# Patient Record
Sex: FEMALE | Race: WHITE | NOT HISPANIC OR LATINO | Employment: UNEMPLOYED | URBAN - NONMETROPOLITAN AREA
[De-identification: names, ages, dates, MRNs, and addresses within clinical notes are randomized per-mention and may not be internally consistent; named-entity substitution may affect disease eponyms.]

---

## 2017-02-15 ENCOUNTER — HOSPITAL ENCOUNTER (OUTPATIENT)
Dept: ULTRASOUND IMAGING | Facility: HOSPITAL | Age: 42
Discharge: HOME | End: 2017-02-15
Admitting: NURSE PRACTITIONER

## 2017-02-15 ENCOUNTER — TRANSCRIBE ORDERS (OUTPATIENT)
Dept: GENERAL RADIOLOGY | Facility: HOSPITAL | Age: 42
End: 2017-02-15

## 2017-02-15 DIAGNOSIS — N63.0 BREAST LUMP: Primary | ICD-10-CM

## 2017-02-15 DIAGNOSIS — N63.0 BREAST LUMP: ICD-10-CM

## 2017-02-15 PROCEDURE — 76642 ULTRASOUND BREAST LIMITED: CPT

## 2017-02-20 ENCOUNTER — TRANSCRIBE ORDERS (OUTPATIENT)
Dept: ADMINISTRATIVE | Facility: HOSPITAL | Age: 42
End: 2017-02-20

## 2017-02-20 DIAGNOSIS — N63.0 BREAST MASS: Primary | ICD-10-CM

## 2017-02-21 ENCOUNTER — APPOINTMENT (OUTPATIENT)
Dept: MRI IMAGING | Facility: HOSPITAL | Age: 42
End: 2017-02-21
Attending: SPECIALIST

## 2017-02-23 ENCOUNTER — APPOINTMENT (OUTPATIENT)
Dept: MRI IMAGING | Facility: HOSPITAL | Age: 42
End: 2017-02-23
Attending: SPECIALIST

## 2017-02-24 ENCOUNTER — HOSPITAL ENCOUNTER (OUTPATIENT)
Dept: MRI IMAGING | Facility: HOSPITAL | Age: 42
Discharge: HOME | End: 2017-02-24
Attending: SPECIALIST | Admitting: SPECIALIST

## 2017-02-24 DIAGNOSIS — N63.0 BREAST MASS: ICD-10-CM

## 2017-02-24 LAB — CREAT BLDA-MCNC: 0.9 MG/DL (ref 0.6–1.3)

## 2017-02-24 PROCEDURE — A9577 INJ MULTIHANCE: HCPCS | Performed by: SPECIALIST

## 2017-02-24 PROCEDURE — 82565 ASSAY OF CREATININE: CPT

## 2017-02-24 PROCEDURE — C8908 MRI W/O FOL W/CONT, BREAST,: HCPCS

## 2017-02-24 PROCEDURE — 0 GADOBENATE DIMEGLUMINE 529 MG/ML SOLUTION: Performed by: SPECIALIST

## 2017-02-24 PROCEDURE — 0159T HC MRI BREAST COMPUTER ANALYSIS: CPT

## 2017-02-24 RX ADMIN — GADOBENATE DIMEGLUMINE 20 ML: 529 INJECTION, SOLUTION INTRAVENOUS at 16:00

## 2017-04-21 ENCOUNTER — HOSPITAL ENCOUNTER (OUTPATIENT)
Age: 42
Setting detail: OUTPATIENT SURGERY
Discharge: HOME | End: 2017-04-21
Attending: ORTHOPAEDIC SURGERY | Admitting: ORTHOPAEDIC SURGERY

## 2017-04-21 ENCOUNTER — HOSPITAL ENCOUNTER (OUTPATIENT)
Dept: GENERAL RADIOLOGY | Age: 42
Discharge: HOME | End: 2017-04-21
Payer: COMMERCIAL

## 2017-04-21 VITALS
RESPIRATION RATE: 18 BRPM | BODY MASS INDEX: 20.76 KG/M2 | OXYGEN SATURATION: 99 % | SYSTOLIC BLOOD PRESSURE: 115 MMHG | HEIGHT: 70 IN | HEART RATE: 69 BPM | DIASTOLIC BLOOD PRESSURE: 68 MMHG | WEIGHT: 145 LBS

## 2017-04-21 DIAGNOSIS — R52 PAIN: ICD-10-CM

## 2017-04-21 PROBLEM — M16.11 PRIMARY OSTEOARTHRITIS OF RIGHT HIP: Status: ACTIVE | Noted: 2017-04-21

## 2017-04-21 PROCEDURE — G8918 PT W/O PREOP ORDER IV AB PRO: HCPCS

## 2017-04-21 PROCEDURE — G8907 PT DOC NO EVENTS ON DISCHARG: HCPCS

## 2017-04-21 PROCEDURE — 20610 DRAIN/INJ JOINT/BURSA W/O US: CPT

## 2017-04-21 PROCEDURE — 3209999900 FLUORO FOR SURGICAL PROCEDURES

## 2017-04-21 RX ORDER — BUPIVACAINE HYDROCHLORIDE 5 MG/ML
INJECTION, SOLUTION EPIDURAL; INTRACAUDAL PRN
Status: DISCONTINUED | OUTPATIENT
Start: 2017-04-21 | End: 2017-04-21 | Stop reason: HOSPADM

## 2017-04-21 RX ORDER — LIDOCAINE HYDROCHLORIDE 10 MG/ML
INJECTION, SOLUTION EPIDURAL; INFILTRATION; INTRACAUDAL; PERINEURAL PRN
Status: DISCONTINUED | OUTPATIENT
Start: 2017-04-21 | End: 2017-04-21 | Stop reason: HOSPADM

## 2017-04-21 RX ORDER — BETAMETHASONE SODIUM PHOSPHATE AND BETAMETHASONE ACETATE 3; 3 MG/ML; MG/ML
INJECTION, SUSPENSION INTRA-ARTICULAR; INTRALESIONAL; INTRAMUSCULAR; SOFT TISSUE PRN
Status: DISCONTINUED | OUTPATIENT
Start: 2017-04-21 | End: 2017-04-21 | Stop reason: HOSPADM

## 2017-06-21 ENCOUNTER — OFFICE VISIT (OUTPATIENT)
Dept: INTERNAL MEDICINE | Age: 42
End: 2017-06-21
Payer: COMMERCIAL

## 2017-06-21 VITALS
HEIGHT: 70 IN | HEART RATE: 86 BPM | WEIGHT: 149 LBS | BODY MASS INDEX: 21.33 KG/M2 | TEMPERATURE: 98.1 F | OXYGEN SATURATION: 99 % | DIASTOLIC BLOOD PRESSURE: 64 MMHG | SYSTOLIC BLOOD PRESSURE: 112 MMHG

## 2017-06-21 DIAGNOSIS — Z00.00 ANNUAL PHYSICAL EXAM: Primary | ICD-10-CM

## 2017-06-21 DIAGNOSIS — Z72.0 TOBACCO ABUSE: ICD-10-CM

## 2017-06-21 DIAGNOSIS — I10 ESSENTIAL HYPERTENSION: ICD-10-CM

## 2017-06-21 DIAGNOSIS — F41.9 ANXIETY: ICD-10-CM

## 2017-06-21 DIAGNOSIS — M16.11 PRIMARY OSTEOARTHRITIS OF RIGHT HIP: ICD-10-CM

## 2017-06-21 PROCEDURE — 99204 OFFICE O/P NEW MOD 45 MIN: CPT | Performed by: NURSE PRACTITIONER

## 2017-06-21 RX ORDER — BUPROPION HYDROCHLORIDE 150 MG/1
150 TABLET, EXTENDED RELEASE ORAL DAILY
COMMUNITY
Start: 2017-05-02 | End: 2017-08-28 | Stop reason: ALTCHOICE

## 2017-06-21 RX ORDER — HYDROCHLOROTHIAZIDE 25 MG/1
12.5 TABLET ORAL DAILY
Qty: 30 TABLET | Refills: 0 | Status: SHIPPED | OUTPATIENT
Start: 2017-06-21 | End: 2017-07-25 | Stop reason: SDUPTHER

## 2017-06-21 RX ORDER — LORAZEPAM 1 MG/1
1 TABLET ORAL PRN
COMMUNITY
Start: 2017-05-02 | End: 2018-01-31 | Stop reason: ALTCHOICE

## 2017-06-21 ASSESSMENT — ENCOUNTER SYMPTOMS
VOMITING: 0
COLOR CHANGE: 0
BACK PAIN: 0
NAUSEA: 0
SHORTNESS OF BREATH: 0
COUGH: 0
PHOTOPHOBIA: 0
RHINORRHEA: 0
VOICE CHANGE: 0

## 2017-06-21 ASSESSMENT — PATIENT HEALTH QUESTIONNAIRE - PHQ9
SUM OF ALL RESPONSES TO PHQ QUESTIONS 1-9: 0
2. FEELING DOWN, DEPRESSED OR HOPELESS: 0
SUM OF ALL RESPONSES TO PHQ9 QUESTIONS 1 & 2: 0
1. LITTLE INTEREST OR PLEASURE IN DOING THINGS: 0

## 2017-07-06 RX ORDER — ATENOLOL 25 MG/1
25 TABLET ORAL DAILY
Qty: 30 TABLET | Refills: 3 | Status: SHIPPED | OUTPATIENT
Start: 2017-07-06 | End: 2017-08-02 | Stop reason: SDUPTHER

## 2017-07-21 DIAGNOSIS — Z00.00 ANNUAL PHYSICAL EXAM: ICD-10-CM

## 2017-07-21 LAB
ALBUMIN SERPL-MCNC: 4 G/DL (ref 3.5–5.2)
ALP BLD-CCNC: 51 U/L (ref 35–104)
ALT SERPL-CCNC: 7 U/L (ref 5–33)
ANION GAP SERPL CALCULATED.3IONS-SCNC: 15 MMOL/L (ref 7–19)
AST SERPL-CCNC: 12 U/L (ref 5–32)
BACTERIA: NEGATIVE /HPF
BASOPHILS ABSOLUTE: 0.1 K/UL (ref 0–0.2)
BASOPHILS RELATIVE PERCENT: 0.9 % (ref 0–1)
BILIRUB SERPL-MCNC: 0.3 MG/DL (ref 0.2–1.2)
BILIRUBIN URINE: NEGATIVE
BLOOD, URINE: ABNORMAL
BUN BLDV-MCNC: 10 MG/DL (ref 6–20)
CALCIUM SERPL-MCNC: 9.4 MG/DL (ref 8.6–10)
CHLORIDE BLD-SCNC: 102 MMOL/L (ref 98–111)
CHOLESTEROL, TOTAL: 182 MG/DL (ref 160–199)
CLARITY: ABNORMAL
CO2: 25 MMOL/L (ref 22–29)
COLOR: YELLOW
CREAT SERPL-MCNC: 0.8 MG/DL (ref 0.5–0.9)
EOSINOPHILS ABSOLUTE: 0.1 K/UL (ref 0–0.6)
EOSINOPHILS RELATIVE PERCENT: 1.9 % (ref 0–5)
EPITHELIAL CELLS, UA: 6 /HPF (ref 0–5)
GFR NON-AFRICAN AMERICAN: >60
GLUCOSE BLD-MCNC: 85 MG/DL (ref 74–109)
GLUCOSE URINE: NEGATIVE MG/DL
HBA1C MFR BLD: 5.4 %
HCT VFR BLD CALC: 39.5 % (ref 37–47)
HDLC SERPL-MCNC: 71 MG/DL (ref 65–121)
HEMOGLOBIN: 13.2 G/DL (ref 12–16)
HYALINE CASTS: 4 /HPF (ref 0–8)
KETONES, URINE: NEGATIVE MG/DL
LDL CHOLESTEROL CALCULATED: 96 MG/DL
LEUKOCYTE ESTERASE, URINE: NEGATIVE
LYMPHOCYTES ABSOLUTE: 2 K/UL (ref 1.1–4.5)
LYMPHOCYTES RELATIVE PERCENT: 35.1 % (ref 20–40)
MCH RBC QN AUTO: 34.4 PG (ref 27–31)
MCHC RBC AUTO-ENTMCNC: 33.4 G/DL (ref 33–37)
MCV RBC AUTO: 102.9 FL (ref 81–99)
MONOCYTES ABSOLUTE: 0.5 K/UL (ref 0–0.9)
MONOCYTES RELATIVE PERCENT: 8.3 % (ref 0–10)
NEUTROPHILS ABSOLUTE: 3.1 K/UL (ref 1.5–7.5)
NEUTROPHILS RELATIVE PERCENT: 53.6 % (ref 50–65)
NITRITE, URINE: NEGATIVE
PDW BLD-RTO: 11.5 % (ref 11.5–14.5)
PH UA: 5.5
PLATELET # BLD: 230 K/UL (ref 130–400)
PMV BLD AUTO: 10.3 FL (ref 9.4–12.3)
POTASSIUM SERPL-SCNC: 3.6 MMOL/L (ref 3.5–5)
PROTEIN UA: NEGATIVE MG/DL
RBC # BLD: 3.84 M/UL (ref 4.2–5.4)
RBC UA: 2 /HPF (ref 0–4)
SODIUM BLD-SCNC: 142 MMOL/L (ref 136–145)
SPECIFIC GRAVITY UA: 1.02
TOTAL PROTEIN: 7.3 G/DL (ref 6.6–8.7)
TRIGL SERPL-MCNC: 74 MG/DL (ref 150–199)
TSH SERPL DL<=0.05 MIU/L-ACNC: 2.56 UIU/ML (ref 0.27–4.2)
UROBILINOGEN, URINE: 0.2 E.U./DL
WBC # BLD: 5.8 K/UL (ref 4.8–10.8)
WBC UA: 1 /HPF (ref 0–5)

## 2017-07-23 LAB — VITAMIN D 1,25-DIHYDROXY: 28.5 PG/ML (ref 19.9–79.3)

## 2017-07-25 ENCOUNTER — TELEPHONE (OUTPATIENT)
Dept: CARDIOLOGY | Age: 42
End: 2017-07-25

## 2017-07-25 ENCOUNTER — OFFICE VISIT (OUTPATIENT)
Dept: INTERNAL MEDICINE | Age: 42
End: 2017-07-25
Payer: COMMERCIAL

## 2017-07-25 VITALS
TEMPERATURE: 98.4 F | BODY MASS INDEX: 21.47 KG/M2 | SYSTOLIC BLOOD PRESSURE: 132 MMHG | HEART RATE: 65 BPM | HEIGHT: 70 IN | DIASTOLIC BLOOD PRESSURE: 80 MMHG | OXYGEN SATURATION: 99 % | WEIGHT: 150 LBS

## 2017-07-25 DIAGNOSIS — F41.9 ANXIETY: Primary | ICD-10-CM

## 2017-07-25 DIAGNOSIS — R53.83 FATIGUE, UNSPECIFIED TYPE: ICD-10-CM

## 2017-07-25 DIAGNOSIS — Z71.6 TOBACCO ABUSE COUNSELING: ICD-10-CM

## 2017-07-25 DIAGNOSIS — I10 ESSENTIAL HYPERTENSION: ICD-10-CM

## 2017-07-25 DIAGNOSIS — Z72.0 TOBACCO ABUSE DISORDER: ICD-10-CM

## 2017-07-25 DIAGNOSIS — E55.9 VITAMIN D DEFICIENCY: ICD-10-CM

## 2017-07-25 DIAGNOSIS — I49.3 PVC (PREMATURE VENTRICULAR CONTRACTION): Chronic | ICD-10-CM

## 2017-07-25 PROCEDURE — 99214 OFFICE O/P EST MOD 30 MIN: CPT | Performed by: NURSE PRACTITIONER

## 2017-07-25 PROCEDURE — 93000 ELECTROCARDIOGRAM COMPLETE: CPT | Performed by: NURSE PRACTITIONER

## 2017-07-25 RX ORDER — ERGOCALCIFEROL (VITAMIN D2) 1250 MCG
50000 CAPSULE ORAL
Qty: 4 CAPSULE | Refills: 3 | Status: SHIPPED | OUTPATIENT
Start: 2017-07-27 | End: 2017-10-27 | Stop reason: ALTCHOICE

## 2017-07-25 RX ORDER — ESCITALOPRAM OXALATE 5 MG/1
5 TABLET ORAL DAILY
Qty: 30 TABLET | Refills: 0 | Status: SHIPPED | OUTPATIENT
Start: 2017-07-25 | End: 2017-08-30 | Stop reason: ALTCHOICE

## 2017-07-25 RX ORDER — HYDROCHLOROTHIAZIDE 25 MG/1
25 TABLET ORAL DAILY
Qty: 30 TABLET | Refills: 0 | Status: SHIPPED | OUTPATIENT
Start: 2017-07-25 | End: 2017-08-28 | Stop reason: SDUPTHER

## 2017-07-25 RX ORDER — ERGOCALCIFEROL (VITAMIN D2) 1250 MCG
50000 CAPSULE ORAL WEEKLY
Qty: 4 CAPSULE | Refills: 3 | Status: SHIPPED | OUTPATIENT
Start: 2017-07-25 | End: 2017-07-25 | Stop reason: SDUPTHER

## 2017-07-25 ASSESSMENT — ENCOUNTER SYMPTOMS
BACK PAIN: 0
COLOR CHANGE: 0
VOICE CHANGE: 0
NAUSEA: 0
RHINORRHEA: 0
VOMITING: 0
PHOTOPHOBIA: 0
SHORTNESS OF BREATH: 0
COUGH: 0

## 2017-08-02 RX ORDER — ATENOLOL 25 MG/1
25 TABLET ORAL DAILY
Qty: 30 TABLET | Refills: 3 | Status: SHIPPED | OUTPATIENT
Start: 2017-08-02 | End: 2017-09-27 | Stop reason: DRUGHIGH

## 2017-08-28 ENCOUNTER — OFFICE VISIT (OUTPATIENT)
Dept: INTERNAL MEDICINE | Age: 42
End: 2017-08-28
Payer: COMMERCIAL

## 2017-08-28 VITALS
HEART RATE: 64 BPM | WEIGHT: 151 LBS | BODY MASS INDEX: 21.62 KG/M2 | SYSTOLIC BLOOD PRESSURE: 108 MMHG | DIASTOLIC BLOOD PRESSURE: 60 MMHG | HEIGHT: 70 IN | TEMPERATURE: 98.6 F | OXYGEN SATURATION: 97 %

## 2017-08-28 DIAGNOSIS — K21.9 GASTROESOPHAGEAL REFLUX DISEASE WITHOUT ESOPHAGITIS: ICD-10-CM

## 2017-08-28 DIAGNOSIS — F41.9 ANXIETY: Primary | ICD-10-CM

## 2017-08-28 PROCEDURE — 99213 OFFICE O/P EST LOW 20 MIN: CPT | Performed by: NURSE PRACTITIONER

## 2017-08-28 RX ORDER — PANTOPRAZOLE SODIUM 20 MG/1
20 TABLET, DELAYED RELEASE ORAL DAILY
Qty: 30 TABLET | Refills: 3 | Status: SHIPPED | OUTPATIENT
Start: 2017-08-28 | End: 2018-01-08 | Stop reason: SDUPTHER

## 2017-08-28 RX ORDER — HYDROCHLOROTHIAZIDE 25 MG/1
25 TABLET ORAL DAILY
Qty: 30 TABLET | Refills: 5 | Status: SHIPPED | OUTPATIENT
Start: 2017-08-28

## 2017-08-28 RX ORDER — BUPROPION HYDROCHLORIDE 300 MG/1
300 TABLET ORAL EVERY MORNING
Qty: 30 TABLET | Refills: 1 | Status: SHIPPED | OUTPATIENT
Start: 2017-08-28 | End: 2017-11-06

## 2017-08-28 ASSESSMENT — ENCOUNTER SYMPTOMS
SHORTNESS OF BREATH: 0
COLOR CHANGE: 0
BACK PAIN: 0
COUGH: 0
VOICE CHANGE: 0
VOMITING: 0
NAUSEA: 0
PHOTOPHOBIA: 0
RHINORRHEA: 0

## 2017-08-30 ENCOUNTER — OFFICE VISIT (OUTPATIENT)
Dept: CARDIOLOGY | Age: 42
End: 2017-08-30
Payer: COMMERCIAL

## 2017-08-30 VITALS
BODY MASS INDEX: 23.34 KG/M2 | HEART RATE: 68 BPM | HEIGHT: 68 IN | DIASTOLIC BLOOD PRESSURE: 70 MMHG | SYSTOLIC BLOOD PRESSURE: 100 MMHG | WEIGHT: 154 LBS

## 2017-08-30 DIAGNOSIS — R07.89 OTHER CHEST PAIN: ICD-10-CM

## 2017-08-30 DIAGNOSIS — I10 ESSENTIAL HYPERTENSION: ICD-10-CM

## 2017-08-30 DIAGNOSIS — F41.9 ANXIETY: ICD-10-CM

## 2017-08-30 DIAGNOSIS — Z72.0 TOBACCO ABUSE: ICD-10-CM

## 2017-08-30 DIAGNOSIS — R00.2 PALPITATIONS: Primary | ICD-10-CM

## 2017-08-30 PROCEDURE — 99204 OFFICE O/P NEW MOD 45 MIN: CPT | Performed by: CLINICAL NURSE SPECIALIST

## 2017-08-30 RX ORDER — LANOLIN ALCOHOL/MO/W.PET/CERES
1000 CREAM (GRAM) TOPICAL DAILY
COMMUNITY
End: 2017-10-27

## 2017-08-30 ASSESSMENT — ENCOUNTER SYMPTOMS: SHORTNESS OF BREATH: 1

## 2017-08-31 ASSESSMENT — ENCOUNTER SYMPTOMS
HEARTBURN: 0
COUGH: 0
NAUSEA: 0
BLURRED VISION: 0
ORTHOPNEA: 0
VOMITING: 0

## 2017-09-19 ENCOUNTER — HOSPITAL ENCOUNTER (OUTPATIENT)
Dept: NON INVASIVE DIAGNOSTICS | Age: 42
Discharge: HOME | End: 2017-09-19
Payer: COMMERCIAL

## 2017-09-19 DIAGNOSIS — R07.89 OTHER CHEST PAIN: ICD-10-CM

## 2017-09-19 DIAGNOSIS — R00.2 PALPITATIONS: ICD-10-CM

## 2017-09-19 LAB
LV EF: 58 %
LVEF MODALITY: NORMAL

## 2017-09-19 PROCEDURE — 93017 CV STRESS TEST TRACING ONLY: CPT

## 2017-09-19 PROCEDURE — 93306 TTE W/DOPPLER COMPLETE: CPT

## 2017-09-27 ENCOUNTER — OFFICE VISIT (OUTPATIENT)
Dept: CARDIOLOGY | Age: 42
End: 2017-09-27
Payer: COMMERCIAL

## 2017-09-27 VITALS
DIASTOLIC BLOOD PRESSURE: 70 MMHG | HEIGHT: 69 IN | HEART RATE: 72 BPM | SYSTOLIC BLOOD PRESSURE: 110 MMHG | BODY MASS INDEX: 22.96 KG/M2 | WEIGHT: 155 LBS

## 2017-09-27 DIAGNOSIS — I49.9 VENTRICULAR ARRHYTHMIA: ICD-10-CM

## 2017-09-27 DIAGNOSIS — I49.8 ATRIAL ARRHYTHMIA: Primary | ICD-10-CM

## 2017-09-27 PROCEDURE — 99213 OFFICE O/P EST LOW 20 MIN: CPT | Performed by: INTERNAL MEDICINE

## 2017-09-27 RX ORDER — ATENOLOL 25 MG/1
25 TABLET ORAL 2 TIMES DAILY
Qty: 60 TABLET | Refills: 5 | Status: SHIPPED | OUTPATIENT
Start: 2017-09-27 | End: 2018-07-23 | Stop reason: SDUPTHER

## 2017-09-27 RX ORDER — ATENOLOL 25 MG/1
25 TABLET ORAL 2 TIMES DAILY
COMMUNITY
End: 2017-09-27 | Stop reason: SDUPTHER

## 2017-09-27 NOTE — PATIENT INSTRUCTIONS
Irregular heart rhythm. Increase atenolol to 25 mg by mouth twice daily. Take your hydrochlorothiazide as needed. ____________________    Orthostatic hypotension -- also called postural hypotension -- is a form of low blood pressure that happens when you stand up from sitting or lying down. Orthostatic hypotension can make you feel dizzy or lightheaded, and maybe even faint. You should be careful and stand up slowly before attempting to start walking. It's usually worse in the morning than in the afternoon. _____________________    Understanding Blood Pressure Readings    Blood pressure is typically recorded as two numbers, written as a ratio like this:      Systolic:  The top number, which is also the higher of the two numbers, measures the pressure in the arteries when the heart beats (when the heart muscle contracts). Diastolic:  The bottom number, which is also the lower of the two numbers, measures the pressure in the arteries between heartbeats (when the heart muscle is resting between beats and refilling with blood). This chart reflects blood pressure categories defined by the American Heart Association. Blood Pressure  Category Systolic  mm Hg (upper #)  Diastolic  mm Hg (lower #)   Normal   less than 120 and less than 80   Prehypertension 120 - 139 or 80 - 89   High Blood Pressure  (Hypertension) Stage 1 140 - 159 or 90 - 99   High Blood Pressure  (Hypertension) Stage 2 160 or higher or 100 or higher   Hypertensive Crisis Higher than 180 or Higher than 110     Take your blood pressure a couple times a day at different times of the day. If readings are anymore than 230-169 or more systolic, or 90 or above diastolic, with regularity, let us know. We may need to make a medication adjustment. If you are a diabetic, the goal is 130/80 or less.      Lifestyle changes you can do to help control your blood pressure at home, including:   Eat a heart-healthy diet, including potassium and fiber.   Drink plenty of water. Exercise regularly for at least 30 minutes of aerobic exercise a day. If you smoke, quit. Limit how much alcohol you drink to 1 drink a day for women, and 2 a day for men. Limit the amount of sodium (salt) you eat. Aim for less than 1,500 mg per day. Reduce stress. Try to avoid things that cause you stress. Stay at a healthy body weight.     -----------------------    What's a normal resting heart rate? A normal resting heart rate for adults ranges from 60 to 100 beats a minute. Generally, a lower heart rate at rest implies more efficient heart function and better cardiovascular fitness. To measure your heart rate, simply check your pulse. Place your index and third fingers on your neck to the side of your windpipe. To check your pulse at your wrist, place two fingers between the bone and the tendon over your radial artery which is located on the thumb side of your wrist.  When you feel your pulse, count the number of beats in 15 seconds. Multiply this number by 4 to calculate your beats per minute. Keep in mind that many factors can influence heart rate, including: Activity level, Fitness level, Air temperature, Body position (standing up or lying down, for example), Emotions, Body size, Medications. Although there's a wide range of normal, and unusually high or low heart rate may indicate an underlying problem. Consult your doctor if your resting heart rate is consistently above 100 beats a minute (tachycardia) or below 60 beats a minute (bradycardia) especially if you have other signs or symptoms, such as fainting, dizziness or shortness of breath.

## 2017-09-27 NOTE — MR AVS SNAPSHOT
After Visit Summary             Shu Mascorro   2017 3:30 PM   Office Visit    Description:  Female : 1975   Provider:  Shailesh Chapman MD   Department:  [facility] [address] [place] [address] and Future Appointments         Below is a list of your follow-up and future appointments. This may not be a complete list as you may have made appointments directly with providers that we are not aware of or your providers may have made some for you. Please call your providers to confirm appointments. It is important to keep your appointments. Please bring your current insurance card, photo ID, co-pay, and all medication bottles to your appointment. If self-pay, payment is expected at the time of service. Your To-Do List     Future Appointments Provider Department Dept Phone    43/3/3706 01:26 AM Chriscruz Mathew, [address] [facility] [phone]    Please arrive 15 minutes prior to appointment time, bring insurance card and photo ID. Follow-Up    Return for 1 month with José Antonio Kan M.D.. Information from Your Visit        Department     Name Address Phone Fax    [facility] [address]. [address] [address] [phone]      Vital Signs     Blood Pressure Pulse Height Weight Last Menstrual Period Body Mass Index    110/70 72 5' 9\" (1.753 m) 155 lb (70.3 kg) 2014 22.89 kg/m2    Smoking Status                   Current Every Day Smoker           Instructions    Irregular heart rhythm. Increase atenolol to 25 mg by mouth twice daily. Take your hydrochlorothiazide as needed. ____________________    Orthostatic hypotension  also called postural hypotension  is a form of low blood pressure that happens when you stand up from sitting or lying down. Orthostatic hypotension can make you feel dizzy or lightheaded, and maybe even faint.   You should be careful and stand up slowly before

## 2017-09-28 ENCOUNTER — TELEPHONE (OUTPATIENT)
Dept: INTERNAL MEDICINE | Age: 42
End: 2017-09-28

## 2017-09-29 NOTE — PROGRESS NOTES
[facility]  [facility], 100 [facility], [address] [place] [address], [address] [facility]  The following was transcribed by SIOBHAN Sneed : 1975, 38 y.o. Female        Office Visit:  2017    Chief Complaint   Patient presents with    Follow-up     4 week follow up, review ZIO patch results. pt states no cardiac symptoms       HISTORY OF PRESENT ILLNESS  Ms. Adonis Sparks is seen here for atrial and ventricular arrhythmias. She presents having had both premature ventricular contractions, premature atrial contractions, and nonsustained events. Fortunately, she is having no syncope, no angina, and no overt heart failure.       Patient Active Problem List   Diagnosis Code    Uterine mass N85.9    S/P laparoscopic cholecystectomy Z90.49    Primary osteoarthritis of right hip M16.11    Essential hypertension I10    Anxiety F41.9    Tobacco abuse Z72.0    PVC (premature ventricular contraction) I49.3    Gastroesophageal reflux disease without esophagitis K21.9     Past Medical History:   Diagnosis Date    PVC (premature ventricular contraction)     Uterine mass 14    9x9 cm on CT scan     Past Surgical History:   Procedure Laterality Date    BREAST SURGERY Bilateral 4034    silicone Memory Gel    CHOLECYSTECTOMY  14    HC INJECT OTHER PERPHRL NERV Right 8/3/2016    HIP FLUOROSCOPIC GUIDED CORTICOSTEROID INJECTION  performed by Radha Yoo MD at [facility] NERV Right 2017    FLURO GUIDED HIP INJECTION performed by Stanley Zuleta MD at [address] [place] [address]      Family History   Problem Relation Age of Onset    Cancer Brother      synovial cell sarcoma    Cancer Maternal Grandfather      lung    Other Daughter      gallbladder disease    Hypertension Mother      Social History   Substance Use Topics    Smoking status: Current Every Day Smoker     Packs/day: HEMATOLOGICAL:   Negative for bruising and bleeding easily. PSYCHIATRIC/BEHAVIORAL:   No excessive anxiety or confusion. Except as noted in the HPI, all other systems are negative. PHYSICAL EXAMINATION  GENERAL:  Alert and oriented x3 in no apparent distress. Short-term and long-term memory intact. Judgment intact. Oriented to time, place and person. No depression, anxiety or agitation. Vital Signs:  /70  Pulse 72  Ht 5' 9\" (1.753 m)  Wt 155 lb (70.3 kg)  LMP 06/08/2014  BMI 22.89 kg/m2   HEAD:  Normocephalic without evidence of old or recent trauma. EYES:  Sclerae clear. Conjunctivae pink. Pupils equal and round. NOSE:  Negative nasal discharge or epistaxis. THROAT:  No lesions on lips or buccal mucosa. NECK:  Supple without mass or JVD. Carotid pulses 2+ to palpation bilaterally without bruit. No thyromegaly noted. CHEST:  Equal bilateral expansion. RESPIRATORY:  The lungs are clear to auscultation. Normal respiratory effort. CARDIOVASCULAR: The heart demonstrates regular rhythm with a normal rate. No audible murmurs or gallops. ABDOMEN:  Soft, nontender. Exhibits no distension. Bowel sounds are normal.   UPPER EXTREMITY EVALUATION:  Radial pulses palpable bilaterally. No cyanosis, clubbing or edema. LOWER EXTREMITY EVALUATION:  Femoral, popliteal, dorsalis pedis, and posterior tibialis pulses 2+ to palpation bilaterally. No cyanosis, clubbing, or peripheral edema. SKIN:  Warm and dry. MUSCULOSKELETAL:  Normal muscle strength and tone. SKIN:  Warm, dry. NEUROLOGIC:  Cranial nerves II through XII are grossly intact. IMPRESSION / PLAN  1. Symptomatic arrhythmia - we are going to increase her Tenormin to 25 mg orally twice daily using her hydrochlorothiazide as needed. 2.  We will see her back in a month and see how she is doing.       Orders Placed This Encounter   Medications    atenolol (TENORMIN) 25 MG tablet     Sig: Take 1 tablet by mouth 2 times daily     Dispense:  60 tablet     Refill:  5     __________________________________  Silvia Ruiz. Daya Hadley M.D., Ph.D., F.A.C.C.   [facility]    cc (pcp): WHITNEY Gaona

## 2017-10-06 ENCOUNTER — TELEPHONE (OUTPATIENT)
Dept: CARDIOLOGY | Age: 42
End: 2017-10-06

## 2017-10-27 ENCOUNTER — OFFICE VISIT (OUTPATIENT)
Dept: CARDIOLOGY | Age: 42
End: 2017-10-27
Payer: COMMERCIAL

## 2017-10-27 VITALS
DIASTOLIC BLOOD PRESSURE: 68 MMHG | HEIGHT: 69 IN | BODY MASS INDEX: 23.11 KG/M2 | WEIGHT: 156 LBS | SYSTOLIC BLOOD PRESSURE: 110 MMHG | HEART RATE: 58 BPM

## 2017-10-27 DIAGNOSIS — I49.8 ATRIAL ARRHYTHMIA: Primary | ICD-10-CM

## 2017-10-27 PROCEDURE — 99213 OFFICE O/P EST LOW 20 MIN: CPT | Performed by: INTERNAL MEDICINE

## 2017-11-03 NOTE — PROGRESS NOTES
[facility]  [facility], [address] [place] [address] Drive, [address] [place] [address], [address] [place]  The following was transcribed by SIOBHAN Molina: 1975, 38 y.o. Female        Office Visit:  10/27/2017    Chief Complaint   Patient presents with    1 Month Follow-Up     1-month follow up - symptomatic arrhythmia, we increased Tenormin to 25 mg BID and HCTZ as needed. pt states no cardiac symptoms       HISTORY OF PRESENT ILLNESS  Ms. Taina Granados is here for a one month follow up. Faith Francisco presents doing better, however she still has some worrisome palpitations. There is no angina, no overt heart failure, and no syncope.       Patient Active Problem List   Diagnosis Code    Uterine mass N85.9    S/P laparoscopic cholecystectomy Z90.49    Primary osteoarthritis of right hip M16.11    Essential hypertension I10    Anxiety F41.9    Tobacco abuse Z72.0    PVC (premature ventricular contraction) I49.3    Gastroesophageal reflux disease without esophagitis K21.9     Past Medical History:   Diagnosis Date    PVC (premature ventricular contraction)     Uterine mass 14    9x9 cm on CT scan     Past Surgical History:   Procedure Laterality Date    BREAST SURGERY Bilateral     silicone Memory Gel    CHOLECYSTECTOMY  14    HC INJECT OTHER PERPHRL NERV Right 8/3/2016    HIP FLUOROSCOPIC GUIDED CORTICOSTEROID INJECTION  performed by Terra Corbin MD at [facility] Right 2017    FLURO GUIDED HIP INJECTION performed by Sarah Sandoval MD at [address] [place] [address]      Family History   Problem Relation Age of Onset    Cancer Brother      synovial cell sarcoma    Cancer Maternal Grandfather      lung    Other Daughter      gallbladder disease    Hypertension Mother      Social History   Substance Use Topics    Smoking status: Current Every Day Smoker     Packs/day: 0.50

## 2017-11-06 ENCOUNTER — OFFICE VISIT (OUTPATIENT)
Dept: INTERNAL MEDICINE | Age: 42
End: 2017-11-06
Payer: COMMERCIAL

## 2017-11-06 VITALS
TEMPERATURE: 99.1 F | SYSTOLIC BLOOD PRESSURE: 100 MMHG | OXYGEN SATURATION: 99 % | BODY MASS INDEX: 23.34 KG/M2 | WEIGHT: 154 LBS | HEIGHT: 68 IN | DIASTOLIC BLOOD PRESSURE: 60 MMHG | HEART RATE: 70 BPM

## 2017-11-06 DIAGNOSIS — I49.9 CARDIAC ARRHYTHMIA, UNSPECIFIED CARDIAC ARRHYTHMIA TYPE: ICD-10-CM

## 2017-11-06 DIAGNOSIS — Z71.3 DIETARY COUNSELING: ICD-10-CM

## 2017-11-06 DIAGNOSIS — I10 ESSENTIAL HYPERTENSION: ICD-10-CM

## 2017-11-06 DIAGNOSIS — F41.9 ANXIETY: Primary | ICD-10-CM

## 2017-11-06 PROCEDURE — 99214 OFFICE O/P EST MOD 30 MIN: CPT | Performed by: NURSE PRACTITIONER

## 2017-11-06 PROCEDURE — 99401 PREV MED CNSL INDIV APPRX 15: CPT | Performed by: NURSE PRACTITIONER

## 2017-11-06 RX ORDER — BUSPIRONE HYDROCHLORIDE 10 MG/1
10 TABLET ORAL 3 TIMES DAILY
Qty: 90 TABLET | Refills: 1 | Status: SHIPPED | OUTPATIENT
Start: 2017-11-06 | End: 2018-07-16

## 2017-11-06 ASSESSMENT — ENCOUNTER SYMPTOMS
COLOR CHANGE: 0
PHOTOPHOBIA: 0
BACK PAIN: 0
SHORTNESS OF BREATH: 0
NAUSEA: 0
VOMITING: 0
COUGH: 0
VOICE CHANGE: 0
RHINORRHEA: 0

## 2017-11-06 NOTE — PROGRESS NOTES
lung    Other Daughter      gallbladder disease    Hypertension Mother        Social History   Substance Use Topics    Smoking status: Current Every Day Smoker     Packs/day: 0.50     Years: 20.00     Types: Cigarettes    Smokeless tobacco: Never Used    Alcohol use Yes      Comment: occ      Current Outpatient Prescriptions   Medication Sig Dispense Refill    busPIRone (BUSPAR) 10 MG tablet Take 1 tablet by mouth 3 times daily 90 tablet 1    atenolol (TENORMIN) 25 MG tablet Take 1 tablet by mouth 2 times daily 60 tablet 5    pantoprazole (PROTONIX) 20 MG tablet Take 1 tablet by mouth daily 30 tablet 3    hydrochlorothiazide (HYDRODIURIL) 25 MG tablet Take 1 tablet by mouth daily (Patient taking differently: Take 25 mg by mouth as needed ) 30 tablet 5    LORazepam (ATIVAN) 1 MG tablet Take 1 mg by mouth as needed       No current facility-administered medications for this visit. Allergies   Allergen Reactions    Percocet [Oxycodone-Acetaminophen] Itching    Codeine     Toradol [Ketorolac Tromethamine] Itching       Health Maintenance   Topic Date Due    HIV screen  08/01/1988    Pneumococcal med risk (1 of 1 - PPSV23) 08/01/1992    Cervical cancer screen  08/01/1994    DTaP/Tdap/Td vaccine (1 - Tdap) 06/02/2009    Flu vaccine (1) 09/01/2017    Lipid screen  07/21/2022       Subjective:      Review of Systems   Constitutional: Negative for chills and fever. HENT: Negative for ear pain, hearing loss, rhinorrhea and voice change. Eyes: Negative for photophobia and visual disturbance. Respiratory: Negative for cough and shortness of breath. Cardiovascular: Negative for chest pain and palpitations. Gastrointestinal: Negative for nausea and vomiting. Endocrine: Negative. Negative for cold intolerance and heat intolerance. Genitourinary: Negative for difficulty urinating and flank pain. Musculoskeletal: Negative for back pain and neck pain.    Skin: Negative for color change

## 2017-11-06 NOTE — PATIENT INSTRUCTIONS
1. Buspar 10 mg three times daily. 2. Follow-up in 6 months for routine visit. 3. Call for worsening symptoms or concerns.

## 2017-11-10 LAB
C-REACTIVE PROTEIN: <0.03 MG/DL (ref 0–0.5)
SEDIMENTATION RATE, ERYTHROCYTE: 10 MM/HR (ref 0–20)
URIC ACID, SERUM: 5.4 MG/DL (ref 2.4–5.7)

## 2017-12-15 ENCOUNTER — TELEPHONE (OUTPATIENT)
Dept: INTERNAL MEDICINE | Age: 42
End: 2017-12-15

## 2017-12-15 NOTE — TELEPHONE ENCOUNTER
Pt is wanting to know if she can get something called in for a bladder infection?   She didn't say what pharmacy due to her leaving it on the voicemail

## 2017-12-19 RX ORDER — PHENAZOPYRIDINE HYDROCHLORIDE 100 MG/1
100 TABLET, FILM COATED ORAL 3 TIMES DAILY PRN
Qty: 30 TABLET | Refills: 0 | Status: SHIPPED | OUTPATIENT
Start: 2017-12-19 | End: 2018-01-10

## 2017-12-19 RX ORDER — NITROFURANTOIN 25; 75 MG/1; MG/1
100 CAPSULE ORAL 2 TIMES DAILY
Qty: 20 CAPSULE | Refills: 0 | Status: SHIPPED | OUTPATIENT
Start: 2017-12-19 | End: 2017-12-29

## 2018-01-08 RX ORDER — PANTOPRAZOLE SODIUM 20 MG/1
20 TABLET, DELAYED RELEASE ORAL DAILY
Qty: 30 TABLET | Refills: 11 | Status: SHIPPED | OUTPATIENT
Start: 2018-01-08 | End: 2018-07-16 | Stop reason: SDUPTHER

## 2018-01-08 NOTE — TELEPHONE ENCOUNTER
Vielka Mejias called requesting a refill of the below medication which has been pended for you:     Requested Prescriptions     Pending Prescriptions Disp Refills    pantoprazole (PROTONIX) 20 MG tablet 30 tablet 11     Sig: Take 1 tablet by mouth daily       Last Appointment Date: 11/6/2017  Next Appointment Date: 1/10/2018    Allergies   Allergen Reactions    Percocet [Oxycodone-Acetaminophen] Itching    Codeine     Toradol [Ketorolac Tromethamine] Itching

## 2018-01-10 ENCOUNTER — OFFICE VISIT (OUTPATIENT)
Dept: INTERNAL MEDICINE | Age: 43
End: 2018-01-10
Payer: COMMERCIAL

## 2018-01-10 VITALS
BODY MASS INDEX: 22.81 KG/M2 | TEMPERATURE: 97.9 F | OXYGEN SATURATION: 98 % | HEART RATE: 59 BPM | DIASTOLIC BLOOD PRESSURE: 64 MMHG | SYSTOLIC BLOOD PRESSURE: 102 MMHG | HEIGHT: 69 IN | WEIGHT: 154 LBS

## 2018-01-10 DIAGNOSIS — R59.0 LYMPHADENOPATHY, AXILLARY: ICD-10-CM

## 2018-01-10 DIAGNOSIS — R59.0 LYMPHADENOPATHY, AXILLARY: Primary | ICD-10-CM

## 2018-01-10 DIAGNOSIS — Z87.898 HISTORY OF ABNORMAL MAMMOGRAM: ICD-10-CM

## 2018-01-10 DIAGNOSIS — B37.0 THRUSH: ICD-10-CM

## 2018-01-10 LAB
ALBUMIN SERPL-MCNC: 4.4 G/DL (ref 3.5–5.2)
ALP BLD-CCNC: 50 U/L (ref 35–104)
ALT SERPL-CCNC: 7 U/L (ref 5–33)
ANION GAP SERPL CALCULATED.3IONS-SCNC: 14 MMOL/L (ref 7–19)
AST SERPL-CCNC: 14 U/L (ref 5–32)
BASOPHILS ABSOLUTE: 0.1 K/UL (ref 0–0.2)
BASOPHILS RELATIVE PERCENT: 1.2 % (ref 0–1)
BILIRUB SERPL-MCNC: 0.3 MG/DL (ref 0.2–1.2)
BUN BLDV-MCNC: 7 MG/DL (ref 6–20)
CALCIUM SERPL-MCNC: 9.6 MG/DL (ref 8.6–10)
CHLORIDE BLD-SCNC: 104 MMOL/L (ref 98–111)
CO2: 24 MMOL/L (ref 22–29)
CREAT SERPL-MCNC: 0.8 MG/DL (ref 0.5–0.9)
EOSINOPHILS ABSOLUTE: 0.1 K/UL (ref 0–0.6)
EOSINOPHILS RELATIVE PERCENT: 1.2 % (ref 0–5)
GFR NON-AFRICAN AMERICAN: >60
GLUCOSE BLD-MCNC: 85 MG/DL (ref 74–109)
HCT VFR BLD CALC: 38.5 % (ref 37–47)
HEMOGLOBIN: 12.7 G/DL (ref 12–16)
LACTATE DEHYDROGENASE: 157 U/L (ref 91–215)
LYMPHOCYTES ABSOLUTE: 2.3 K/UL (ref 1.1–4.5)
LYMPHOCYTES RELATIVE PERCENT: 43.2 % (ref 20–40)
MCH RBC QN AUTO: 32.7 PG (ref 27–31)
MCHC RBC AUTO-ENTMCNC: 33 G/DL (ref 33–37)
MCV RBC AUTO: 99.2 FL (ref 81–99)
MONO TEST: NEGATIVE
MONOCYTES ABSOLUTE: 0.3 K/UL (ref 0–0.9)
MONOCYTES RELATIVE PERCENT: 4.8 % (ref 0–10)
NEUTROPHILS ABSOLUTE: 2.6 K/UL (ref 1.5–7.5)
NEUTROPHILS RELATIVE PERCENT: 49.4 % (ref 50–65)
PDW BLD-RTO: 11.2 % (ref 11.5–14.5)
PLATELET # BLD: 239 K/UL (ref 130–400)
PMV BLD AUTO: 10.3 FL (ref 9.4–12.3)
POTASSIUM SERPL-SCNC: 4 MMOL/L (ref 3.5–5)
RBC # BLD: 3.88 M/UL (ref 4.2–5.4)
SODIUM BLD-SCNC: 142 MMOL/L (ref 136–145)
TOTAL PROTEIN: 7.3 G/DL (ref 6.6–8.7)
WBC # BLD: 5.2 K/UL (ref 4.8–10.8)

## 2018-01-10 PROCEDURE — 99214 OFFICE O/P EST MOD 30 MIN: CPT | Performed by: NURSE PRACTITIONER

## 2018-01-10 RX ORDER — FLUCONAZOLE 150 MG/1
150 TABLET ORAL DAILY
Qty: 1 TABLET | Refills: 0 | Status: SHIPPED | OUTPATIENT
Start: 2018-01-10 | End: 2018-01-11

## 2018-01-10 RX ORDER — ONDANSETRON 4 MG/1
4 TABLET, ORALLY DISINTEGRATING ORAL EVERY 8 HOURS PRN
Qty: 30 TABLET | Refills: 0 | Status: SHIPPED | OUTPATIENT
Start: 2018-01-10 | End: 2018-04-19 | Stop reason: SDUPTHER

## 2018-01-10 RX ORDER — AMOXICILLIN AND CLAVULANATE POTASSIUM 875; 125 MG/1; MG/1
1 TABLET, FILM COATED ORAL 2 TIMES DAILY
Qty: 20 TABLET | Refills: 0 | Status: SHIPPED | OUTPATIENT
Start: 2018-01-10 | End: 2018-01-15 | Stop reason: ALTCHOICE

## 2018-01-10 ASSESSMENT — ENCOUNTER SYMPTOMS
PHOTOPHOBIA: 0
RHINORRHEA: 0
VOMITING: 0
COLOR CHANGE: 0
NAUSEA: 0
BACK PAIN: 0
SHORTNESS OF BREATH: 0
VOICE CHANGE: 0
COUGH: 0

## 2018-01-10 NOTE — PROGRESS NOTES
Musculoskeletal: Normal range of motion. Lymphadenopathy:     She has axillary adenopathy. Left axillary: Lateral adenopathy present. approx 2 cm palpable, mildly tender   Neurological: She is alert and oriented to person, place, and time. Skin: Skin is warm and dry. Psychiatric: She has a normal mood and affect. Her behavior is normal.   Nursing note and vitals reviewed. /64 (Site: Left Arm, Position: Sitting)   Pulse 59   Temp 97.9 °F (36.6 °C)   Ht 5' 9\" (1.753 m)   Wt 154 lb (69.9 kg)   LMP 06/08/2014   SpO2 98%   BMI 22.74 kg/m²     Assessment:      1. Lymphadenopathy, axillary  [facility] [place], [place], [place]    Comprehensive Metabolic Panel    CBC Auto Differential    Bartonella Henselae (Cat [place]ratch) Antibodies IgG & IgM by IFA    Lactate Dehydrogenase    Mononucleosis Screen   2. History of abnormal mammogram  [facility] [address]   3. Thrush         Plan: We will treat with additional antibiotic therapy and obtain labs. I would like patient to have further evaluation per general surgery especially due to her history. Patient is to follow-up for worsening condition or concerns. 1. Labs today. 2. Begin Augmentin 875 mg twice daily x 10 days as prescribed. 3. Referral to general surgery for further evaluation. 4. Call for worsening symptoms or concerns. 5. Nystatin and diflucan sent to pharmacy. Take as directed. Patient given educational materials - see patient instructions. Discussed use, benefit, and side effects of prescribed medications. All patient questions answered. Pt voiced understanding. Reviewed health maintenance. Instructed to continue current medications, diet and exercise. Patient agreed with treatment plan. Follow up as directed.      MEDICATIONS:  Orders Placed This Encounter   Medications    amoxicillin-clavulanate (AUGMENTIN) 875-125 MG per tablet     Sig: Take 1 tablet by mouth 2 times daily for 10

## 2018-01-14 LAB
BARTONELLA HENSELAE AB, IGG: NORMAL
BARTONELLA HENSELAE AB, IGM: NORMAL

## 2018-01-15 ENCOUNTER — OFFICE VISIT (OUTPATIENT)
Dept: SURGERY | Age: 43
End: 2018-01-15
Payer: COMMERCIAL

## 2018-01-15 ENCOUNTER — TELEPHONE (OUTPATIENT)
Dept: INTERNAL MEDICINE | Age: 43
End: 2018-01-15

## 2018-01-15 VITALS
DIASTOLIC BLOOD PRESSURE: 68 MMHG | WEIGHT: 151.2 LBS | RESPIRATION RATE: 16 BRPM | BODY MASS INDEX: 21.64 KG/M2 | TEMPERATURE: 98.1 F | SYSTOLIC BLOOD PRESSURE: 102 MMHG | HEIGHT: 70 IN

## 2018-01-15 DIAGNOSIS — N64.4 BREAST PAIN, LEFT: Primary | ICD-10-CM

## 2018-01-15 DIAGNOSIS — R59.0 AXILLARY ADENOPATHY: ICD-10-CM

## 2018-01-15 PROCEDURE — 99212 OFFICE O/P EST SF 10 MIN: CPT | Performed by: PHYSICIAN ASSISTANT

## 2018-01-31 ENCOUNTER — TELEPHONE (OUTPATIENT)
Dept: INTERNAL MEDICINE | Age: 43
End: 2018-01-31

## 2018-01-31 ENCOUNTER — OFFICE VISIT (OUTPATIENT)
Dept: CARDIOLOGY | Age: 43
End: 2018-01-31
Payer: COMMERCIAL

## 2018-01-31 VITALS
HEIGHT: 69 IN | SYSTOLIC BLOOD PRESSURE: 99 MMHG | HEART RATE: 58 BPM | DIASTOLIC BLOOD PRESSURE: 62 MMHG | BODY MASS INDEX: 23.11 KG/M2 | WEIGHT: 156 LBS

## 2018-01-31 DIAGNOSIS — I10 ESSENTIAL HYPERTENSION: ICD-10-CM

## 2018-01-31 DIAGNOSIS — R00.2 PALPITATIONS: Primary | ICD-10-CM

## 2018-01-31 DIAGNOSIS — I49.9 IRREGULAR HEART RHYTHM: ICD-10-CM

## 2018-01-31 PROCEDURE — 99213 OFFICE O/P EST LOW 20 MIN: CPT | Performed by: INTERNAL MEDICINE

## 2018-01-31 RX ORDER — VALACYCLOVIR HYDROCHLORIDE 1 G/1
2000 TABLET, FILM COATED ORAL 2 TIMES DAILY
Qty: 14 TABLET | Refills: 1 | Status: SHIPPED | OUTPATIENT
Start: 2018-01-31 | End: 2018-04-19 | Stop reason: ALTCHOICE

## 2018-01-31 NOTE — TELEPHONE ENCOUNTER
Called and spoke with patient, she has a fever blister and requested valacyclovir, per verbal order from Araceli delgado, order sent to [facility].

## 2018-02-19 NOTE — PROGRESS NOTES
HISTORY OF PRESENT ILLNESS:  Nesha Needs comes today to discuss left axillary nodule and left breasts nodularity. She states that over a year ago she noticed some left axillary nodularity and left breasts tenderness. She reports that she had a MRI done about a year ago at [facility] the reports of which we do not have. She did see Dr. Oumar Lamb at that time and the events of that encounter are not available as well. The patient denies any skin retractions or nipple inversion. As it relates to the axilla, she denies known environmental changes that would potentially cause her adenopathy. There is no reported family history of breast cancer. She has a history of silicone gel breast implants. Patient Active Problem List    Diagnosis Date Noted    Gastroesophageal reflux disease without esophagitis 08/28/2017    PVC (premature ventricular contraction) 07/25/2017    Essential hypertension 06/21/2017    Anxiety 06/21/2017    Tobacco abuse 06/21/2017    Primary osteoarthritis of right hip 04/21/2017    S/P laparoscopic cholecystectomy 07/10/2014    Uterine mass 06/18/2014     Current Outpatient Prescriptions   Medication Sig Dispense Refill    ondansetron (ZOFRAN ODT) 4 MG disintegrating tablet Take 1 tablet by mouth every 8 hours as needed for Nausea or Vomiting 30 tablet 0    pantoprazole (PROTONIX) 20 MG tablet Take 1 tablet by mouth daily 30 tablet 11    busPIRone (BUSPAR) 10 MG tablet Take 1 tablet by mouth 3 times daily 90 tablet 1    atenolol (TENORMIN) 25 MG tablet Take 1 tablet by mouth 2 times daily 60 tablet 5    hydrochlorothiazide (HYDRODIURIL) 25 MG tablet Take 1 tablet by mouth daily (Patient taking differently: Take 25 mg by mouth as needed ) 30 tablet 5    valACYclovir (VALTREX) 1 g tablet Take 2 tablets by mouth 2 times daily 14 tablet 1     No current facility-administered medications for this visit. Allergies: Percocet [oxycodone-acetaminophen];  Codeine; and Toradol [ketorolac tromethamine]     Past Medical History:   Diagnosis Date    PVC (premature ventricular contraction)     Uterine mass 6/17/14    9x9 cm on CT scan     Past Surgical History:   Procedure Laterality Date    BREAST SURGERY Bilateral 1690    silicone Memory Gel    CHOLECYSTECTOMY  6/24/14    HC INJECT OTHER PERPHRL NERV Right 8/3/2016    HIP FLUOROSCOPIC GUIDED CORTICOSTEROID INJECTION  performed by Lidia Kaiser MD at [facility] NERV Right 4/21/2017    FLURO GUIDED HIP INJECTION performed by Rober Medrano MD at [address] [place] [address]     Family History   Problem Relation Age of Onset    Cancer Brother      synovial cell sarcoma    Cancer Maternal Grandfather      lung    Other Daughter      gallbladder disease    Hypertension Mother      Social History   Substance Use Topics    Smoking status: Current Every Day Smoker     Packs/day: 0.50     Years: 20.00     Types: Cigarettes    Smokeless tobacco: Never Used    Alcohol use Yes      Comment: occ     RADIOLOGY:  We were able to review the report from her breast MRI dated 2/24/2017. The report revealed some scattered background enhancement in both breasts. She had a small cyst and possible fibroadenoma of the left breast. There were no malignant features identified. REVIEW OF SYSTEMS:  14 point ROS reviewed and positive for the above    PHYSICAL EXAMINATION:  Blood pressure 102/68, temperature 98.1 °F (36.7 °C), temperature source Temporal, resp. rate 16, height 5' 10\" (1.778 m), weight 151 lb 3.2 oz (68.6 kg), last menstrual period 06/08/2014, unknown if currently breastfeeding. GENERAL:  Reveals a 38 y.o. female that  appears to be in no acute distress. BREAST:  She has some fibrocystic changes throughout both breasts. I feel no dominant masses at this time. I feel no suspicious adenopathy at this time as well.      PSYCHIATRIC:  Patient is oriented to

## 2018-02-27 ENCOUNTER — TELEPHONE (OUTPATIENT)
Dept: CARDIOLOGY | Age: 43
End: 2018-02-27

## 2018-03-07 ENCOUNTER — TELEPHONE (OUTPATIENT)
Dept: INTERNAL MEDICINE | Age: 43
End: 2018-03-07

## 2018-03-07 RX ORDER — VARENICLINE TARTRATE 1 MG/1
1 TABLET, FILM COATED ORAL 2 TIMES DAILY
Qty: 60 TABLET | Refills: 3 | Status: SHIPPED | OUTPATIENT
Start: 2018-03-07 | End: 2018-04-19

## 2018-03-12 ENCOUNTER — OFFICE VISIT (OUTPATIENT)
Dept: SURGERY | Age: 43
End: 2018-03-12
Payer: COMMERCIAL

## 2018-03-12 VITALS
SYSTOLIC BLOOD PRESSURE: 127 MMHG | WEIGHT: 150.4 LBS | HEIGHT: 69 IN | TEMPERATURE: 97.9 F | DIASTOLIC BLOOD PRESSURE: 70 MMHG | BODY MASS INDEX: 22.28 KG/M2

## 2018-03-12 DIAGNOSIS — Z98.82 HISTORY OF BREAST AUGMENTATION: ICD-10-CM

## 2018-03-12 DIAGNOSIS — N60.19 FIBROCYSTIC BREAST DISEASE (FCBD), UNSPECIFIED LATERALITY: ICD-10-CM

## 2018-03-12 DIAGNOSIS — N64.4 BREAST PAIN: ICD-10-CM

## 2018-03-12 PROCEDURE — 99215 OFFICE O/P EST HI 40 MIN: CPT | Performed by: SURGERY

## 2018-03-13 NOTE — PROGRESS NOTES
Ms. Juanita Pappas comes in today for evaluation. She was seen by the PA 2 months ago and now comes in for additional discussion. She initially noted left axillary nodularity in left breast nodularity about a year ago. She had an MRI at that time which she says was unremarkable but we do not have that report. She did see Dr. Myriam Jackson at that time and did not have any surgery. There is no significant family history of breast cancer. She has had a hysterectomy but still has ovaries. She had placement of bilateral silicone gel breast implants in 2010. Mammogram-2/19/2018  There are moderate parenchymal densities. The breast parenchyma is heterogeneous which may decrease mammographic sensitivity. Augmentation implants are present bilaterally. No masses, suspicious calcifications, or architectural distortion. Impression: Category 1   Negative                       No mammographic evidence of malignancy    Ultrasound-2/26/2018  Impression: Scattered small left breast nodules, essentially stable from 2016. Recommend routine annual mammographic screening. BREAST EXAM:    Rayne  has unremarkable fibrocystic changes throughout both breasts. There are no dominant masses, no skin or nipple changes and no axillary adenopathy. I see nothing suspicious on physical examination. She does have some tenderness laterally on the left. No worrisome palpable masses. She has bilateral implants with no associated abnormalities. IMPRESSION:  Benign fibrocystic changes                           Low probability of malignancy    PLAN: I would recommend 6 month follow-up with repeat left ultrasound.        DISCUSSION:  We discussed  the fibrocystic disease and its relationship to breast cancer  , the pathophysiology of breast cancer, the pathophysiology of fibrocystic disease, the importance of routine mammograms, the technique of good breast self-examination and encouraged her, the effect of caffeine on her breasts and the risks

## 2018-04-11 ENCOUNTER — TELEPHONE (OUTPATIENT)
Dept: INTERNAL MEDICINE | Age: 43
End: 2018-04-11

## 2018-04-19 ENCOUNTER — OFFICE VISIT (OUTPATIENT)
Dept: INTERNAL MEDICINE | Age: 43
End: 2018-04-19
Payer: COMMERCIAL

## 2018-04-19 VITALS
OXYGEN SATURATION: 99 % | SYSTOLIC BLOOD PRESSURE: 106 MMHG | DIASTOLIC BLOOD PRESSURE: 60 MMHG | BODY MASS INDEX: 21.33 KG/M2 | WEIGHT: 149 LBS | HEIGHT: 70 IN | HEART RATE: 52 BPM | TEMPERATURE: 99.5 F

## 2018-04-19 DIAGNOSIS — Z01.818 PRE-OP EVALUATION: Primary | ICD-10-CM

## 2018-04-19 DIAGNOSIS — R00.1 BRADYCARDIA: ICD-10-CM

## 2018-04-19 LAB
ANION GAP SERPL CALCULATED.3IONS-SCNC: 16 MMOL/L (ref 7–19)
APTT: 30.4 SEC (ref 26–36.2)
BASOPHILS ABSOLUTE: 0.1 K/UL (ref 0–0.2)
BASOPHILS RELATIVE PERCENT: 0.7 % (ref 0–1)
BILIRUBIN URINE: NEGATIVE
BLOOD, URINE: NEGATIVE
BUN BLDV-MCNC: 11 MG/DL (ref 6–20)
CALCIUM SERPL-MCNC: 9.5 MG/DL (ref 8.6–10)
CHLORIDE BLD-SCNC: 104 MMOL/L (ref 98–111)
CLARITY: CLEAR
CO2: 21 MMOL/L (ref 22–29)
COLOR: YELLOW
CREAT SERPL-MCNC: 0.8 MG/DL (ref 0.5–0.9)
EOSINOPHILS ABSOLUTE: 0 K/UL (ref 0–0.6)
EOSINOPHILS RELATIVE PERCENT: 0.4 % (ref 0–5)
GFR NON-AFRICAN AMERICAN: >60
GLUCOSE BLD-MCNC: 88 MG/DL (ref 74–109)
GLUCOSE URINE: NEGATIVE MG/DL
HBA1C MFR BLD: 5.4 %
HCT VFR BLD CALC: 36.4 % (ref 37–47)
HEMOGLOBIN: 12.1 G/DL (ref 12–16)
INR BLD: 1.02 (ref 0.88–1.18)
KETONES, URINE: NEGATIVE MG/DL
LEUKOCYTE ESTERASE, URINE: NEGATIVE
LYMPHOCYTES ABSOLUTE: 2.3 K/UL (ref 1.1–4.5)
LYMPHOCYTES RELATIVE PERCENT: 33.5 % (ref 20–40)
MCH RBC QN AUTO: 32.4 PG (ref 27–31)
MCHC RBC AUTO-ENTMCNC: 33.2 G/DL (ref 33–37)
MCV RBC AUTO: 97.6 FL (ref 81–99)
MONOCYTES ABSOLUTE: 0.3 K/UL (ref 0–0.9)
MONOCYTES RELATIVE PERCENT: 4.6 % (ref 0–10)
NEUTROPHILS ABSOLUTE: 4.1 K/UL (ref 1.5–7.5)
NEUTROPHILS RELATIVE PERCENT: 60.4 % (ref 50–65)
NITRITE, URINE: NEGATIVE
PDW BLD-RTO: 11.3 % (ref 11.5–14.5)
PH UA: 6
PLATELET # BLD: 193 K/UL (ref 130–400)
PMV BLD AUTO: 10.4 FL (ref 9.4–12.3)
POTASSIUM SERPL-SCNC: 3.7 MMOL/L (ref 3.5–5)
PROTEIN UA: NEGATIVE MG/DL
PROTHROMBIN TIME: 13.3 SEC (ref 12–14.6)
RBC # BLD: 3.73 M/UL (ref 4.2–5.4)
SODIUM BLD-SCNC: 141 MMOL/L (ref 136–145)
SPECIFIC GRAVITY UA: 1
URINE REFLEX TO CULTURE: NORMAL
UROBILINOGEN, URINE: 0.2 E.U./DL
WBC # BLD: 6.8 K/UL (ref 4.8–10.8)

## 2018-04-19 PROCEDURE — 99213 OFFICE O/P EST LOW 20 MIN: CPT | Performed by: NURSE PRACTITIONER

## 2018-04-19 PROCEDURE — 93000 ELECTROCARDIOGRAM COMPLETE: CPT | Performed by: NURSE PRACTITIONER

## 2018-04-19 RX ORDER — HYDROCODONE BITARTRATE AND ACETAMINOPHEN 7.5; 325 MG/1; MG/1
1 TABLET ORAL EVERY 8 HOURS PRN
COMMUNITY
End: 2018-07-16 | Stop reason: ALTCHOICE

## 2018-04-19 RX ORDER — ONDANSETRON 4 MG/1
4 TABLET, ORALLY DISINTEGRATING ORAL EVERY 8 HOURS PRN
Qty: 30 TABLET | Refills: 0 | Status: SHIPPED | OUTPATIENT
Start: 2018-04-19 | End: 2018-08-06 | Stop reason: SDUPTHER

## 2018-04-19 ASSESSMENT — ENCOUNTER SYMPTOMS
RHINORRHEA: 0
VOMITING: 0
VOICE CHANGE: 0
NAUSEA: 0
COLOR CHANGE: 0
COUGH: 0
BACK PAIN: 0
PHOTOPHOBIA: 0
SHORTNESS OF BREATH: 0

## 2018-04-20 LAB — MRSA CULTURE ONLY: NORMAL

## 2018-06-11 RX ORDER — LANSOPRAZOLE 30 MG/1
30 CAPSULE, DELAYED RELEASE ORAL DAILY
Qty: 30 CAPSULE | Refills: 5 | Status: SHIPPED | OUTPATIENT
Start: 2018-06-11 | End: 2018-07-16

## 2018-07-16 ENCOUNTER — OFFICE VISIT (OUTPATIENT)
Dept: INTERNAL MEDICINE | Age: 43
End: 2018-07-16
Payer: COMMERCIAL

## 2018-07-16 VITALS
HEIGHT: 69 IN | OXYGEN SATURATION: 97 % | DIASTOLIC BLOOD PRESSURE: 84 MMHG | HEART RATE: 69 BPM | SYSTOLIC BLOOD PRESSURE: 120 MMHG | WEIGHT: 144 LBS | BODY MASS INDEX: 21.33 KG/M2

## 2018-07-16 DIAGNOSIS — G25.81 RESTLESS LEG SYNDROME: ICD-10-CM

## 2018-07-16 DIAGNOSIS — N32.81 OVERACTIVE BLADDER: ICD-10-CM

## 2018-07-16 DIAGNOSIS — F41.9 ANXIETY: ICD-10-CM

## 2018-07-16 DIAGNOSIS — K21.9 GASTROESOPHAGEAL REFLUX DISEASE WITHOUT ESOPHAGITIS: ICD-10-CM

## 2018-07-16 DIAGNOSIS — I10 ESSENTIAL HYPERTENSION: Primary | ICD-10-CM

## 2018-07-16 DIAGNOSIS — G47.00 INSOMNIA, UNSPECIFIED TYPE: ICD-10-CM

## 2018-07-16 PROCEDURE — 99396 PREV VISIT EST AGE 40-64: CPT | Performed by: NURSE PRACTITIONER

## 2018-07-16 RX ORDER — DOXEPIN HYDROCHLORIDE 10 MG/1
10 CAPSULE ORAL NIGHTLY
Qty: 30 CAPSULE | Refills: 0 | Status: SHIPPED | OUTPATIENT
Start: 2018-07-16

## 2018-07-16 RX ORDER — PANTOPRAZOLE SODIUM 20 MG/1
20 TABLET, DELAYED RELEASE ORAL DAILY
Qty: 30 TABLET | Refills: 11 | Status: SHIPPED | OUTPATIENT
Start: 2018-07-16 | End: 2019-01-14 | Stop reason: SDUPTHER

## 2018-07-16 RX ORDER — ROPINIROLE 0.5 MG/1
0.5 TABLET, FILM COATED ORAL NIGHTLY
Qty: 30 TABLET | Refills: 0 | Status: SHIPPED | OUTPATIENT
Start: 2018-07-16 | End: 2018-08-27 | Stop reason: SDUPTHER

## 2018-07-16 ASSESSMENT — ENCOUNTER SYMPTOMS
COUGH: 0
VOMITING: 0
NAUSEA: 0
BACK PAIN: 0
COLOR CHANGE: 0
SHORTNESS OF BREATH: 0
RHINORRHEA: 0
PHOTOPHOBIA: 0
VOICE CHANGE: 0

## 2018-07-16 NOTE — PROGRESS NOTES
OTHER PERPHRL NERV Right 4/21/2017    FLURO GUIDED HIP INJECTION performed by Josie Osborn MD at [address]       Vitals 7/16/2018 4/19/2018 3/12/2018 1/31/2018 1/15/2018 5/26/3178   SYSTOLIC 042 287 584 99 544 416   DIASTOLIC 84 60 70 62 68 64   Site - Left Arm Left Arm - Left Arm Left Arm   Position - Sitting Sitting - Sitting Sitting   Cuff Size - - Medium Adult - Large Adult -   Pulse 69 52 - 58 - 59   Temp - 99.5 97.9 - 98.1 97.9   Resp - - - - 16 -   Weight 144 lb 149 lb 150 lb 6.4 oz 156 lb 151 lb 3.2 oz 154 lb   Height 5' 9\" 5' 10\" 5' 9\" 5' 9\" 5' 10\" 5' 9\"   BMI (wt*703/ht~2) 21.26 kg/m2 21.38 kg/m2 22.21 kg/m2 23.03 kg/m2 21.69 kg/m2 22.74 kg/m2   Some recent data might be hidden       Family History   Problem Relation Age of Onset    Cancer Brother         synovial cell sarcoma    Cancer Maternal Grandfather         lung    Other Daughter         gallbladder disease    Hypertension Mother        Social History   Substance Use Topics    Smoking status: Current Every Day Smoker     Packs/day: 0.50     Years: 20.00     Types: Cigarettes    Smokeless tobacco: Never Used    Alcohol use Yes      Comment: occ      Current Outpatient Prescriptions   Medication Sig Dispense Refill    rOPINIRole (REQUIP) 0.5 MG tablet Take 1 tablet by mouth nightly 30 tablet 0    doxepin (SINEQUAN) 10 MG capsule Take 1 capsule by mouth nightly 30 capsule 0    pantoprazole (PROTONIX) 20 MG tablet Take 1 tablet by mouth daily 30 tablet 11    Mirabegron ER (MYRBETRIQ) 25 MG TB24 Take 1 tablet by mouth daily 30 tablet 0    ondansetron (ZOFRAN ODT) 4 MG disintegrating tablet Take 1 tablet by mouth every 8 hours as needed for Nausea or Vomiting 30 tablet 0    atenolol (TENORMIN) 25 MG tablet Take 1 tablet by mouth 2 times daily 60 tablet 5    hydrochlorothiazide (HYDRODIURIL) 25 MG tablet Take 1 tablet by mouth daily (Patient taking differently: Take 25 11    Mirabegron ER (MYRBETRIQ) 25 MG TB24     Sig: Take 1 tablet by mouth daily     Dispense:  30 tablet     Refill:  0         ORDERS:  Orders Placed This Encounter   Procedures    CBC Auto Differential    Comprehensive Metabolic Panel    Hemoglobin A1C    Lipid Panel    TSH without Reflex       Follow-up:  Return in about 6 months (around 1/16/2019) for follow-up with labs. PATIENT INSTRUCTIONS:  Patient Instructions   1. Myrbetriq 25 mg daily for overactive bladder. 2. Trial of sinequan nightly for sleep aid. 3. Trial of requip for RLS. 4. Resent protonix to pharmacy. 5. Follow-up in 6 months with labs 1 week prior to visit. 6. Call for worsening symptoms or concerns. Electronically signed by WHITNEY Vines on 7/16/2018 at 9:00 AM    EMR Dragon/transcription disclaimer:  Much of this encounter note is electronic transcription/translation of spoken language to printed texts. The electronic translation of spoken language may be erroneous, or at times, nonsensical words or phrases may be inadvertently transcribed.   Although I have reviewed the note for such errors, some may still exist.

## 2018-07-23 RX ORDER — ATENOLOL 25 MG/1
25 TABLET ORAL 2 TIMES DAILY
Qty: 60 TABLET | Refills: 5 | Status: SHIPPED | OUTPATIENT
Start: 2018-07-23 | End: 2019-04-12 | Stop reason: SDUPTHER

## 2018-08-06 ENCOUNTER — TELEPHONE (OUTPATIENT)
Dept: INTERNAL MEDICINE | Age: 43
End: 2018-08-06

## 2018-08-06 RX ORDER — ONDANSETRON 4 MG/1
4 TABLET, ORALLY DISINTEGRATING ORAL EVERY 8 HOURS PRN
Qty: 30 TABLET | Refills: 0 | Status: SHIPPED | OUTPATIENT
Start: 2018-08-06 | End: 2019-02-27 | Stop reason: SDUPTHER

## 2018-08-27 RX ORDER — ROPINIROLE 0.5 MG/1
0.5 TABLET, FILM COATED ORAL NIGHTLY
Qty: 30 TABLET | Refills: 2 | Status: SHIPPED | OUTPATIENT
Start: 2018-08-27 | End: 2018-08-29 | Stop reason: ALTCHOICE

## 2018-08-27 NOTE — TELEPHONE ENCOUNTER
Clara Chavez called requesting a refill of the below medication which has been pended for you:     Requested Prescriptions     Pending Prescriptions Disp Refills    rOPINIRole (REQUIP) 0.5 MG tablet 30 tablet 0     Sig: Take 1 tablet by mouth nightly       Last Appointment Date: 7/16/2018  Next Appointment Date: 1/16/2019    Allergies   Allergen Reactions    Percocet [Oxycodone-Acetaminophen] Itching    Codeine     Toradol [Ketorolac Tromethamine] Itching

## 2018-08-29 ENCOUNTER — OFFICE VISIT (OUTPATIENT)
Dept: CARDIOLOGY | Age: 43
End: 2018-08-29
Payer: COMMERCIAL

## 2018-08-29 VITALS
HEART RATE: 60 BPM | WEIGHT: 146 LBS | DIASTOLIC BLOOD PRESSURE: 64 MMHG | BODY MASS INDEX: 21.62 KG/M2 | HEIGHT: 69 IN | SYSTOLIC BLOOD PRESSURE: 112 MMHG

## 2018-08-29 DIAGNOSIS — I49.3 PVC (PREMATURE VENTRICULAR CONTRACTION): Chronic | ICD-10-CM

## 2018-08-29 DIAGNOSIS — I10 ESSENTIAL HYPERTENSION: Primary | ICD-10-CM

## 2018-08-29 PROCEDURE — 99214 OFFICE O/P EST MOD 30 MIN: CPT | Performed by: NURSE PRACTITIONER

## 2018-08-29 NOTE — PROGRESS NOTES
[facility], [place]. [address], [facility] [address], [address] [facility]  [phone] office  [phone] fax      OFFICE VISIT:  2018    Nicole Evangelista - : 1975    Reason For Visit:  Silviano Singh is a 37 y.o. female who is here for 6 Month Follow-Up (Patient presents for cardiology follow up doing well.) and Palpitations    The patient presents today for cardiology follow up. Overall, the patient is doing well from a cardiac standpoint without symptoms to suggest myocardial ischemia. BP and palpitations well controlled on current regimen. The patient's PCP monitors cholesterol. 2D echo last year showed normal cardiac structure. Stress test was negative for myocardial ischemia. Subjective  Rayne denies exertional chest pain, shortness of breath, orthopnea, paroxysmal nocturnal dyspnea, syncope, presyncope, sustained arrythmia, edema and fatigue. The patient denies numbness or weakness to suggest cerebrovascular accident or transient ischemic attack.       Nicole Evangelista has the following history as recorded in [facility]:    Patient Active Problem List   Diagnosis Code    Uterine mass N85.9    S/P laparoscopic cholecystectomy Z90.49    Primary osteoarthritis of right hip M16.11    Essential hypertension I10    Anxiety F41.9    Tobacco abuse Z72.0    PVC (premature ventricular contraction) I49.3    Gastroesophageal reflux disease without esophagitis K21.9    Breast pain N64.4    Diffuse cystic mastopathy N60.19    History of breast augmentation Z98.82    Overactive bladder N32.81    Restless leg syndrome G25.81    Insomnia G47.00     Past Medical History:   Diagnosis Date    PVC (premature ventricular contraction)     Restless leg syndrome 2018    Uterine mass 14    9x9 cm on CT scan     Past Surgical History:   Procedure Laterality Date    BREAST SURGERY Bilateral 0558    silicone Memory Gel    CHOLECYSTECTOMY hemorrhage or discharge. Respiratory - no significant wheezing, stridor, apnea or cough. No dyspnea on exertion or shortness of air. Cardiovascular - no exertional chest pain to suggest myocardial ischemia. No orthopnea or PND. No sensation of sustained arrythmia. No occurrence of slow heart rate. No palpitations. No claudication. No leg edema. Gastrointestinal - no abdominal swelling or pain. No blood in stool. No severe constipation, diarrhea, nausea, or vomiting. Genitourinary - no dysuria, frequency, or urgency. No flank pain or hematuria. Musculoskeletal - no back pain or myalgia. No problems with gait. Extremities - no clubbing, cyanosis or edema. Skin - no color change or rash. No pallor. No new surgical incision. Neurologic - no speech difficulty, facial asymmetry or lateralizing weakness. No seizures, presyncope or syncope. No significant dizziness. Hematologic - no easy bruising or excessive bleeding. Psychiatric - no severe anxiety or insomnia. No confusion. All other review of systems are negative. Objective  Vital Signs - /64   Pulse 60   Ht 5' 9\" (1.753 m)   Wt 146 lb (66.2 kg)   LMP 06/08/2014   BMI 21.56 kg/m²   General - Rayne is alert, cooperative, and pleasant. Well groomed. No acute distress. Body habitus - Body mass index is 21.56 kg/m². HEENT - Head is normocephalic. No circumoral cyanosis. Dentition is normal.  EYES -   Lids normal without ptosis. No discharge, edema or subconjunctival hemorrhage. Neck - Symmetrical without apparent mass or lymphadenopathy. Respiratory - Normal respiratory effort without use of accessory muscles. Ausculatation reveals vesicular breath sounds without crackles, wheezes, rub or rhonchi. Cardiovascular - No jugular venous distention. Auscultation reveals regular rate and rhythm. No audible clicks, gallop or rub. No murmur. No lower extremity varicosities. No carotid bruits.     Abdominal -  No visible

## 2018-08-29 NOTE — PATIENT INSTRUCTIONS
results and keep a list of the medicines you take. How can you care for yourself at home? · Limit caffeine and other stimulants if they trigger premature heartbeats. · Reduce stress. Avoid people and places that make you feel anxious, if you can. Learn ways to reduce stress, such as biofeedback, guided imagery, and meditation. · Do not smoke or allow others to smoke around you. If you need help quitting, talk to your doctor about stop-smoking programs and medicines. These can increase your chances of quitting for good. · Get at least 30 minutes of exercise on most days of the week. Walking is a good choice. You also may want to do other activities, such as running, swimming, cycling, or playing tennis or team sports. · Get enough sleep. Keep your room dark and quiet, and try to go to bed at the same time every night. · Limit alcohol to 2 drinks a day for men and 1 drink a day for women. Too much alcohol can cause health problems. If drinking alcohol causes more premature heartbeats, do not drink it. · If your doctor prescribes medicine, take it exactly as prescribed. Call your doctor if you think you are having a problem with your medicine. When should you call for help? Call 911 anytime you think you may need emergency care. For example, call if:    · You passed out (lost consciousness).    Call your doctor now or seek immediate medical care if:    · You are dizzy or lightheaded, or you feel like you may faint.     · You are short of breath.    Watch closely for changes in your health, and be sure to contact your doctor if you have any problems. Where can you learn more? Go to https://mikal.[employer]Storm [email or web address] org and sign in to your [email or web address] account. Enter Q572 in the [employer] box to learn more about \"Premature Heartbeat: Care Instructions. \"     If you do not have an account, please click on the \"Sign Up Now\" link.   Current as of: December 6, 2017  Content Version: 11.7  © 0935-2699

## 2018-08-31 DIAGNOSIS — N64.4 BREAST PAIN: Primary | ICD-10-CM

## 2018-08-31 DIAGNOSIS — R59.0 AXILLARY ADENOPATHY: ICD-10-CM

## 2018-10-18 ENCOUNTER — OFFICE VISIT (OUTPATIENT)
Dept: INTERNAL MEDICINE | Age: 43
End: 2018-10-18
Payer: COMMERCIAL

## 2018-10-18 VITALS — OXYGEN SATURATION: 98 % | SYSTOLIC BLOOD PRESSURE: 110 MMHG | DIASTOLIC BLOOD PRESSURE: 62 MMHG | HEART RATE: 53 BPM

## 2018-10-18 DIAGNOSIS — R68.82 DECREASED LIBIDO: ICD-10-CM

## 2018-10-18 DIAGNOSIS — F41.9 ANXIETY: ICD-10-CM

## 2018-10-18 DIAGNOSIS — R68.82 DECREASED LIBIDO: Primary | ICD-10-CM

## 2018-10-18 LAB
ESTRADIOL LEVEL: 320.5 PG/ML
TESTOSTERONE TOTAL: 12 NG/DL (ref 8.4–48.1)

## 2018-10-18 PROCEDURE — 99214 OFFICE O/P EST MOD 30 MIN: CPT | Performed by: NURSE PRACTITIONER

## 2018-10-18 RX ORDER — ROPINIROLE 0.5 MG/1
0.5 TABLET, FILM COATED ORAL NIGHTLY
COMMUNITY
Start: 2018-10-07 | End: 2019-01-16 | Stop reason: SDUPTHER

## 2018-10-18 RX ORDER — BUPROPION HYDROCHLORIDE 150 MG/1
150 TABLET ORAL 2 TIMES DAILY
Qty: 60 TABLET | Refills: 0 | Status: SHIPPED | OUTPATIENT
Start: 2018-10-18 | End: 2018-11-26 | Stop reason: SDUPTHER

## 2018-10-18 RX ORDER — ALPRAZOLAM 0.5 MG/1
0.5 TABLET ORAL PRN
Qty: 10 TABLET | Refills: 0 | Status: SHIPPED | OUTPATIENT
Start: 2018-10-18 | End: 2018-11-17

## 2018-10-18 RX ORDER — VALACYCLOVIR HYDROCHLORIDE 1 G/1
1000 TABLET, FILM COATED ORAL DAILY PRN
Qty: 30 TABLET | Refills: 0 | Status: SHIPPED | OUTPATIENT
Start: 2018-10-18 | End: 2019-02-22 | Stop reason: SDUPTHER

## 2018-10-18 ASSESSMENT — ENCOUNTER SYMPTOMS
VOICE CHANGE: 0
SHORTNESS OF BREATH: 0
COLOR CHANGE: 0
COUGH: 0
NAUSEA: 0
PHOTOPHOBIA: 0
VOMITING: 0
RHINORRHEA: 0
BACK PAIN: 0

## 2018-10-18 ASSESSMENT — PATIENT HEALTH QUESTIONNAIRE - PHQ9
2. FEELING DOWN, DEPRESSED OR HOPELESS: 0
SUM OF ALL RESPONSES TO PHQ QUESTIONS 1-9: 0
SUM OF ALL RESPONSES TO PHQ9 QUESTIONS 1 & 2: 0
1. LITTLE INTEREST OR PLEASURE IN DOING THINGS: 0
SUM OF ALL RESPONSES TO PHQ QUESTIONS 1-9: 0

## 2018-10-23 ENCOUNTER — HOSPITAL ENCOUNTER (OUTPATIENT)
Dept: GENERAL RADIOLOGY | Age: 43
Discharge: HOME | End: 2018-10-23
Payer: COMMERCIAL

## 2018-10-23 ENCOUNTER — OFFICE VISIT (OUTPATIENT)
Dept: URGENT CARE | Age: 43
End: 2018-10-23
Payer: COMMERCIAL

## 2018-10-23 VITALS
OXYGEN SATURATION: 98 % | DIASTOLIC BLOOD PRESSURE: 73 MMHG | WEIGHT: 145 LBS | TEMPERATURE: 98.5 F | SYSTOLIC BLOOD PRESSURE: 123 MMHG | HEART RATE: 75 BPM | RESPIRATION RATE: 16 BRPM | BODY MASS INDEX: 20.76 KG/M2 | HEIGHT: 70 IN

## 2018-10-23 DIAGNOSIS — R07.9 CHEST PAIN, UNSPECIFIED TYPE: ICD-10-CM

## 2018-10-23 DIAGNOSIS — J40 BRONCHITIS: Primary | ICD-10-CM

## 2018-10-23 DIAGNOSIS — R05.8 PRODUCTIVE COUGH: ICD-10-CM

## 2018-10-23 DIAGNOSIS — J02.9 SORE THROAT: ICD-10-CM

## 2018-10-23 DIAGNOSIS — F17.200 SMOKER: ICD-10-CM

## 2018-10-23 DIAGNOSIS — J02.9 PHARYNGITIS, UNSPECIFIED ETIOLOGY: ICD-10-CM

## 2018-10-23 LAB — S PYO AG THROAT QL: NORMAL

## 2018-10-23 PROCEDURE — G0405 EKG INTERPRET & REPORT PREVE: HCPCS | Performed by: SPECIALIST

## 2018-10-23 PROCEDURE — 93000 ELECTROCARDIOGRAM COMPLETE: CPT | Performed by: SPECIALIST

## 2018-10-23 PROCEDURE — 99213 OFFICE O/P EST LOW 20 MIN: CPT | Performed by: SPECIALIST

## 2018-10-23 PROCEDURE — 87880 STREP A ASSAY W/OPTIC: CPT | Performed by: SPECIALIST

## 2018-10-23 PROCEDURE — 71046 X-RAY EXAM CHEST 2 VIEWS: CPT

## 2018-10-23 RX ORDER — BENZONATATE 100 MG/1
100 CAPSULE ORAL 3 TIMES DAILY PRN
Qty: 21 CAPSULE | Refills: 0 | Status: SHIPPED | OUTPATIENT
Start: 2018-10-23 | End: 2018-10-30

## 2018-10-23 RX ORDER — AMOXICILLIN 875 MG/1
875 TABLET, COATED ORAL 2 TIMES DAILY
Qty: 14 TABLET | Refills: 0 | Status: SHIPPED | OUTPATIENT
Start: 2018-10-23 | End: 2018-10-30

## 2018-10-23 ASSESSMENT — ENCOUNTER SYMPTOMS
SORE THROAT: 1
COUGH: 1
CHEST TIGHTNESS: 1

## 2018-10-23 NOTE — PROGRESS NOTES
as needed for Cough 21 capsule 0    rOPINIRole (REQUIP) 0.5 MG tablet Take 0.5 mg by mouth nightly      valACYclovir (VALTREX) 1 g tablet Take 1 tablet by mouth daily as needed (fever blister) 30 tablet 0    buPROPion (WELLBUTRIN XL) 150 MG extended release tablet Take 1 tablet by mouth 2 times daily 60 tablet 0    ALPRAZolam (XANAX) 0.5 MG tablet Take 1 tablet by mouth as needed for Anxiety for up to 30 days. . 10 tablet 0    ondansetron (ZOFRAN ODT) 4 MG disintegrating tablet Take 1 tablet by mouth every 8 hours as needed for Nausea or Vomiting 30 tablet 0    atenolol (TENORMIN) 25 MG tablet Take 1 tablet by mouth 2 times daily 60 tablet 5    doxepin (SINEQUAN) 10 MG capsule Take 1 capsule by mouth nightly 30 capsule 0    pantoprazole (PROTONIX) 20 MG tablet Take 1 tablet by mouth daily 30 tablet 11    hydrochlorothiazide (HYDRODIURIL) 25 MG tablet Take 1 tablet by mouth daily (Patient taking differently: Take 25 mg by mouth as needed ) 30 tablet 5     No current facility-administered medications for this visit. Allergies   Allergen Reactions    Percocet [Oxycodone-Acetaminophen] Itching    Codeine     Toradol [Ketorolac Tromethamine] Itching       Health Maintenance   Topic Date Due    HIV screen  08/01/1988    Pneumococcal med risk (1 of 1 - PPSV23) 08/01/1992    Cervical cancer screen  08/01/1994    DTaP/Tdap/Td vaccine (1 - Tdap) 06/02/2009    Flu vaccine (1) 09/01/2018    Potassium monitoring  04/19/2019    Creatinine monitoring  04/19/2019    Lipid screen  07/21/2022       Subjective:     Review of Systems   Constitutional: Positive for fatigue and fever. HENT: Positive for congestion, postnasal drip and sore throat. Respiratory: Positive for cough and chest tightness. Cardiovascular: Positive for chest pain. Objective:     Physical Exam   Constitutional: Vital signs are normal. She appears well-developed and well-nourished. She is cooperative.    HENT:   Head: label.  · Be careful when taking over-the-counter cold or flu medicines and Tylenol at the same time. Many of these medicines have acetaminophen, which is Tylenol. Read the labels to make sure that you are not taking more than the recommended dose. Too much acetaminophen (Tylenol) can be harmful. · Drink plenty of fluids. Fluids may help soothe an irritated throat. Hot fluids, such as tea or soup, may help decrease throat pain. · Use over-the-counter throat lozenges to soothe pain. Regular cough drops or hard candy may also help. These should not be given to young children because of the risk of choking. · Do not smoke or allow others to smoke around you. If you need help quitting, talk to your doctor about stop-smoking programs and medicines. These can increase your chances of quitting for good. · Use a vaporizer or humidifier to add moisture to your bedroom. Follow the directions for cleaning the machine. When should you call for help? Call your doctor now or seek immediate medical care if:    · You have new or worse trouble swallowing.     · Your sore throat gets much worse on one side.    Watch closely for changes in your health, and be sure to contact your doctor if you do not get better as expected. Where can you learn more? Go to https://IKOTECH.ElasticDot. org and sign in to your Etopus account. Enter Y557 in the [employer] box to learn more about \"Sore Throat: Care Instructions. \"     If you do not have an account, please click on the \"Sign Up Now\" link. Current as of: May 12, 2017  Content Version: 11.7  © 3160-8930 [employer]. Care instructions adapted under license by [facility] [place] ([facility]). If you have questions about a medical condition or this instruction, always ask your healthcare professional. [place] [address] any warranty or liability for your use of this information.        Patient Education        Learning About Benefits From Quitting

## 2018-11-26 DIAGNOSIS — R68.82 DECREASED LIBIDO: ICD-10-CM

## 2018-11-26 RX ORDER — BUPROPION HYDROCHLORIDE 150 MG/1
TABLET ORAL
Qty: 60 TABLET | Refills: 5 | Status: SHIPPED | OUTPATIENT
Start: 2018-11-26 | End: 2019-08-23

## 2019-01-14 RX ORDER — PANTOPRAZOLE SODIUM 20 MG/1
20 TABLET, DELAYED RELEASE ORAL DAILY
Qty: 90 TABLET | Refills: 3 | Status: SHIPPED | OUTPATIENT
Start: 2019-01-14 | End: 2019-01-15 | Stop reason: CLARIF

## 2019-01-14 RX ORDER — ROPINIROLE 0.5 MG/1
TABLET, FILM COATED ORAL
Qty: 30 TABLET | Refills: 3 | Status: SHIPPED | OUTPATIENT
Start: 2019-01-14 | End: 2019-01-16 | Stop reason: SDUPTHER

## 2019-01-15 RX ORDER — LANSOPRAZOLE 15 MG/1
15 CAPSULE, DELAYED RELEASE ORAL DAILY
Qty: 90 CAPSULE | Refills: 3 | Status: SHIPPED | OUTPATIENT
Start: 2019-01-15 | End: 2019-01-15 | Stop reason: CLARIF

## 2019-01-15 RX ORDER — PANTOPRAZOLE SODIUM 20 MG/1
20 TABLET, DELAYED RELEASE ORAL DAILY
Qty: 90 TABLET | Refills: 3 | Status: SHIPPED | OUTPATIENT
Start: 2019-01-15 | End: 2019-01-16 | Stop reason: SDUPTHER

## 2019-01-16 ENCOUNTER — OFFICE VISIT (OUTPATIENT)
Dept: INTERNAL MEDICINE | Age: 44
End: 2019-01-16
Payer: COMMERCIAL

## 2019-01-16 VITALS
HEART RATE: 60 BPM | SYSTOLIC BLOOD PRESSURE: 110 MMHG | BODY MASS INDEX: 21.03 KG/M2 | HEIGHT: 69 IN | WEIGHT: 142 LBS | DIASTOLIC BLOOD PRESSURE: 70 MMHG | OXYGEN SATURATION: 99 %

## 2019-01-16 DIAGNOSIS — G25.81 RESTLESS LEG SYNDROME: ICD-10-CM

## 2019-01-16 DIAGNOSIS — I10 ESSENTIAL HYPERTENSION: Primary | ICD-10-CM

## 2019-01-16 DIAGNOSIS — F41.9 ANXIETY: ICD-10-CM

## 2019-01-16 DIAGNOSIS — R68.82 DECREASED LIBIDO: ICD-10-CM

## 2019-01-16 DIAGNOSIS — K21.9 GASTROESOPHAGEAL REFLUX DISEASE WITHOUT ESOPHAGITIS: ICD-10-CM

## 2019-01-16 PROCEDURE — 99396 PREV VISIT EST AGE 40-64: CPT | Performed by: NURSE PRACTITIONER

## 2019-01-16 RX ORDER — PANTOPRAZOLE SODIUM 40 MG/1
40 TABLET, DELAYED RELEASE ORAL DAILY
Qty: 90 TABLET | Refills: 2 | Status: SHIPPED | OUTPATIENT
Start: 2019-01-16 | End: 2019-07-16 | Stop reason: SDUPTHER

## 2019-01-16 RX ORDER — ROPINIROLE 1 MG/1
1 TABLET, FILM COATED ORAL DAILY
Qty: 90 TABLET | Refills: 2 | Status: SHIPPED | OUTPATIENT
Start: 2019-01-16 | End: 2019-09-13 | Stop reason: DRUGHIGH

## 2019-01-16 ASSESSMENT — ENCOUNTER SYMPTOMS
RHINORRHEA: 0
COUGH: 0
VOMITING: 0
COLOR CHANGE: 0
SHORTNESS OF BREATH: 0
BACK PAIN: 0
NAUSEA: 0
VOICE CHANGE: 0
PHOTOPHOBIA: 0

## 2019-02-05 RX ORDER — LANSOPRAZOLE 15 MG/1
15 CAPSULE, DELAYED RELEASE ORAL DAILY
Qty: 90 CAPSULE | Refills: 3 | Status: SHIPPED | OUTPATIENT
Start: 2019-02-05 | End: 2019-07-16 | Stop reason: ALTCHOICE

## 2019-02-25 RX ORDER — VALACYCLOVIR HYDROCHLORIDE 1 G/1
TABLET, FILM COATED ORAL
Qty: 30 TABLET | Refills: 0 | Status: SHIPPED | OUTPATIENT
Start: 2019-02-25 | End: 2019-10-28 | Stop reason: SDUPTHER

## 2019-02-27 RX ORDER — ONDANSETRON 4 MG/1
TABLET, ORALLY DISINTEGRATING ORAL
Qty: 30 TABLET | Refills: 0 | Status: SHIPPED | OUTPATIENT
Start: 2019-02-27 | End: 2021-01-14

## 2019-04-12 RX ORDER — ATENOLOL 25 MG/1
25 TABLET ORAL 2 TIMES DAILY
Qty: 60 TABLET | Refills: 5 | Status: SHIPPED | OUTPATIENT
Start: 2019-04-12 | End: 2019-09-13 | Stop reason: SDUPTHER

## 2019-05-15 RX ORDER — ROPINIROLE 0.5 MG/1
TABLET, FILM COATED ORAL
Qty: 30 TABLET | Refills: 0 | Status: SHIPPED | OUTPATIENT
Start: 2019-05-15 | End: 2019-06-16 | Stop reason: SDUPTHER

## 2019-05-15 NOTE — TELEPHONE ENCOUNTER
Pharmacy sent an efax requesting a refill of the below medication which has been pended for you:     Requested Prescriptions     Pending Prescriptions Disp Refills    rOPINIRole (REQUIP) 0.5 MG tablet [Pharmacy Med Name: ROPINIROLE 0.5MG TABLETS] 30 tablet 0     Sig: TAKE 1 TABLET BY MOUTH EVERY NIGHT       Last Appointment Date: 1/16/2019  Next Appointment Date: 7/16/2019      Allergies   Allergen Reactions    Percocet [Oxycodone-Acetaminophen] Itching    Codeine     Toradol [Ketorolac Tromethamine] Itching

## 2019-05-30 ENCOUNTER — TELEPHONE (OUTPATIENT)
Dept: CARDIOLOGY | Age: 44
End: 2019-05-30

## 2019-06-17 RX ORDER — ROPINIROLE 0.5 MG/1
TABLET, FILM COATED ORAL
Qty: 30 TABLET | Refills: 5 | Status: SHIPPED | OUTPATIENT
Start: 2019-06-17 | End: 2020-03-16

## 2019-06-17 NOTE — TELEPHONE ENCOUNTER
Pharmacy sent an efax requesting a refill of the below medication which has been pended for you:     Requested Prescriptions     Pending Prescriptions Disp Refills    rOPINIRole (REQUIP) 0.5 MG tablet [Pharmacy Med Name: ROPINIROLE 0.5MG TABLETS] 30 tablet 5     Sig: TAKE 1 TABLET BY MOUTH EVERY NIGHT       Last Appointment Date: 1/16/2019  Next Appointment Date: 7/16/2019      Allergies   Allergen Reactions    Percocet [Oxycodone-Acetaminophen] Itching    Codeine     Toradol [Ketorolac Tromethamine] Itching

## 2019-07-16 ENCOUNTER — OFFICE VISIT (OUTPATIENT)
Dept: INTERNAL MEDICINE | Age: 44
End: 2019-07-16
Payer: COMMERCIAL

## 2019-07-16 VITALS
WEIGHT: 138 LBS | HEIGHT: 70 IN | DIASTOLIC BLOOD PRESSURE: 64 MMHG | HEART RATE: 56 BPM | SYSTOLIC BLOOD PRESSURE: 122 MMHG | BODY MASS INDEX: 19.76 KG/M2 | OXYGEN SATURATION: 94 %

## 2019-07-16 DIAGNOSIS — K21.9 GASTROESOPHAGEAL REFLUX DISEASE WITHOUT ESOPHAGITIS: ICD-10-CM

## 2019-07-16 DIAGNOSIS — I10 ESSENTIAL HYPERTENSION: ICD-10-CM

## 2019-07-16 DIAGNOSIS — G51.4 FACIAL TWITCHING: Primary | ICD-10-CM

## 2019-07-16 DIAGNOSIS — D64.9 ANEMIA, UNSPECIFIED TYPE: ICD-10-CM

## 2019-07-16 DIAGNOSIS — Z12.31 ENCOUNTER FOR SCREENING MAMMOGRAM FOR BREAST CANCER: ICD-10-CM

## 2019-07-16 DIAGNOSIS — F41.9 ANXIETY: ICD-10-CM

## 2019-07-16 DIAGNOSIS — H53.9 VISUAL DISTURBANCE: ICD-10-CM

## 2019-07-16 DIAGNOSIS — R20.0 RIGHT FACIAL NUMBNESS: ICD-10-CM

## 2019-07-16 LAB
ALBUMIN SERPL-MCNC: 3.7 G/DL (ref 3.5–5.2)
ALP BLD-CCNC: 54 U/L (ref 35–104)
ALT SERPL-CCNC: 7 U/L (ref 5–33)
ANION GAP SERPL CALCULATED.3IONS-SCNC: 14 MMOL/L (ref 7–19)
AST SERPL-CCNC: 13 U/L (ref 5–32)
BASOPHILS ABSOLUTE: 0.1 K/UL (ref 0–0.2)
BASOPHILS RELATIVE PERCENT: 1 % (ref 0–1)
BILIRUB SERPL-MCNC: <0.2 MG/DL (ref 0.2–1.2)
BUN BLDV-MCNC: 8 MG/DL (ref 6–20)
CALCIUM SERPL-MCNC: 9.2 MG/DL (ref 8.6–10)
CHLORIDE BLD-SCNC: 104 MMOL/L (ref 98–111)
CHOLESTEROL, TOTAL: 144 MG/DL (ref 160–199)
CO2: 24 MMOL/L (ref 22–29)
CREAT SERPL-MCNC: 0.8 MG/DL (ref 0.5–0.9)
EOSINOPHILS ABSOLUTE: 0.1 K/UL (ref 0–0.6)
EOSINOPHILS RELATIVE PERCENT: 2.2 % (ref 0–5)
GFR NON-AFRICAN AMERICAN: >60
GLUCOSE BLD-MCNC: 89 MG/DL (ref 74–109)
HBA1C MFR BLD: 5.1 % (ref 4–6)
HCT VFR BLD CALC: 36.9 % (ref 37–47)
HDLC SERPL-MCNC: 47 MG/DL (ref 65–121)
HEMOGLOBIN: 11.9 G/DL (ref 12–16)
LDL CHOLESTEROL CALCULATED: 85 MG/DL
LYMPHOCYTES ABSOLUTE: 2.7 K/UL (ref 1.1–4.5)
LYMPHOCYTES RELATIVE PERCENT: 55 % (ref 20–40)
MCH RBC QN AUTO: 32.5 PG (ref 27–31)
MCHC RBC AUTO-ENTMCNC: 32.2 G/DL (ref 33–37)
MCV RBC AUTO: 100.8 FL (ref 81–99)
MONOCYTES ABSOLUTE: 0.4 K/UL (ref 0–0.9)
MONOCYTES RELATIVE PERCENT: 7.7 % (ref 0–10)
NEUTROPHILS ABSOLUTE: 1.7 K/UL (ref 1.5–7.5)
NEUTROPHILS RELATIVE PERCENT: 34.1 % (ref 50–65)
PDW BLD-RTO: 11.9 % (ref 11.5–14.5)
PLATELET # BLD: 173 K/UL (ref 130–400)
PMV BLD AUTO: 11.1 FL (ref 9.4–12.3)
POTASSIUM SERPL-SCNC: 3.7 MMOL/L (ref 3.5–5)
RBC # BLD: 3.66 M/UL (ref 4.2–5.4)
SODIUM BLD-SCNC: 142 MMOL/L (ref 136–145)
TOTAL PROTEIN: 6.7 G/DL (ref 6.6–8.7)
TRIGL SERPL-MCNC: 61 MG/DL (ref 0–149)
TSH SERPL DL<=0.05 MIU/L-ACNC: 3.16 UIU/ML (ref 0.27–4.2)
WBC # BLD: 4.9 K/UL (ref 4.8–10.8)

## 2019-07-16 PROCEDURE — 99214 OFFICE O/P EST MOD 30 MIN: CPT | Performed by: NURSE PRACTITIONER

## 2019-07-16 RX ORDER — PANTOPRAZOLE SODIUM 40 MG/1
40 TABLET, DELAYED RELEASE ORAL 2 TIMES DAILY
Qty: 60 TABLET | Refills: 3 | Status: SHIPPED | OUTPATIENT
Start: 2019-07-16 | End: 2021-01-14

## 2019-07-16 ASSESSMENT — ENCOUNTER SYMPTOMS
RHINORRHEA: 0
PHOTOPHOBIA: 0
VOMITING: 0
COUGH: 0
BACK PAIN: 0
SHORTNESS OF BREATH: 0
NAUSEA: 0
VOICE CHANGE: 0
COLOR CHANGE: 0

## 2019-07-16 ASSESSMENT — PATIENT HEALTH QUESTIONNAIRE - PHQ9
SUM OF ALL RESPONSES TO PHQ9 QUESTIONS 1 & 2: 0
1. LITTLE INTEREST OR PLEASURE IN DOING THINGS: 0
SUM OF ALL RESPONSES TO PHQ QUESTIONS 1-9: 0
SUM OF ALL RESPONSES TO PHQ QUESTIONS 1-9: 0
2. FEELING DOWN, DEPRESSED OR HOPELESS: 0

## 2019-07-16 NOTE — PROGRESS NOTES
[facility] INTERNAL MEDICINE  [address] [facility] [address]  [address] [address]  Dept: [phone]  Dept Fax: [phone]: [phone]    Nneka Herring is a 37 y.o. female who presents today for her medical conditions/complaints as noted below. Nneka Herring is c/o of Hypertension (6 month follow up, has some shakiness at times and memory issues) and Anxiety (Continues to have increased anxiety)      HPI:     HPI   Chief Complaint   Patient presents with    Hypertension     6 month follow up, has some shakiness at times and memory issues    Anxiety     Continues to have increased anxiety     Patient presents today for follow-up hypertension, anxiety, RLS and GERD. She is currently taking 40 mg protonix; she states GERD is well-controlled. Requip is working well for RLS. She had labs done this morning; results are still pending for tsh and a1c. CBC reveals mild anemia with low neutrophils and elevated lymphocytes; she has recently not been ill. She complains today of a head twitching or movement when she is focusing or trying to apply make-up. She reports 2-3 incidents of her right side of her face going numb and her throat feeling like it was closing. She states 2 nights ago she took benadryl when it happened in case she was having an allergic reaction. She also notices her fingertips often feel numb. She also has been having increased anxiety when in enclosed areas such as a car for long periods of time. She denies any crying, sadness, no issues with anger or headaches. She feels like she is \"laid back\". She does have a teenager that has behavioral problems, but he is currently doing well. Mammogram- DUE.     Lab Results   Component Value Date    CHOL 144 (L) 07/16/2019    CHOL 182 07/21/2017     Lab Results   Component Value Date    TRIG 61 07/16/2019    TRIG 74 (L) 07/21/2017     Lab Results   Component Value Date    HDL 47 (L) 07/16/2019    HDL 71 07/21/2017 Lab Results   Component Value Date    LDLCALC 85 07/16/2019    LDLCALC 96 07/21/2017     No results found for: LABVLDL, VLDL  No results found for: [facility]  Lab Results   Component Value Date     07/16/2019    K 3.7 07/16/2019     07/16/2019    CO2 24 07/16/2019    BUN 8 07/16/2019    CREATININE 0.8 07/16/2019    GLUCOSE 89 07/16/2019    CALCIUM 9.2 07/16/2019    PROT 6.7 07/16/2019    LABALBU 3.7 07/16/2019    BILITOT <0.2 07/16/2019    ALKPHOS 54 07/16/2019    AST 13 07/16/2019    ALT 7 07/16/2019    LABGLOM >60 07/16/2019       Lab Results   Component Value Date    WBC 4.9 07/16/2019    HGB 11.9 (L) 07/16/2019    HCT 36.9 (L) 07/16/2019    .8 (H) 07/16/2019     07/16/2019     Lab Results   Component Value Date    TSH 2.56 07/21/2017       Past Medical History:   Diagnosis Date    PVC (premature ventricular contraction)     Restless leg syndrome 7/16/2018    Uterine mass 6/17/14    9x9 cm on CT scan      Past Surgical History:   Procedure Laterality Date    BREAST SURGERY Bilateral 5943    silicone Memory Gel    CHOLECYSTECTOMY  6/24/14    HC INJECT OTHER PERPHRL NERV Right 8/3/2016    HIP FLUOROSCOPIC GUIDED CORTICOSTEROID INJECTION  performed by Yamini Barba MD at [address] [facility] [address] NERV Right 4/21/2017    FLURO GUIDED HIP INJECTION performed by Larry Arreola MD at [address] [place] [address]     [address] 7/16/2019 1/16/2019 10/23/2018 10/18/2018 8/29/2018 4/93/4681   SYSTOLIC 878 635 [phone] 378   DIASTOLIC 64 70 73 62 64 84   Site Left Upper Arm Left Upper Arm - Left Upper Arm - -   Position Sitting Sitting - Sitting - -   Cuff Size - - - - - -   Pulse 56 60 75 53 60 69   Temp - - 98.5 - - -   Resp - - 16 - - -   SpO2 94 99 98 98 - 97   Weight 138 lb 142 lb 145 lb - 146 lb 144 lb   Height 5' 10\" 5' 9\" 5' 10\" - 5' 9\" 5' 9\"   BMI (wt*703/ht~2) 19.8 kg/m2 20.97 kg/m2 20.8 08/01/1992    Breast cancer screen  02/19/2019    Potassium monitoring  04/19/2019    Creatinine monitoring  04/19/2019    Pneumococcal 0-64 years Vaccine (1 of 1 - PPSV23) 07/15/2024 (Originally 8/1/1979)    Flu vaccine (1) 09/01/2019    Lipid screen  07/21/2022    HIV screen  Discontinued       Subjective:      Review of Systems   Constitutional: Negative for chills and fever. HENT: Negative for ear pain, hearing loss, rhinorrhea and voice change. Eyes: Negative for photophobia and visual disturbance. Respiratory: Negative for cough and shortness of breath. Cardiovascular: Negative for chest pain and palpitations. Gastrointestinal: Negative for nausea and vomiting. Endocrine: Negative. Negative for cold intolerance and heat intolerance. Genitourinary: Negative for difficulty urinating and flank pain. Musculoskeletal: Negative for back pain and neck pain. Skin: Negative for color change and rash. Allergic/Immunologic: Negative for environmental allergies and food allergies. Neurological: Negative for dizziness, speech difficulty and headaches. Hematological: Does not bruise/bleed easily. Psychiatric/Behavioral: Negative for sleep disturbance and suicidal ideas. Objective:     Physical Exam   Constitutional: She is oriented to person, place, and time. She appears well-developed and well-nourished. HENT:   Head: Atraumatic. Right Ear: External ear normal.   Left Ear: External ear normal.   Nose: Nose normal.   Mouth/Throat: Oropharynx is clear and moist.   Eyes: Pupils are equal, round, and reactive to light. Conjunctivae are normal.   Neck: Normal range of motion. Neck supple. Cardiovascular: Normal rate, regular rhythm, S1 normal, S2 normal and normal heart sounds. Pulmonary/Chest: Effort normal and breath sounds normal.   Abdominal: Soft. Bowel sounds are normal.   Musculoskeletal: Normal range of motion.    Neurological: She is alert and oriented to person, place,

## 2019-07-22 DIAGNOSIS — G51.4 FACIAL TWITCHING: ICD-10-CM

## 2019-07-22 DIAGNOSIS — R20.0 RIGHT FACIAL NUMBNESS: ICD-10-CM

## 2019-07-22 DIAGNOSIS — H53.9 VISUAL DISTURBANCE: ICD-10-CM

## 2019-07-23 ENCOUNTER — OFFICE VISIT (OUTPATIENT)
Dept: INTERNAL MEDICINE | Age: 44
End: 2019-07-23
Payer: COMMERCIAL

## 2019-07-23 VITALS
WEIGHT: 141 LBS | HEART RATE: 44 BPM | SYSTOLIC BLOOD PRESSURE: 118 MMHG | HEIGHT: 69 IN | RESPIRATION RATE: 20 BRPM | BODY MASS INDEX: 20.88 KG/M2 | DIASTOLIC BLOOD PRESSURE: 76 MMHG | OXYGEN SATURATION: 98 %

## 2019-07-23 DIAGNOSIS — G51.4 FACIAL TWITCHING: ICD-10-CM

## 2019-07-23 DIAGNOSIS — D51.9 ANEMIA DUE TO VITAMIN B12 DEFICIENCY, UNSPECIFIED B12 DEFICIENCY TYPE: Primary | ICD-10-CM

## 2019-07-23 DIAGNOSIS — D64.9 ANEMIA, UNSPECIFIED TYPE: ICD-10-CM

## 2019-07-23 LAB
BASOPHILS ABSOLUTE: 0 K/UL (ref 0–0.2)
BASOPHILS RELATIVE PERCENT: 0.6 % (ref 0–1)
EOSINOPHILS ABSOLUTE: 0.1 K/UL (ref 0–0.6)
EOSINOPHILS RELATIVE PERCENT: 2.2 % (ref 0–5)
FERRITIN: 15.2 NG/ML (ref 13–150)
FOLATE: 9.6 NG/ML (ref 4.8–37.3)
HCT VFR BLD CALC: 35.3 % (ref 37–47)
HEMOGLOBIN: 11.4 G/DL (ref 12–16)
IRON SATURATION: 20 % (ref 14–50)
IRON: 58 UG/DL (ref 37–145)
LYMPHOCYTES ABSOLUTE: 2.4 K/UL (ref 1.1–4.5)
LYMPHOCYTES RELATIVE PERCENT: 47.5 % (ref 20–40)
MCH RBC QN AUTO: 32.3 PG (ref 27–31)
MCHC RBC AUTO-ENTMCNC: 32.3 G/DL (ref 33–37)
MCV RBC AUTO: 100 FL (ref 81–99)
MONOCYTES ABSOLUTE: 0.3 K/UL (ref 0–0.9)
MONOCYTES RELATIVE PERCENT: 6.7 % (ref 0–10)
NEUTROPHILS ABSOLUTE: 2.2 K/UL (ref 1.5–7.5)
NEUTROPHILS RELATIVE PERCENT: 42.8 % (ref 50–65)
PDW BLD-RTO: 12.3 % (ref 11.5–14.5)
PLATELET # BLD: 164 K/UL (ref 130–400)
PMV BLD AUTO: 10.2 FL (ref 9.4–12.3)
RBC # BLD: 3.53 M/UL (ref 4.2–5.4)
TOTAL IRON BINDING CAPACITY: 292 UG/DL (ref 250–400)
VITAMIN B-12: 266 PG/ML (ref 211–946)
WBC # BLD: 5.1 K/UL (ref 4.8–10.8)

## 2019-07-23 PROCEDURE — 96372 THER/PROPH/DIAG INJ SC/IM: CPT | Performed by: NURSE PRACTITIONER

## 2019-07-23 PROCEDURE — 99214 OFFICE O/P EST MOD 30 MIN: CPT | Performed by: NURSE PRACTITIONER

## 2019-07-23 RX ORDER — CYANOCOBALAMIN 1000 UG/ML
1000 INJECTION INTRAMUSCULAR; SUBCUTANEOUS ONCE
Status: COMPLETED | OUTPATIENT
Start: 2019-07-23 | End: 2019-07-23

## 2019-07-23 RX ADMIN — CYANOCOBALAMIN 1000 MCG: 1000 INJECTION INTRAMUSCULAR; SUBCUTANEOUS at 11:26

## 2019-07-23 ASSESSMENT — ENCOUNTER SYMPTOMS
VOMITING: 0
COLOR CHANGE: 0
SHORTNESS OF BREATH: 0
PHOTOPHOBIA: 0
BACK PAIN: 0
COUGH: 0
NAUSEA: 0
VOICE CHANGE: 0
RHINORRHEA: 0

## 2019-07-23 NOTE — PROGRESS NOTES
5' 10\" - 5' 9\"   BMI (wt*703/ht~2) 20.82 kg/m2 19.8 kg/m2 20.97 kg/m2 20.8 kg/m2 - 21.56 kg/m2   Some recent data might be hidden       Family History   Problem Relation Age of Onset    Cancer Brother         synovial cell sarcoma    Cancer Maternal Grandfather         lung    Other Daughter         gallbladder disease    Hypertension Mother        Social History     Tobacco Use    Smoking status: Current Every Day Smoker     Packs/day: 0.50     Years: 20.00     Pack years: 10.00     Types: Cigarettes    Smokeless tobacco: Never Used   Substance Use Topics    Alcohol use: Yes     Comment: occ      Current Outpatient Medications   Medication Sig Dispense Refill    pantoprazole (PROTONIX) 40 MG tablet Take 1 tablet by mouth 2 times daily 60 tablet 3    rOPINIRole (REQUIP) 0.5 MG tablet TAKE 1 TABLET BY MOUTH EVERY NIGHT 30 tablet 5    atenolol (TENORMIN) 25 MG tablet Take 1 tablet by mouth 2 times daily 60 tablet 5    ondansetron (ZOFRAN-ODT) 4 MG disintegrating tablet DISSOLVE 1 TABLET ON THE TONGUE EVERY 8 HOURS AS NEEDED FOR NAUSEA OR VOMITING 30 tablet 0    valACYclovir (VALTREX) 1 g tablet TAKE 1 TABLET BY MOUTH EVERY DAY AS NEEDED FOR FEVER BLISTER 30 tablet 0    buPROPion (WELLBUTRIN XL) 150 MG extended release tablet TAKE 1 TABLET BY MOUTH TWICE DAILY 60 tablet 5    doxepin (SINEQUAN) 10 MG capsule Take 1 capsule by mouth nightly (Patient taking differently: Take 10 mg by mouth nightly as needed ) 30 capsule 0    hydrochlorothiazide (HYDRODIURIL) 25 MG tablet Take 1 tablet by mouth daily (Patient taking differently: Take 25 mg by mouth as needed ) 30 tablet 5    rOPINIRole (REQUIP) 1 MG tablet Take 1 tablet by mouth daily (Patient not taking: Reported on 7/23/2019) 90 tablet 2     No current facility-administered medications for this visit.       Allergies   Allergen Reactions    Percocet [Oxycodone-Acetaminophen] Itching    Codeine     Toradol [Ketorolac Tromethamine] Itching       Health

## 2019-08-05 ENCOUNTER — TELEPHONE (OUTPATIENT)
Dept: CARDIOLOGY | Age: 44
End: 2019-08-05

## 2019-08-23 ENCOUNTER — OFFICE VISIT (OUTPATIENT)
Dept: PRIMARY CARE CLINIC | Age: 44
End: 2019-08-23
Payer: COMMERCIAL

## 2019-08-23 VITALS
SYSTOLIC BLOOD PRESSURE: 136 MMHG | WEIGHT: 133 LBS | HEIGHT: 69 IN | BODY MASS INDEX: 19.7 KG/M2 | OXYGEN SATURATION: 96 % | RESPIRATION RATE: 20 BRPM | HEART RATE: 68 BPM | DIASTOLIC BLOOD PRESSURE: 72 MMHG

## 2019-08-23 DIAGNOSIS — D51.9 ANEMIA DUE TO VITAMIN B12 DEFICIENCY, UNSPECIFIED B12 DEFICIENCY TYPE: ICD-10-CM

## 2019-08-23 DIAGNOSIS — R41.3 MEMORY LOSS: Primary | ICD-10-CM

## 2019-08-23 DIAGNOSIS — F41.9 ANXIETY: ICD-10-CM

## 2019-08-23 LAB
BASOPHILS ABSOLUTE: 0.1 K/UL (ref 0–0.2)
BASOPHILS RELATIVE PERCENT: 0.8 % (ref 0–1)
EOSINOPHILS ABSOLUTE: 0.1 K/UL (ref 0–0.6)
EOSINOPHILS RELATIVE PERCENT: 0.8 % (ref 0–5)
HCT VFR BLD CALC: 38.9 % (ref 37–47)
HEMOGLOBIN: 12.8 G/DL (ref 12–16)
IMMATURE GRANULOCYTES #: 0 K/UL
LYMPHOCYTES ABSOLUTE: 3.1 K/UL (ref 1.1–4.5)
LYMPHOCYTES RELATIVE PERCENT: 50.4 % (ref 20–40)
MCH RBC QN AUTO: 32.8 PG (ref 27–31)
MCHC RBC AUTO-ENTMCNC: 32.9 G/DL (ref 33–37)
MCV RBC AUTO: 99.7 FL (ref 81–99)
MONOCYTES ABSOLUTE: 0.4 K/UL (ref 0–0.9)
MONOCYTES RELATIVE PERCENT: 5.6 % (ref 0–10)
NEUTROPHILS ABSOLUTE: 2.6 K/UL (ref 1.5–7.5)
NEUTROPHILS RELATIVE PERCENT: 42.2 % (ref 50–65)
PDW BLD-RTO: 12.5 % (ref 11.5–14.5)
PLATELET # BLD: 177 K/UL (ref 130–400)
PMV BLD AUTO: 11 FL (ref 9.4–12.3)
RBC # BLD: 3.9 M/UL (ref 4.2–5.4)
VITAMIN B-12: 326 PG/ML (ref 211–946)
WBC # BLD: 6.2 K/UL (ref 4.8–10.8)

## 2019-08-23 PROCEDURE — 99214 OFFICE O/P EST MOD 30 MIN: CPT | Performed by: NURSE PRACTITIONER

## 2019-08-23 ASSESSMENT — ENCOUNTER SYMPTOMS
VOMITING: 0
RHINORRHEA: 0
NAUSEA: 0
COUGH: 0
PHOTOPHOBIA: 0
BACK PAIN: 0
SHORTNESS OF BREATH: 0
COLOR CHANGE: 0
VOICE CHANGE: 0

## 2019-08-23 NOTE — PROGRESS NOTES
(SINEQUAN) 10 MG capsule Take 1 capsule by mouth nightly (Patient taking differently: Take 10 mg by mouth nightly as needed ) 30 capsule 0    hydrochlorothiazide (HYDRODIURIL) 25 MG tablet Take 1 tablet by mouth daily (Patient taking differently: Take 25 mg by mouth as needed ) 30 tablet 5    rOPINIRole (REQUIP) 0.5 MG tablet TAKE 1 TABLET BY MOUTH EVERY NIGHT 30 tablet 5     No current facility-administered medications for this visit. Allergies   Allergen Reactions    Percocet [Oxycodone-Acetaminophen] Itching    Codeine     Toradol [Ketorolac Tromethamine] Itching       Health Maintenance   Topic Date Due    Breast cancer screen  07/15/2020 (Originally 2/19/2019)    DTaP/Tdap/Td vaccine (1 - Tdap) 07/18/2020 (Originally 8/1/1992)    Pneumococcal 0-64 years Vaccine (1 of 1 - PPSV23) 07/15/2024 (Originally 8/1/1979)    Flu vaccine (1) 09/01/2019    Potassium monitoring  07/16/2020    Creatinine monitoring  07/16/2020    Lipid screen  07/16/2024    HIV screen  Discontinued       Subjective:      Review of Systems   Constitutional: Negative for chills and fever. HENT: Negative for ear pain, hearing loss, rhinorrhea and voice change. Eyes: Negative for photophobia and visual disturbance. Respiratory: Negative for cough and shortness of breath. Cardiovascular: Negative for chest pain and palpitations. Gastrointestinal: Negative for nausea and vomiting. Endocrine: Negative. Negative for cold intolerance and heat intolerance. Genitourinary: Negative for difficulty urinating and flank pain. Musculoskeletal: Negative for back pain and neck pain. Skin: Negative for color change and rash. Allergic/Immunologic: Negative for environmental allergies and food allergies. Neurological: Negative for dizziness, speech difficulty and headaches. Hematological: Does not bruise/bleed easily. Psychiatric/Behavioral: Negative for sleep disturbance and suicidal ideas.        Objective: of the defined types were placed in this encounter. ORDERS:  Orders Placed This Encounter   Procedures   Luayah Hermes 85, WHITNEY Padilla, Neurology, [place]       Follow-up:  Return in about 1 year (around 8/23/2020) for follow-up. PATIENT INSTRUCTIONS:  There are no Patient Instructions on file for this visit. Electronically signed by WHITNEY Farias on 8/23/2019 at 1:07 PM    EMR Dragon/transcription disclaimer:  Much of thisencounter note is electronic transcription/translation of spoken language to printed texts. The electronic translation of spoken language may be erroneous, or at times, nonsensical words or phrases may be inadvertentlytranscribed.   Although I have reviewed the note for such errors, some may still exist.

## 2019-08-29 ENCOUNTER — OFFICE VISIT (OUTPATIENT)
Dept: PRIMARY CARE CLINIC | Age: 44
End: 2019-08-29
Payer: COMMERCIAL

## 2019-08-29 DIAGNOSIS — E53.8 VITAMIN B 12 DEFICIENCY: Primary | ICD-10-CM

## 2019-08-29 PROCEDURE — 96372 THER/PROPH/DIAG INJ SC/IM: CPT | Performed by: NURSE PRACTITIONER

## 2019-08-29 RX ORDER — CYANOCOBALAMIN 1000 UG/ML
1000 INJECTION INTRAMUSCULAR; SUBCUTANEOUS ONCE
Status: COMPLETED | OUTPATIENT
Start: 2019-08-29 | End: 2019-08-29

## 2019-08-29 RX ADMIN — CYANOCOBALAMIN 1000 MCG: 1000 INJECTION INTRAMUSCULAR; SUBCUTANEOUS at 13:11

## 2019-09-04 ENCOUNTER — TELEPHONE (OUTPATIENT)
Dept: PRIMARY CARE CLINIC | Age: 44
End: 2019-09-04

## 2019-09-04 NOTE — TELEPHONE ENCOUNTER
Patient called and requested something for nasal congestion,fever of 100-102 with chills and cough. Patient reports \"I just don't feel good enough to come in to office. \"

## 2019-09-05 ENCOUNTER — TELEPHONE (OUTPATIENT)
Dept: PRIMARY CARE CLINIC | Age: 44
End: 2019-09-05

## 2019-09-05 RX ORDER — METHYLPREDNISOLONE 4 MG/1
TABLET ORAL
Qty: 1 KIT | Refills: 0 | Status: SHIPPED | OUTPATIENT
Start: 2019-09-05 | End: 2019-09-11

## 2019-09-13 ENCOUNTER — OFFICE VISIT (OUTPATIENT)
Dept: CARDIOLOGY | Age: 44
End: 2019-09-13
Payer: COMMERCIAL

## 2019-09-13 ENCOUNTER — OFFICE VISIT (OUTPATIENT)
Dept: PRIMARY CARE CLINIC | Age: 44
End: 2019-09-13
Payer: COMMERCIAL

## 2019-09-13 VITALS
HEART RATE: 51 BPM | DIASTOLIC BLOOD PRESSURE: 78 MMHG | BODY MASS INDEX: 20.29 KG/M2 | WEIGHT: 137 LBS | SYSTOLIC BLOOD PRESSURE: 112 MMHG | HEIGHT: 69 IN

## 2019-09-13 DIAGNOSIS — I49.3 PVC (PREMATURE VENTRICULAR CONTRACTION): Primary | ICD-10-CM

## 2019-09-13 DIAGNOSIS — E53.8 B12 DEFICIENCY: Primary | ICD-10-CM

## 2019-09-13 DIAGNOSIS — R00.2 PALPITATIONS: ICD-10-CM

## 2019-09-13 PROCEDURE — 96372 THER/PROPH/DIAG INJ SC/IM: CPT | Performed by: NURSE PRACTITIONER

## 2019-09-13 PROCEDURE — 99212 OFFICE O/P EST SF 10 MIN: CPT | Performed by: INTERNAL MEDICINE

## 2019-09-13 PROCEDURE — 93000 ELECTROCARDIOGRAM COMPLETE: CPT | Performed by: INTERNAL MEDICINE

## 2019-09-13 PROCEDURE — 99212 OFFICE O/P EST SF 10 MIN: CPT | Performed by: NURSE PRACTITIONER

## 2019-09-13 RX ORDER — CYANOCOBALAMIN 1000 UG/ML
1000 INJECTION INTRAMUSCULAR; SUBCUTANEOUS ONCE
Status: COMPLETED | OUTPATIENT
Start: 2019-09-13 | End: 2019-09-13

## 2019-09-13 RX ORDER — ATENOLOL 25 MG/1
25 TABLET ORAL 2 TIMES DAILY
Qty: 180 TABLET | Refills: 3 | Status: SHIPPED | OUTPATIENT
Start: 2019-09-13 | End: 2020-10-05

## 2019-09-13 RX ADMIN — CYANOCOBALAMIN 1000 MCG: 1000 INJECTION INTRAMUSCULAR; SUBCUTANEOUS at 10:57

## 2019-09-16 ENCOUNTER — OFFICE VISIT (OUTPATIENT)
Dept: PRIMARY CARE CLINIC | Age: 44
End: 2019-09-16
Payer: COMMERCIAL

## 2019-09-16 ENCOUNTER — TELEPHONE (OUTPATIENT)
Dept: PRIMARY CARE CLINIC | Age: 44
End: 2019-09-16

## 2019-09-16 VITALS — DIASTOLIC BLOOD PRESSURE: 72 MMHG | OXYGEN SATURATION: 98 % | SYSTOLIC BLOOD PRESSURE: 112 MMHG | HEART RATE: 62 BPM

## 2019-09-16 DIAGNOSIS — E53.8 B12 DEFICIENCY: ICD-10-CM

## 2019-09-16 DIAGNOSIS — E53.8 B12 DEFICIENCY: Primary | ICD-10-CM

## 2019-09-16 LAB — VITAMIN B-12: 975 PG/ML (ref 211–946)

## 2019-09-16 PROCEDURE — 99213 OFFICE O/P EST LOW 20 MIN: CPT | Performed by: NURSE PRACTITIONER

## 2019-09-16 ASSESSMENT — ENCOUNTER SYMPTOMS
RHINORRHEA: 0
COLOR CHANGE: 0
VOMITING: 0
SHORTNESS OF BREATH: 0
BACK PAIN: 0
NAUSEA: 0
VOICE CHANGE: 0
COUGH: 0
PHOTOPHOBIA: 0

## 2019-09-16 NOTE — PROGRESS NOTES
[facility] PRIMARY [facility]ARE  [address] [address]  [address] [address]  Dept: [phone]  Dept Fax: [phone]  Loc: [phone]    Wojciech Courtney is a 53 y.o. female who presents today for her medical conditions/complaints as noted below. Wojciech Courtney is c/o of Follow-up (Follow for B12 levels)      HPI:     HPI   Chief Complaint   Patient presents with    Follow-up     Follow for B12 levels     Patient presents today for follow-up on b12 deficiency. She is going to have labs drawn today. She states she feels like she is improving and having less fatigue; she is no longer having facial tingling or numbness. No complaints today.      Past Medical History:   Diagnosis Date    PVC (premature ventricular contraction)     Restless leg syndrome 7/16/2018    Uterine mass 6/17/14    9x9 cm on CT scan      Past Surgical History:   Procedure Laterality Date    BREAST RECONSTRUCTION  04/2019    BREAST SURGERY Bilateral 4609    silicone Memory Gel    CHOLECYSTECTOMY  6/24/14    HC INJECT OTHER PERPHRL NERV Right 8/3/2016    HIP FLUOROSCOPIC GUIDED CORTICOSTEROID INJECTION  performed by Marion Hopkins MD at [address] [place] [address] NERV Right 4/21/2017    FLURO GUIDED HIP INJECTION performed by Anders Chappell MD at [address] [place] [address] Street 9/16/2019 9/13/2019 8/23/2019 7/23/2019 7/16/2019 9/57/0015   SYSTOLIC 201 [phone]   DIASTOLIC 72 78 72 76 64 70   Site Left Upper Arm - - - Left Upper Arm Left Upper Arm   Position Sitting - - - Sitting Sitting   Cuff Size - - - - - -   Pulse 62 51 68 44 56 60   Temp - - - - - -   Resp - - 20 20 - -   SpO2 98 - 96 98 94 99   Weight - 137 lb 133 lb 141 lb 138 lb 142 lb   Height - 5' 9\" 5' 9\" 5' 9\" 5' 10\" 5' 9\"   BMI (wt*703/ht~2) - 20.23 kg/m2 19.64 kg/m2 20.82 kg/m2 19.8 kg/m2 20.97 kg/m2   Some recent data might be hidden Family History   Problem Relation Age of Onset    Cancer Brother         synovial cell sarcoma    Cancer Maternal Grandfather         lung    Other Daughter         gallbladder disease    Hypertension Mother        Social History     Tobacco Use    Smoking status: Current Every Day Smoker     Packs/day: 0.50     Years: 20.00     Pack years: 10.00     Types: Cigarettes    Smokeless tobacco: Never Used   Substance Use Topics    Alcohol use: Yes     Comment: occ      Current Outpatient Medications   Medication Sig Dispense Refill    Multiple Vitamin (MULTI-VITAMIN DAILY PO) Take by mouth      atenolol (TENORMIN) 25 MG tablet Take 1 tablet by mouth 2 times daily 180 tablet 3    pantoprazole (PROTONIX) 40 MG tablet Take 1 tablet by mouth 2 times daily 60 tablet 3    rOPINIRole (REQUIP) 0.5 MG tablet TAKE 1 TABLET BY MOUTH EVERY NIGHT 30 tablet 5    doxepin (SINEQUAN) 10 MG capsule Take 1 capsule by mouth nightly (Patient taking differently: Take 10 mg by mouth nightly as needed ) 30 capsule 0    hydrochlorothiazide (HYDRODIURIL) 25 MG tablet Take 1 tablet by mouth daily (Patient taking differently: Take 25 mg by mouth as needed ) 30 tablet 5    ondansetron (ZOFRAN-ODT) 4 MG disintegrating tablet DISSOLVE 1 TABLET ON THE TONGUE EVERY 8 HOURS AS NEEDED FOR NAUSEA OR VOMITING (Patient not taking: Reported on 9/16/2019) 30 tablet 0    valACYclovir (VALTREX) 1 g tablet TAKE 1 TABLET BY MOUTH EVERY DAY AS NEEDED FOR FEVER BLISTER (Patient not taking: Reported on 9/16/2019) 30 tablet 0     No current facility-administered medications for this visit.       Allergies   Allergen Reactions    Percocet [Oxycodone-Acetaminophen] Itching    Codeine     Toradol [Ketorolac Tromethamine] Itching       Health Maintenance   Topic Date Due    Flu vaccine (1) 09/01/2019    Breast cancer screen  07/15/2020 (Originally 2/19/2019)    DTaP/Tdap/Td vaccine (1 - Tdap) 07/18/2020 (Originally 8/1/1992)    Pneumococcal 0-64 years Vaccine (1 of 1 - PPSV23) 07/15/2024 (Originally 8/1/1979)    Potassium monitoring  07/16/2020    Creatinine monitoring  07/16/2020    Lipid screen  07/16/2024    HIV screen  Discontinued       Subjective:      Review of Systems   Constitutional: Negative for chills and fever. HENT: Negative for ear pain, hearing loss, rhinorrhea and voice change. Eyes: Negative for photophobia and visual disturbance. Respiratory: Negative for cough and shortness of breath. Cardiovascular: Negative for chest pain and palpitations. Gastrointestinal: Negative for nausea and vomiting. Endocrine: Negative. Negative for cold intolerance and heat intolerance. Genitourinary: Negative for difficulty urinating and flank pain. Musculoskeletal: Negative for back pain and neck pain. Skin: Negative for color change and rash. Allergic/Immunologic: Negative for environmental allergies and food allergies. Neurological: Negative for dizziness, speech difficulty and headaches. Hematological: Does not bruise/bleed easily. Psychiatric/Behavioral: Negative for sleep disturbance and suicidal ideas. Objective:     Physical Exam   Constitutional: She is oriented to person, place, and time. She appears well-developed and well-nourished. HENT:   Head: Atraumatic. Right Ear: External ear normal.   Left Ear: External ear normal.   Nose: Nose normal.   Mouth/Throat: Oropharynx is clear and moist.   Eyes: Pupils are equal, round, and reactive to light. Conjunctivae are normal.   Neck: Normal range of motion. Neck supple. Cardiovascular: Normal rate, regular rhythm, S1 normal, S2 normal and normal heart sounds. Pulmonary/Chest: Effort normal and breath sounds normal.   Abdominal: Soft. Bowel sounds are normal.   Musculoskeletal: Normal range of motion. Neurological: She is alert and oriented to person, place, and time. Skin: Skin is warm and dry. Psychiatric: She has a normal mood and affect.  Her behavior

## 2019-10-28 RX ORDER — VALACYCLOVIR HYDROCHLORIDE 1 G/1
TABLET, FILM COATED ORAL
Qty: 30 TABLET | Refills: 0 | Status: SHIPPED | OUTPATIENT
Start: 2019-10-28 | End: 2021-06-01 | Stop reason: SDUPTHER

## 2019-11-22 ENCOUNTER — OFFICE VISIT (OUTPATIENT)
Dept: PRIMARY CARE CLINIC | Age: 44
End: 2019-11-22
Payer: COMMERCIAL

## 2019-11-22 VITALS
WEIGHT: 138 LBS | TEMPERATURE: 98 F | RESPIRATION RATE: 20 BRPM | BODY MASS INDEX: 20.44 KG/M2 | OXYGEN SATURATION: 98 % | DIASTOLIC BLOOD PRESSURE: 68 MMHG | HEIGHT: 69 IN | HEART RATE: 73 BPM | SYSTOLIC BLOOD PRESSURE: 112 MMHG

## 2019-11-22 DIAGNOSIS — R68.82 DECREASED LIBIDO: ICD-10-CM

## 2019-11-22 DIAGNOSIS — L65.9 ALOPECIA: Primary | ICD-10-CM

## 2019-11-22 DIAGNOSIS — R61 NIGHT SWEATS: ICD-10-CM

## 2019-11-22 DIAGNOSIS — N89.8 VAGINAL DRYNESS: ICD-10-CM

## 2019-11-22 PROCEDURE — 99214 OFFICE O/P EST MOD 30 MIN: CPT | Performed by: NURSE PRACTITIONER

## 2019-11-22 ASSESSMENT — ENCOUNTER SYMPTOMS
VOICE CHANGE: 0
BACK PAIN: 0
COUGH: 0
NAUSEA: 0
SHORTNESS OF BREATH: 0
VOMITING: 0
COLOR CHANGE: 0
RHINORRHEA: 0
PHOTOPHOBIA: 0

## 2019-11-25 DIAGNOSIS — N89.8 VAGINAL DRYNESS: ICD-10-CM

## 2019-11-25 DIAGNOSIS — L65.9 ALOPECIA: ICD-10-CM

## 2019-11-25 DIAGNOSIS — R68.82 DECREASED LIBIDO: ICD-10-CM

## 2019-11-25 DIAGNOSIS — R61 NIGHT SWEATS: ICD-10-CM

## 2019-11-25 LAB
ALBUMIN SERPL-MCNC: 3.9 G/DL (ref 3.5–5.2)
ALP BLD-CCNC: 55 U/L (ref 35–104)
ALT SERPL-CCNC: 11 U/L (ref 5–33)
ANION GAP SERPL CALCULATED.3IONS-SCNC: 13 MMOL/L (ref 7–19)
AST SERPL-CCNC: 18 U/L (ref 5–32)
BILIRUB SERPL-MCNC: <0.2 MG/DL (ref 0.2–1.2)
BUN BLDV-MCNC: 11 MG/DL (ref 6–20)
CALCIUM SERPL-MCNC: 9.8 MG/DL (ref 8.6–10)
CHLORIDE BLD-SCNC: 108 MMOL/L (ref 98–111)
CO2: 24 MMOL/L (ref 22–29)
CORTISOL - AM: 12.4 UG/DL (ref 6.2–19.4)
CREAT SERPL-MCNC: 0.9 MG/DL (ref 0.5–0.9)
ESTRADIOL LEVEL: 243.8 PG/ML
GFR NON-AFRICAN AMERICAN: >60
GLUCOSE BLD-MCNC: 94 MG/DL (ref 74–109)
HBA1C MFR BLD: 5.4 % (ref 4–6)
INSULIN: 4 MU/L (ref 2.6–24.9)
POTASSIUM SERPL-SCNC: 4.3 MMOL/L (ref 3.5–5)
PROGESTERONE LEVEL: 0.22 NG/ML
SODIUM BLD-SCNC: 145 MMOL/L (ref 136–145)
T3 FREE: 3.2 PG/ML (ref 2–4.4)
T4 FREE: 1.2 NG/DL (ref 0.93–1.7)
TESTOSTERONE TOTAL: 8.1 NG/DL (ref 8.4–48.1)
TOTAL PROTEIN: 6.6 G/DL (ref 6.6–8.7)
TSH SERPL DL<=0.05 MIU/L-ACNC: 2.35 UIU/ML (ref 0.27–4.2)
VITAMIN B-12: 473 PG/ML (ref 211–946)
VITAMIN D 25-HYDROXY: 42 NG/ML

## 2019-11-27 LAB
DHEAS (DHEA SULFATE): 25 UG/DL (ref 32–240)
THYROGLOBULIN AB: <0.9 IU/ML (ref 0–4)
THYROID PEROXIDASE (TPO) ABS: 0.7 IU/ML (ref 0–9)

## 2019-11-29 LAB — T3 REVERSE: 16.1 NG/DL (ref 9–27)

## 2019-11-30 LAB — ESTRONE: 67.4 PG/ML

## 2019-12-10 ENCOUNTER — TELEPHONE (OUTPATIENT)
Dept: PRIMARY CARE CLINIC | Age: 44
End: 2019-12-10

## 2020-03-16 RX ORDER — ROPINIROLE 0.5 MG/1
TABLET, FILM COATED ORAL
Qty: 30 TABLET | Refills: 5 | Status: SHIPPED | OUTPATIENT
Start: 2020-03-16 | End: 2020-09-25

## 2020-03-16 NOTE — TELEPHONE ENCOUNTER
Rayne called requesting a refill of the below medication which has been pended for you:     Requested Prescriptions     Pending Prescriptions Disp Refills    rOPINIRole (REQUIP) 0.5 MG tablet [Pharmacy Med Name: ROPINIROLE 0.5MG TABLETS] 30 tablet 5     Sig: TAKE 1 TABLET BY MOUTH EVERY NIGHT       Last Appointment Date: 11/22/2019  Next Appointment Date: 3/16/2020    Allergies   Allergen Reactions    Percocet [Oxycodone-Acetaminophen] Itching    Codeine     Toradol [Ketorolac Tromethamine] Itching

## 2020-03-24 ENCOUNTER — TELEPHONE (OUTPATIENT)
Dept: PRIMARY CARE CLINIC | Age: 45
End: 2020-03-24

## 2020-03-24 RX ORDER — NITROFURANTOIN 25; 75 MG/1; MG/1
100 CAPSULE ORAL 2 TIMES DAILY
Qty: 20 CAPSULE | Refills: 0 | Status: SHIPPED | OUTPATIENT
Start: 2020-03-24 | End: 2020-04-03

## 2020-06-10 ENCOUNTER — OFFICE VISIT (OUTPATIENT)
Dept: CARDIOLOGY | Age: 45
End: 2020-06-10
Payer: COMMERCIAL

## 2020-06-10 VITALS
HEIGHT: 69 IN | DIASTOLIC BLOOD PRESSURE: 68 MMHG | BODY MASS INDEX: 20.73 KG/M2 | HEART RATE: 51 BPM | WEIGHT: 140 LBS | SYSTOLIC BLOOD PRESSURE: 110 MMHG

## 2020-06-10 PROCEDURE — 93000 ELECTROCARDIOGRAM COMPLETE: CPT | Performed by: NURSE PRACTITIONER

## 2020-06-10 NOTE — PROGRESS NOTES
EKG shows Sinus Bradycardia with a heart rate if 81 bpm.   EKG is unchanged from EKG done in 9/2019.

## 2020-09-08 ENCOUNTER — VIRTUAL VISIT (OUTPATIENT)
Dept: PRIMARY CARE CLINIC | Age: 45
End: 2020-09-08
Payer: COMMERCIAL

## 2020-09-08 PROCEDURE — 99443 PR PHYS/QHP TELEPHONE EVALUATION 21-30 MIN: CPT | Performed by: NURSE PRACTITIONER

## 2020-09-08 RX ORDER — HYDROXYZINE HYDROCHLORIDE 25 MG/1
25 TABLET, FILM COATED ORAL EVERY 8 HOURS PRN
Qty: 30 TABLET | Refills: 0 | Status: SHIPPED | OUTPATIENT
Start: 2020-09-08 | End: 2020-09-18

## 2020-09-08 RX ORDER — SERTRALINE HYDROCHLORIDE 25 MG/1
25 TABLET, FILM COATED ORAL DAILY
Qty: 30 TABLET | Refills: 0 | Status: SHIPPED | OUTPATIENT
Start: 2020-09-08 | End: 2020-10-05

## 2020-09-08 ASSESSMENT — ENCOUNTER SYMPTOMS
VOICE CHANGE: 0
PHOTOPHOBIA: 0
COLOR CHANGE: 0
VOMITING: 0
SHORTNESS OF BREATH: 0
BACK PAIN: 0
NAUSEA: 0
RHINORRHEA: 0
COUGH: 0
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
## 2020-10-05 RX ORDER — SERTRALINE HYDROCHLORIDE 25 MG/1
25 TABLET, FILM COATED ORAL DAILY
Qty: 30 TABLET | Refills: 0 | Status: SHIPPED | OUTPATIENT
Start: 2020-10-05 | End: 2020-10-12 | Stop reason: SDUPTHER

## 2020-10-05 RX ORDER — ATENOLOL 25 MG/1
TABLET ORAL
Qty: 180 TABLET | Refills: 3 | Status: SHIPPED | OUTPATIENT
Start: 2020-10-05 | End: 2021-01-14 | Stop reason: SDUPTHER

## 2020-10-12 ENCOUNTER — VIRTUAL VISIT (OUTPATIENT)
Dept: PRIMARY CARE CLINIC | Age: 45
End: 2020-10-12
Payer: COMMERCIAL

## 2020-10-12 PROCEDURE — 99213 OFFICE O/P EST LOW 20 MIN: CPT | Performed by: NURSE PRACTITIONER

## 2020-10-12 RX ORDER — SERTRALINE HYDROCHLORIDE 25 MG/1
25 TABLET, FILM COATED ORAL DAILY
Qty: 30 TABLET | Refills: 3 | Status: SHIPPED | OUTPATIENT
Start: 2020-10-12 | End: 2021-01-14 | Stop reason: SDUPTHER

## 2020-10-12 ASSESSMENT — ENCOUNTER SYMPTOMS
BACK PAIN: 0
PHOTOPHOBIA: 0
VOICE CHANGE: 0
COUGH: 0
NAUSEA: 0
SHORTNESS OF BREATH: 0
COLOR CHANGE: 0
RHINORRHEA: 0
VOMITING: 0

## 2020-10-12 ASSESSMENT — PATIENT HEALTH QUESTIONNAIRE - PHQ9
SUM OF ALL RESPONSES TO PHQ QUESTIONS 1-9: 0
1. LITTLE INTEREST OR PLEASURE IN DOING THINGS: 0
SUM OF ALL RESPONSES TO PHQ9 QUESTIONS 1 & 2: 0
2. FEELING DOWN, DEPRESSED OR HOPELESS: 0
SUM OF ALL RESPONSES TO PHQ QUESTIONS 1-9: 0

## 2020-10-12 NOTE — PROGRESS NOTES
ideas.       Prior to Visit Medications    Medication Sig Taking?  Authorizing Provider   sertraline (ZOLOFT) 25 MG tablet Take 1 tablet by mouth daily Yes WHITNEY Pope   atenolol (TENORMIN) 25 MG tablet TAKE 1 TABLET BY MOUTH TWICE DAILY  WHITNEY Portillo   rOPINIRole (REQUIP) 0.5 MG tablet TAKE 1 TABLET BY MOUTH EVERY NIGHT  WHITNEY Pope   valACYclovir (VALTREX) 1 g tablet TAKE 1 TABLET BY MOUTH EVERY DAY AS NEEDED FOR FEVER BLISTER  WHITNEY Pope   Multiple Vitamin (MULTI-VITAMIN DAILY PO) Take by mouth  Historical Provider, MD   pantoprazole (PROTONIX) 40 MG tablet Take 1 tablet by mouth 2 times daily  Patient not taking: Reported on 9/58/1318  WHITNEY Pope   ondansetron (ZOFRAN-ODT) 4 MG disintegrating tablet DISSOLVE 1 TABLET ON THE TONGUE EVERY 8 HOURS AS NEEDED FOR NAUSEA OR VOMITING  WHITNEY Pope   doxepin (SINEQUAN) 10 MG capsule Take 1 capsule by mouth nightly  Patient not taking: Reported on 6/88/9297  WHITNEY Pope   hydrochlorothiazide (HYDRODIURIL) 25 MG tablet Take 1 tablet by mouth daily  Patient taking differently: Take 25 mg by mouth as needed   WHITNEY Pope       Social History     Tobacco Use    Smoking status: Current Every Day Smoker     Packs/day: 0.50     Years: 20.00     Pack years: 10.00     Types: Cigarettes    Smokeless tobacco: Never Used   Substance Use Topics    Alcohol use: Yes     Comment: occ    Drug use: No        Allergies   Allergen Reactions    Percocet [Oxycodone-Acetaminophen] Itching    Codeine     Toradol [Ketorolac Tromethamine] Itching   ,   Past Medical History:   Diagnosis Date    PVC (premature ventricular contraction)     Restless leg syndrome 7/16/2018    Uterine mass 6/17/14    9x9 cm on CT scan   ,   Past Surgical History:   Procedure Laterality Date    BREAST RECONSTRUCTION  04/2019    BREAST SURGERY Bilateral 2274    silicone Memory Gel    CHOLECYSTECTOMY  6/24/14    HC INJECT OTHER PERPHRL NERV Right 8/3/2016    HIP FLUOROSCOPIC GUIDED CORTICOSTEROID INJECTION  performed by Lolly Kent MD at [facility] NERV Right 4/21/2017    FLURO GUIDED HIP INJECTION performed by Kim Mendenhall MD at [address] [place] [address], VAGINAL      TONSILLECTOMY  1979    TUBAL LIGATION  1994   ,   Social History     Tobacco Use    Smoking status: Current Every Day Smoker     Packs/day: 0.50     Years: 20.00     Pack years: 10.00     Types: Cigarettes    Smokeless tobacco: Never Used   Substance Use Topics    Alcohol use: Yes     Comment: occ    Drug use: No   ,   Family History   Problem Relation Age of Onset    Cancer Brother         synovial cell sarcoma    Cancer Maternal Grandfather         lung    Other Daughter         gallbladder disease    Hypertension Mother    ,   Immunization History   Administered Date(s) Administered    Tetanus 06/01/2009   ,   Health Maintenance   Topic Date Due    DTaP/Tdap/Td vaccine (1 - Tdap) 08/01/1992    Breast cancer screen  02/19/2020    Flu vaccine (1) 09/01/2020    Pneumococcal 0-64 years Vaccine (1 of 1 - PPSV23) 07/15/2024 (Originally 8/1/1979)    Potassium monitoring  11/25/2020    Creatinine monitoring  11/25/2020    Lipid screen  07/16/2024    Hepatitis A vaccine  Aged Out    Hepatitis B vaccine  Aged Out    Hib vaccine  Aged Out    Meningococcal (ACWY) vaccine  Aged Out    HIV screen  Discontinued       PHYSICAL EXAMINATION:  [ INSTRUCTIONS:  \"[x]\" Indicates a positive item  \"[]\" Indicates a negative item  -- DELETE ALL ITEMS NOT EXAMINED]  [x] Alert  [x] Oriented to person/place/time    [x] No apparent distress  [] Toxic appearing    [] Face flushed appearing [x] Sclera clear  [] Lips are cyanotic      [x] Breathing appears normal  [] Appears tachypneic      [] Rash on visible skin    [x] Cranial Nerves II-XII grossly intact    [x] Motor grossly intact in visible upper extremities    [x] Motor grossly intact in visible lower extremities    [x] Normal Mood  [] Anxious appearing    [] Depressed appearing  [] Confused appearing      [] Poor short term memory  [] Poor long term memory    [] OTHER:      Due to this being a TeleHealth encounter, evaluation of the following organ systems is limited: Vitals/Constitutional/EENT/Resp/CV/GI//MS/Neuro/Skin/Heme-Lymph-Imm. ASSESSMENT/PLAN:  1. Anxiety  - Continue sertraline 25 mg; follow-up in 3 months or sooner if needed. 2. Tobacco Abuse Disorder  Approximately 5 minutes of education was provided about quit smoking and the harms of tobacco.  Patient does show understanding. Patient does not have  the desire to quit smoking in the future. Return in about 3 months (around 1/12/2021) for video visit, anxiety/depression check. An  electronic signature was used to authenticate this note. --WHITNEY Wheatley on 10/12/2020 at 11:18 AM        Pursuant to the emergency declaration under the [facility] waiver authority and the [employer] Act, this Virtual  Visit was conducted, with patient's consent, to reduce the patient's risk of exposure to COVID-19 and provide continuity of care for an established patient. Services were provided through a video synchronous discussion virtually to substitute for in-person clinic visit.

## 2020-10-19 ENCOUNTER — VIRTUAL VISIT (OUTPATIENT)
Dept: PRIMARY CARE CLINIC | Age: 45
End: 2020-10-19
Payer: COMMERCIAL

## 2020-10-19 PROCEDURE — 99214 OFFICE O/P EST MOD 30 MIN: CPT | Performed by: NURSE PRACTITIONER

## 2020-10-19 ASSESSMENT — ENCOUNTER SYMPTOMS
SHORTNESS OF BREATH: 0
PHOTOPHOBIA: 0
BACK PAIN: 0
VOICE CHANGE: 0
COLOR CHANGE: 0
VOMITING: 0
RHINORRHEA: 0
COUGH: 0
NAUSEA: 0

## 2020-10-19 NOTE — PROGRESS NOTES
[address] [address]             Phone:  [phone]  Fax:  [phone]       10/19/2020    TELEHEALTH EVALUATION -- Audio/Visual (During QTBYU-95 public health emergency)    HPI:  Chief Complaint   Patient presents with    Abdominal Pain         Rayne Welch (:  1975) has requested an audio/video evaluation for the following concern(s):    Patient presents today for evaluation of abdominal pain that began yesterday. She reports it was off and on yesterday. Today she has had constant pain in her right lower abdomen. She cannot tolerate coughing or moving without increased pain. She states that when she pushes in on the area it provides relief but hurts worse after letting go. She has had 3-4 days of diarrhea as well. She denies fever, nausea or vomiting. No dysuria, flank pain or urinary frequency. She does still have her appendix. Review of Systems   Constitutional: Negative for chills and fever. HENT: Negative for ear pain, hearing loss, rhinorrhea and voice change. Eyes: Negative for photophobia and visual disturbance. Respiratory: Negative for cough and shortness of breath. Cardiovascular: Negative for chest pain and palpitations. Gastrointestinal: Negative for nausea and vomiting. Endocrine: Negative. Negative for cold intolerance and heat intolerance. Genitourinary: Negative for difficulty urinating and flank pain. Musculoskeletal: Negative for back pain and neck pain. Skin: Negative for color change and rash. Allergic/Immunologic: Negative for environmental allergies and food allergies. Neurological: Negative for dizziness, speech difficulty and headaches. Hematological: Does not bruise/bleed easily. Psychiatric/Behavioral: Negative for sleep disturbance and suicidal ideas. Prior to Visit Medications    Medication Sig Taking?  Authorizing Provider   sertraline (ZOLOFT) 25 MG tablet Take 1 tablet by mouth daily  Arnel Reina WHITNEY Sam   atenolol (TENORMIN) 25 MG tablet TAKE 1 TABLET BY MOUTH TWICE DAILY  WHITNEY Bryson   rOPINIRole (REQUIP) 0.5 MG tablet TAKE 1 TABLET BY MOUTH EVERY NIGHT  WHITNEY Galvan   valACYclovir (VALTREX) 1 g tablet TAKE 1 TABLET BY MOUTH EVERY DAY AS NEEDED FOR FEVER BLISTER  WHITNEY Galvan   Multiple Vitamin (MULTI-VITAMIN DAILY PO) Take by mouth  Historical Provider, MD   pantoprazole (PROTONIX) 40 MG tablet Take 1 tablet by mouth 2 times daily  Patient not taking: Reported on 7/29/8670  WHITNEY Galvan   ondansetron (ZOFRAN-ODT) 4 MG disintegrating tablet DISSOLVE 1 TABLET ON THE TONGUE EVERY 8 HOURS AS NEEDED FOR NAUSEA OR VOMITING  WHITNEY Galvan   doxepin (SINEQUAN) 10 MG capsule Take 1 capsule by mouth nightly  Patient not taking: Reported on 1/99/5102  WHITNEY Galvan   hydrochlorothiazide (HYDRODIURIL) 25 MG tablet Take 1 tablet by mouth daily  Patient taking differently: Take 25 mg by mouth as needed   WHITNEY Galvan       Social History     Tobacco Use    Smoking status: Current Every Day Smoker     Packs/day: 0.50     Years: 20.00     Pack years: 10.00     Types: Cigarettes    Smokeless tobacco: Never Used   Substance Use Topics    Alcohol use: Yes     Comment: occ    Drug use: No        Allergies   Allergen Reactions    Percocet [Oxycodone-Acetaminophen] Itching    Codeine     Toradol [Ketorolac Tromethamine] Itching   ,   Past Medical History:   Diagnosis Date    PVC (premature ventricular contraction)     Restless leg syndrome 7/16/2018    Uterine mass 6/17/14    9x9 cm on CT scan   ,   Past Surgical History:   Procedure Laterality Date    BREAST RECONSTRUCTION  04/2019    BREAST SURGERY Bilateral 4029    silicone Memory Gel    CHOLECYSTECTOMY  6/24/14    HC INJECT OTHER PERPHRL NERV Right 8/3/2016    HIP FLUOROSCOPIC GUIDED CORTICOSTEROID INJECTION  performed by Vinita Melara MD at [facility]    HC INJECT OTHER PERPHRL NERV Right 4/21/2017    FLURO GUIDED HIP INJECTION performed by Tricia Velázquez MD at [facility], VAGINAL      TONSILLECTOMY  1979    TUBAL LIGATION  1994   ,   Social History     Tobacco Use    Smoking status: Current Every Day Smoker     Packs/day: 0.50     Years: 20.00     Pack years: 10.00     Types: Cigarettes    Smokeless tobacco: Never Used   Substance Use Topics    Alcohol use: Yes     Comment: occ    Drug use: No   ,   Family History   Problem Relation Age of Onset    Cancer Brother         synovial cell sarcoma    Cancer Maternal Grandfather         lung    Other Daughter         gallbladder disease    Hypertension Mother    ,   Immunization History   Administered Date(s) Administered    Tetanus 06/01/2009   ,   Health Maintenance   Topic Date Due    DTaP/Tdap/Td vaccine (1 - Tdap) 08/01/1992    Breast cancer screen  02/19/2020    Flu vaccine (1) 09/01/2020    Pneumococcal 0-64 years Vaccine (1 of 1 - PPSV23) 07/15/2024 (Originally 8/1/1979)    Potassium monitoring  11/25/2020    Creatinine monitoring  11/25/2020    Lipid screen  07/16/2024    Hepatitis A vaccine  Aged Out    Hepatitis B vaccine  Aged Out    Hib vaccine  Aged Out    Meningococcal (ACWY) vaccine  Aged Out    HIV screen  Discontinued       PHYSICAL EXAMINATION:  [ INSTRUCTIONS:  \"[x]\" Indicates a positive item  \"[]\" Indicates a negative item  -- DELETE ALL ITEMS NOT EXAMINED]  [x] Alert  [x] Oriented to person/place/time    [x] No apparent distress  [] Toxic appearing    [] Face flushed appearing [x] Sclera clear  [] Lips are cyanotic      [x] Breathing appears normal  [] Appears tachypneic      [] Rash on visible skin    [x] Cranial Nerves II-XII grossly intact    [x] Motor grossly intact in visible upper extremities    [x] Motor grossly intact in visible lower extremities    [x] Normal Mood  [] Anxious appearing    [] Depressed appearing  [] Confused appearing      [] Poor short term memory [] Poor long term memory    [] OTHER:      Due to this being a TeleHealth encounter, evaluation of the following organ systems is limited: Vitals/Constitutional/EENT/Resp/CV/GI//MS/Neuro/Skin/Heme-Lymph-Imm. ASSESSMENT/PLAN:  1. Right lower quadrant abdominal pain  - There is obvious concern for an acute appendicitis at this time; she would like to avoid going to ED due to cost especially if it is not emergent. She wants to have CT done at [facility] today; she is agreeable to go to the ED if we are unable to get her scheduled this afternoon, however. Return if symptoms worsen or fail to improve. An  electronic signature was used to authenticate this note. --WHITNEY Mendoza on 10/19/2020 at 2:40 PM        Pursuant to the emergency declaration under the [facility], 1135 waiver authority and the [employer] and [employer] Act, this Virtual  Visit was conducted, with patient's consent, to reduce the patient's risk of exposure to COVID-19 and provide continuity of care for an established patient. Services were provided through a video synchronous discussion virtually to substitute for in-person clinic visit.

## 2020-10-20 ENCOUNTER — TELEPHONE (OUTPATIENT)
Dept: PRIMARY CARE CLINIC | Age: 45
End: 2020-10-20

## 2020-10-20 NOTE — TELEPHONE ENCOUNTER
Please inform patient that CT is negative; could possibly be a pulled muscle that is causing her severe pain. I would recommend muscle relaxer- flexeril 10 mg TID as needed #30.

## 2020-10-22 ENCOUNTER — TELEPHONE (OUTPATIENT)
Dept: PRIMARY CARE CLINIC | Age: 45
End: 2020-10-22

## 2020-10-22 RX ORDER — CYCLOBENZAPRINE HCL 10 MG
10 TABLET ORAL 3 TIMES DAILY PRN
Qty: 90 TABLET | Refills: 0 | Status: SHIPPED | OUTPATIENT
Start: 2020-10-22 | End: 2020-11-21

## 2020-10-22 NOTE — TELEPHONE ENCOUNTER
Pt called to get results on CT scan. Per [address] Er[address] Lerma advised of the following:    Please inform patient that CT is negative; could possibly be a pulled muscle that is causing her severe pain. I would recommend muscle relaxer- flexeril 10 mg TID as needed #30. Medication sent in to [facility].

## 2020-10-26 ENCOUNTER — VIRTUAL VISIT (OUTPATIENT)
Dept: PRIMARY CARE CLINIC | Age: 45
End: 2020-10-26
Payer: COMMERCIAL

## 2020-10-26 PROCEDURE — 99213 OFFICE O/P EST LOW 20 MIN: CPT | Performed by: NURSE PRACTITIONER

## 2020-10-26 NOTE — PROGRESS NOTES
[address] [address]             Phone:  [phone]  Fax:  [phone]       10/26/2020    TELEHEALTH EVALUATION -- Audio/Visual (During EGTWD-57 public health emergency)    HPI:  Chief Complaint   Patient presents with    Abdominal Pain         Rayne Welch (:  1975) has requested an audio/video evaluation for the following concern(s):    Patient presents today for follow-up abdominal pain. She is still having RLQ abdominal pain; CT abdomen was negative for any acute process. She states she is having normal bowel movements. We discussed possibility of muscular strain. Review of Systems   Constitutional: Negative for chills and fever. HENT: Negative for ear pain, hearing loss, rhinorrhea and voice change. Eyes: Negative for photophobia and visual disturbance. Respiratory: Negative for cough and shortness of breath. Cardiovascular: Negative for chest pain and palpitations. Gastrointestinal: Negative for nausea and vomiting. Endocrine: Negative. Negative for cold intolerance and heat intolerance. Genitourinary: Negative for difficulty urinating and flank pain. Musculoskeletal: Negative for back pain and neck pain. Skin: Negative for color change and rash. Allergic/Immunologic: Negative for environmental allergies and food allergies. Neurological: Negative for dizziness, speech difficulty and headaches. Hematological: Does not bruise/bleed easily. Psychiatric/Behavioral: Negative for sleep disturbance and suicidal ideas. Prior to Visit Medications    Medication Sig Taking? Authorizing Provider   methylPREDNISolone (MEDROL DOSEPACK) 4 MG tablet Take by mouth.  Yes WHITNEY Moreno   cyclobenzaprine (FLEXERIL) 10 MG tablet Take 1 tablet by mouth 3 times daily as needed for Muscle spasms  WHITNEY Moreno   sertraline (ZOLOFT) 25 MG tablet Take 1 tablet by mouth daily  WHITNEY Moreno   atenolol (TENORMIN) 25 MG VAGINAL      TONSILLECTOMY  1979    TUBAL LIGATION  1994   ,   Social History     Tobacco Use    Smoking status: Current Every Day Smoker     Packs/day: 0.50     Years: 20.00     Pack years: 10.00     Types: Cigarettes    Smokeless tobacco: Never Used   Substance Use Topics    Alcohol use: Yes     Comment: occ    Drug use: No   ,   Family History   Problem Relation Age of Onset    Cancer Brother         synovial cell sarcoma    Cancer Maternal Grandfather         lung    Other Daughter         gallbladder disease    Hypertension Mother    ,   Immunization History   Administered Date(s) Administered    Tetanus 06/01/2009   ,   Health Maintenance   Topic Date Due    DTaP/Tdap/Td vaccine (1 - Tdap) 08/01/1992    Breast cancer screen  02/19/2020    Flu vaccine (1) 09/01/2020    Potassium monitoring  11/25/2020    Creatinine monitoring  11/25/2020    Pneumococcal 0-64 years Vaccine (1 of 1 - PPSV23) 07/15/2024 (Originally 8/1/1979)    Lipid screen  07/16/2024    Hepatitis A vaccine  Aged Out    Hepatitis B vaccine  Aged Out    Hib vaccine  Aged Out    Meningococcal (ACWY) vaccine  Aged Out    HIV screen  Discontinued       PHYSICAL EXAMINATION:  [ INSTRUCTIONS:  \"[x]\" Indicates a positive item  \"[]\" Indicates a negative item  -- DELETE ALL ITEMS NOT EXAMINED]  [x] Alert  [x] Oriented to person/place/time    [x] No apparent distress  [] Toxic appearing    [] Face flushed appearing [x] Sclera clear  [] Lips are cyanotic      [x] Breathing appears normal  [] Appears tachypneic      [] Rash on visible skin    [x] Cranial Nerves II-XII grossly intact    [x] Motor grossly intact in visible upper extremities    [x] Motor grossly intact in visible lower extremities    [x] Normal Mood  [] Anxious appearing    [] Depressed appearing  [] Confused appearing      [] Poor short term memory  [] Poor long term memory    [] OTHER:      Due to this being a TeleHealth encounter, evaluation of the following organ systems is limited: Vitals/Constitutional/EENT/Resp/CV/GI//MS/Neuro/Skin/Heme-Lymph-Imm. ASSESSMENT/PLAN:  1. Right lower quadrant abdominal pain    - [address], APRN, Gastroenterology, [place]  - Will try medrol dose pack for symptoms; refer to GI for further work-up. No follow-ups on file. An  electronic signature was used to authenticate this note. --Lorrin Mortimer, APRN on 11/11/2020 at 3:31 PM        Pursuant to the emergency declaration under the [facility], 1135 waiver authority and the [employer] and [employer] Act, this Virtual  Visit was conducted, with patient's consent, to reduce the patient's risk of exposure to COVID-19 and provide continuity of care for an established patient. Services were provided through a video synchronous discussion virtually to substitute for in-person clinic visit.

## 2020-10-27 ENCOUNTER — OFFICE VISIT (OUTPATIENT)
Dept: CARDIOLOGY | Age: 45
End: 2020-10-27
Payer: COMMERCIAL

## 2020-10-27 VITALS
HEART RATE: 56 BPM | WEIGHT: 129 LBS | DIASTOLIC BLOOD PRESSURE: 74 MMHG | SYSTOLIC BLOOD PRESSURE: 122 MMHG | HEIGHT: 69 IN | BODY MASS INDEX: 19.11 KG/M2

## 2020-10-27 PROCEDURE — 93000 ELECTROCARDIOGRAM COMPLETE: CPT | Performed by: NURSE PRACTITIONER

## 2020-10-27 PROCEDURE — 99213 OFFICE O/P EST LOW 20 MIN: CPT | Performed by: NURSE PRACTITIONER

## 2020-10-27 RX ORDER — METHYLPREDNISOLONE 4 MG/1
TABLET ORAL
Qty: 1 KIT | Refills: 0 | Status: SHIPPED | OUTPATIENT
Start: 2020-10-27 | End: 2021-01-14

## 2020-10-27 ASSESSMENT — ENCOUNTER SYMPTOMS
RHINORRHEA: 0
VOMITING: 0
BACK PAIN: 0
SHORTNESS OF BREATH: 0
PHOTOPHOBIA: 0
NAUSEA: 0
VOICE CHANGE: 0
COUGH: 0
COLOR CHANGE: 0

## 2020-10-27 NOTE — PATIENT INSTRUCTIONS
Patient Education        Premature Heartbeat: Care Instructions  Your Care Instructions     A premature heartbeat happens when the heart beats earlier than it should. This briefly interrupts the heart's rhythm. You do not usually feel the early heartbeat, and the next beat is stronger. To many people, this feels like a skipped heartbeat or a flutter. This heartbeat is also called a premature ventricular contraction (PVC). If you have no heart problems, premature heartbeats that happen once in a while are not a cause for concern. Most people have them at some time. They may happen more often if you drink a lot of caffeine or alcohol or are under stress. Usually, no cause for a premature heartbeat is found, and no treatment is needed. Some people may take medicine to prevent these heartbeats and to relieve symptoms. Follow-up care is a key part of your treatment and safety. Be sure to make and go to all appointments, and call your doctor if you are having problems. It's also a good idea to know your test results and keep a list of the medicines you take. How can you care for yourself at home? · Limit caffeine and other stimulants if they trigger premature heartbeats. · Reduce stress. Avoid people and places that make you feel anxious, if you can. Learn ways to reduce stress, such as biofeedback, guided imagery, and meditation. · Do not smoke or allow others to smoke around you. If you need help quitting, talk to your doctor about stop-smoking programs and medicines. These can increase your chances of quitting for good. · Get at least 30 minutes of exercise on most days of the week. Walking is a good choice. You also may want to do other activities, such as running, swimming, cycling, or playing tennis or team sports. · Eat heart-healthy foods. · Stay at a healthy weight. Lose weight if you need to. · Get enough sleep.  Keep your room dark and quiet, and try to go to bed at the same time every
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
## 2020-11-24 ENCOUNTER — PATIENT MESSAGE (OUTPATIENT)
Dept: PRIMARY CARE CLINIC | Age: 45
End: 2020-11-24

## 2020-11-24 RX ORDER — NITROFURANTOIN 25; 75 MG/1; MG/1
100 CAPSULE ORAL 2 TIMES DAILY
Qty: 20 CAPSULE | Refills: 0 | Status: SHIPPED | OUTPATIENT
Start: 2020-11-24 | End: 2020-12-04

## 2020-11-24 NOTE — TELEPHONE ENCOUNTER
From: Bonita Getting  To: WHITNEY Zamudio  Sent: 11/24/2020 8:32 AM CST  Subject: Non-Urgent Medical Question    Good morning. I would like for you to call in some medicine for me to [facility] on [address]. Woke up this morning and pretty sure I have a bladder infection. .. Urgency with little results, pain when stopping, frequency with little results, and a tinge of pink on tissue after. I've been through this before. Very uncomfortable. Thank you and have a good day!

## 2020-11-24 NOTE — TELEPHONE ENCOUNTER
Please advise to typically do evisit or add on for VV, but this time it is okay to send in Macrobid 100 mg BID 10 days. Thank you!

## 2020-12-01 ENCOUNTER — TRANSCRIBE ORDERS (OUTPATIENT)
Dept: ADMINISTRATIVE | Facility: HOSPITAL | Age: 45
End: 2020-12-01

## 2020-12-01 DIAGNOSIS — M75.42 IMPINGEMENT SYNDROME OF LEFT SHOULDER: Primary | ICD-10-CM

## 2020-12-09 ENCOUNTER — HOSPITAL ENCOUNTER (OUTPATIENT)
Dept: GENERAL RADIOLOGY | Facility: HOSPITAL | Age: 45
Discharge: HOME | End: 2020-12-09

## 2020-12-09 ENCOUNTER — HOSPITAL ENCOUNTER (OUTPATIENT)
Dept: MRI IMAGING | Facility: HOSPITAL | Age: 45
Discharge: HOME | End: 2020-12-09

## 2020-12-09 VITALS — DIASTOLIC BLOOD PRESSURE: 48 MMHG | SYSTOLIC BLOOD PRESSURE: 99 MMHG | HEART RATE: 62 BPM | OXYGEN SATURATION: 100 %

## 2020-12-09 PROCEDURE — 77002 NEEDLE LOCALIZATION BY XRAY: CPT

## 2020-12-09 PROCEDURE — 73222 MRI JOINT UPR EXTREM W/DYE: CPT

## 2020-12-09 PROCEDURE — 0 GADOBENATE DIMEGLUMINE 529 MG/ML SOLUTION: Performed by: ORTHOPAEDIC SURGERY

## 2020-12-09 PROCEDURE — 25010000003 LIDOCAINE 1 % SOLUTION: Performed by: ORTHOPAEDIC SURGERY

## 2020-12-09 PROCEDURE — 0 IOPAMIDOL 41 % SOLUTION: Performed by: ORTHOPAEDIC SURGERY

## 2020-12-09 PROCEDURE — A9577 INJ MULTIHANCE: HCPCS | Performed by: ORTHOPAEDIC SURGERY

## 2020-12-09 RX ORDER — LIDOCAINE HYDROCHLORIDE 10 MG/ML
5 INJECTION, SOLUTION INFILTRATION; PERINEURAL ONCE
Status: COMPLETED | OUTPATIENT
Start: 2020-12-09 | End: 2020-12-09

## 2020-12-09 RX ADMIN — IOPAMIDOL 2 ML: 408 INJECTION, SOLUTION INTRATHECAL at 13:45

## 2020-12-09 RX ADMIN — LIDOCAINE HYDROCHLORIDE 5 ML: 10 INJECTION, SOLUTION INFILTRATION; PERINEURAL at 13:45

## 2020-12-09 RX ADMIN — GADOBENATE DIMEGLUMINE 5 ML: 529 INJECTION, SOLUTION INTRAVENOUS at 13:45

## 2021-01-14 ENCOUNTER — VIRTUAL VISIT (OUTPATIENT)
Dept: PRIMARY CARE CLINIC | Age: 46
End: 2021-01-14
Payer: COMMERCIAL

## 2021-01-14 DIAGNOSIS — F41.9 ANXIETY: ICD-10-CM

## 2021-01-14 DIAGNOSIS — Z72.0 TOBACCO ABUSE: ICD-10-CM

## 2021-01-14 DIAGNOSIS — G25.81 RESTLESS LEG SYNDROME: ICD-10-CM

## 2021-01-14 DIAGNOSIS — I10 ESSENTIAL HYPERTENSION: Primary | ICD-10-CM

## 2021-01-14 DIAGNOSIS — K21.9 GASTROESOPHAGEAL REFLUX DISEASE WITHOUT ESOPHAGITIS: ICD-10-CM

## 2021-01-14 PROCEDURE — 99214 OFFICE O/P EST MOD 30 MIN: CPT | Performed by: NURSE PRACTITIONER

## 2021-01-14 RX ORDER — ROPINIROLE 0.5 MG/1
TABLET, FILM COATED ORAL
Qty: 30 TABLET | Refills: 5 | Status: SHIPPED | OUTPATIENT
Start: 2021-01-14 | End: 2021-02-23

## 2021-01-14 RX ORDER — ATENOLOL 25 MG/1
TABLET ORAL
Qty: 180 TABLET | Refills: 3 | Status: SHIPPED | OUTPATIENT
Start: 2021-01-14 | End: 2021-12-26

## 2021-01-14 RX ORDER — OMEPRAZOLE 20 MG/1
20 CAPSULE, DELAYED RELEASE ORAL
Qty: 60 CAPSULE | Refills: 5 | Status: SHIPPED | OUTPATIENT
Start: 2021-01-14

## 2021-01-14 RX ORDER — SERTRALINE HYDROCHLORIDE 25 MG/1
25 TABLET, FILM COATED ORAL DAILY
Qty: 30 TABLET | Refills: 5 | Status: SHIPPED | OUTPATIENT
Start: 2021-01-14 | End: 2021-02-23

## 2021-01-14 ASSESSMENT — ENCOUNTER SYMPTOMS
SHORTNESS OF BREATH: 0
VOMITING: 0
COLOR CHANGE: 0
BACK PAIN: 0
PHOTOPHOBIA: 0
NAUSEA: 0
VOICE CHANGE: 0
COUGH: 0
RHINORRHEA: 0

## 2021-01-14 NOTE — PROGRESS NOTES
(before meals) Yes WHITNEY Larson   rOPINIRole (REQUIP) 0.5 MG tablet TAKE 1 TABLET BY MOUTH EVERY NIGHT Yes WHITNEY Larson   atenolol (TENORMIN) 25 MG tablet TAKE 1 TABLET BY MOUTH TWICE DAILY Yes WHITNEY Larson   valACYclovir (VALTREX) 1 g tablet TAKE 1 TABLET BY MOUTH EVERY DAY AS NEEDED FOR FEVER BLISTER  WHITNEY Larson   Multiple Vitamin (MULTI-VITAMIN DAILY PO) Take by mouth  Historical Provider, MD   doxepin (SINEQUAN) 10 MG capsule Take 1 capsule by mouth nightly  WHITNEY Larson   hydrochlorothiazide (HYDRODIURIL) 25 MG tablet Take 1 tablet by mouth daily  Patient taking differently: Take 25 mg by mouth as needed   WHITNEY Larson       Social History     Tobacco Use    Smoking status: Current Every Day Smoker     Packs/day: 0.50     Years: 20.00     Pack years: 10.00     Types: Cigarettes    Smokeless tobacco: Never Used   Substance Use Topics    Alcohol use: Yes     Comment: occ    Drug use: No        Allergies   Allergen Reactions    Percocet [Oxycodone-Acetaminophen] Itching    Codeine     Toradol [Ketorolac Tromethamine] Itching   ,   Past Medical History:   Diagnosis Date    PVC (premature ventricular contraction)     Restless leg syndrome 7/16/2018    Uterine mass 6/17/14    9x9 cm on CT scan   ,   Past Surgical History:   Procedure Laterality Date    BREAST RECONSTRUCTION  04/2019    BREAST SURGERY Bilateral 4650    silicone Memory Gel    CHOLECYSTECTOMY  6/24/14    HC INJECT OTHER PERPHRL NERV Right 8/3/2016    HIP FLUOROSCOPIC GUIDED CORTICOSTEROID INJECTION  performed by Melody Armenta MD at [facility] Right 4/21/2017    FLURO GUIDED HIP INJECTION performed by Kanika Khan MD at [address] [place] [address], VAGINAL      TONSILLECTOMY  1979   [address]   ,   Social History     Tobacco Use    Smoking status: Current Every Day Smoker     Packs/day: 0.50     Years: 20.00 MG tablet; Take 1 tablet by mouth daily  Dispense: 30 tablet; Refill: 5  - CBC Auto Differential; Future  - Comprehensive Metabolic Panel; Future  - Lipid Panel; Future  - Hemoglobin A1C; Future  - Vitamin D 25 Hydroxy; Future  - TSH without Reflex; Future    2. Essential hypertension    - CBC Auto Differential; Future  - Comprehensive Metabolic Panel; Future  - Lipid Panel; Future  - Hemoglobin A1C; Future  - Vitamin D 25 Hydroxy; Future  - TSH without Reflex; Future    3. Restless leg syndrome    - CBC Auto Differential; Future  - Comprehensive Metabolic Panel; Future  - Lipid Panel; Future  - Hemoglobin A1C; Future  - Vitamin D 25 Hydroxy; Future  - TSH without Reflex; Future    4. Gastroesophageal reflux disease without esophagitis    - CBC Auto Differential; Future  - Comprehensive Metabolic Panel; Future  - Lipid Panel; Future  - Hemoglobin A1C; Future  - Vitamin D 25 Hydroxy; Future  - TSH without Reflex; Future    5. Tobacco abuse  Approximately 5 minutes of education was provided about quit smoking and the harms of tobacco.  Patient does show understanding. Patient has  the desire to quit smoking in the future. Return in about 6 months (around 7/14/2021) for in office, physical.      An  electronic signature was used to authenticate this note. --WHITNEY Grimes on 1/14/2021 at 11:21 AM        Pursuant to the emergency declaration under the [facility]5 waiver authority and the [email or web address] and [employer] Act, this Virtual  Visit was conducted, with patient's consent, to reduce the patient's risk of exposure to COVID-19 and provide continuity of care for an established patient. Services were provided through a video synchronous discussion virtually to substitute for in-person clinic visit.

## 2021-02-23 RX ORDER — ROPINIROLE 0.5 MG/1
TABLET, FILM COATED ORAL
Qty: 30 TABLET | Refills: 5 | Status: SHIPPED | OUTPATIENT
Start: 2021-02-23 | End: 2021-05-16 | Stop reason: SDUPTHER

## 2021-05-17 RX ORDER — ROPINIROLE 0.5 MG/1
TABLET, FILM COATED ORAL
Qty: 30 TABLET | Refills: 5 | Status: SHIPPED | OUTPATIENT
Start: 2021-05-17 | End: 2021-06-01 | Stop reason: SDUPTHER

## 2021-07-20 ENCOUNTER — OFFICE VISIT (OUTPATIENT)
Dept: PRIMARY CARE CLINIC | Age: 46
End: 2021-07-20

## 2021-07-20 VITALS
HEIGHT: 69 IN | OXYGEN SATURATION: 98 % | SYSTOLIC BLOOD PRESSURE: 102 MMHG | BODY MASS INDEX: 19.93 KG/M2 | HEART RATE: 62 BPM | DIASTOLIC BLOOD PRESSURE: 68 MMHG | TEMPERATURE: 98.8 F | WEIGHT: 134.6 LBS

## 2021-07-20 DIAGNOSIS — F41.9 ANXIETY: ICD-10-CM

## 2021-07-20 DIAGNOSIS — R41.3 MEMORY LOSS: ICD-10-CM

## 2021-07-20 DIAGNOSIS — I49.3 PVC (PREMATURE VENTRICULAR CONTRACTION): Chronic | ICD-10-CM

## 2021-07-20 DIAGNOSIS — I10 ESSENTIAL HYPERTENSION: Primary | ICD-10-CM

## 2021-07-20 DIAGNOSIS — Z72.0 TOBACCO ABUSE: ICD-10-CM

## 2021-07-20 DIAGNOSIS — Z12.31 ENCOUNTER FOR SCREENING MAMMOGRAM FOR MALIGNANT NEOPLASM OF BREAST: ICD-10-CM

## 2021-07-20 DIAGNOSIS — Z12.11 COLON CANCER SCREENING: ICD-10-CM

## 2021-07-20 DIAGNOSIS — K21.9 GASTROESOPHAGEAL REFLUX DISEASE WITHOUT ESOPHAGITIS: ICD-10-CM

## 2021-07-20 PROCEDURE — 99214 OFFICE O/P EST MOD 30 MIN: CPT | Performed by: NURSE PRACTITIONER

## 2021-07-20 PROCEDURE — 99406 BEHAV CHNG SMOKING 3-10 MIN: CPT | Performed by: NURSE PRACTITIONER

## 2021-07-20 ASSESSMENT — ENCOUNTER SYMPTOMS
RHINORRHEA: 0
VOICE CHANGE: 0
COLOR CHANGE: 0
PHOTOPHOBIA: 0
COUGH: 0
SHORTNESS OF BREATH: 0
VOMITING: 0
NAUSEA: 0
BACK PAIN: 0

## 2021-07-20 ASSESSMENT — PATIENT HEALTH QUESTIONNAIRE - PHQ9
SUM OF ALL RESPONSES TO PHQ QUESTIONS 1-9: 0
2. FEELING DOWN, DEPRESSED OR HOPELESS: 0
SUM OF ALL RESPONSES TO PHQ QUESTIONS 1-9: 0
SUM OF ALL RESPONSES TO PHQ QUESTIONS 1-9: 0
SUM OF ALL RESPONSES TO PHQ9 QUESTIONS 1 & 2: 0
1. LITTLE INTEREST OR PLEASURE IN DOING THINGS: 0

## 2021-07-20 NOTE — PROGRESS NOTES
PERPHRL NERV Right 4/21/2017    FLURO GUIDED HIP INJECTION performed by Soheila Helms MD at [address] [facility] [address], VAGINAL      TONSILLECTOMY  1979    TUBAL LIGATION  1994       Vitals 7/20/2021 10/27/2020 6/10/2020 11/22/2019 9/16/2019 9/47/1448   SYSTOLIC 446 253 [phone] 298   DIASTOLIC 68 74 68 68 72 78   Site - - - - Left Upper Arm -   Position - - - - Sitting -   Cuff Size - - - - - -   Pulse 62 56 51 73 62 51   Temp 98.8 - - 98 - -   Resp - - - 20 - -   SpO2 98 - - 98 98 -   Weight 134 lb 9.6 oz 129 lb 140 lb 138 lb - 137 lb   Height 5' 9\" 5' 9\" 5' 9\" 5' 9\" - 5' 9\"   Body mass index 19.87 kg/m2 19.05 kg/m2 20.67 kg/m2 20.38 kg/m2 - 20.23 kg/m2   Some recent data might be hidden       Family History   Problem Relation Age of Onset    Cancer Brother         synovial cell sarcoma    Cancer Maternal Grandfather         lung    Other Daughter         gallbladder disease    Hypertension Mother        Social History     Tobacco Use    Smoking status: Current Every Day Smoker     Packs/day: 0.50     Years: 20.00     Pack years: 10.00     Types: Cigarettes    Smokeless tobacco: Never Used   Substance Use Topics    Alcohol use: Yes     Comment: occ      Current Outpatient Medications on File Prior to Visit   Medication Sig Dispense Refill    valACYclovir (VALTREX) 1 g tablet TAKE 1 TABLET BY MOUTH EVERY DAY AS NEEDED FOR FEVER BLISTER 30 tablet 0    rOPINIRole (REQUIP) 0.5 MG tablet TAKE 1 TABLET BY MOUTH EVERY NIGHT 30 tablet 5    omeprazole (PRILOSEC) 20 MG delayed release capsule Take 1 capsule by mouth 2 times daily (before meals) 60 capsule 5    atenolol (TENORMIN) 25 MG tablet TAKE 1 TABLET BY MOUTH TWICE DAILY (Patient taking differently: daily TAKE 1 TABLET BY MOUTH TWICE DAILY) 180 tablet 3    Multiple Vitamin (MULTI-VITAMIN DAILY PO) Take by mouth      doxepin (SINEQUAN) 10 MG capsule Take 1 capsule by mouth nightly (Patient taking differently: Take 10 mg by mouth nightly PRN) 30 capsule 0    hydrochlorothiazide (HYDRODIURIL) 25 MG tablet Take 1 tablet by mouth daily (Patient taking differently: Take 25 mg by mouth as needed ) 30 tablet 5     No current facility-administered medications on file prior to visit. Allergies   Allergen Reactions    Percocet [Oxycodone-Acetaminophen] Itching    Codeine     Toradol [Ketorolac Tromethamine] Itching       Health Maintenance   Topic Date Due    Hepatitis C screen  Never done    COVID-19 Vaccine (1) Never done    DTaP/Tdap/Td vaccine (1 - Tdap) Never done    Colon cancer screen colonoscopy  Never done    Breast cancer screen  02/19/2020    Potassium monitoring  11/25/2020    Creatinine monitoring  11/25/2020    Pneumococcal 0-64 years Vaccine (1 of 2 - PPSV23) 07/15/2024 (Originally 8/1/1981)    Flu vaccine (1) 09/01/2021    Lipid screen  07/16/2024    Hepatitis A vaccine  Aged Out    Hepatitis B vaccine  Aged Out    Hib vaccine  Aged Out    Meningococcal (ACWY) vaccine  Aged Out    HIV screen  Discontinued       Subjective:      Review of Systems   Constitutional: Negative for chills and fever. HENT: Negative for ear pain, hearing loss, rhinorrhea and voice change. Eyes: Negative for photophobia and visual disturbance. Respiratory: Negative for cough and shortness of breath. Cardiovascular: Negative for chest pain and palpitations. Gastrointestinal: Negative for nausea and vomiting. Endocrine: Negative. Negative for cold intolerance and heat intolerance. Genitourinary: Negative for difficulty urinating and flank pain. Musculoskeletal: Negative for back pain and neck pain. Skin: Negative for color change and rash. Allergic/Immunologic: Negative for environmental allergies and food allergies. Neurological: Negative for dizziness, speech difficulty and headaches. Hematological: Does not bruise/bleed easily. Psychiatric/Behavioral: Negative for sleep disturbance and suicidal ideas. Objective:     Physical Exam  Vitals and nursing note reviewed. Constitutional:       Appearance: She is well-developed. HENT:      Head: Atraumatic. Right Ear: External ear normal.      Left Ear: External ear normal.      Nose: Nose normal.   Eyes:      Conjunctiva/sclera: Conjunctivae normal.      Pupils: Pupils are equal, round, and reactive to light. Cardiovascular:      Rate and Rhythm: Normal rate and regular rhythm. Heart sounds: Normal heart sounds, S1 normal and S2 normal.   Pulmonary:      Effort: Pulmonary effort is normal.      Breath sounds: Normal breath sounds. Abdominal:      General: Bowel sounds are normal.      Palpations: Abdomen is soft. Musculoskeletal:         General: Normal range of motion. Cervical back: Normal range of motion and neck supple. Skin:     General: Skin is warm and dry. Neurological:      Mental Status: She is alert and oriented to person, place, and time. Psychiatric:         Behavior: Behavior normal.       /68   Pulse 62   Temp 98.8 °F (37.1 °C) (Temporal)   Ht 5' 9\" (1.753 m)   Wt 134 lb 9.6 oz (61.1 kg)   LMP 06/08/2014   SpO2 98%   BMI 19.88 kg/m²     Assessment:       Diagnosis Orders   1. Essential hypertension     2. PVC (premature ventricular contraction)     3. Gastroesophageal reflux disease without esophagitis     4. Anxiety  sertraline (ZOLOFT) 50 MG tablet    Vitamin B12   5. Tobacco abuse     6. Encounter for screening mammogram for malignant neoplasm of breast  JOHNATHON DIGITAL SCREEN W OR WO CAD BILATERAL   7. Colon cancer screening  Cologuard (For External Results Only)   8. Memory loss  [place], WHITNEY Silva, Neurology, [facility]         Plan:     More than 50% of the time was spent counseling and coordinating care for a total time of 30 min face to face. Approximately 5 minutes of education was provided about quit smoking and the harms of tobacco.  Patient does show understanding.   Patient has  the desire to quit smoking in the future. Patient given educational materials -see patient instructions. Discussed use, benefit, and side effects of prescribed medications. All patient questions answered. Pt voiced understanding. Reviewed health maintenance. Instructed to continue currentmedications, diet and exercise. Patient agreed with treatment plan. Follow up as directed. MEDICATIONS:  Orders Placed This Encounter   Medications    sertraline (ZOLOFT) 50 MG tablet     Sig: Take 1 tablet by mouth daily     Dispense:  30 tablet     Refill:  5         ORDERS:  Orders Placed This Encounter   Procedures    [place] (For External Results Only)    JOHNATHON  [address] CAD BILATERAL    Vitamin B12    [place] [place] [address], [facility]       Follow-up:  Return in about 4 weeks (around 8/17/2021) for anxiety/depression check. PATIENT INSTRUCTIONS:  There are no Patient Instructions on file for this visit. Electronically signed by WHITNEY Cross on 7/20/2021 at 11:55 AM    EMR Dragon/transcription disclaimer:  Much of thisencounter note is electronic transcription/translation of spoken language to printed texts. The electronic translation of spoken language may be erroneous, or at times, nonsensical words or phrases may be inadvertentlytranscribed.   Although I have reviewed the note for such errors, some may still exist.

## 2021-08-03 ENCOUNTER — OFFICE VISIT (OUTPATIENT)
Dept: NEUROSURGERY | Age: 46
End: 2021-08-03
Payer: COMMERCIAL

## 2021-08-03 VITALS
DIASTOLIC BLOOD PRESSURE: 78 MMHG | WEIGHT: 134 LBS | TEMPERATURE: 97.4 F | OXYGEN SATURATION: 100 % | BODY MASS INDEX: 19.85 KG/M2 | HEIGHT: 69 IN | HEART RATE: 56 BPM | SYSTOLIC BLOOD PRESSURE: 123 MMHG

## 2021-08-03 DIAGNOSIS — R41.3 MEMORY CHANGE: Primary | ICD-10-CM

## 2021-08-03 DIAGNOSIS — R41.3 MEMORY CHANGE: ICD-10-CM

## 2021-08-03 LAB
ALBUMIN SERPL-MCNC: 4 G/DL (ref 3.5–5.2)
ALP BLD-CCNC: 75 U/L (ref 35–104)
ALT SERPL-CCNC: 8 U/L (ref 5–33)
ANION GAP SERPL CALCULATED.3IONS-SCNC: 8 MMOL/L (ref 7–19)
AST SERPL-CCNC: 18 U/L (ref 5–32)
BASOPHILS ABSOLUTE: 0 K/UL (ref 0–0.2)
BASOPHILS RELATIVE PERCENT: 0.8 % (ref 0–1)
BILIRUB SERPL-MCNC: <0.2 MG/DL (ref 0.2–1.2)
BUN BLDV-MCNC: 6 MG/DL (ref 6–20)
C-REACTIVE PROTEIN: <0.3 MG/DL (ref 0–0.5)
CALCIUM SERPL-MCNC: 9.4 MG/DL (ref 8.6–10)
CHLORIDE BLD-SCNC: 103 MMOL/L (ref 98–111)
CO2: 28 MMOL/L (ref 22–29)
CREAT SERPL-MCNC: 0.8 MG/DL (ref 0.5–0.9)
EOSINOPHILS ABSOLUTE: 0.1 K/UL (ref 0–0.6)
EOSINOPHILS RELATIVE PERCENT: 1.1 % (ref 0–5)
GFR AFRICAN AMERICAN: >59
GFR NON-AFRICAN AMERICAN: >60
GLUCOSE BLD-MCNC: 86 MG/DL (ref 74–109)
HCT VFR BLD CALC: 40.1 % (ref 37–47)
HEMOGLOBIN: 13.1 G/DL (ref 12–16)
HIV-1 P24 AG: NORMAL
IMMATURE GRANULOCYTES #: 0 K/UL
LYMPHOCYTES ABSOLUTE: 2.4 K/UL (ref 1.1–4.5)
LYMPHOCYTES RELATIVE PERCENT: 45 % (ref 20–40)
MCH RBC QN AUTO: 33.6 PG (ref 27–31)
MCHC RBC AUTO-ENTMCNC: 32.7 G/DL (ref 33–37)
MCV RBC AUTO: 102.8 FL (ref 81–99)
MONOCYTES ABSOLUTE: 0.3 K/UL (ref 0–0.9)
MONOCYTES RELATIVE PERCENT: 5.9 % (ref 0–10)
NEUTROPHILS ABSOLUTE: 2.5 K/UL (ref 1.5–7.5)
NEUTROPHILS RELATIVE PERCENT: 47 % (ref 50–65)
PDW BLD-RTO: 12.1 % (ref 11.5–14.5)
PLATELET # BLD: 193 K/UL (ref 130–400)
PMV BLD AUTO: 10.6 FL (ref 9.4–12.3)
POTASSIUM SERPL-SCNC: 4.3 MMOL/L (ref 3.5–5)
RAPID HIV 1&2: NORMAL
RBC # BLD: 3.9 M/UL (ref 4.2–5.4)
SEDIMENTATION RATE, ERYTHROCYTE: 9 MM/HR (ref 0–20)
SODIUM BLD-SCNC: 139 MMOL/L (ref 136–145)
TOTAL PROTEIN: 7 G/DL (ref 6.6–8.7)
TSH REFLEX FT4: 1.62 UIU/ML (ref 0.35–5.5)
WBC # BLD: 5.3 K/UL (ref 4.8–10.8)

## 2021-08-03 PROCEDURE — 99213 OFFICE O/P EST LOW 20 MIN: CPT | Performed by: NURSE PRACTITIONER

## 2021-08-03 NOTE — PROGRESS NOTES
RECONSTRUCTION  04/2019    BREAST SURGERY Bilateral 7941    silicone Memory Gel    CHOLECYSTECTOMY  6/24/14    HC INJECT OTHER PERPHRL NERV Right 8/3/2016    HIP FLUOROSCOPIC GUIDED CORTICOSTEROID INJECTION  performed by Vasquez Kilpatrick MD at [facility] NERV Right 4/21/2017    FLURO GUIDED HIP INJECTION performed by Jackson Capone MD at [address] [place] [address], VAGINAL      TONSILLECTOMY  1979    TUBAL LIGATION  1994       Family History   Problem Relation Age of Onset    Cancer Brother         synovial cell sarcoma    Cancer Maternal Grandfather         lung    Other Daughter         gallbladder disease    Hypertension Mother        Social History     Socioeconomic History    Marital status:      Spouse name: Not on file    Number of children: Not on file    Years of education: Not on file    Highest education level: Not on file   Occupational History    Not on file   Tobacco Use    Smoking status: Current Every Day Smoker     Packs/day: 0.50     Years: 20.00     Pack years: 10.00     Types: Cigarettes    Smokeless tobacco: Never Used   Substance and Sexual Activity    Alcohol use: Yes     Comment: occ    Drug use: No    Sexual activity: Not on file   Other Topics Concern    Not on file   Social History Narrative    Not on file     Social Determinants of Health     Financial Resource Strain:     Difficulty of Paying Living Expenses:    Food Insecurity:     Worried About Running Out of Food in the Last Year:     Ran Out of Food in the Last Year:    Transportation Needs:     Lack of Transportation (Medical):      Lack of Transportation (Non-Medical):    Physical Activity:     Days of Exercise per Week:     Minutes of Exercise per Session:    Stress:     Feeling of Stress :    Social Connections:     Frequency of Communication with Friends and Family:     Frequency of Social Gatherings with Friends and Family:     Attends Yazidism Services:     Active Member of Clubs or Organizations:     Attends Club or Organization Meetings:     Marital Status:    Intimate Partner Violence:     Fear of Current or Ex-Partner:     Emotionally Abused:     Physically Abused:     Sexually Abused:        Current Outpatient Medications   Medication Sig Dispense Refill    sertraline (ZOLOFT) 50 MG tablet Take 1 tablet by mouth daily 30 tablet 5    valACYclovir (VALTREX) 1 g tablet TAKE 1 TABLET BY MOUTH EVERY DAY AS NEEDED FOR FEVER BLISTER (Patient taking differently: as needed TAKE 1 TABLET BY MOUTH EVERY DAY AS NEEDED FOR FEVER BLISTER) 30 tablet 0    rOPINIRole (REQUIP) 0.5 MG tablet TAKE 1 TABLET BY MOUTH EVERY NIGHT 30 tablet 5    omeprazole (PRILOSEC) 20 MG delayed release capsule Take 1 capsule by mouth 2 times daily (before meals) 60 capsule 5    atenolol (TENORMIN) 25 MG tablet TAKE 1 TABLET BY MOUTH TWICE DAILY (Patient taking differently: daily TAKE 1 TABLET BY MOUTH TWICE DAILY) 180 tablet 3    Multiple Vitamin (MULTI-VITAMIN DAILY PO) Take by mouth      doxepin (SINEQUAN) 10 MG capsule Take 1 capsule by mouth nightly (Patient taking differently: Take 10 mg by mouth nightly PRN) 30 capsule 0    hydrochlorothiazide (HYDRODIURIL) 25 MG tablet Take 1 tablet by mouth daily (Patient taking differently: Take 25 mg by mouth as needed ) 30 tablet 5     No current facility-administered medications for this visit. Allergies   Allergen Reactions    Percocet [Oxycodone-Acetaminophen] Itching    Codeine     Toradol [Ketorolac Tromethamine] Itching     REVIEW OF SYSTEMS  Constitutional: []? Fever []? Sweats []? Chills []? Recent Injury [x]? Denies all unless marked  HEENT:[]? Headache  []? Head Injury []? Hearing Loss  []? Sore Throat  []? Ear Ache [x]? Denies all unless marked  Spine:  []? Neck pain  []? Back pain  []? Sciaticia  [x]? Denies all unless marked  Cardiovascular:[]? Heart Disease []? Palpitations []? Chest Pain   [x]?  Denies all unless marked  Pulmonary: []? Shortness of Breath []? Cough   [x]? Denies all unless marked  Psychiatric/Behavioral:[]? Depression []? Anxiety [x]? Denies all unless marked  Gastrointestinal: []? Nausea  []? Vomiting  []? Abdominal Pain  []? Constipation  []? Diarrhea  [x]? Denies all unless marked  Genitourinary:   []? Frequency  []? Urgency  []? Dysuria []? Incontinence  [x]? Denies all unless marked  Extremities: []? Pain  []? Swelling  [x]? Denies all unless marked  Musculoskeletal: []? Myalgias  []? Joint Pain  []? Arthritis []? Muscle Cramps []? Muscle Twitches  [x]? Denies all unless marked  Sleep: []? Insomnia[]? Snoring []? Restless Legs  []? Sleep Apnea  []? Daytime Sleepiness  [x]? Denies all unless marked  Skin:[]? Rash []? Color Change [x]? Denies all unless marked   Neurological:[x]? Visual Disturbance [x]? Memory Loss []? Loss of Balance []? Slurred Speech []? Weakness []? Seizures  []? Dizziness [x]? Denies all unless marked    The MA has completed the ROS with the patient. I have reviewed it in its' entirety with the patient and agree with the documentation. PHYSICAL EXAM  /78   Pulse 56   Temp 97.4 °F (36.3 °C)   Ht 5' 9\" (1.753 m)   Wt 134 lb (60.8 kg)   LMP 06/08/2014   SpO2 100%   Breastfeeding No   BMI 19.79 kg/m²       Constitutional - No acute distress    HEENT- Conjunctiva normal.  No scars, masses, or lesions over external nose or ears, no neck masses noted, no jugular vein distension, no bruit  Cardiac- Regular rate and rhythm  Pulmonary- Good expansion, normal effort without use of accessory muscles  Musculoskeletal - No significant wasting of muscles noted, no bony deformities  Extremities - No clubbing, cyanosis or edema  Skin - Warm, dry, and intact.   No rash, erythema, or pallor  Psychiatric - Mood, affect, and behavior appear normal      NEUROLOGICAL EXAM     Mental status   [x] Awake, alert, oriented   [x]Affect attention and concentration appear appropriate  [x]Recent and remote memory appears unremarkable  [x]Speech normal without dysarthria or aphasia, comprehension and repetition intact. COMMENTS: MoCA 27/30    Cranial Nerves [x]No VF deficit to confrontation,  no papilledema on fundoscopic exam.  [x]PERRLA, EOMI, no nystagmus, conjugate eye movements, no ptosis  [x]Face symmetric  [x]Facial sensation intact  [x]Tongue midline no atrophy or fasciculations present  [x]Palate midline, hearing to finger rub normal bilaterally  [x]Shoulder shrug and SCM testing normal bilaterally  COMMENTS:   Motor   [x]5/5 strength x 4 extremities  [x]Normal bulk and tone  [x]No tremor present  [x]No rigidity or bradykinesia noted  COMMENTS:   Sensory  [x]Sensation intact to light touch, pin prick, vibration, and proprioception BLE  [x]Sensation intact to light touch, pin prick, vibration, and proprioception BUE  COMMENTS:   Coordination [x]FTN normal bilaterally   [x]HTS normal bilaterally  [x]ALVARO normal bilaterally.    COMMENTS:   Reflexes  [x]Symmetric and non-pathological  [x]Toes down going bilaterally  [x]No clonus present  COMMENTS:   Gait                  [x]Normal steady gait    []Ataxic    []Spastic     []Magnetic     []Shuffling  COMMENTS:       LABS RECORD AND IMAGING REVIEW (As below and per HPI)    Lab Results   Component Value Date    ZQXZHOHR26 473 11/25/2019     Lab Results   Component Value Date    WBC 6.2 08/23/2019    HGB 12.8 08/23/2019    HCT 38.9 08/23/2019    MCV 99.7 (H) 08/23/2019     08/23/2019     Lab Results   Component Value Date     11/25/2019    K 4.3 11/25/2019     11/25/2019    CO2 24 11/25/2019    BUN 11 11/25/2019    CREATININE 0.9 11/25/2019    GLUCOSE 94 11/25/2019    CALCIUM 9.8 11/25/2019    PROT 6.6 11/25/2019    LABALBU 3.9 11/25/2019    BILITOT <0.2 11/25/2019    ALKPHOS 55 11/25/2019    AST 18 11/25/2019    ALT 11 11/25/2019    LABGLOM >60 11/25/2019     Lab Results   Component Value Date    CHOL 144 (L) 07/16/2019    TRIG 61 07/16/2019    HDL 47 (L) 07/16/2019    LDLCALC 85 07/16/2019     Lab Results   Component Value Date    TSH 2.350 11/25/2019    T4FREE 1.20 11/25/2019     Lab Results   Component Value Date    CRP <0.03 11/10/2017    SEDRATE 10 11/10/2017      XR CHEST STANDARD (2 VW)    Result Date: 10/23/2018  XR CHEST (2 VW) 10/23/2018 12:40 PM HISTORY: R05 COMPARISON: None. FINDINGS: The lungs are clear. The cardiomediastinal silhouette and pulmonary vascularity are within normal limits. The osseous structures and surrounding soft tissues demonstrate no acute abnormality. Augmentation prostheses are noted    1. No radiographic evidence of acute cardiopulmonary process. Signed by Dr Nayana Hyman on 10/23/2018 1:50 PM    Reviewed referral records     ASSESSMENT:    Bonita Barnard is a 48y.o. year old female here for evaluation of memory changes. MoCA 27/30. Exam is normal today. She has noted more stress/anxiety recently. Suspect that this is playing a large role in symptoms. Dementia felt less likely. Will plan for additional work up to exclude secondary source with MRI brain and labs today. ICD-10-CM    1. Memory change  R41.3 CBC Auto Differential     Comprehensive Metabolic Panel     C-Reactive Protein     HIV Screen     Vitamin B12     Vitamin B1, Whole Blood     TSH WITH REFLEX TO FT4     Sedimentation Rate     RPR Reflex to Titer and TPPA     MRI BRAIN W WO CONTRAST       PLAN:  1. MRI brain   2. Additional labs as listed above  3. Maximize treatment of anxiety/depression through PCP, suspect this is playing a large role in symptoms. 4. Return in about 3 months (around 11/3/2021) for follow up, sooner if worsening.     Thomas Avalos DNP, APRN

## 2021-08-03 NOTE — PROGRESS NOTES

## 2021-08-04 DIAGNOSIS — I10 ESSENTIAL HYPERTENSION: ICD-10-CM

## 2021-08-04 DIAGNOSIS — K21.9 GASTROESOPHAGEAL REFLUX DISEASE WITHOUT ESOPHAGITIS: ICD-10-CM

## 2021-08-04 DIAGNOSIS — G25.81 RESTLESS LEG SYNDROME: ICD-10-CM

## 2021-08-04 DIAGNOSIS — F41.9 ANXIETY: ICD-10-CM

## 2021-08-04 LAB
CHOLESTEROL, TOTAL: 127 MG/DL (ref 160–199)
HBA1C MFR BLD: 5.3 % (ref 4–6)
HDLC SERPL-MCNC: 46 MG/DL (ref 65–121)
LDL CHOLESTEROL CALCULATED: 68 MG/DL
TRIGL SERPL-MCNC: 67 MG/DL (ref 0–149)
VITAMIN B-12: 279 PG/ML (ref 211–946)
VITAMIN D 25-HYDROXY: 18.8 NG/ML

## 2021-08-05 LAB — RPR: NORMAL

## 2021-08-06 ENCOUNTER — TELEPHONE (OUTPATIENT)
Dept: PRIMARY CARE CLINIC | Age: 46
End: 2021-08-06

## 2021-08-06 DIAGNOSIS — E55.9 VITAMIN D DEFICIENCY: Primary | ICD-10-CM

## 2021-08-06 RX ORDER — ERGOCALCIFEROL 1.25 MG/1
50000 CAPSULE ORAL WEEKLY
Qty: 12 CAPSULE | Refills: 1 | Status: SHIPPED | OUTPATIENT
Start: 2021-08-06 | End: 2022-01-12

## 2021-08-08 LAB — VITAMIN B1 WHOLE BLOOD: 118 NMOL/L (ref 70–180)

## 2021-08-09 ENCOUNTER — TELEPHONE (OUTPATIENT)
Dept: NEUROSURGERY | Age: 46
End: 2021-08-09

## 2021-08-09 ENCOUNTER — VIRTUAL VISIT (OUTPATIENT)
Dept: PRIMARY CARE CLINIC | Age: 46
End: 2021-08-09
Payer: COMMERCIAL

## 2021-08-09 DIAGNOSIS — J01.90 ACUTE NON-RECURRENT SINUSITIS, UNSPECIFIED LOCATION: Primary | ICD-10-CM

## 2021-08-09 PROCEDURE — 99213 OFFICE O/P EST LOW 20 MIN: CPT | Performed by: NURSE PRACTITIONER

## 2021-08-09 RX ORDER — AMOXICILLIN AND CLAVULANATE POTASSIUM 875; 125 MG/1; MG/1
1 TABLET, FILM COATED ORAL 2 TIMES DAILY
Qty: 20 TABLET | Refills: 0 | Status: SHIPPED | OUTPATIENT
Start: 2021-08-09 | End: 2021-10-26 | Stop reason: ALTCHOICE

## 2021-08-09 ASSESSMENT — ENCOUNTER SYMPTOMS
SHORTNESS OF BREATH: 0
COUGH: 1
SINUS PAIN: 1
PHOTOPHOBIA: 0
VOICE CHANGE: 0
SINUS PRESSURE: 1
BACK PAIN: 0
COLOR CHANGE: 0
NAUSEA: 0
VOMITING: 0
RHINORRHEA: 0

## 2021-08-09 NOTE — TELEPHONE ENCOUNTER
Called patient about b12 labs. Pt voiced her pcp called already and sent b12 script to pharmacy.  I voiced understanding at this time,

## 2021-08-09 NOTE — PROGRESS NOTES
capsule Take 1 capsule by mouth once a week Yes WHITNEY Benjamin   sertraline (ZOLOFT) 50 MG tablet Take 1 tablet by mouth daily Yes WHITNEY Benjamin   valACYclovir (VALTREX) 1 g tablet TAKE 1 TABLET BY MOUTH EVERY DAY AS NEEDED FOR FEVER BLISTER  Patient taking differently: as needed TAKE 1 TABLET BY MOUTH EVERY DAY AS NEEDED FOR FEVER BLISTER Yes WHITNEY Benjamin   rOPINIRole (REQUIP) 0.5 MG tablet TAKE 1 TABLET BY MOUTH EVERY NIGHT Yes WHITNEY Benjamin   omeprazole (PRILOSEC) 20 MG delayed release capsule Take 1 capsule by mouth 2 times daily (before meals) Yes WHITNEY Benjamin   atenolol (TENORMIN) 25 MG tablet TAKE 1 TABLET BY MOUTH TWICE DAILY  Patient taking differently: daily TAKE 1 TABLET BY MOUTH TWICE DAILY Yes WHITNEY Benjamin   Multiple Vitamin (MULTI-VITAMIN DAILY PO) Take by mouth Yes Historical Provider, MD   doxepin (SINEQUAN) 10 MG capsule Take 1 capsule by mouth nightly  Patient taking differently: Take 10 mg by mouth nightly PRN Yes WHITNEY Benjamin   hydrochlorothiazide (HYDRODIURIL) 25 MG tablet Take 1 tablet by mouth daily  Patient taking differently: Take 25 mg by mouth as needed  Yes WHITNEY Benjamin       Social History     Tobacco Use    Smoking status: Current Every Day Smoker     Packs/day: 0.50     Years: 20.00     Pack years: 10.00     Types: Cigarettes    Smokeless tobacco: Never Used   Substance Use Topics    Alcohol use: Yes     Comment: occ    Drug use: No        Allergies   Allergen Reactions    Percocet [Oxycodone-Acetaminophen] Itching    Codeine     Toradol [Ketorolac Tromethamine] Itching   ,   Past Medical History:   Diagnosis Date    PVC (premature ventricular contraction)     Restless leg syndrome 7/16/2018    Uterine mass 6/17/14    9x9 cm on CT scan   ,   Past Surgical History:   Procedure Laterality Date    BREAST RECONSTRUCTION  04/2019    BREAST SURGERY Bilateral 4755    silicone Memory Gel    CHOLECYSTECTOMY 6/24/14    HC INJECT OTHER PERPHRL NERV Right 8/3/2016    HIP FLUOROSCOPIC GUIDED CORTICOSTEROID INJECTION  performed by Tiara Cabral MD at [facility] Right 4/21/2017    FLURO GUIDED HIP INJECTION performed by Gunjan Rios MD at [address] [place] [address], VAGINAL      TONSILLECTOMY  1979    TUBAL LIGATION  1994   ,   Social History     Tobacco Use    Smoking status: Current Every Day Smoker     Packs/day: 0.50     Years: 20.00     Pack years: 10.00     Types: Cigarettes    Smokeless tobacco: Never Used   Substance Use Topics    Alcohol use: Yes     Comment: occ    Drug use: No   ,   Family History   Problem Relation Age of Onset    Cancer Brother         synovial cell sarcoma    Cancer Maternal Grandfather         lung    Other Daughter         gallbladder disease    Hypertension Mother    ,   Immunization History   Administered Date(s) Administered    Tetanus 06/01/2009   ,   Health Maintenance   Topic Date Due    Hepatitis C screen  Never done    COVID-19 Vaccine (1) Never done    DTaP/Tdap/Td vaccine (1 - Tdap) Never done    Colon cancer screen colonoscopy  Never done    Breast cancer screen  02/19/2020    Pneumococcal 0-64 years Vaccine (1 of 2 - PPSV23) 07/15/2024 (Originally 8[birth year]1/1979)    Flu vaccine (1) 09/01/2021    Potassium monitoring  08/03/2022    Creatinine monitoring  08/03/2022    Lipid screen  08/04/2026    Hepatitis A vaccine  Aged Out    Hepatitis B vaccine  Aged Out    Hib vaccine  Aged Out    Meningococcal (ACWY) vaccine  Aged Out    HIV screen  Discontinued       PHYSICAL EXAMINATION:  [ INSTRUCTIONS:  \"[x]\" Indicates a positive item  \"[]\" Indicates a negative item  -- DELETE ALL ITEMS NOT EXAMINED]  [x] Alert  [x] Oriented to person/place/time    [x] No apparent distress  [] Toxic appearing    [] Face flushed appearing [x] Sclera clear  [] Lips are cyanotic      [x] Breathing appears normal  [] Appears tachypneic [] Rash on visible skin    [x] Cranial Nerves II-XII grossly intact    [x] Motor grossly intact in visible upper extremities    [x] Motor grossly intact in visible lower extremities    [x] Normal Mood  [] Anxious appearing    [] Depressed appearing  [] Confused appearing      [] Poor short term memory  [] Poor long term memory    [] OTHER:      Due to this being a TeleHealth encounter, evaluation of the following organ systems is limited: Vitals/Constitutional/EENT/Resp/CV/GI//MS/Neuro/Skin/Heme-Lymph-Imm. LMP 06/08/2014      No flowsheet data found. ASSESSMENT/PLAN:  1. Acute non-recurrent sinusitis, unspecified location    - amoxicillin-clavulanate (AUGMENTIN) 875-125 MG per tablet; Take 1 tablet by mouth 2 times daily  Dispense: 20 tablet; Refill: 0      No follow-ups on file. An  electronic signature was used to authenticate this note. --Lorrin Mortimer, APRN on 8/9/2021 at 4:03 PM        Pursuant to the emergency declaration under the [facility], [facility] waiver authority and the [employer] Act, this Virtual  Visit was conducted, with patient's consent, to reduce the patient's risk of exposure to COVID-19 and provide continuity of care for an established patient. Services were provided through a video synchronous discussion virtually to substitute for in-person clinic visit.

## 2021-08-09 NOTE — TELEPHONE ENCOUNTER
----- Message from WHITNEY Murcia sent at 8/9/2021  7:55 AM CDT -----  Vitamin B12 is on the low side of normal. Have her start once daily vitamin B12 replacement.

## 2021-08-17 ENCOUNTER — OFFICE VISIT (OUTPATIENT)
Dept: PRIMARY CARE CLINIC | Age: 46
End: 2021-08-17
Payer: COMMERCIAL

## 2021-08-17 VITALS
HEART RATE: 53 BPM | WEIGHT: 144.8 LBS | SYSTOLIC BLOOD PRESSURE: 118 MMHG | OXYGEN SATURATION: 98 % | HEIGHT: 69 IN | DIASTOLIC BLOOD PRESSURE: 64 MMHG | BODY MASS INDEX: 21.45 KG/M2 | TEMPERATURE: 97.2 F

## 2021-08-17 DIAGNOSIS — F41.9 ANXIETY: Primary | ICD-10-CM

## 2021-08-17 DIAGNOSIS — G25.81 RESTLESS LEG SYNDROME: ICD-10-CM

## 2021-08-17 PROCEDURE — 99214 OFFICE O/P EST MOD 30 MIN: CPT | Performed by: NURSE PRACTITIONER

## 2021-08-17 RX ORDER — ROPINIROLE 0.5 MG/1
TABLET, FILM COATED ORAL
Qty: 90 TABLET | Refills: 5 | Status: SHIPPED | OUTPATIENT
Start: 2021-08-17 | End: 2022-02-22 | Stop reason: SDUPTHER

## 2021-08-17 ASSESSMENT — ENCOUNTER SYMPTOMS
COLOR CHANGE: 0
SHORTNESS OF BREATH: 0
BACK PAIN: 0
PHOTOPHOBIA: 0
RHINORRHEA: 0
NAUSEA: 0
VOMITING: 0
VOICE CHANGE: 0
COUGH: 0

## 2021-08-17 NOTE — PROGRESS NOTES
[facility] PRIMARY [facility]ARE  [address] [id number] [address] [address]  Dept: [phone]  Dept Fax: [phone]  Loc: [phone]    Mariposa Peck is a 48 y.o. female who presents today for her medical conditions/complaints as noted below. Mariposa Peck is c/o of Anxiety and Depression        HPI:     HPI   Chief Complaint   Patient presents with    Anxiety    Depression     Patient presents today for medication follow-up. She reports she is doing well on sertraline 50 mg. She has not noticed significant improvement in mood but she is becoming more aware of her reactions to situations. She states occasionally she is taking more than one tablet of requip; this often helps reduce RLS symptoms.        Past Medical History:   Diagnosis Date    PVC (premature ventricular contraction)     Restless leg syndrome 7/16/2018    Uterine mass 6/17/14    9x9 cm on CT scan      Past Surgical History:   Procedure Laterality Date    BREAST RECONSTRUCTION  04/2019    BREAST SURGERY Bilateral 4427    silicone Memory Gel    CHOLECYSTECTOMY  6/24/14    HC INJECT OTHER PERPHRL NERV Right 8/3/2016    HIP FLUOROSCOPIC GUIDED CORTICOSTEROID INJECTION  performed by Elyse Jones MD at [facility] NERV Right 4/21/2017    FLURO GUIDED HIP INJECTION performed by Vonnie Becerril MD at [address] [place] [address] 8/17/2021 8/3/2021 7/20/2021 10/27/2020 6/10/2020 51/37/2744   SYSTOLIC [phone] 065   DIASTOLIC 64 78 68 74 68 68   Site - - - - - -   Position - - - - - -   Pulse 53 56 62 56 51 73   Temp 97.2 97.4 98.8 - - 98   Resp - - - - - 20   SpO2 98 100 98 - - 98   Weight 144 lb 12.8 oz 134 lb 134 lb 9.6 oz 129 lb 140 lb 138 lb   Height 5' 9\" 5' 9\" 5' 9\" 5' 9\" 5' 9\" 5' 9\"   Body mass index 21.38 kg/m2 19.79 kg/m2 19.87 kg/m2 19.05 kg/m2 20.67 kg/m2 20.38 kg/m2   Some Never done    COVID-19 Vaccine (1) Never done    DTaP/Tdap/Td vaccine (1 - Tdap) Never done    Colon cancer screen colonoscopy  Never done    Breast cancer screen  02/19/2020    Pneumococcal 0-64 years Vaccine (1 of 2 - PPSV23) 07/15/2024 (Originally [birth year]1/1979)    Flu vaccine (1) 09/01/2021    Potassium monitoring  08/03/2022    Creatinine monitoring  08/03/2022    Lipid screen  08/04/2026    Hepatitis A vaccine  Aged Out    Hepatitis B vaccine  Aged Out    Hib vaccine  Aged Out    Meningococcal (ACWY) vaccine  Aged Out    HIV screen  Discontinued       Subjective:      Review of Systems   Constitutional: Negative for chills and fever. HENT: Negative for ear pain, hearing loss, rhinorrhea and voice change. Eyes: Negative for photophobia and visual disturbance. Respiratory: Negative for cough and shortness of breath. Cardiovascular: Negative for chest pain and palpitations. Gastrointestinal: Negative for nausea and vomiting. Endocrine: Negative. Negative for cold intolerance and heat intolerance. Genitourinary: Negative for difficulty urinating and flank pain. Musculoskeletal: Negative for back pain and neck pain. Skin: Negative for color change and rash. Allergic/Immunologic: Negative for environmental allergies and food allergies. Neurological: Negative for dizziness, speech difficulty and headaches. Hematological: Does not bruise/bleed easily. Psychiatric/Behavioral: Negative for sleep disturbance and suicidal ideas. Objective:     Physical Exam  Vitals and nursing note reviewed. Constitutional:       Appearance: She is well-developed. HENT:      Head: Atraumatic. Right Ear: External ear normal.      Left Ear: External ear normal.      Nose: Nose normal.   Eyes:      Conjunctiva/sclera: Conjunctivae normal.      Pupils: Pupils are equal, round, and reactive to light. Cardiovascular:      Rate and Rhythm: Normal rate and regular rhythm.       Heart sounds: Normal heart sounds, S1 normal and S2 normal.   Pulmonary:      Effort: Pulmonary effort is normal.      Breath sounds: Normal breath sounds. Abdominal:      General: Bowel sounds are normal.      Palpations: Abdomen is soft. Musculoskeletal:         General: Normal range of motion. Cervical back: Normal range of motion and neck supple. Skin:     General: Skin is warm and dry. Neurological:      Mental Status: She is alert and oriented to person, place, and time. Psychiatric:         Behavior: Behavior normal.       /64   Pulse 53   Temp 97.2 °F (36.2 °C)   Ht 5' 9\" (1.753 m)   Wt 144 lb 12.8 oz (65.7 kg)   LMP 06/08/2014   SpO2 98%   BMI 21.38 kg/m²     Assessment:       Diagnosis Orders   1. Anxiety  sertraline (ZOLOFT) 50 MG tablet   2. Restless leg syndrome           Plan:     More than 50% of the time was spent counseling and coordinating care for a total time of 30 min face to face. Increase sertraline to 75 mg daily. Okay to increase requip to 1-3 tablets as need. Patient given educational materials -see patient instructions. Discussed use, benefit, and side effects of prescribed medications. All patient questions answered. Pt voiced understanding. Reviewed health maintenance. Instructed to continue currentmedications, diet and exercise. Patient agreed with treatment plan. Follow up as directed. MEDICATIONS:  Orders Placed This Encounter   Medications    rOPINIRole (REQUIP) 0.5 MG tablet     Sig: TAKE 1-3 TABLETS BY MOUTH EVERY NIGHT     Dispense:  90 tablet     Refill:  5    sertraline (ZOLOFT) 50 MG tablet     Sig: Take 1 tablet by mouth daily Take 1.5 tablet by mouth daily     Dispense:  45 tablet     Refill:  5         ORDERS:  No orders of the defined types were placed in this encounter. Follow-up:  Return in about 6 months (around 2/17/2022). PATIENT INSTRUCTIONS:  There are no Patient Instructions on file for this visit.   Electronically signed by WHITNEY Bain on 8/17/2021 at 12:13 PM    EMR Dragon/transcription disclaimer:  Much of thisencounter note is electronic transcription/translation of spoken language to printed texts. The electronic translation of spoken language may be erroneous, or at times, nonsensical words or phrases may be inadvertentlytranscribed.   Although I have reviewed the note for such errors, some may still exist.

## 2021-10-26 ENCOUNTER — OFFICE VISIT (OUTPATIENT)
Dept: CARDIOLOGY CLINIC | Age: 46
End: 2021-10-26
Payer: COMMERCIAL

## 2021-10-26 ENCOUNTER — TELEPHONE (OUTPATIENT)
Dept: PRIMARY CARE CLINIC | Age: 46
End: 2021-10-26

## 2021-10-26 VITALS
WEIGHT: 132 LBS | DIASTOLIC BLOOD PRESSURE: 80 MMHG | HEART RATE: 50 BPM | BODY MASS INDEX: 19.55 KG/M2 | SYSTOLIC BLOOD PRESSURE: 118 MMHG | HEIGHT: 69 IN | OXYGEN SATURATION: 98 %

## 2021-10-26 DIAGNOSIS — F17.200 SMOKING: ICD-10-CM

## 2021-10-26 DIAGNOSIS — I49.3 PVC (PREMATURE VENTRICULAR CONTRACTION): Primary | ICD-10-CM

## 2021-10-26 PROCEDURE — 99213 OFFICE O/P EST LOW 20 MIN: CPT | Performed by: NURSE PRACTITIONER

## 2021-10-26 PROCEDURE — 93000 ELECTROCARDIOGRAM COMPLETE: CPT | Performed by: NURSE PRACTITIONER

## 2021-10-26 NOTE — PATIENT INSTRUCTIONS
Patient Education        Premature Heartbeat: Care Instructions  Your Care Instructions     A premature heartbeat happens when the heart beats earlier than it should. This briefly interrupts the heart's rhythm. You do not usually feel the early heartbeat, and the next beat is stronger. To many people, this feels like a skipped heartbeat or a flutter. This heartbeat is also called a premature ventricular contraction (PVC). If you have no heart problems, premature heartbeats that happen once in a while are not a cause for concern. Most people have them at some time. They may happen more often if you drink a lot of caffeine or alcohol or are under stress. Usually, no cause for a premature heartbeat is found, and no treatment is needed. Some people may take medicine to prevent these heartbeats and to relieve symptoms. Follow-up care is a key part of your treatment and safety. Be sure to make and go to all appointments, and call your doctor if you are having problems. It's also a good idea to know your test results and keep a list of the medicines you take. How can you care for yourself at home? · Limit caffeine and other stimulants if they trigger premature heartbeats. · Reduce stress. Avoid people and places that make you feel anxious, if you can. Learn ways to reduce stress, such as biofeedback, guided imagery, and meditation. · Do not smoke or allow others to smoke around you. If you need help quitting, talk to your doctor about stop-smoking programs and medicines. These can increase your chances of quitting for good. · Get at least 30 minutes of exercise on most days of the week. Walking is a good choice. You also may want to do other activities, such as running, swimming, cycling, or playing tennis or team sports. · Eat heart-healthy foods. · Stay at a healthy weight. Lose weight if you need to. · Get enough sleep.  Keep your room dark and quiet, and try to go to bed at the same time every night.  · Limit alcohol to 2 drinks a day for men and 1 drink a day for women. Too much alcohol can cause health problems. If drinking alcohol causes more premature heartbeats, do not drink it. · If your doctor prescribes medicine, take it exactly as prescribed. Call your doctor if you think you are having a problem with your medicine. When should you call for help? Call 911 anytime you think you may need emergency care. For example, call if:    · You passed out (lost consciousness). Call your doctor now or seek immediate medical care if:    · You are dizzy or lightheaded, or you feel like you may faint.     · You are short of breath. Watch closely for changes in your health, and be sure to contact your doctor if you have any problems. Where can you learn more? Go to [email or web address] and sign in to your [employer] account. Enter D518 in the [employer] box to learn more about \"Premature Heartbeat: Care Instructions. \"     If you do not have an account, please click on the \"Sign Up Now\" link. Current as of: April 29, 2021               Content Version: 13.0  © 2006-2021 Healthwise, Incorporated. Care instructions adapted under license by [facility] [place] ([facility]). If you have questions about a medical condition or this instruction, always ask your healthcare professional. [address] any warranty or liability for your use of this information.

## 2021-10-26 NOTE — PROGRESS NOTES
Date    PVC (premature ventricular contraction)     Restless leg syndrome 7/16/2018    Uterine mass 6/17/14    9x9 cm on CT scan       Past Surgical History:   Procedure Laterality Date    BREAST RECONSTRUCTION  04/2019    BREAST SURGERY Bilateral 2165    silicone Memory Gel    CHOLECYSTECTOMY  6/24/14    HC INJECT OTHER PERPHRL NERV Right 8/3/2016    HIP FLUOROSCOPIC GUIDED CORTICOSTEROID INJECTION  performed by Christine Torres MD at [facility] Right 4/21/2017    FLURO GUIDED HIP INJECTION performed by Henrietta Cotto MD at [address] [place] [address] VAGINAL      TONSILLECTOMY  1979    TUBAL LIGATION  1994       Family History   Problem Relation Age of Onset    Cancer Brother         synovial cell sarcoma    Cancer Maternal Grandfather         lung    Other Daughter         gallbladder disease    Hypertension Mother        Social History     Socioeconomic History    Marital status:      Spouse name: Not on file    Number of children: Not on file    Years of education: Not on file    Highest education level: Not on file   Occupational History    Not on file   Tobacco Use    Smoking status: Current Every Day Smoker     Packs/day: 0.50     Years: 20.00     Pack years: 10.00     Types: Cigarettes    Smokeless tobacco: Never Used   Substance and Sexual Activity    Alcohol use: Yes     Comment: occ    Drug use: No    Sexual activity: Not on file   Other Topics Concern    Not on file   Social History Narrative    Not on file     Social Determinants of Health     Financial Resource Strain:     Difficulty of Paying Living Expenses:    Food Insecurity:     Worried About Running Out of Food in the Last Year:     Ran Out of Food in the Last Year:    Transportation Needs:     Lack of Transportation (Medical):      Lack of Transportation (Non-Medical):    Physical Activity:     Days of Exercise per Week:     Minutes of Exercise per Session: Stress:     Feeling of Stress :    Social Connections:     Frequency of Communication with Friends and Family:     Frequency of Social Gatherings with Friends and Family:     Attends Confucianist Services:     Active Member of Clubs or Organizations:     Attends Club or Organization Meetings:     Marital Status:    Intimate Partner Violence:     Fear of Current or Ex-Partner:     Emotionally Abused:     Physically Abused:     Sexually Abused:         Allergies   Allergen Reactions    Percocet [Oxycodone-Acetaminophen] Itching    Codeine     Toradol [Ketorolac Tromethamine] Itching         Current Outpatient Medications:     rOPINIRole (REQUIP) 0.5 MG tablet, TAKE 1-3 TABLETS BY MOUTH EVERY NIGHT, Disp: 90 tablet, Rfl: 5    sertraline (ZOLOFT) 50 MG tablet, Take 1 tablet by mouth daily Take 1.5 tablet by mouth daily, Disp: 45 tablet, Rfl: 5    vitamin D (ERGOCALCIFEROL) 1.25 MG (69383 UT) CAPS capsule, Take 1 capsule by mouth once a week, Disp: 12 capsule, Rfl: 1    valACYclovir (VALTREX) 1 g tablet, TAKE 1 TABLET BY MOUTH EVERY DAY AS NEEDED FOR FEVER BLISTER (Patient taking differently: as needed TAKE 1 TABLET BY MOUTH EVERY DAY AS NEEDED FOR FEVER BLISTER), Disp: 30 tablet, Rfl: 0    omeprazole (PRILOSEC) 20 MG delayed release capsule, Take 1 capsule by mouth 2 times daily (before meals), Disp: 60 capsule, Rfl: 5    atenolol (TENORMIN) 25 MG tablet, TAKE 1 TABLET BY MOUTH TWICE DAILY (Patient taking differently: daily TAKE 1 TABLET BY MOUTH TWICE DAILY), Disp: 180 tablet, Rfl: 3    Multiple Vitamin (MULTI-VITAMIN DAILY PO), Take by mouth, Disp: , Rfl:     doxepin (SINEQUAN) 10 MG capsule, Take 1 capsule by mouth nightly (Patient taking differently: Take 10 mg by mouth nightly PRN), Disp: 30 capsule, Rfl: 0    hydrochlorothiazide (HYDRODIURIL) 25 MG tablet, Take 1 tablet by mouth daily (Patient taking differently: Take 25 mg by mouth as needed ), Disp: 30 tablet, Rfl: 5   amoxicillin-clavulanate (AUGMENTIN) 875-125 MG per tablet, Take 1 tablet by mouth 2 times daily (Patient not taking: Reported on 10/26/2021), Disp: 20 tablet, Rfl: 0    PE:  Vitals:    10/26/21 1400   BP: 118/80   Pulse: 50   SpO2: 98%       Estimated body mass index is 19.49 kg/m² as calculated from the following:    Height as of this encounter: 5' 9\" (1.753 m). Weight as of this encounter: 132 lb (59.9 kg). Constitutional: She is oriented to person, place, and time. She appears well-developed and well-nourished in no acute distress. Neck:  Neck supple without JVD present. No trachea deviation present. No thyromegaly present. Eyes:Conjunctivae and EOM are normal. Pupils equal and reactive to light. ENT:Hearing appears normal.conjunctiva and lids are normal, ears and nose appear normal.  Cardiovascular: Normal rate, normal rhythm, normal heart sounds. No murmur ascultated. No gallop and no friction rub. No carotid bruits. No peripheral edema. Pulmonary/Chest:  Lungs clear to auscultation bilaterally without evidence of respiratory distress. She without wheezes. She without rales or ronchi. Musculoskeletal: Normal range of motion. Gait is normal.  Head is normocephalic and atraumatic. Skin: Skin is warm and dry without rash or pallor. Psychiatric:She is alert and oriented to person, place, and time. She has a normal mood and affect. Her behavior is normal. Thought content normal.       Lab Results   Component Value Date    CREATININE 0.8 08/03/2021    CREATININE 0.9 11/25/2019    CREATININE 0.8 07/16/2019    HGB 13.1 08/03/2021    HGB 12.8 08/23/2019    HGB 11.4 07/23/2019         ECG 10/26/21  Sinus bradycardia, HR 50 bpm         Assessment, Recommendations, & Plan:  48 y.o. female with symptomatic PVCs & smoking    Symptomatic PVCs-controlled with atenolol.  Continue current regimen    Smoking-actively tying to quit      Disposition - RTC in numeral 1 year with Dr. Lucas Torres or sooner if needed      Please do not hesitate to contact me for any questions or concerns.     Sincerely yours,    WHITNEY Thomas

## 2021-10-27 ENCOUNTER — OFFICE VISIT (OUTPATIENT)
Dept: PRIMARY CARE CLINIC | Age: 46
End: 2021-10-27
Payer: COMMERCIAL

## 2021-10-27 VITALS
DIASTOLIC BLOOD PRESSURE: 68 MMHG | OXYGEN SATURATION: 98 % | HEIGHT: 69 IN | WEIGHT: 131 LBS | TEMPERATURE: 96.6 F | HEART RATE: 54 BPM | BODY MASS INDEX: 19.4 KG/M2 | SYSTOLIC BLOOD PRESSURE: 112 MMHG

## 2021-10-27 DIAGNOSIS — L02.412 ABSCESS OF LEFT AXILLA: Primary | ICD-10-CM

## 2021-10-27 PROCEDURE — 99213 OFFICE O/P EST LOW 20 MIN: CPT | Performed by: NURSE PRACTITIONER

## 2021-10-27 RX ORDER — CEPHALEXIN 500 MG/1
500 CAPSULE ORAL 4 TIMES DAILY
Qty: 40 CAPSULE | Refills: 0 | Status: SHIPPED | OUTPATIENT
Start: 2021-10-27 | End: 2021-12-28 | Stop reason: ALTCHOICE

## 2021-10-27 ASSESSMENT — PATIENT HEALTH QUESTIONNAIRE - PHQ9
1. LITTLE INTEREST OR PLEASURE IN DOING THINGS: 0
SUM OF ALL RESPONSES TO PHQ QUESTIONS 1-9: 0
SUM OF ALL RESPONSES TO PHQ9 QUESTIONS 1 & 2: 0
2. FEELING DOWN, DEPRESSED OR HOPELESS: 0
SUM OF ALL RESPONSES TO PHQ QUESTIONS 1-9: 0
SUM OF ALL RESPONSES TO PHQ QUESTIONS 1-9: 0

## 2021-10-27 NOTE — TELEPHONE ENCOUNTER
Received a call about a pimple under the arm.  Told her I would call back with more information on where to go from here

## 2021-11-01 ENCOUNTER — OFFICE VISIT (OUTPATIENT)
Dept: SURGERY | Age: 46
End: 2021-11-01
Payer: COMMERCIAL

## 2021-11-01 VITALS
BODY MASS INDEX: 19.55 KG/M2 | WEIGHT: 132 LBS | HEIGHT: 69 IN | SYSTOLIC BLOOD PRESSURE: 106 MMHG | TEMPERATURE: 97.8 F | HEART RATE: 100 BPM | DIASTOLIC BLOOD PRESSURE: 72 MMHG

## 2021-11-01 DIAGNOSIS — R22.32 AXILLARY MASS, LEFT: Primary | ICD-10-CM

## 2021-11-01 PROCEDURE — 99024 POSTOP FOLLOW-UP VISIT: CPT | Performed by: PHYSICIAN ASSISTANT

## 2021-11-01 PROCEDURE — 24075 EXC ARM/ELBOW LES SC < 3 CM: CPT | Performed by: PHYSICIAN ASSISTANT

## 2021-11-03 NOTE — RESULT ENCOUNTER NOTE
Pathology report given. She reports all is well. This has been electronically signed by Jannine Fabry.  Janet El PA-C

## 2021-11-15 ENCOUNTER — OFFICE VISIT (OUTPATIENT)
Dept: SURGERY | Age: 46
End: 2021-11-15
Payer: COMMERCIAL

## 2021-11-15 VITALS — HEIGHT: 69 IN | TEMPERATURE: 98.3 F | BODY MASS INDEX: 20.14 KG/M2 | WEIGHT: 136 LBS

## 2021-11-15 DIAGNOSIS — R22.32 AXILLARY MASS, LEFT: Primary | ICD-10-CM

## 2021-11-15 PROCEDURE — 99211 OFF/OP EST MAY X REQ PHY/QHP: CPT | Performed by: PHYSICIAN ASSISTANT

## 2021-11-17 NOTE — PROGRESS NOTES
by mouth daily (Patient taking differently: Take 25 mg by mouth as needed ) 30 tablet 5     No current facility-administered medications for this visit. Allergies: Percocet [oxycodone-acetaminophen], Codeine, and Toradol [ketorolac tromethamine]  Past Medical History:   Diagnosis Date    PVC (premature ventricular contraction)     Restless leg syndrome 7/16/2018    Uterine mass 6/17/14    9x9 cm on CT scan     Past Surgical History:   Procedure Laterality Date    BREAST RECONSTRUCTION  04/2019    BREAST SURGERY Bilateral 7926    silicone Memory Gel    CHOLECYSTECTOMY  6/24/14    HC INJECT OTHER PERPHRL NERV Right 8/3/2016    HIP FLUOROSCOPIC GUIDED CORTICOSTEROID INJECTION  performed by Yoel Taylor MD at [facility] NERV Right 4/21/2017    FLURO GUIDED HIP INJECTION performed by Jean Bowman MD at [address] [place] [address] VAGINAL      TONSILLECTOMY  1979    TUBAL LIGATION  1994     Family History   Problem Relation Age of Onset    Cancer Brother         synovial cell sarcoma    Cancer Maternal Grandfather         lung    Other Daughter         gallbladder disease    Hypertension Mother      Social History     Tobacco Use    Smoking status: Current Every Day Smoker     Packs/day: 0.50     Years: 20.00     Pack years: 10.00     Types: Cigarettes    Smokeless tobacco: Never Used   Substance Use Topics    Alcohol use: Yes     Comment: occ       Review of systems  All systems are reviewed and positive for the above. All other systems are noted to be negative. Exam  Blood pressure 106/72, pulse 100, temperature 97.8 °F (36.6 °C), temperature source Temporal, height 5' 9\" (1.753 m), weight 132 lb (59.9 kg), last menstrual period 06/08/2014, not currently breastfeeding. On examination to the left axilla, there is a mass that is approximately 1.5 to 2 cm in size. There is some mild tenderness. There is no fluctuance.   There is no erythema on today's exam.      Impression  Left axillary mass    Plan/procedure  I believe that this mass needs to be excised and sent for pathologic analysis. Risk benefits and alternatives of the procedure were discussed with the patient and she is willing to proceed. Following informed consent and Xylocaine anesthesia an elliptical incision was made around the area of interest.  The area of interest measured approximately 1.5 to 2 cm in size. It was excised in its entirety. The skin was reapproximated with 3-0 nylon stitches. She tolerated the procedure well. A sterile dressing was applied. The specimen was sent for pathologic analysis. We will plan to see her back in 2 weeks for suture removal.  .

## 2021-12-01 NOTE — PROGRESS NOTES
Subjective  Rayne Welch today in follow-up from mass excision to the left axilla. She states she is doing well. She has no new complaints. She is reported no problems with infection or drainage.     Objective  Patient Active Problem List    Diagnosis Date Noted    Overactive bladder 07/16/2018    Restless leg syndrome 07/16/2018    Insomnia 07/16/2018    Breast pain 03/12/2018    Diffuse cystic mastopathy 03/12/2018    History of breast augmentation 03/12/2018    Gastroesophageal reflux disease without esophagitis 08/28/2017    PVC (premature ventricular contraction) 07/25/2017    Essential hypertension 06/21/2017    Anxiety 06/21/2017    Tobacco abuse 06/21/2017    Primary osteoarthritis of right hip 04/21/2017    S/P laparoscopic cholecystectomy 07/10/2014    Uterine mass 06/18/2014       Current Outpatient Medications   Medication Sig Dispense Refill    cephALEXin (KEFLEX) 500 MG capsule Take 1 capsule by mouth 4 times daily 40 capsule 0    rOPINIRole (REQUIP) 0.5 MG tablet TAKE 1-3 TABLETS BY MOUTH EVERY NIGHT 90 tablet 5    sertraline (ZOLOFT) 50 MG tablet Take 1 tablet by mouth daily Take 1.5 tablet by mouth daily 45 tablet 5    vitamin D (ERGOCALCIFEROL) 1.25 MG (99702 UT) CAPS capsule Take 1 capsule by mouth once a week 12 capsule 1    valACYclovir (VALTREX) 1 g tablet TAKE 1 TABLET BY MOUTH EVERY DAY AS NEEDED FOR FEVER BLISTER 30 tablet 0    omeprazole (PRILOSEC) 20 MG delayed release capsule Take 1 capsule by mouth 2 times daily (before meals) 60 capsule 5    atenolol (TENORMIN) 25 MG tablet TAKE 1 TABLET BY MOUTH TWICE DAILY (Patient taking differently: daily TAKE 1 TABLET BY MOUTH TWICE DAILY) 180 tablet 3    Multiple Vitamin (MULTI-VITAMIN DAILY PO) Take by mouth      doxepin (SINEQUAN) 10 MG capsule Take 1 capsule by mouth nightly 30 capsule 0    hydrochlorothiazide (HYDRODIURIL) 25 MG tablet Take 1 tablet by mouth daily (Patient taking differently: Take 25 mg by mouth as needed ) 30 tablet 5     No current facility-administered medications for this visit. Allergies: Percocet [oxycodone-acetaminophen], Codeine, and Toradol [ketorolac tromethamine]    Past Medical History:   Diagnosis Date    PVC (premature ventricular contraction)     Restless leg syndrome 7/16/2018    Uterine mass 6/17/14    9x9 cm on CT scan       Past Surgical History:   Procedure Laterality Date    BREAST RECONSTRUCTION  04/2019    BREAST SURGERY Bilateral 5241    silicone Memory Gel    CHOLECYSTECTOMY  6/24/14    HC INJECT OTHER PERPHRL NERV Right 8/3/2016    HIP FLUOROSCOPIC GUIDED CORTICOSTEROID INJECTION  performed by Rochelle Schwarz MD at [facility] NERV Right 4/21/2017    FLURO GUIDED HIP INJECTION performed by Sujatha Ribera MD at [address] [place] [address] VAGINAL      TONSILLECTOMY  1979    TUBAL LIGATION  1994       Family History   Problem Relation Age of Onset    Cancer Brother         synovial cell sarcoma    Cancer Maternal Grandfather         lung    Other Daughter         gallbladder disease    Hypertension Mother        Social History     Tobacco Use    Smoking status: Current Every Day Smoker     Packs/day: 0.50     Years: 20.00     Pack years: 10.00     Types: Cigarettes    Smokeless tobacco: Never Used   Substance Use Topics    Alcohol use: Yes     Comment: occ      Pathology   No malignancy identified. Subcutaneous abscess with associated ingrown hair    Review of systems  Reviewed and positive for the above although system noted to be negative    Exam  Temperature 98.3 °F (36.8 °C), temperature source Temporal, height 5' 9\" (1.753 m), weight 136 lb (61.7 kg), last menstrual period 06/08/2014, not currently breastfeeding. Axillary exam demonstrates the incision to be healing well. There is no evidence of infection. There is no drainage.     Assessment  Axillary mass status post excision    Plan  Sutures were removed without difficulty. Patient was given care instructions. We will plan to see the patient back in office as needed.

## 2021-12-28 ENCOUNTER — OFFICE VISIT (OUTPATIENT)
Dept: PRIMARY CARE CLINIC | Age: 46
End: 2021-12-28
Payer: COMMERCIAL

## 2021-12-28 VITALS
DIASTOLIC BLOOD PRESSURE: 74 MMHG | OXYGEN SATURATION: 98 % | BODY MASS INDEX: 19.08 KG/M2 | HEART RATE: 63 BPM | SYSTOLIC BLOOD PRESSURE: 102 MMHG | WEIGHT: 128.8 LBS | HEIGHT: 69 IN | TEMPERATURE: 97.6 F

## 2021-12-28 DIAGNOSIS — R52 BODY ACHES: ICD-10-CM

## 2021-12-28 DIAGNOSIS — R52 BODY ACHES: Primary | ICD-10-CM

## 2021-12-28 DIAGNOSIS — U07.1 COVID-19: ICD-10-CM

## 2021-12-28 LAB — SARS-COV-2, PCR: DETECTED

## 2021-12-28 PROCEDURE — 99213 OFFICE O/P EST LOW 20 MIN: CPT | Performed by: FAMILY MEDICINE

## 2021-12-28 ASSESSMENT — ENCOUNTER SYMPTOMS
GASTROINTESTINAL NEGATIVE: 1
SORE THROAT: 1
SHORTNESS OF BREATH: 0
COUGH: 0
RHINORRHEA: 1

## 2021-12-28 NOTE — PROGRESS NOTES
[address] [facility]  [address] [address]  [address] [address]  Dept: [phone]  Dept Fax: [phone]  Loc: [phone]      Subjective:     Chief Complaint   Patient presents with    Fever     fever and body aches that started on Sunday. She took and OTC Covid-19 test which was positive.  Generalized Body Aches       HPI:  Louise Méndez is a 48 y.o. female presents today with above complaint since 3 days ago. She had a positive   covid test from a home kit. She is here to get a confirmatory test    ROS:   Review of Systems   Constitutional: Positive for chills and fever. Negative for appetite change. HENT: Positive for congestion, rhinorrhea and sore throat. Respiratory: Negative for cough and shortness of breath. Cardiovascular: Negative. Gastrointestinal: Negative. Genitourinary: Negative. Musculoskeletal: Positive for myalgias. Neurological: Positive for weakness.        PMHx:  Past Medical History:   Diagnosis Date    PVC (premature ventricular contraction)     Restless leg syndrome 7/16/2018    Uterine mass 6/17/14    9x9 cm on CT scan     Patient Active Problem List   Diagnosis    Uterine mass    S/P laparoscopic cholecystectomy    Primary osteoarthritis of right hip    Essential hypertension    Anxiety    Tobacco abuse    PVC (premature ventricular contraction)    Gastroesophageal reflux disease without esophagitis    Breast pain    Diffuse cystic mastopathy    History of breast augmentation    Overactive bladder    Restless leg syndrome    Insomnia       PSHx:  Past Surgical History:   Procedure Laterality Date    BREAST RECONSTRUCTION  04/2019    BREAST SURGERY Bilateral 2306    silicone Memory Gel    CHOLECYSTECTOMY  6/24/14    HC INJECT OTHER PERPHRL NERV Right 8/3/2016    HIP FLUOROSCOPIC GUIDED CORTICOSTEROID INJECTION  performed by J Luis Valenzuela MD at [facility]    HC INJECT OTHER PERPHRL NERV Right 4/21/2017    FLURO GUIDED HIP INJECTION performed by Yari Montero MD at [facility], VAGINAL      TONSILLECTOMY  1979    TUBAL LIGATION  1994       PFHx:  Family History   Problem Relation Age of Onset    Cancer Brother         synovial cell sarcoma    Cancer Maternal Grandfather         lung    Other Daughter         gallbladder disease    Hypertension Mother        SocialHx:  Social History     Tobacco Use    Smoking status: Current Every Day Smoker     Packs/day: 0.50     Years: 20.00     Pack years: 10.00     Types: Cigarettes    Smokeless tobacco: Never Used   Substance Use Topics    Alcohol use: Yes     Comment: occ       Allergies:   Allergies   Allergen Reactions    Percocet [Oxycodone-Acetaminophen] Itching    Codeine     Toradol [Ketorolac Tromethamine] Itching       Medications:  Current Outpatient Medications   Medication Sig Dispense Refill    atenolol (TENORMIN) 25 MG tablet TAKE 1 TABLET BY MOUTH TWICE DAILY 180 tablet 0    rOPINIRole (REQUIP) 0.5 MG tablet TAKE 1-3 TABLETS BY MOUTH EVERY NIGHT 90 tablet 5    sertraline (ZOLOFT) 50 MG tablet Take 1 tablet by mouth daily Take 1.5 tablet by mouth daily 45 tablet 5    vitamin D (ERGOCALCIFEROL) 1.25 MG (68611 UT) CAPS capsule Take 1 capsule by mouth once a week 12 capsule 1    valACYclovir (VALTREX) 1 g tablet TAKE 1 TABLET BY MOUTH EVERY DAY AS NEEDED FOR FEVER BLISTER 30 tablet 0    omeprazole (PRILOSEC) 20 MG delayed release capsule Take 1 capsule by mouth 2 times daily (before meals) 60 capsule 5    Multiple Vitamin (MULTI-VITAMIN DAILY PO) Take by mouth      doxepin (SINEQUAN) 10 MG capsule Take 1 capsule by mouth nightly 30 capsule 0    hydrochlorothiazide (HYDRODIURIL) 25 MG tablet Take 1 tablet by mouth daily (Patient taking differently: Take 25 mg by mouth as needed ) 30 tablet 5    cephALEXin (KEFLEX) 500 MG capsule Take 1 capsule by mouth 4 times daily 40 capsule 0     No current including when to contact or return to office (ie; if symptoms worsen or fail to improve), were discussed and acknowledged.     Electronically signed by Fatmata Moses MD on 12/28/21 at 11:51 AM CST

## 2022-01-12 DIAGNOSIS — E55.9 VITAMIN D DEFICIENCY: ICD-10-CM

## 2022-01-12 RX ORDER — ERGOCALCIFEROL 1.25 MG/1
CAPSULE ORAL
Qty: 12 CAPSULE | Refills: 1 | Status: SHIPPED | OUTPATIENT
Start: 2022-01-12 | End: 2022-07-03

## 2022-01-13 DIAGNOSIS — F41.9 ANXIETY: ICD-10-CM

## 2022-02-22 ENCOUNTER — TELEMEDICINE (OUTPATIENT)
Dept: PRIMARY CARE CLINIC | Age: 47
End: 2022-02-22
Payer: COMMERCIAL

## 2022-02-22 DIAGNOSIS — I49.3 PVC (PREMATURE VENTRICULAR CONTRACTION): Chronic | ICD-10-CM

## 2022-02-22 DIAGNOSIS — Z12.11 COLON CANCER SCREENING: ICD-10-CM

## 2022-02-22 DIAGNOSIS — I10 ESSENTIAL HYPERTENSION: Primary | ICD-10-CM

## 2022-02-22 DIAGNOSIS — F41.9 ANXIETY: ICD-10-CM

## 2022-02-22 PROCEDURE — 99213 OFFICE O/P EST LOW 20 MIN: CPT | Performed by: NURSE PRACTITIONER

## 2022-02-22 RX ORDER — ROPINIROLE 0.5 MG/1
TABLET, FILM COATED ORAL
Qty: 90 TABLET | Refills: 5 | Status: SHIPPED | OUTPATIENT
Start: 2022-02-22

## 2022-02-22 RX ORDER — ATENOLOL 25 MG/1
25 TABLET ORAL 2 TIMES DAILY
Qty: 180 TABLET | Refills: 2 | Status: SHIPPED | OUTPATIENT
Start: 2022-02-22

## 2022-02-22 ASSESSMENT — ENCOUNTER SYMPTOMS
PHOTOPHOBIA: 0
VOMITING: 0
SHORTNESS OF BREATH: 0
COLOR CHANGE: 0
NAUSEA: 0
RHINORRHEA: 0
COUGH: 0
BACK PAIN: 0
VOICE CHANGE: 0

## 2022-02-22 NOTE — PROGRESS NOTES
[address] [address]             Phone:  [phone]  Fax:  [phone]       2022    TELEHEALTH EVALUATION -- Audio/Visual (During [facility]- public health emergency)    HPI:  Chief Complaint   Patient presents with    Follow-up     No current issues to discuss          Jasper Shaw (:  1975) has requested an audio/video evaluation for the following concern(s):    Patient presents today for follow-up anxiety, hypertension. She reports blood pressure is stable on medication. She denies any new concerns today. She states she never received cologuard when it was was ordered at last visit. Review of Systems   Constitutional: Negative for chills and fever. HENT: Negative for ear pain, hearing loss, rhinorrhea and voice change. Eyes: Negative for photophobia and visual disturbance. Respiratory: Negative for cough and shortness of breath. Cardiovascular: Negative for chest pain and palpitations. Gastrointestinal: Negative for nausea and vomiting. Endocrine: Negative. Negative for cold intolerance and heat intolerance. Genitourinary: Negative for difficulty urinating and flank pain. Musculoskeletal: Negative for back pain and neck pain. Skin: Negative for color change and rash. Allergic/Immunologic: Negative for environmental allergies and food allergies. Neurological: Negative for dizziness, speech difficulty and headaches. Hematological: Does not bruise/bleed easily. Psychiatric/Behavioral: Negative for sleep disturbance and suicidal ideas. Prior to Visit Medications    Medication Sig Taking?  Authorizing Provider   atenolol (TENORMIN) 25 MG tablet Take 1 tablet by mouth in the morning and at bedtime Yes WHITNEY Escobedo   rOPINIRole (REQUIP) 0.5 MG tablet TAKE 1-3 TABLETS BY MOUTH EVERY NIGHT Yes WHITNEY Escobedo   sertraline (ZOLOFT) 50 MG tablet TAKE 1 TABLET BY MOUTH DAILY Yes WHITNEY Escobedo   vitamin D (ERGOCALCIFEROL) 1.25 MG (95939 UT) CAPS capsule TAKE 1 CAPSULE BY MOUTH 1 TIME A WEEK Yes WHITNEY Abraham   valACYclovir (VALTREX) 1 g tablet TAKE 1 TABLET BY MOUTH EVERY DAY AS NEEDED FOR FEVER BLISTER Yes WHITNEY Abraham   omeprazole (PRILOSEC) 20 MG delayed release capsule Take 1 capsule by mouth 2 times daily (before meals) Yes WHITNEY Abraham   Multiple Vitamin (MULTI-VITAMIN DAILY PO) Take by mouth Yes Historical Provider, MD   doxepin (SINEQUAN) 10 MG capsule Take 1 capsule by mouth nightly Yes WHITNEY Abraham   hydrochlorothiazide (HYDRODIURIL) 25 MG tablet Take 1 tablet by mouth daily  Patient taking differently: Take 25 mg by mouth as needed  Yes WHITNEY Abraham       Social History     Tobacco Use    Smoking status: Current Every Day Smoker     Packs/day: 0.50     Years: 20.00     Pack years: 10.00     Types: Cigarettes    Smokeless tobacco: Never Used   Substance Use Topics    Alcohol use: Yes     Comment: occ    Drug use: No        Allergies   Allergen Reactions    Percocet [Oxycodone-Acetaminophen] Itching    Codeine     Toradol [Ketorolac Tromethamine] Itching   ,   Past Medical History:   Diagnosis Date    PVC (premature ventricular contraction)     Restless leg syndrome 7/16/2018    Uterine mass 6/17/14    9x9 cm on CT scan   ,   Past Surgical History:   Procedure Laterality Date    BREAST RECONSTRUCTION  04/2019    BREAST SURGERY Bilateral 5238    silicone Memory Gel    CHOLECYSTECTOMY  6/24/14    HC INJECT OTHER PERPHRL NERV Right 8/3/2016    HIP FLUOROSCOPIC GUIDED CORTICOSTEROID INJECTION  performed by Franklin San MD at [facility] HC INJECT OTHER PERPHRL NERV Right 4/21/2017    FLURO GUIDED HIP INJECTION performed by Letitia Meraz MD at [address] [place] [address], VAGINAL      TONSILLECTOMY  1979   [address]   ,   Social History     Tobacco Use    Smoking status: Current Every Day Smoker     Packs/day: 0.50 Years: 20.00     Pack years: 10.00     Types: Cigarettes    Smokeless tobacco: Never Used   Substance Use Topics    Alcohol use: Yes     Comment: occ    Drug use: No   ,   Family History   Problem Relation Age of Onset    Cancer Brother         synovial cell sarcoma    Cancer Maternal Grandfather         lung    Other Daughter         gallbladder disease    Hypertension Mother    ,   Immunization History   Administered Date(s) Administered    Tetanus 06/01/2009   ,   Health Maintenance   Topic Date Due    Hepatitis C screen  Never done    COVID-19 Vaccine (1) Never done    DTaP/Tdap/Td vaccine (1 - Tdap) Never done    Colorectal Cancer Screen  Never done    Breast cancer screen  02/19/2020    Flu vaccine (1) Never done    Pneumococcal 0-64 years Vaccine (1 of 2 - PPSV23) 07/15/2024 (Originally 8/1/1979)    Potassium monitoring  08/03/2022    Creatinine monitoring  08/03/2022    Depression Screen  10/27/2022    Lipid screen  08/04/2026    Hepatitis A vaccine  Aged Out    Hepatitis B vaccine  Aged Out    Hib vaccine  Aged Out    Meningococcal (ACWY) vaccine  Aged Out    HIV screen  Discontinued       PHYSICAL EXAMINATION:  [ INSTRUCTIONS:  \"[x]\" Indicates a positive item  \"[]\" Indicates a negative item  -- DELETE ALL ITEMS NOT EXAMINED]  [x] Alert  [x] Oriented to person/place/time    [x] No apparent distress  [] Toxic appearing    [] Face flushed appearing [x] Sclera clear  [] Lips are cyanotic      [x] Breathing appears normal  [] Appears tachypneic      [] Rash on visible skin    [x] Cranial Nerves II-XII grossly intact    [x] Motor grossly intact in visible upper extremities    [x] Motor grossly intact in visible lower extremities    [x] Normal Mood  [] Anxious appearing    [] Depressed appearing  [] Confused appearing      [] Poor short term memory  [] Poor long term memory    [] OTHER:      Due to this being a TeleHealth encounter, evaluation of the following organ systems is limited: Vitals/Constitutional/EENT/Resp/CV/GI//MS/Neuro/Skin/Heme-Lymph-Imm. LMP 06/08/2014      Patient-Reported Vitals 2/22/2022   Patient-Reported Weight 128   Patient-Reported Height 5' 9\"   Patient-Reported Systolic 374   Patient-Reported Diastolic 73   Patient-Reported Pulse 54   Patient-Reported SpO2 99          ASSESSMENT/PLAN:  1. Essential hypertension    - CBC with Auto Differential; Future  - Hemoglobin A1C; Future  - Lipid Panel; Future  - TSH; Future  - Comprehensive Metabolic Panel; Future  - Vitamin D 25 Hydroxy; Future    2. Anxiety    - CBC with Auto Differential; Future  - Hemoglobin A1C; Future  - Lipid Panel; Future  - TSH; Future  - Comprehensive Metabolic Panel; Future  - Vitamin D 25 Hydroxy; Future    3. PVC (premature ventricular contraction)    - atenolol (TENORMIN) 25 MG tablet; Take 1 tablet by mouth in the morning and at bedtime  Dispense: 180 tablet; Refill: 2    4. Colon cancer screening    - Fecal DNA Colorectal cancer screening (Cologuard)      Return in about 6 months (around 8/22/2022) for in office, follow-up with labs. An  electronic signature was used to authenticate this note. --WHITNEY Fox on 2/22/2022 at 3:00 PM        Pursuant to the emergency declaration under the [facility], [facility] waiver authority and the [employer] and [employer] Act, this Virtual  Visit was conducted, with patient's consent, to reduce the patient's risk of exposure to COVID-19 and provide continuity of care for an established patient. Services were provided through a video synchronous discussion virtually to substitute for in-person clinic visit.

## 2022-03-13 LAB — NONINV COLON CA DNA+OCC BLD SCRN STL QL: NORMAL

## 2022-07-01 DIAGNOSIS — E55.9 VITAMIN D DEFICIENCY: ICD-10-CM

## 2022-07-03 RX ORDER — ERGOCALCIFEROL 1.25 MG/1
CAPSULE ORAL
Qty: 12 CAPSULE | Refills: 1 | Status: SHIPPED | OUTPATIENT
Start: 2022-07-03

## 2022-08-22 ENCOUNTER — OFFICE VISIT (OUTPATIENT)
Dept: PRIMARY CARE CLINIC | Age: 47
End: 2022-08-22
Payer: COMMERCIAL

## 2022-08-22 VITALS
WEIGHT: 122.8 LBS | BODY MASS INDEX: 18.19 KG/M2 | DIASTOLIC BLOOD PRESSURE: 72 MMHG | HEART RATE: 69 BPM | HEIGHT: 69 IN | TEMPERATURE: 97.9 F | SYSTOLIC BLOOD PRESSURE: 98 MMHG | OXYGEN SATURATION: 98 %

## 2022-08-22 DIAGNOSIS — K21.9 GASTROESOPHAGEAL REFLUX DISEASE WITHOUT ESOPHAGITIS: ICD-10-CM

## 2022-08-22 DIAGNOSIS — Z12.31 BREAST CANCER SCREENING BY MAMMOGRAM: ICD-10-CM

## 2022-08-22 DIAGNOSIS — G25.81 RESTLESS LEG SYNDROME: ICD-10-CM

## 2022-08-22 DIAGNOSIS — Z72.0 TOBACCO ABUSE: ICD-10-CM

## 2022-08-22 DIAGNOSIS — Z00.00 ENCOUNTER FOR ANNUAL PHYSICAL EXAM: Primary | ICD-10-CM

## 2022-08-22 DIAGNOSIS — R25.2 LEG CRAMPS: ICD-10-CM

## 2022-08-22 PROCEDURE — 99406 BEHAV CHNG SMOKING 3-10 MIN: CPT | Performed by: NURSE PRACTITIONER

## 2022-08-22 PROCEDURE — 99213 OFFICE O/P EST LOW 20 MIN: CPT | Performed by: NURSE PRACTITIONER

## 2022-08-22 PROCEDURE — 99396 PREV VISIT EST AGE 40-64: CPT | Performed by: NURSE PRACTITIONER

## 2022-08-22 SDOH — ECONOMIC STABILITY: FOOD INSECURITY: WITHIN THE PAST 12 MONTHS, THE FOOD YOU BOUGHT JUST DIDN'T LAST AND YOU DIDN'T HAVE MONEY TO GET MORE.: NEVER TRUE

## 2022-08-22 SDOH — ECONOMIC STABILITY: FOOD INSECURITY: WITHIN THE PAST 12 MONTHS, YOU WORRIED THAT YOUR FOOD WOULD RUN OUT BEFORE YOU GOT MONEY TO BUY MORE.: NEVER TRUE

## 2022-08-22 ASSESSMENT — ENCOUNTER SYMPTOMS
VOMITING: 0
PHOTOPHOBIA: 0
VOICE CHANGE: 0
BACK PAIN: 0
RHINORRHEA: 0
COUGH: 0
NAUSEA: 0
COLOR CHANGE: 0
SHORTNESS OF BREATH: 0

## 2022-08-22 ASSESSMENT — PATIENT HEALTH QUESTIONNAIRE - PHQ9
SUM OF ALL RESPONSES TO PHQ QUESTIONS 1-9: 0
2. FEELING DOWN, DEPRESSED OR HOPELESS: 0
SUM OF ALL RESPONSES TO PHQ9 QUESTIONS 1 & 2: 0
SUM OF ALL RESPONSES TO PHQ QUESTIONS 1-9: 0
1. LITTLE INTEREST OR PLEASURE IN DOING THINGS: 0
SUM OF ALL RESPONSES TO PHQ QUESTIONS 1-9: 0
SUM OF ALL RESPONSES TO PHQ QUESTIONS 1-9: 0

## 2022-08-22 ASSESSMENT — SOCIAL DETERMINANTS OF HEALTH (SDOH): HOW HARD IS IT FOR YOU TO PAY FOR THE VERY BASICS LIKE FOOD, HOUSING, MEDICAL CARE, AND HEATING?: NOT HARD AT ALL

## 2022-08-22 NOTE — PROGRESS NOTES
[address] [facility]  [address] [address] [place] [address]  Dept: [phone]  Dept Fax: [phone]  Loc: [phone]    Louise Méndez is a 50 y.o. female who presents today for her medical conditions/complaints as noted below. Louise Méndez is c/o of 6 Month Follow-Up (/) and Leg Pain (Leg and foot cramps )        HPI:     HPI   Chief Complaint   Patient presents with    6 Month Follow-Up           Leg Pain     Leg and foot cramps      Patient presents today for annual physical. She is due for fasting labs and mammogram.      She complains of worsening RLS. She is currently taking 0.5 mg requip 1-3 tablets nightly. She is also taking 25 hctz daily. She states she feels like she has adequate water intake. She  reports that she has been smoking cigarettes. She has a 10.00 pack-year smoking history.  She has never used smokeless tobacco.      Past Medical History:   Diagnosis Date    PVC (premature ventricular contraction)     Restless leg syndrome 7/16/2018    Uterine mass 6/17/14    9x9 cm on CT scan      Past Surgical History:   Procedure Laterality Date    BREAST RECONSTRUCTION  04/2019    BREAST SURGERY Bilateral 3498    silicone Memory Gel    CHOLECYSTECTOMY  6/24/14    HC INJECT OTHER PERPHRL NERV Right 8/3/2016    HIP FLUOROSCOPIC GUIDED CORTICOSTEROID INJECTION  performed by J Luis Valenzuela MD at [address] NERV Right 4/21/2017    FLURO GUIDED HIP INJECTION performed by Iveth Werner MD at [address] [facility] 8/22/2022 12/28/2021 11/15/2021 11/1/2021 10/27/2021 48/24/7784   SYSTOLIC 98 120 - [phone]   DIASTOLIC 72 74 - 72 68 80   Site - - - Left Upper Arm - -   Position - - - Sitting - -   Cuff Size - - - Medium Adult - -   Pulse 69 63 - 100 54 50   Temp 97.9 97.6 98.3 97.8 96.6 -   Resp - - - - - -   SpO2 98 98 - - 98 98   Weight 122 lb 12.8 oz 128 lb 12.8 oz 136 lb 132 lb 131 lb 132 lb   Height 5' 9\" 5' 9\" 5' 9\" 5' 9\" 5' 9\" 5' 9\"   Body mass index 18.13 kg/m2 19.02 kg/m2 20.08 kg/m2 19.49 kg/m2 19.34 kg/m2 19.49 kg/m2   Some recent data might be hidden       Family History   Problem Relation Age of Onset    Cancer Brother         synovial cell sarcoma    Cancer Maternal Grandfather         lung    Other Daughter         gallbladder disease    Hypertension Mother        Social History     Tobacco Use    Smoking status: Every Day     Packs/day: 0.50     Years: 20.00     Pack years: 10.00     Types: Cigarettes    Smokeless tobacco: Never   Substance Use Topics    Alcohol use: Yes     Comment: occ      Current Outpatient Medications on File Prior to Visit   Medication Sig Dispense Refill    vitamin D (ERGOCALCIFEROL) 1.25 MG (77549 UT) CAPS capsule TAKE 1 CAPSULE BY MOUTH 1 TIME A WEEK 12 capsule 1    atenolol (TENORMIN) 25 MG tablet Take 1 tablet by mouth in the morning and at bedtime 180 tablet 2    rOPINIRole (REQUIP) 0.5 MG tablet TAKE 1-3 TABLETS BY MOUTH EVERY NIGHT 90 tablet 5    sertraline (ZOLOFT) 50 MG tablet TAKE 1 TABLET BY MOUTH DAILY 30 tablet 1    valACYclovir (VALTREX) 1 g tablet TAKE 1 TABLET BY MOUTH EVERY DAY AS NEEDED FOR FEVER BLISTER 30 tablet 0    omeprazole (PRILOSEC) 20 MG delayed release capsule Take 1 capsule by mouth 2 times daily (before meals) 60 capsule 5    Multiple Vitamin (MULTI-VITAMIN DAILY PO) Take by mouth      doxepin (SINEQUAN) 10 MG capsule Take 1 capsule by mouth nightly 30 capsule 0    hydrochlorothiazide (HYDRODIURIL) 25 MG tablet Take 1 tablet by mouth daily (Patient taking differently: Take 25 mg by mouth as needed) 30 tablet 5     No current facility-administered medications on file prior to visit.      Allergies   Allergen Reactions    Percocet [Oxycodone-Acetaminophen] Itching    Codeine     Toradol [Ketorolac Tromethamine] Itching       Health Maintenance   Topic Date Due    COVID-19 Vaccine (1) Never done    Hepatitis C screen  Never done    DTaP/Tdap/Td vaccine (1 - Tdap) Never done    Breast cancer screen  02/19/2020    Pneumococcal 0-64 years Vaccine (1 - PCV) 07/15/2024 (Originally 8/1/1981)    Flu vaccine (1) 09/01/2022    Depression Screen  10/27/2022    Colorectal Cancer Screen  03/03/2025    Lipids  08/04/2026    Hepatitis A vaccine  Aged Out    Hepatitis B vaccine  Aged Out    Hib vaccine  Aged Out    Meningococcal (ACWY) vaccine  Aged Out    HIV screen  Discontinued       Subjective:      Review of Systems   Constitutional:  Negative for chills and fever. HENT:  Negative for ear pain, hearing loss, rhinorrhea and voice change. Eyes:  Negative for photophobia and visual disturbance. Respiratory:  Negative for cough and shortness of breath. Cardiovascular:  Negative for chest pain and palpitations. Gastrointestinal:  Negative for nausea and vomiting. Endocrine: Negative. Negative for cold intolerance and heat intolerance. Genitourinary:  Negative for difficulty urinating and flank pain. Musculoskeletal:  Negative for back pain and neck pain. Skin:  Negative for color change and rash. Allergic/Immunologic: Negative for environmental allergies and food allergies. Neurological:  Negative for dizziness, speech difficulty and headaches. Hematological:  Does not bruise/bleed easily. Psychiatric/Behavioral:  Negative for sleep disturbance and suicidal ideas. Objective:     Physical Exam  Constitutional:       Appearance: She is well-developed. HENT:      Head: Atraumatic. Right Ear: External ear normal.      Left Ear: External ear normal.      Nose: Nose normal.   Eyes:      Conjunctiva/sclera: Conjunctivae normal.      Pupils: Pupils are equal, round, and reactive to light. Cardiovascular:      Rate and Rhythm: Normal rate and regular rhythm.       Heart sounds: Normal heart sounds, S1 normal and S2 normal.   Pulmonary:      Effort: Pulmonary effort is normal. Breath sounds: Normal breath sounds. Abdominal:      General: Bowel sounds are normal.      Palpations: Abdomen is soft. Musculoskeletal:         General: Normal range of motion. Cervical back: Normal range of motion and neck supple. Skin:     General: Skin is warm and dry. Neurological:      Mental Status: She is alert and oriented to person, place, and time. Psychiatric:         Behavior: Behavior normal.     BP 98/72   Pulse 69   Temp 97.9 °F (36.6 °C) (Temporal)   Ht 5' 9\" (1.753 m)   Wt 122 lb 12.8 oz (55.7 kg)   LMP 06/08/2014   SpO2 98%   BMI 18.13 kg/m²     Assessment:       Diagnosis Orders   1. Encounter for annual physical exam        2. Gastroesophageal reflux disease without esophagitis        3. Restless leg syndrome        4. Breast cancer screening by mammogram  J[facility]ATH[facility] DIGITAL SCREEN W OR WO CAD BILATERAL      5. Tobacco abuse        6. Leg cramps              Plan:   More than 50% of the time was spent counseling and coordinating care for a total time of 30 min face to face. Decrease HCTZ to 12.5 mg to help with leg cramps. Fasting labs in [address]. Mammogram- will call with appointment. Stop smoking. Follow-up in 6 months or sooner if needed. Approximately 5 minutes of education was provided about quit smoking and the harms of tobacco.  Patient does show understanding. Patient does not have  the desire to quit smoking in the future. PDMP Monitoring:    Last PDMP [place] as Reviewed:  Review User Review Instant Review Result            Urine Drug Screenings (1 yr)    No resulted procedures found. Medication Contract and Consent for Opioid Use Documents Filed       Patient Documents       Type of Document Status Date Received Received By Description    Medication Contract Received 6/21/2017 11:09 AM Binh Go                      Patient given educational materials -see patient instructions.   Discussed use, benefit, and side effects of prescribed medications. All patient questions answered. Pt voiced understanding. Reviewed health maintenance. Instructed to continue currentmedications, diet and exercise. Patient agreed with treatment plan. Follow up as directed. MEDICATIONS:  No orders of the defined types were placed in this encounter. ORDERS:  Orders Placed This Encounter   Procedures    JOHNATHON DIGITAL SCREEN W OR WO CAD BILATERAL       Follow-up:  Return in about 6 months (around 2/22/2023) for in office. PATIENT INSTRUCTIONS:  Patient Instructions   Decrease HCTZ to 12.5 mg to help with leg cramps. Fasting labs in [address]. Mammogram- will call with appointment. Stop smoking. Follow-up in 6 months or sooner if needed. Electronically signed by WHITNEY Pabon on 8/22/2022 at 11:39 AM    EMR Dragon/transcription disclaimer:  Much of thisencounter note is electronic transcription/translation of spoken language to printed texts. The electronic translation of spoken language may be erroneous, or at times, nonsensical words or phrases may be inadvertentlytranscribed.   Although I have reviewed the note for such errors, some may still exist.

## 2022-08-22 NOTE — PATIENT INSTRUCTIONS
Decrease HCTZ to 12.5 mg to help with leg cramps. Fasting labs in [address]. Mammogram- will call with appointment. Stop smoking. Follow-up in 6 months or sooner if needed.

## 2022-09-13 RX ORDER — VALACYCLOVIR HYDROCHLORIDE 1 G/1
TABLET, FILM COATED ORAL
Qty: 30 TABLET | Refills: 0 | OUTPATIENT
Start: 2022-09-13

## 2022-09-16 RX ORDER — VALACYCLOVIR HYDROCHLORIDE 1 G/1
TABLET, FILM COATED ORAL
Qty: 30 TABLET | Refills: 0 | Status: SHIPPED | OUTPATIENT
Start: 2022-09-16

## 2022-09-16 NOTE — TELEPHONE ENCOUNTER
Nik Braun called requesting a refill of the below medication which has been pended for you:     Requested Prescriptions     Pending Prescriptions Disp Refills    valACYclovir (VALTREX) 1 g tablet 30 tablet 0     Sig: TAKE 1 TABLET BY MOUTH EVERY DAY AS NEEDED FOR FEVER BLISTER       Last Appointment Date: Visit date not found  Next Appointment Date: Visit date not found    Allergies   Allergen Reactions    Percocet [Oxycodone-Acetaminophen] Itching    Codeine     Toradol [Ketorolac Tromethamine] Itching

## 2022-10-20 DIAGNOSIS — F41.9 ANXIETY: ICD-10-CM

## 2022-10-20 NOTE — TELEPHONE ENCOUNTER
Rayne Yuri called to request a refill on her medication.       Last office visit : 8/22/2022   Next office visit : 2/22/2023     Requested Prescriptions     Pending Prescriptions Disp Refills    sertraline (ZOLOFT) 50 MG tablet [Pharmacy Med Name: SERTRALINE 50MG TABLETS] 30 tablet 3     Sig: TAKE 1 TABLET BY MOUTH DAILY            CRISTIAN Salazar

## 2022-10-24 ENCOUNTER — TELEPHONE (OUTPATIENT)
Dept: CARDIOLOGY CLINIC | Age: 47
End: 2022-10-24

## 2022-10-25 ENCOUNTER — OFFICE VISIT (OUTPATIENT)
Dept: CARDIOLOGY CLINIC | Age: 47
End: 2022-10-25
Payer: COMMERCIAL

## 2022-10-25 VITALS
SYSTOLIC BLOOD PRESSURE: 90 MMHG | DIASTOLIC BLOOD PRESSURE: 62 MMHG | HEIGHT: 69 IN | WEIGHT: 127 LBS | HEART RATE: 54 BPM | BODY MASS INDEX: 18.81 KG/M2

## 2022-10-25 DIAGNOSIS — I49.3 PVC (PREMATURE VENTRICULAR CONTRACTION): Primary | ICD-10-CM

## 2022-10-25 PROCEDURE — 93000 ELECTROCARDIOGRAM COMPLETE: CPT | Performed by: INTERNAL MEDICINE

## 2022-10-25 PROCEDURE — 3078F DIAST BP <80 MM HG: CPT | Performed by: INTERNAL MEDICINE

## 2022-10-25 PROCEDURE — 3074F SYST BP LT 130 MM HG: CPT | Performed by: INTERNAL MEDICINE

## 2022-10-25 PROCEDURE — 99214 OFFICE O/P EST MOD 30 MIN: CPT | Performed by: INTERNAL MEDICINE

## 2022-10-25 NOTE — PROGRESS NOTES
HISTORY  50year-old with a history of symptomatic PVCs, tobacco abuse, and a depressed HDL returns for routine follow-up visit. Evaluation prompted by ectopy revealed a normal echocardiogram in 2017. Placed on atenolol 25 mg a day with apparent significant suppression of her symptoms. Last recorded lipids in our records from August 2021 with perceptible LDL of 68, depressed HDL of 46, and triglycerides of 67. On return today she relates an occasional palpitation without any symptoms that would suggest some sustained dysrhythmia. These are infrequent and apparently well suppressed by her beta-blocker therapy. She continues to smoke but relatively infrequently. Under some stress as her  has been troubled with marked dyspnea on exertion with a cardiac evaluation at this point negative and a pulmonary evaluation presently underway [still smoking]. She has not been vaccinated for COVID-19. PHYSICAL EXAM  On exam she carries 127 pounds in a 5 foot 9 inch frame. Pressure is 90/62 with pulse of 54. EOMs full, sclerae and conjunctiva normal. PERRLA. Mask in place. Trachea midline with no neck masses. Assessment of internal jugular veins reveals no elevation of central venous pressure at 45 degrees. Carotid pulses normal without delay or bruit. Thyroid normal to palpation. Chest exam reveals normal respiratory effort, no abnormal breath sounds and normal expiratory phase. No skin lesions seen. PMI normal. S1, S2 normal without murmur or eliane or click. Normal bowel sounds without palpable mass or bruit. No clubbing or acrocyanosis. No significant lower extremity edema or signs of venous insufficiency. General motor strength appears to be within normal limits. Normal range of motion with normal gait. Alert, oriented x 3, memory and cognition normal as reflected by history and conversation. EKG reveals sinus bradycardia at 54 with low voltage in the limb leads.     ASSESSMENT/PLAN:   Ventricular ectopy -appears to be simple and well suppressed with beta-blocker therapy.   Continue atenolol  Tobacco abuse -the hazards of such discussed at length  Dyslipidemia -advised that her depressed HDL was very likely a consequence of tobacco use  Pandemic response -unvaccinated

## 2022-11-02 ENCOUNTER — HOSPITAL ENCOUNTER (OUTPATIENT)
Dept: WOMENS IMAGING | Age: 47
Discharge: HOME | End: 2022-11-02
Payer: COMMERCIAL

## 2022-11-02 DIAGNOSIS — N63.0 MASS OF BREAST, UNSPECIFIED LATERALITY: ICD-10-CM

## 2022-11-02 PROCEDURE — 77066 DX MAMMO INCL CAD BI: CPT

## 2022-11-02 PROCEDURE — 76642 ULTRASOUND BREAST LIMITED: CPT

## 2022-11-11 ENCOUNTER — TELEPHONE (OUTPATIENT)
Dept: PRIMARY CARE CLINIC | Age: 47
End: 2022-11-11

## 2022-11-11 NOTE — TELEPHONE ENCOUNTER
Patient called in about results she received from mammogram. Wanting to know if she can have a referral to Dr Yessenia Ji for biopsy.  Please advise

## 2022-11-13 DIAGNOSIS — N63.21 MASS OF UPPER OUTER QUADRANT OF LEFT BREAST: Primary | ICD-10-CM

## 2022-11-13 NOTE — TELEPHONE ENCOUNTER
Bryn for referral to Dr Liliana Johnson; will need biopsy for suspicious mass of breast.     Impression:   1. Left breast, BI-RADS 4B, intermediate concern for malignancy   involving the palpable 1.7 cm complex cystic mass in the upper outer   quadrant. Recommendation is for ultrasound-guided percutaneous core   needle biopsy for diagnosis in order to exclude malignancy. 2.  1.7 cm axillary lymph node with borderline thickened cortical   elements. Recommendation is for percutaneous needle biopsy versus   fine-needle aspiration (depending on performing physician preference)   in order to exclude malignancy. As a courtesy to the patient I discussed all results and including   recommendations for 2 biopsies with her. She already has a surgical consultation scheduled for next week as   well. Overall assessment, BI-RADS 4, suspicious, recommendation is for   biopsies.

## 2022-11-16 ENCOUNTER — OFFICE VISIT (OUTPATIENT)
Dept: SURGERY | Age: 47
End: 2022-11-16
Payer: COMMERCIAL

## 2022-11-16 VITALS
HEART RATE: 64 BPM | TEMPERATURE: 98.2 F | WEIGHT: 134 LBS | OXYGEN SATURATION: 100 % | BODY MASS INDEX: 19.85 KG/M2 | HEIGHT: 69 IN

## 2022-11-16 DIAGNOSIS — R22.32 AXILLARY MASS, LEFT: Primary | ICD-10-CM

## 2022-11-16 PROCEDURE — 99202 OFFICE O/P NEW SF 15 MIN: CPT | Performed by: PHYSICIAN ASSISTANT

## 2022-11-16 RX ORDER — DIAZEPAM 5 MG/1
TABLET ORAL
Qty: 10 TABLET | Refills: 0 | Status: SHIPPED | OUTPATIENT
Start: 2022-11-16 | End: 2022-11-26

## 2022-11-16 NOTE — PROGRESS NOTES
November 16, 2022     Mari Velazquez presents with a left breast mass. No known family history of breast cancer. She has history of fibrocystic breast.  Mammography showed a 1.7 cm mass within the left breast and a potential 1.7 cm axillary node as well.         Objective  Patient Active Problem List    Diagnosis Date Noted    Triple negative breast cancer ([facility] [address] 12/09/2022    Malignant neoplasm of overlapping sites of left breast ([facility] [address] 11/22/2022    Overactive bladder 07/16/2018    Restless leg syndrome 07/16/2018    Insomnia 07/16/2018    Breast pain 03/12/2018    Diffuse cystic mastopathy 03/12/2018    History of breast augmentation 03/12/2018    Gastroesophageal reflux disease without esophagitis 08/28/2017    PVC (premature ventricular contraction) 07/25/2017    Essential hypertension 06/21/2017    Anxiety 06/21/2017    Tobacco abuse 06/21/2017    Primary osteoarthritis of right hip 04/21/2017    S/P laparoscopic cholecystectomy 07/10/2014    Uterine mass 06/18/2014       Current Outpatient Medications   Medication Sig Dispense Refill    sertraline (ZOLOFT) 50 MG tablet TAKE 1 TABLET BY MOUTH DAILY (Patient taking differently: Take 50 mg by mouth nightly) 30 tablet 3    valACYclovir (VALTREX) 1 g tablet TAKE 1 TABLET BY MOUTH EVERY DAY AS NEEDED FOR FEVER BLISTER (Patient taking differently: Take 1,000 mg by mouth as needed TAKE 1 TABLET BY MOUTH EVERY DAY AS NEEDED FOR FEVER BLISTER) 30 tablet 0    atenolol (TENORMIN) 25 MG tablet Take 1 tablet by mouth in the morning and at bedtime (Patient taking differently: Take 25 mg by mouth nightly) 180 tablet 2    omeprazole (PRILOSEC) 20 MG delayed release capsule Take 1 capsule by mouth 2 times daily (before meals) (Patient taking differently: Take 20 mg by mouth nightly) 60 capsule 5    Multiple Vitamin (MULTI-VITAMIN DAILY PO) Take 1 tablet by mouth daily      doxepin (SINEQUAN) 10 MG capsule Take 1 capsule by mouth nightly (Patient taking differently: Take 10 mg by mouth nightly as needed) 30 capsule 0    hydrochlorothiazide (HYDRODIURIL) 25 MG tablet Take 1 tablet by mouth daily (Patient taking differently: Take 25 mg by mouth as needed) 30 tablet 5    montelukast (SINGULAIR) 10 MG tablet Take 10 mg by mouth nightly      ondansetron (ZOFRAN) 4 MG tablet Take 1 tablet by mouth every 6 hours as needed for Nausea or Vomiting 30 tablet 5    promethazine (PHENERGAN) 25 MG tablet Take 0.5 tablets by mouth every 6 hours as needed for Nausea 30 tablet 5    OLANZapine (ZYPREXA) 10 MG tablet Take 1 tablet at night x4 nights ONLY with each cycle Adriamycin/Cytoxan every 2 weeks x4 cycles 16 tablet 0    lidocaine-prilocaine (EMLA) 2.5-2.5 % cream Apply topically as needed. 30 g 5    vitamin D (ERGOCALCIFEROL) 1.25 MG (45683 UT) CAPS capsule TAKE 1 CAPSULE BY MOUTH 1 TIME A WEEK 12 capsule 1    rOPINIRole (REQUIP) 0.5 MG tablet TAKE 1 TO 3 TABLETS BY MOUTH EVERY NIGHT 90 tablet 5     No current facility-administered medications for this visit.        Allergies Codeine, Percocet [oxycodone-acetaminophen], and Toradol [ketorolac tromethamine]    Past Medical History:   Diagnosis Date    Breast cancer ([facility] [address] 11/2022    Triple negative breast cancer    Malignant neoplasm of overlapping sites of left breast ([facility] [address] 11/22/2022    PVC (premature ventricular contraction)     Restless leg syndrome 07/16/2018    Uterine mass 06/17/2014    9x9 cm on CT scan       Past Surgical History:   Procedure Laterality Date    BREAST RECONSTRUCTION  04/2019    BREAST SURGERY Bilateral 1931    silicone Memory Gel    CHOLECYSTECTOMY  6/24/14    HC INJECT OTHER PERPHRL NERV Right 8/3/2016    HIP FLUOROSCOPIC GUIDED CORTICOSTEROID INJECTION  performed by Justin [facility] NERV Right 4/21/2017    FLURO GUIDED HIP INJECTION performed by Diamond Hernandez MD at [address] [facility], VAGINAL      PORT SURGERY Left 12/9/2022    PORT INSERTION WITH FLUOROSCOPY performed by Kulwinder Cifuentes MD at [facility] BREAST NEEDLE BIOPSY LEFT Left 11/21/2022     BREAST NEEDLE BIOPSY LEFT LPS GENERAL SURGERY       Family History   Problem Relation Age of Onset    Cancer Mother         Melanoma    Hypertension Mother     Cancer Brother         synovial cell sarcoma    Cancer Maternal Grandfather         lung    Other Daughter         gallbladder disease       Social History     Tobacco Use    Smoking status: Every Day     Packs/day: 0.50     Years: 34.00     Pack years: 17.00     Types: Cigarettes     Start date: 24    Smokeless tobacco: Never   Substance Use Topics    Alcohol use: Not Currently     Comment: occ        Review of systems  Reviewed and positive for the above all other systems noted to be negative    Exam  Vitals:    11/16/22 1048   Pulse: 64   Temp: 98.2 °F (36.8 °C)   SpO2: 100%        Patient has a palpable area within the left breast which corresponds to the mammographic and ultrasound findings. I am not able to appreciate the area in the left axilla. Right breast demonstrates no masses. Assessment  Suspicious left breast mass    Plan  Mammotome biopsy to be performed by Dr. Dino Daley. (Please note that portions of this note were completed with a voice recognition program. Efforts were made to edit the dictations but occasionally words are mis-transcribed. )eft breast mass

## 2022-11-21 ENCOUNTER — HOSPITAL ENCOUNTER (OUTPATIENT)
Dept: WOMENS IMAGING | Age: 47
Discharge: HOME | End: 2022-11-21
Payer: COMMERCIAL

## 2022-11-21 ENCOUNTER — PROCEDURE VISIT (OUTPATIENT)
Dept: SURGERY | Age: 47
End: 2022-11-21

## 2022-11-21 DIAGNOSIS — R92.8 ABNORMAL MAMMOGRAM: ICD-10-CM

## 2022-11-21 DIAGNOSIS — R22.32 AXILLARY MASS, LEFT: Primary | ICD-10-CM

## 2022-11-21 DIAGNOSIS — N63.20 MASS OF LEFT BREAST, UNSPECIFIED QUADRANT: ICD-10-CM

## 2022-11-21 PROCEDURE — 77065 DX MAMMO INCL CAD UNI: CPT

## 2022-11-22 ENCOUNTER — TELEPHONE (OUTPATIENT)
Dept: SURGERY | Age: 47
End: 2022-11-22

## 2022-11-22 DIAGNOSIS — C50.812 MALIGNANT NEOPLASM OF OVERLAPPING SITES OF LEFT BREAST (HCC): Primary | ICD-10-CM

## 2022-11-22 NOTE — TELEPHONE ENCOUNTER
Gave results. Please cancel follow up appt with me and schedule MRI and breast talk with Dr. David Murray. Orders placed.

## 2022-11-23 ENCOUNTER — TELEPHONE (OUTPATIENT)
Dept: OTHER | Age: 47
End: 2022-11-23

## 2022-11-23 ENCOUNTER — TELEPHONE (OUTPATIENT)
Dept: SURGERY | Age: 47
End: 2022-11-23

## 2022-11-23 DIAGNOSIS — C50.812 MALIGNANT NEOPLASM OF OVERLAPPING SITES OF LEFT BREAST (HCC): Primary | ICD-10-CM

## 2022-11-23 DIAGNOSIS — Z80.1 FAMILY HISTORY OF LUNG CANCER: ICD-10-CM

## 2022-11-23 NOTE — TELEPHONE ENCOUNTER
Patient called to speak with a nurse regarding getting an order for a MRI sooner. Pt was told she would have to wait until later January. Please return call to advise.

## 2022-11-23 NOTE — TELEPHONE ENCOUNTER
Called pt to let her know I would be scheduling all appointments and assured her she would get in sooner than told by scheduling

## 2022-11-23 NOTE — TELEPHONE ENCOUNTER
Patient was informed by Mata Richards     MRI scheduled on 12/2/2022 at 7 Am to arrive at 6:30 Am. All instructions for test were given.   Breast US Scheduled before breast talk here at General Surgery office on 12/7/2022 @ 11 AM.

## 2022-11-23 NOTE — TELEPHONE ENCOUNTER
Reached out to patient via telephone call to offer Nurse Navigator services. We spoke at length about navigator services and I offered to mail a Breast Cancer Treatment Handbook to her and she agreed that would be good. I included a business card with contact information, [employer] flyer, [employer] information and a welcome letter. I encouraged her to reach out at anytime with questions or concerns. Appointment times, dates and location reviewed and MRI, US and breast talk dates and times given. [facility] genetic testing offered and accepted by patient. She opts to have saliva kit mailed to her. Orders placed. Will f/u after breast talk.

## 2022-12-02 ENCOUNTER — HOSPITAL ENCOUNTER (OUTPATIENT)
Dept: MRI IMAGING | Age: 47
Discharge: HOME | End: 2022-12-02
Payer: COMMERCIAL

## 2022-12-02 DIAGNOSIS — I10 ESSENTIAL HYPERTENSION: ICD-10-CM

## 2022-12-02 DIAGNOSIS — Z80.1 FAMILY HISTORY OF LUNG CANCER: ICD-10-CM

## 2022-12-02 DIAGNOSIS — C50.812 MALIGNANT NEOPLASM OF OVERLAPPING SITES OF LEFT BREAST (HCC): Primary | ICD-10-CM

## 2022-12-02 DIAGNOSIS — C50.812 MALIGNANT NEOPLASM OF OVERLAPPING SITES OF LEFT BREAST (HCC): ICD-10-CM

## 2022-12-02 DIAGNOSIS — F41.9 ANXIETY: ICD-10-CM

## 2022-12-02 LAB
ALBUMIN SERPL-MCNC: 4 G/DL (ref 3.5–5.2)
ALP BLD-CCNC: 71 U/L (ref 35–104)
ALT SERPL-CCNC: 7 U/L (ref 5–33)
ANION GAP SERPL CALCULATED.3IONS-SCNC: 7 MMOL/L (ref 7–19)
AST SERPL-CCNC: 16 U/L (ref 5–32)
BASOPHILS ABSOLUTE: 0.1 K/UL (ref 0–0.2)
BASOPHILS RELATIVE PERCENT: 1.2 % (ref 0–1)
BILIRUB SERPL-MCNC: <0.2 MG/DL (ref 0.2–1.2)
BUN BLDV-MCNC: 11 MG/DL (ref 6–20)
CALCIUM SERPL-MCNC: 9.3 MG/DL (ref 8.6–10)
CHLORIDE BLD-SCNC: 105 MMOL/L (ref 98–111)
CHOLESTEROL, TOTAL: 169 MG/DL (ref 160–199)
CO2: 27 MMOL/L (ref 22–29)
CREAT SERPL-MCNC: 0.9 MG/DL (ref 0.5–0.9)
EOSINOPHILS ABSOLUTE: 0.1 K/UL (ref 0–0.6)
EOSINOPHILS RELATIVE PERCENT: 2.4 % (ref 0–5)
GFR SERPL CREATININE-BSD FRML MDRD: >60 ML/MIN/{1.73_M2}
GLUCOSE BLD-MCNC: 87 MG/DL (ref 74–109)
HBA1C MFR BLD: 5.2 % (ref 4–6)
HCT VFR BLD CALC: 39.9 % (ref 37–47)
HDLC SERPL-MCNC: 58 MG/DL (ref 65–121)
HEMOGLOBIN: 13 G/DL (ref 12–16)
IMMATURE GRANULOCYTES #: 0 K/UL
LDL CHOLESTEROL CALCULATED: 100 MG/DL
LYMPHOCYTES ABSOLUTE: 2.1 K/UL (ref 1.1–4.5)
LYMPHOCYTES RELATIVE PERCENT: 41.9 % (ref 20–40)
MCH RBC QN AUTO: 33.7 PG (ref 27–31)
MCHC RBC AUTO-ENTMCNC: 32.6 G/DL (ref 33–37)
MCV RBC AUTO: 103.4 FL (ref 81–99)
MONOCYTES ABSOLUTE: 0.4 K/UL (ref 0–0.9)
MONOCYTES RELATIVE PERCENT: 7.7 % (ref 0–10)
NEUTROPHILS ABSOLUTE: 2.4 K/UL (ref 1.5–7.5)
NEUTROPHILS RELATIVE PERCENT: 46.6 % (ref 50–65)
PDW BLD-RTO: 11.7 % (ref 11.5–14.5)
PLATELET # BLD: 183 K/UL (ref 130–400)
PMV BLD AUTO: 9.9 FL (ref 9.4–12.3)
POTASSIUM SERPL-SCNC: 4.2 MMOL/L (ref 3.5–5)
RBC # BLD: 3.86 M/UL (ref 4.2–5.4)
SODIUM BLD-SCNC: 139 MMOL/L (ref 136–145)
TOTAL PROTEIN: 6.2 G/DL (ref 6.6–8.7)
TRIGL SERPL-MCNC: 54 MG/DL (ref 0–149)
TSH SERPL DL<=0.05 MIU/L-ACNC: 2.74 UIU/ML (ref 0.27–4.2)
VITAMIN D 25-HYDROXY: 81.7 NG/ML
WBC # BLD: 5.1 K/UL (ref 4.8–10.8)

## 2022-12-02 PROCEDURE — 6360000004 HC RX CONTRAST MEDICATION: Performed by: PHYSICIAN ASSISTANT

## 2022-12-02 PROCEDURE — A9577 INJ MULTIHANCE: HCPCS | Performed by: PHYSICIAN ASSISTANT

## 2022-12-02 PROCEDURE — C8908 MRI W/O FOL W/CONT, BREAST,: HCPCS

## 2022-12-02 RX ADMIN — GADOBENATE DIMEGLUMINE 12 ML: 529 INJECTION, SOLUTION INTRAVENOUS at 08:20

## 2022-12-06 NOTE — PROGRESS NOTES
HISTORY OF PRESENT ILLNESS:    Ms. Sheyla Cisneros  is recently status post ultrasound guided breast biopsy  on the left which revealed a 1.1 cm high grade invasive ductal carcinoma. ER negative. NE negative. Her2 negative. Ki67 is 83%. MammaPrint is pending    MRI-12/2/2022     EXAM REASON: Patient is a 78-year-old female with recent diagnosis of   left breast malignancy. COMPARISON:    Prior breast imaging November 2, 2022 and breast biopsy November 21, 2022   TECHNICAL:    Multiplanar, multisequence imaging was performed through the breasts   before and after the administration of 12 mL MultiHance IV contrast.   FINDINGS:   Contrast is seen in the heart and great vessels on postcontrast   sequences. There is minimal background enhancement of the patient's   heterogeneously dense tissue. In the upper outer quadrant of the left   breast is demonstration of an oval mass, which on MRI demonstrates   very irregular margins and surrounded by some nonmass enhancement. MRI   measurement is estimated as 24 x 20 x 20 mm in length by with by   height. This is noted to be larger than that seen on previous   ultrasounds, and this may be due to some associated surrounding   nonmass enhancement which is immediately contiguous with mass margins. T2 bright biopsy clip is noted within the mass. The mass is seen just   under the skin but skin thickness or enhancement is not appreciated. Mass is also noted to be immediately contiguous with the   implant/implant capsule, without any intervening fat. No other   suspicious abnormality is seen throughout the left breast, no   suspicious finding is seen in the right breast. Axillary lymph nodes   appear within normal by MRI. No abnormal internal mammary chain lymph   nodes are seen. No obvious extramammary significant findings seen. Impression   1. Left breast, BI-RADS 6, biopsy-proven malignant mass in the left   breast upper outer quadrant.  Mass measures larger on MRI, possibly due   to some surrounding involved nonmass enhancement surrounding the mass. The mass is noted to be immediately contiguous, with no intervening   fat plane between the posterior margin of the mass and the   implant/implant capsule. Recommend appropriate clinical, medical   oncology and surgical management. Overall assessment BI-RADS 6, biopsy-proven malignancy. Signed by Dr Castro Bronson     I reviewed the radiographic images with the patient. I concur with the radiologist evaluation and recommendations. I discussed her treatment plan with Dr. Melissa Capps. DISCUSSION:  I had a lengthy discussion with Ms. Welch  and her family about the ramifications of the diagnosis of breast cancer. We discussed the pathophysiology of cancer in general and also the ways in which surgery, radiation therapy, and chemotherapy are utilized in the treatment of different types of cancers. We also explained how these modalities related to her situation in particular. We discussed the pathophysiology of breast cancer and some length, including what is known about the causes of breast cancer, its relationship to fibrocystic disease, its relationship to hormone replacement therapy, and some of the genetic aspects involved in familial breast cancers. We discussed the BrCa genetic analysis and why it is appropriate for her. We discussed breast MRI and how it assists in evaluation of breast cancers and the results of her MRI if done. We discussed the surgical options including simple mastectomy and lumpectomy with  sentinel lymph node biopsy as well as the possibility of axillary lymph node dissection. We explained in depth why breast conservation therapy requires radiation treatments for the majority of women. These treatments may be external beam for 6 weeks, partial breast for 5 days,  or intraoperative.    I explained that most women treated for invasive malignancy do receive systemic therapy, hormonal therapy or  chemotherapy postoperatively depending upon the final pathology, the lymph node status, and the hormone receptor status. I discussed Oncotype Dx, Mammoprint, and Adjuvant Online as tools which aid in the decision for chemotherapy or hormonal therapy. We also discussed the possibility of breast reconstruction if a mastectomy was required. .  I explained to her the different techniques including placement of a subpectoral implant with a Alloderm sling  versus TRAM flap reconstruction as welll as other methods of reconstruction. She does not wish to pursue reconstruction at this time. After a prolonged discussion lasting 150 minutes  we felt it was most appropriate that she undergo  neoadjuvant chemotherapy and port placement    We will schedule port placement, appoint with Dr. Gloria Davila, and her metastatic work-up. We discussed the risks and benefits of the surgery including but not limited to bleeding, infection, pain, lymphedema, numbness, and also the risks of general anesthesia including pneumonia, blood clots, heart attack, stroke, and death. She expresses good understanding and is agreeable to proceed with surgery.       We will schedule this to be done as soon as possible  at [facility].

## 2022-12-07 ENCOUNTER — HOSPITAL ENCOUNTER (OUTPATIENT)
Dept: PREADMISSION TESTING | Age: 47
End: 2022-12-07
Payer: COMMERCIAL

## 2022-12-07 ENCOUNTER — INITIAL CONSULT (OUTPATIENT)
Dept: SURGERY | Age: 47
End: 2022-12-07

## 2022-12-07 DIAGNOSIS — C50.812 MALIGNANT NEOPLASM OF OVERLAPPING SITES OF LEFT BREAST (HCC): Primary | ICD-10-CM

## 2022-12-07 DIAGNOSIS — C50.919 TRIPLE NEGATIVE BREAST CANCER (HCC): ICD-10-CM

## 2022-12-07 RX ORDER — ACETAMINOPHEN 325 MG/1
975 TABLET ORAL ONCE
Status: CANCELLED | OUTPATIENT
Start: 2022-12-09

## 2022-12-07 RX ORDER — GABAPENTIN 300 MG/1
300 CAPSULE ORAL ONCE
Status: CANCELLED | OUTPATIENT
Start: 2022-12-09

## 2022-12-07 NOTE — Clinical Note
Appointment Kale, metastatic work-up Appointment with me with left breast ultrasound in about 2 months

## 2022-12-09 ENCOUNTER — HOSPITAL ENCOUNTER (OUTPATIENT)
Age: 47
Setting detail: OUTPATIENT SURGERY
Discharge: HOME | End: 2022-12-09
Attending: SURGERY | Admitting: SURGERY
Payer: COMMERCIAL

## 2022-12-09 ENCOUNTER — ANESTHESIA (OUTPATIENT)
Dept: OPERATING ROOM | Age: 47
End: 2022-12-09
Payer: COMMERCIAL

## 2022-12-09 ENCOUNTER — TELEPHONE (OUTPATIENT)
Dept: SURGERY | Age: 47
End: 2022-12-09

## 2022-12-09 ENCOUNTER — ANESTHESIA EVENT (OUTPATIENT)
Dept: OPERATING ROOM | Age: 47
End: 2022-12-09
Payer: COMMERCIAL

## 2022-12-09 ENCOUNTER — APPOINTMENT (OUTPATIENT)
Dept: GENERAL RADIOLOGY | Age: 47
End: 2022-12-09
Attending: SURGERY
Payer: COMMERCIAL

## 2022-12-09 VITALS
RESPIRATION RATE: 16 BRPM | OXYGEN SATURATION: 98 % | TEMPERATURE: 98.4 F | HEART RATE: 85 BPM | DIASTOLIC BLOOD PRESSURE: 50 MMHG | HEIGHT: 69 IN | WEIGHT: 130 LBS | SYSTOLIC BLOOD PRESSURE: 105 MMHG | BODY MASS INDEX: 19.26 KG/M2

## 2022-12-09 DIAGNOSIS — C50.812 MALIGNANT NEOPLASM OF OVERLAPPING SITES OF LEFT BREAST (HCC): Primary | ICD-10-CM

## 2022-12-09 PROBLEM — C50.919 TRIPLE NEGATIVE BREAST CANCER (HCC): Status: ACTIVE | Noted: 2022-12-09

## 2022-12-09 PROBLEM — Z17.421 TRIPLE NEGATIVE BREAST CANCER (HCC): Status: ACTIVE | Noted: 2022-12-09

## 2022-12-09 PROCEDURE — 2580000003 HC RX 258: Performed by: ANESTHESIOLOGY

## 2022-12-09 PROCEDURE — 6370000000 HC RX 637 (ALT 250 FOR IP): Performed by: ANESTHESIOLOGY

## 2022-12-09 PROCEDURE — 2709999900 HC NON-CHARGEABLE SUPPLY: Performed by: SURGERY

## 2022-12-09 PROCEDURE — 7100000011 HC PHASE II RECOVERY - ADDTL 15 MIN: Performed by: SURGERY

## 2022-12-09 PROCEDURE — 71045 X-RAY EXAM CHEST 1 VIEW: CPT

## 2022-12-09 PROCEDURE — 7100000000 HC PACU RECOVERY - FIRST 15 MIN: Performed by: SURGERY

## 2022-12-09 PROCEDURE — 7100000010 HC PHASE II RECOVERY - FIRST 15 MIN: Performed by: SURGERY

## 2022-12-09 PROCEDURE — 3600000003 HC SURGERY LEVEL 3 BASE: Performed by: SURGERY

## 2022-12-09 PROCEDURE — 6360000002 HC RX W HCPCS: Performed by: SURGERY

## 2022-12-09 PROCEDURE — 3700000000 HC ANESTHESIA ATTENDED CARE: Performed by: SURGERY

## 2022-12-09 PROCEDURE — 6370000000 HC RX 637 (ALT 250 FOR IP): Performed by: SURGERY

## 2022-12-09 PROCEDURE — 6360000002 HC RX W HCPCS

## 2022-12-09 PROCEDURE — 7100000001 HC PACU RECOVERY - ADDTL 15 MIN: Performed by: SURGERY

## 2022-12-09 PROCEDURE — 2500000003 HC RX 250 WO HCPCS

## 2022-12-09 PROCEDURE — A4217 STERILE WATER/SALINE, 500 ML: HCPCS | Performed by: SURGERY

## 2022-12-09 PROCEDURE — 3700000001 HC ADD 15 MINUTES (ANESTHESIA): Performed by: SURGERY

## 2022-12-09 PROCEDURE — 36561 INSERT TUNNELED CV CATH: CPT | Performed by: SURGERY

## 2022-12-09 PROCEDURE — 71045 X-RAY EXAM CHEST 1 VIEW: CPT | Performed by: RADIOLOGY

## 2022-12-09 PROCEDURE — 36598 INJ W/FLUOR EVAL CV DEVICE: CPT | Performed by: SURGERY

## 2022-12-09 PROCEDURE — 2500000003 HC RX 250 WO HCPCS: Performed by: SURGERY

## 2022-12-09 PROCEDURE — 2580000003 HC RX 258: Performed by: SURGERY

## 2022-12-09 PROCEDURE — 3600000013 HC SURGERY LEVEL 3 ADDTL 15MIN: Performed by: SURGERY

## 2022-12-09 PROCEDURE — 6360000002 HC RX W HCPCS: Performed by: ANESTHESIOLOGY

## 2022-12-09 RX ORDER — FENTANYL CITRATE 50 UG/ML
50 INJECTION, SOLUTION INTRAMUSCULAR; INTRAVENOUS
Status: DISCONTINUED | OUTPATIENT
Start: 2022-12-09 | End: 2022-12-09 | Stop reason: HOSPADM

## 2022-12-09 RX ORDER — LIDOCAINE HYDROCHLORIDE 10 MG/ML
1 INJECTION, SOLUTION EPIDURAL; INFILTRATION; INTRACAUDAL; PERINEURAL
Status: DISCONTINUED | OUTPATIENT
Start: 2022-12-09 | End: 2022-12-09 | Stop reason: HOSPADM

## 2022-12-09 RX ORDER — SODIUM CHLORIDE 0.9 % (FLUSH) 0.9 %
5-40 SYRINGE (ML) INJECTION EVERY 12 HOURS SCHEDULED
Status: DISCONTINUED | OUTPATIENT
Start: 2022-12-09 | End: 2022-12-09 | Stop reason: HOSPADM

## 2022-12-09 RX ORDER — METOCLOPRAMIDE HYDROCHLORIDE 5 MG/ML
10 INJECTION INTRAMUSCULAR; INTRAVENOUS
Status: DISCONTINUED | OUTPATIENT
Start: 2022-12-09 | End: 2022-12-09 | Stop reason: HOSPADM

## 2022-12-09 RX ORDER — SODIUM CHLORIDE 9 MG/ML
INJECTION, SOLUTION INTRAVENOUS PRN
Status: DISCONTINUED | OUTPATIENT
Start: 2022-12-09 | End: 2022-12-09 | Stop reason: HOSPADM

## 2022-12-09 RX ORDER — ACETAMINOPHEN 325 MG/1
975 TABLET ORAL ONCE
Status: COMPLETED | OUTPATIENT
Start: 2022-12-09 | End: 2022-12-09

## 2022-12-09 RX ORDER — DEXAMETHASONE 4 MG/1
4 TABLET ORAL ONCE
Status: COMPLETED | OUTPATIENT
Start: 2022-12-09 | End: 2022-12-09

## 2022-12-09 RX ORDER — SODIUM CHLORIDE, SODIUM LACTATE, POTASSIUM CHLORIDE, CALCIUM CHLORIDE 600; 310; 30; 20 MG/100ML; MG/100ML; MG/100ML; MG/100ML
INJECTION, SOLUTION INTRAVENOUS CONTINUOUS
Status: DISCONTINUED | OUTPATIENT
Start: 2022-12-09 | End: 2022-12-09 | Stop reason: HOSPADM

## 2022-12-09 RX ORDER — LIDOCAINE HYDROCHLORIDE 10 MG/ML
INJECTION, SOLUTION EPIDURAL; INFILTRATION; INTRACAUDAL; PERINEURAL PRN
Status: DISCONTINUED | OUTPATIENT
Start: 2022-12-09 | End: 2022-12-09 | Stop reason: SDUPTHER

## 2022-12-09 RX ORDER — HYDROMORPHONE HYDROCHLORIDE 1 MG/ML
0.25 INJECTION, SOLUTION INTRAMUSCULAR; INTRAVENOUS; SUBCUTANEOUS EVERY 5 MIN PRN
Status: DISCONTINUED | OUTPATIENT
Start: 2022-12-09 | End: 2022-12-09 | Stop reason: HOSPADM

## 2022-12-09 RX ORDER — MEPERIDINE HYDROCHLORIDE 25 MG/ML
12.5 INJECTION INTRAMUSCULAR; INTRAVENOUS; SUBCUTANEOUS EVERY 5 MIN PRN
Status: DISCONTINUED | OUTPATIENT
Start: 2022-12-09 | End: 2022-12-09 | Stop reason: HOSPADM

## 2022-12-09 RX ORDER — DIPHENHYDRAMINE HYDROCHLORIDE 50 MG/ML
12.5 INJECTION INTRAMUSCULAR; INTRAVENOUS
Status: DISCONTINUED | OUTPATIENT
Start: 2022-12-09 | End: 2022-12-09 | Stop reason: HOSPADM

## 2022-12-09 RX ORDER — CEFAZOLIN SODIUM 1 G/3ML
INJECTION, POWDER, FOR SOLUTION INTRAMUSCULAR; INTRAVENOUS PRN
Status: DISCONTINUED | OUTPATIENT
Start: 2022-12-09 | End: 2022-12-09 | Stop reason: SDUPTHER

## 2022-12-09 RX ORDER — HYDROMORPHONE HYDROCHLORIDE 1 MG/ML
0.5 INJECTION, SOLUTION INTRAMUSCULAR; INTRAVENOUS; SUBCUTANEOUS EVERY 5 MIN PRN
Status: DISCONTINUED | OUTPATIENT
Start: 2022-12-09 | End: 2022-12-09 | Stop reason: HOSPADM

## 2022-12-09 RX ORDER — SODIUM CHLORIDE 0.9 % (FLUSH) 0.9 %
5-40 SYRINGE (ML) INJECTION PRN
Status: DISCONTINUED | OUTPATIENT
Start: 2022-12-09 | End: 2022-12-09 | Stop reason: HOSPADM

## 2022-12-09 RX ORDER — FENTANYL CITRATE 50 UG/ML
25 INJECTION, SOLUTION INTRAMUSCULAR; INTRAVENOUS
Status: DISCONTINUED | OUTPATIENT
Start: 2022-12-09 | End: 2022-12-09 | Stop reason: HOSPADM

## 2022-12-09 RX ORDER — MIDAZOLAM HYDROCHLORIDE 2 MG/2ML
2 INJECTION, SOLUTION INTRAMUSCULAR; INTRAVENOUS
Status: DISCONTINUED | OUTPATIENT
Start: 2022-12-09 | End: 2022-12-09 | Stop reason: HOSPADM

## 2022-12-09 RX ORDER — PROPOFOL 10 MG/ML
INJECTION, EMULSION INTRAVENOUS PRN
Status: DISCONTINUED | OUTPATIENT
Start: 2022-12-09 | End: 2022-12-09 | Stop reason: SDUPTHER

## 2022-12-09 RX ORDER — GABAPENTIN 300 MG/1
300 CAPSULE ORAL ONCE
Status: DISCONTINUED | OUTPATIENT
Start: 2022-12-09 | End: 2022-12-09 | Stop reason: HOSPADM

## 2022-12-09 RX ORDER — SODIUM CHLORIDE 9 MG/ML
INJECTION, SOLUTION INTRAVENOUS CONTINUOUS
Status: DISCONTINUED | OUTPATIENT
Start: 2022-12-09 | End: 2022-12-09 | Stop reason: HOSPADM

## 2022-12-09 RX ORDER — FAMOTIDINE 20 MG/1
20 TABLET, FILM COATED ORAL ONCE
Status: COMPLETED | OUTPATIENT
Start: 2022-12-09 | End: 2022-12-09

## 2022-12-09 RX ADMIN — PROPOFOL 150 MCG/KG/MIN: 10 INJECTION, EMULSION INTRAVENOUS at 08:44

## 2022-12-09 RX ADMIN — DEXAMETHASONE 4 MG: 4 TABLET ORAL at 07:33

## 2022-12-09 RX ADMIN — ACETAMINOPHEN 975 MG: 325 TABLET ORAL at 07:33

## 2022-12-09 RX ADMIN — SODIUM CHLORIDE, SODIUM LACTATE, POTASSIUM CHLORIDE, AND CALCIUM CHLORIDE: 600; 310; 30; 20 INJECTION, SOLUTION INTRAVENOUS at 09:30

## 2022-12-09 RX ADMIN — FAMOTIDINE 20 MG: 20 TABLET ORAL at 07:33

## 2022-12-09 RX ADMIN — SODIUM CHLORIDE, SODIUM LACTATE, POTASSIUM CHLORIDE, AND CALCIUM CHLORIDE: 600; 310; 30; 20 INJECTION, SOLUTION INTRAVENOUS at 07:07

## 2022-12-09 RX ADMIN — CEFAZOLIN SODIUM 1 G: 1 INJECTION, POWDER, FOR SOLUTION INTRAMUSCULAR; INTRAVENOUS at 08:50

## 2022-12-09 RX ADMIN — PROPOFOL 100 MG: 10 INJECTION, EMULSION INTRAVENOUS at 08:41

## 2022-12-09 RX ADMIN — LIDOCAINE HYDROCHLORIDE 50 MG: 10 INJECTION, SOLUTION EPIDURAL; INFILTRATION; INTRACAUDAL; PERINEURAL at 08:41

## 2022-12-09 ASSESSMENT — ENCOUNTER SYMPTOMS: SHORTNESS OF BREATH: 0

## 2022-12-09 ASSESSMENT — LIFESTYLE VARIABLES: SMOKING_STATUS: 1

## 2022-12-09 NOTE — H&P
HISTORY OF PRESENT ILLNESS:     Ms. Brady Pathak  is recently status post ultrasound guided breast biopsy  on the left which revealed a 1.1 cm high grade invasive ductal carcinoma. ER negative. IL negative. Her2 negative. Ki67 is 83%. MammaPrint is pending     MRI-12/2/2022     EXAM REASON: Patient is a 58-year-old female with recent diagnosis of   left breast malignancy. COMPARISON:    Prior breast imaging November 2, 2022 and breast biopsy November 21, 2022   TECHNICAL:    Multiplanar, multisequence imaging was performed through the breasts   before and after the administration of 12 mL MultiHance IV contrast.   FINDINGS:   Contrast is seen in the heart and great vessels on postcontrast   sequences. There is minimal background enhancement of the patient's   heterogeneously dense tissue. In the upper outer quadrant of the left   breast is demonstration of an oval mass, which on MRI demonstrates   very irregular margins and surrounded by some nonmass enhancement. MRI   measurement is estimated as 24 x 20 x 20 mm in length by with by   height. This is noted to be larger than that seen on previous   ultrasounds, and this may be due to some associated surrounding   nonmass enhancement which is immediately contiguous with mass margins. T2 bright biopsy clip is noted within the mass. The mass is seen just   under the skin but skin thickness or enhancement is not appreciated. Mass is also noted to be immediately contiguous with the   implant/implant capsule, without any intervening fat. No other   suspicious abnormality is seen throughout the left breast, no   suspicious finding is seen in the right breast. Axillary lymph nodes   appear within normal by MRI. No abnormal internal mammary chain lymph   nodes are seen. No obvious extramammary significant findings seen. Impression   1. Left breast, BI-RADS 6, biopsy-proven malignant mass in the left   breast upper outer quadrant.  Mass measures larger on MRI, possibly due   to some surrounding involved nonmass enhancement surrounding the mass. The mass is noted to be immediately contiguous, with no intervening   fat plane between the posterior margin of the mass and the   implant/implant capsule. Recommend appropriate clinical, medical   oncology and surgical management. Overall assessment BI-RADS 6, biopsy-proven malignancy.    Signed by Dr Joao Guerra is a 50 y.o. female with the following history as recorded in [facility]:  Patient Active Problem List    Diagnosis Date Noted    Triple negative breast cancer ([facility] [address] 12/09/2022    Malignant neoplasm of overlapping sites of left breast ([facility] [address] 11/22/2022    Overactive bladder 07/16/2018    Restless leg syndrome 07/16/2018    Insomnia 07/16/2018    Breast pain 03/12/2018    Diffuse cystic mastopathy 03/12/2018    History of breast augmentation 03/12/2018    Gastroesophageal reflux disease without esophagitis 08/28/2017    PVC (premature ventricular contraction) 07/25/2017    Essential hypertension 06/21/2017    Anxiety 06/21/2017    Tobacco abuse 06/21/2017    Primary osteoarthritis of right hip 04/21/2017    S/P laparoscopic cholecystectomy 07/10/2014    Uterine mass 06/18/2014     Current Facility-Administered Medications   Medication Dose Route Frequency Provider Last Rate Last Admin    gabapentin (NEURONTIN) capsule 300 mg  300 mg Oral Once Silvia Dhaliwal MD        lactated ringers infusion   IntraVENous Continuous Jadiel Monique,  mL/hr at 12/09/22 0707 New Bag at 12/09/22 0707    lidocaine PF 1 % injection 1 mL  1 mL IntraDERmal Once PRN Jadiel Monique, DO        0.9 % sodium chloride infusion   IntraVENous Continuous Jadiel Monique, DO        lactated ringers infusion   IntraVENous Continuous Jadiel Monique, DO        sodium chloride flush 0.9 % injection 5-40 mL  5-40 mL IntraVENous 2 times per day Jadielnae Monique, DO        sodium chloride flush 0.9 % injection 5-40 mL  5-40 mL IntraVENous PRN Eura Buys, DO        0.9 % sodium chloride infusion   IntraVENous PRN Eura Buys, DO        midazolam PF (VERSED) injection 2 mg  2 mg IntraVENous Once PRN Eura Buys, DO        fentaNYL (SUBLIMAZE) injection 25 mcg  25 mcg IntraVENous Once PRN Eura Buys, DO        Or    fentaNYL (SUBLIMAZE) injection 50 mcg  50 mcg IntraVENous Once PRN Eura Buys, DO         Allergies: Codeine, Percocet [oxycodone-acetaminophen], and Toradol [ketorolac tromethamine]  Past Medical History:   Diagnosis Date    Malignant neoplasm of overlapping sites of left breast ([facility] [address] 11/22/2022    PVC (premature ventricular contraction)     Restless leg syndrome 7/16/2018    Uterine mass 6/17/14    9x9 cm on CT scan     Past Surgical History:   Procedure Laterality Date    BREAST RECONSTRUCTION  04/2019    BREAST SURGERY Bilateral 2991    silicone Memory Gel    CHOLECYSTECTOMY  6/24/14    HC INJECT OTHER PERPHRL NERV Right 8/3/2016    HIP FLUOROSCOPIC GUIDED CORTICOSTEROID INJECTION  performed by Rashawn Valadez MD at [address] [facility] NERV Right 4/21/2017    FLURO GUIDED HIP INJECTION performed by José Manuel Jaime MD at [address], [address] [place] [address] Dr BREAST NEEDLE BIOPSY LEFT Left 11/21/2022    US BREAST NEEDLE BIOPSY LEFT LPS GENERAL SURGERY     Family History   Problem Relation Age of Onset    Cancer Brother         synovial cell sarcoma    Cancer Maternal Grandfather         lung    Other Daughter         gallbladder disease    Hypertension Mother      Social History     Tobacco Use    Smoking status: Every Day     Packs/day: 0.50     Years: 20.00     Pack years: 10.00     Types: Cigarettes     Start date: 24    Smokeless tobacco: Never   Substance Use Topics    Alcohol use: Yes     Comment: occ       ROS:  14 point review of systems is negative except for the above.         PHYSICAL EXAM:    The patient is a 50 y.o. female  in no acute distress. She is alert oriented and cooperative. Mood and affect are appropriate. Skin is warm and dry without rashes. BP (!) 115/53   Pulse 52   Temp 98.5 °F (36.9 °C) (Temporal)   Resp 18   Ht 5' 9\" (1.753 m)   Wt 130 lb (59 kg)   LMP 06/08/2014   SpO2 100%   BMI 19.20 kg/m²   Triple negative breast cancer    HEENT: Normocephalic and atraumatic. EOMs intact. Pupils equal and round and reactive to light and accommodation. External ears and nose are normal.  Sclera nonicteric. Conjunctiva normal  Oropharynx without masses or lesions. Neck: Neck is supple without masses or thyromegaly    Chest: Lungs are clear to auscultation. Respiratory effort normal    Cardiac: Regular rate and rhythm without rubs, murmurs, or gallops    Breasts: The breasts are symmetrical.  She has bilateral implants. She has a palpable mass in the left breast with biopsy-proven triple negative cancer. .     Abdomen: The abdomen is soft and nontender with no hepatosplenomegaly. There are no abdominal hernias noted. Extremities: The extremities are normal. There are no signs of clubbing, cyanosis, or edema. IMPRESSION: Triple negative breast cancer    DISCUSSION:  I had a lengthy discussion with Ms. Welch  and her family about the ramifications of the diagnosis of breast cancer. We discussed the pathophysiology of cancer in general and also the ways in which surgery, radiation therapy, and chemotherapy are utilized in the treatment of different types of cancers. We also explained how these modalities related to her situation in particular. We discussed the pathophysiology of breast cancer and some length, including what is known about the causes of breast cancer, its relationship to fibrocystic disease, its relationship to hormone replacement therapy, and some of the genetic aspects involved in familial breast cancers. We discussed the BrCa genetic analysis and why it is appropriate for her. We discussed breast MRI and how it assists in evaluation of breast cancers and the results of her MRI if done. We discussed the surgical options including simple mastectomy and lumpectomy with  sentinel lymph node biopsy as well as the possibility of axillary lymph node dissection. We explained in depth why breast conservation therapy requires radiation treatments for the majority of women. These treatments may be external beam for 6 weeks, partial breast for 5 days,  or intraoperative. I explained that most women treated for invasive malignancy do receive systemic therapy, hormonal therapy or  chemotherapy postoperatively depending upon the final pathology, the lymph node status, and the hormone receptor status. I discussed Oncotype Dx, Mammoprint, and Adjuvant Online as tools which aid in the decision for chemotherapy or hormonal therapy. We also discussed the possibility of breast reconstruction if a mastectomy was required. .  I explained to her the different techniques including placement of a subpectoral implant with a Alloderm sling  versus TRAM flap reconstruction as welll as other methods of reconstruction. After a prolonged discussion lasting 120 minutes  we felt it was most appropriate that she undergo  neoadjuvant chemotherapy and port placement       We discussed the risks and benefits of the surgery including but not limited to bleeding, infection, pain, lymphedema, numbness, and also the risks of general anesthesia including pneumonia, blood clots, heart attack, stroke, and death. She expresses good understanding and is agreeable to proceed with surgery.        We will schedule this to be done as soon as possible  at [facility].

## 2022-12-09 NOTE — DISCHARGE INSTRUCTIONS
POST-OP INSTRUCTIONS FOR MEDIPORT  1. Surgical glue will dissolve do not soak the site, you may shower in 48 hours, pat the incision dry do not rub. 2. If a small are of the wound separates or becomes red and inflamed, call the office to make an appointment. 3. Pain medication may cause constipation, you may take the laxative of your choice. 4. Call the office if you have any questions or concerns during nurse's  Hours Monday-Friday 9:00-4:00. [phone]. In case of emergency the answering service will take your call. 5.  Monitor incision site for redness, swelling, drainage, red streaks leading away from incision, or if incision separates call the office for appointment      *****PLEASE SEE THE COPY OF THE \"POWER PORT PATIENT GUIDE AND YOUR IDENTIFICATION CARD\" PROVIDED TO YOU AT TIME OF DISCHARGE.

## 2022-12-09 NOTE — BRIEF OP NOTE
Brief Postoperative Note      DATE OF PROCEDURE: 12/9/2022     SURGEON: Danny Paget, MD    PREOPERATIVE DIAGNOSIS:  Malignant neoplasm of overlapping sites of left female breast, unspecified estrogen receptor status ([facility] [C50.812]    POSTOPERATIVE DIAGNOSIS: Same     OPERATION: Procedure(s):  PORT INSERTION WITH FLUOROSCOPY    ANESTHESIA: Monitor Anesthesia Care    ESTIMATED BLOOD LOSS: Minimal    COMPLICATIONS: None. SPECIMENS: * No specimens in log *    DRAINS: None    The patient tolerated the procedure well.     Electronically signed by Danny Paget, MD  on 12/9/2022 at 10:02 AM

## 2022-12-09 NOTE — ANESTHESIA PRE PROCEDURE
Department of Anesthesiology  Preprocedure Note       Name:  Humza Durán   Age:  50 y.o.  :  1975                                          MRN:  [id number]         Date:  2022      Surgeon: My Lorenzana):  Lupe Anderson MD    Procedure: Procedure(s):  PORT INSERTION WITH FLUOROSCOPY    Medications prior to admission:   Prior to Admission medications    Medication Sig Start Date End Date Taking?  Authorizing Provider   sertraline (ZOLOFT) 50 MG tablet TAKE 1 TABLET BY MOUTH DAILY  Patient taking differently: Take 50 mg by mouth nightly    Porcupine Sides, APRN   valACYclovir (VALTREX) 1 g tablet TAKE 1 TABLET BY MOUTH EVERY DAY AS NEEDED FOR FEVER BLISTER  Patient taking differently: Take 1,000 mg by mouth as needed TAKE 1 TABLET BY MOUTH EVERY DAY AS NEEDED FOR FEVER BLISTER 22   WHITNEY Jackson - CNP   vitamin D (ERGOCALCIFEROL) 1.25 MG (76051 UT) CAPS capsule TAKE 1 CAPSULE BY MOUTH 1 TIME A WEEK  Patient taking differently: Take 50,000 Units by mouth once a week 36   Janice Sides, APRBRIGIDA   atenolol (TENORMIN) 25 MG tablet Take 1 tablet by mouth in the morning and at bedtime  Patient taking differently: Take 25 mg by mouth nightly    Porcupine Sides, APRBRIGIDA   rOPINIRole (REQUIP) 0.5 MG tablet TAKE 1-3 TABLETS BY MOUTH EVERY NIGHT  Patient taking differently: Take 0.5 mg by mouth nightly TAKE 1-3 TABLETS BY MOUTH EVERY NIGHT    Janice Sides APRBRIGIDA   omeprazole (PRILOSEC) 20 MG delayed release capsule Take 1 capsule by mouth 2 times daily (before meals)  Patient taking differently: Take 20 mg by mouth nightly 3/00/47   Porcupine Sides, APRBRIGIDA   Multiple Vitamin (MULTI-VITAMIN DAILY PO) Take 1 tablet by mouth daily    Historical Provider, MD   doxepin (SINEQUAN) 10 MG capsule Take 1 capsule by mouth nightly  Patient taking differently: Take 10 mg by mouth nightly as needed    Janice Sides, APRN   hydrochlorothiazide (HYDRODIURIL) 25 MG tablet Take 1 tablet by mouth daily  Patient taking differently: Take 25 mg by mouth as needed 2/72/52   WHITNEY Feliciano       Current medications:    Current Facility-Administered Medications   Medication Dose Route Frequency Provider Last Rate Last Admin    acetaminophen (TYLENOL) tablet 975 mg  975 mg Oral Once Sunspot MD Esau        gabapentin (NEURONTIN) capsule 300 mg  300 mg Oral Once Sunspot MD Esau        lactated ringers infusion   IntraVENous Continuous Anshul Cruz,  mL/hr at 12/09/22 0707 New Bag at 12/09/22 0707       Allergies:     Allergies   Allergen Reactions    Codeine Itching    Percocet [Oxycodone-Acetaminophen] Itching    Toradol [Ketorolac Tromethamine] Itching       Problem List:    Patient Active Problem List   Diagnosis Code    Uterine mass N85.8    S/P laparoscopic cholecystectomy Z90.49    Primary osteoarthritis of right hip M16.11    Essential hypertension I10    Anxiety F41.9    Tobacco abuse Z72.0    PVC (premature ventricular contraction) I49.3    Gastroesophageal reflux disease without esophagitis K21.9    Breast pain N64.4    Diffuse cystic mastopathy N60.19    History of breast augmentation Z98.82    Overactive bladder N32.81    Restless leg syndrome G25.81    Insomnia G47.00    Malignant neoplasm of overlapping sites of left breast (HCC) C50.812       Past Medical History:        Diagnosis Date    Malignant neoplasm of overlapping sites of left breast ([address] 11/22/2022    PVC (premature ventricular contraction)     Restless leg syndrome 7/16/2018    Uterine mass 6/17/14    9x9 cm on CT scan       Past Surgical History:        Procedure Laterality Date    BREAST RECONSTRUCTION  04/2019    BREAST SURGERY Bilateral 6777    silicone Memory Gel    CHOLECYSTECTOMY  6/24/14    HC INJECT OTHER PERPHRL NERV Right 8/3/2016    HIP FLUOROSCOPIC GUIDED CORTICOSTEROID INJECTION  performed by Josefina Carballo MD at [facility] OR    HC INJECT OTHER PERPHRL NERV Right 4/21/2017    FLURO GUIDED HIP INJECTION performed by Margie Monet MD at [address] [facility], VAGINAL      TONSILLECTOMY  1979    TUBAL LIGATION  1994     BREAST NEEDLE BIOPSY LEFT Left 11/21/2022     BREAST NEEDLE BIOPSY LEFT LPS GENERAL SURGERY       Social History:    Social History     Tobacco Use    Smoking status: Every Day     Packs/day: 0.50     Years: 20.00     Pack years: 10.00     Types: Cigarettes     Start date: 24    Smokeless tobacco: Never   Substance Use Topics    Alcohol use: Yes     Comment: occ                                Ready to quit: Not Answered  Counseling given: Not Answered      Vital Signs (Current):   Vitals:    12/07/22 1523 12/09/22 0655   BP:  (!) 115/53   Pulse:  52   Resp:  18   Temp:  98.5 °F (36.9 °C)   TempSrc:  Temporal   SpO2:  100%   Weight: 130 lb (59 kg) 130 lb (59 kg)   Height: 5' 9\" (1.753 m) 5' 9\" (1.753 m)                                              BP Readings from Last 3 Encounters:   12/09/22 (!) 115/53   10/25/22 90/62   08/22/22 98/72       NPO Status: Time of last liquid consumption: 0000                        Time of last solid consumption: 0000                        Date of last liquid consumption: 12/08/22                        Date of last solid food consumption: 12/08/22    BMI:   Wt Readings from Last 3 Encounters:   12/09/22 130 lb (59 kg)   11/16/22 134 lb (60.8 kg)   10/25/22 127 lb (57.6 kg)     Body mass index is 19.2 kg/m².     CBC:   Lab Results   Component Value Date/Time    WBC 5.1 12/02/2022 08:28 AM    RBC 3.86 12/02/2022 08:28 AM    HGB 13.0 12/02/2022 08:28 AM    HCT 39.9 12/02/2022 08:28 AM    .4 12/02/2022 08:28 AM    RDW 11.7 12/02/2022 08:28 AM     12/02/2022 08:28 AM       CMP:   Lab Results   Component Value Date/Time     12/02/2022 08:28 AM    K 4.2 12/02/2022 08:28 AM     12/02/2022 08:28 AM    CO2 27 12/02/2022 08:28 AM    BUN 11 12/02/2022 08:28 AM    CREATININE 0.9 12/02/2022 08:28 AM    GFRAA >59 08/03/2021 12:06 PM    LABGLOM >60 12/02/2022 08:28 AM    GLUCOSE 87 12/02/2022 08:28 AM    PROT 6.2 12/02/2022 08:28 AM    CALCIUM 9.3 12/02/2022 08:28 AM    BILITOT <0.2 12/02/2022 08:28 AM    ALKPHOS 71 12/02/2022 08:28 AM    AST 16 12/02/2022 08:28 AM    ALT 7 12/02/2022 08:28 AM       POC Tests: No results for input(s): POCGLU, POCNA, POCK, POCCL, POCBUN, POCHEMO, POCHCT in the last 72 hours. Coags:   Lab Results   Component Value Date/Time    PROTIME 13.3 04/19/2018 12:40 PM    INR 1.02 04/19/2018 12:40 PM    APTT 30.4 04/19/2018 12:40 PM       HCG (If Applicable):   Lab Results   Component Value Date    PREGTESTUR NEGATIVE (L) 06/19/2014        ABGs: No results found for: PHART, PO2ART, VUU7YFB, OTI3ZSB, BEART, J6AHQZML     Type & Screen (If Applicable):  No results found for: LABABO, LABRH    Drug/Infectious Status (If Applicable):  No results found for: HIV, HEPCAB    COVID-19 Screening (If Applicable):   Lab Results   Component Value Date/Time    COVID19 DETECTED 12/28/2021 11:41 AM           Anesthesia Evaluation  Patient summary reviewed and Nursing notes reviewed no history of anesthetic complications:   Airway: Mallampati: I  TM distance: >3 FB   Neck ROM: full  Mouth opening: > = 3 FB   Dental:    (+) upper dentures and lower dentures      Pulmonary:   (+) current smoker    (-) asthma, shortness of breath and sleep apnea          Patient smoked on day of surgery. Cardiovascular:  Exercise tolerance: good (>4 METS),   (+) hypertension:, dysrhythmias: PVC,     (-) pacemaker and no hyperlipidemia    ECG reviewed      Echocardiogram reviewed         Beta Blocker:  Dose within 24 Hrs      ROS comment: Echo 2017:  Summary   Normal left ventricular size with preserved LV function and an estimated   ejection fraction of approximately 55-60%. Normal left ventricular wall thickness.         Neuro/Psych:      (-) seizures and CVA           GI/Hepatic/Renal: (+) GERD: well controlled,      (-) liver disease and no renal disease       Endo/Other:    (+) malignancy/cancer (left breast cancer). (-) diabetes mellitus, blood dyscrasia               Abdominal:             Vascular:     - DVT and PE. Other Findings:           Anesthesia Plan      general and TIVA     ASA 2     (Pepcid and decadron in preop.)  Induction: intravenous. MIPS: Postoperative opioids intended and Prophylactic antiemetics administered. Anesthetic plan and risks discussed with patient.                         Beba Ramírez DO   12/9/2022

## 2022-12-09 NOTE — ANESTHESIA POSTPROCEDURE EVALUATION
Department of Anesthesiology  Postprocedure Note    Patient: Boaz Nunez  MRN: [id number]  YOB: 1975  Date of evaluation: 12/9/2022      Procedure Summary     Date: 12/09/22 Room / Location: [address]    Anesthesia Start: 5807 Anesthesia Stop: 5108    Procedure: PORT INSERTION WITH FLUOROSCOPY (Left) Diagnosis:       Malignant neoplasm of overlapping sites of left female breast, unspecified estrogen receptor status ([facility]      (Malignant neoplasm of overlapping sites of left female breast, unspecified estrogen receptor status ([facility] [C50.812])    Surgeons: Ryan Melvin MD Responsible Provider: WHITNEY Palacios CRNA    Anesthesia Type: general, TIVA ASA Status: 2          Anesthesia Type: No value filed.     Adolph Phase I:      Adolph Phase II:        Anesthesia Post Evaluation    Patient location during evaluation: PACU  Patient participation: complete - patient participated  Level of consciousness: awake and alert  Pain score: 0  Airway patency: patent  Nausea & Vomiting: no vomiting and no nausea  Complications: no  Cardiovascular status: hemodynamically stable  Respiratory status: acceptable and room air  Hydration status: stable

## 2022-12-09 NOTE — TELEPHONE ENCOUNTER
Pt. Is scheduled with Mica Elizabeth on 12/23, however, urology says that was made in error and suppose to be with Dr. Edwige Payton.

## 2022-12-09 NOTE — PROGRESS NOTES
MEDICAL ONCOLOGY CONSULTATION    Pt Name: Cresencio Grimes  MRN: [id number]  YOB: 1975  Date of evaluation: 12/14/2022    REASON FOR CONSULTATION:  Breast cancer  REQUESTING PHYSICIAN: Dr Kendra Helm    History Obtained From:  patient and old medical records    HISTORY OF PRESENT ILLNESS:    Diagnosis  Invasive ductal carcinoma, left breast, Nov 2022  Grade 3  Stage IIIB, clinical T2 N1 M0  ER 0.2%, WI 0%, HER-2 0/negative, Ki67 83%  MammaPrint: Basal type/high risk  Germline Willem 81 gene genetic panel: PENDING    Treatment Summary  Anticipate Pembrolizumab every 6 weeks x1 year with Adriamycin, Cyclophosphamide + GCSF every 2 weeks x4 cycles followed by weekly Taxol, Carbo x12 cycles  Anticipate surgery  Anticipate radiation therapy    Cancer History  Rayne Rendon was first seen by me on 12/14/2022. She was referred by Dr. Kendra Helm for a diagnosis of triple negative breast cancer. This was a palpable lesion on her left breast.  11/2/22 Bilateral diagnostic mammogram: Left breast, BI-RADS 4B, intermediate concern for malignancy involving the palpable 1.7 cm complex cystic mass in the upper outer quadrant. Recommendation is for ultrasound-guided percutaneous core needle biopsy for diagnosis in order to exclude malignancy. 1.7 cm axillary lymph node with borderline thickened cortical elements. Recommendation is for percutaneous needle biopsy versus fine-needle aspiration (depending on performing physician preference) in order to exclude malignancy. 11/2/22 US left breast: Palpable area of concern is labeled in the left breast upper outer quadrant at 3:00 4 cm from the nipple. Ultrasound at this location demonstrates an underlying partially circumscribed partially indistinct complex cystic mass measuring 1.7 x 1.6 x 1 cm. The surrounding tissue is unaffected in sonographic appearance. The underlying implant is noted and unremarkable.  Ultrasound also performed of the left axilla which demonstrates a solitary borderline abnormal axillary lymph node with appearance of thickened cortical elements. The lymph node measures up to 1.7 0.9 x 0.7 cm with mildly thickened cortex of 4 mm.   11/21/22 Breast, left breast needle core biopsies at 3 o'clock position: Invasive ductal carcinoma, no special type, grade 3. Invasive carcinoma measures 1.1 cm in greatest linear dimension and is present in multiple cores. Lymph node, left axillary lymph node fine-needle aspiration, smears and ThinPrep: Small round lymphocytes present, negative for cytologic evidence of malignancy. ER 0.2%, NM 0%, HER-2 0/negative, Ki67 83%. MammaPrint: Basal type/high risk. 12/2/22 MRI bilateral breast: Left breast, BI-RADS 6, biopsy-proven malignant mass in the left breast upper outer quadrant. MRI measurement is estimated as 24 x 20 x 20 mm in length by with by height. This is noted to be larger than that seen on previous ultrasounds, and this may be due to some associated surrounding nonmass enhancement which is immediately contiguous with mass margins. The mass is noted to be immediately contiguous, with no intervening fat plane between the posterior margin of the mass and the implant/implant capsule. Axillary lymph nodes appear within normal by MRI. No abnormal internal mammary chain lymph nodes are seen. No obvious extramammary significant findings seen. Recommend appropriate clinical, medical oncology and surgical management. Overall assessment BI-RADS 6, biopsy-proven malignancy. 12/14/22 Patient education given to patient for chemotherapy. Treatment consent signed. 12/14/2022-she was first seen by me. Essentially, clinical T2 N1 Mx triple negative breast cancer I reviewed imaging, pathology. Discussed treatment recommendations, side effects, logistics with the patient at length. I recommend neoadjuvant chemotherapy with dose dense Adriamycin and cyclophosphamide followed by carboplatin/Taxol. 1 year of Keytruda.   Awaiting CT C/A/P and bone scan. Order 2D echo today.       Past Medical History:    Past Medical History:   Diagnosis Date    Breast cancer ([facility] [address] 11/2022    Triple negative breast cancer    Malignant neoplasm of overlapping sites of left breast ([facility] [address] 11/22/2022    PVC (premature ventricular contraction)     Restless leg syndrome 07/16/2018    Uterine mass 06/17/2014    9x9 cm on CT scan       Past Surgical History:    Past Surgical History:   Procedure Laterality Date    BREAST RECONSTRUCTION  04/2019    BREAST SURGERY Bilateral 6715    silicone Memory Gel    CHOLECYSTECTOMY  6/24/14    HC INJECT OTHER PERPHRL NERV Right 8/3/2016    HIP FLUOROSCOPIC GUIDED CORTICOSTEROID INJECTION  performed by Adolfo Harrison MD at [address] [facility] NERV Right 4/21/2017    FLURO GUIDED HIP INJECTION performed by Valerio Rojas MD at [facility], VAGINAL      PORT SURGERY Left 12/9/2022    PORT INSERTION WITH FLUOROSCOPY performed by Susan Lu MD at [facility] LEFT Left 11/21/2022    US BREAST NEEDLE BIOPSY LEFT LPS GENERAL SURGERY       Social History:    Marital status:   Smoking status: Currently; 1/2 pack daily for 34 years  ETOH status: No  Resides: [place], [place]    Family History:   Family History   Problem Relation Age of Onset    Cancer Mother         Melanoma    Hypertension Mother     Cancer Brother         synovial cell sarcoma    Cancer Maternal Grandfather         lung    Other Daughter         gallbladder disease       Current Hospital Medications:    Current Outpatient Medications   Medication Sig Dispense Refill    montelukast (SINGULAIR) 10 MG tablet Take 10 mg by mouth nightly      ondansetron (ZOFRAN) 4 MG tablet Take 1 tablet by mouth every 6 hours as needed for Nausea or Vomiting 30 tablet 5    promethazine (PHENERGAN) 25 MG tablet Take 0.5 tablets by mouth every 6 hours as needed for Nausea 30 tablet 5 OLANZapine (ZYPREXA) 10 MG tablet Take 1 tablet at night x4 nights ONLY with each cycle Adriamycin/Cytoxan every 2 weeks x4 cycles 16 tablet 0    lidocaine-prilocaine (EMLA) 2.5-2.5 % cream Apply topically as needed. 30 g 5    vitamin D (ERGOCALCIFEROL) 1.25 MG (53921 UT) CAPS capsule TAKE 1 CAPSULE BY MOUTH 1 TIME A WEEK 12 capsule 1    rOPINIRole (REQUIP) 0.5 MG tablet TAKE 1 TO 3 TABLETS BY MOUTH EVERY NIGHT 90 tablet 5    sertraline (ZOLOFT) 50 MG tablet TAKE 1 TABLET BY MOUTH DAILY (Patient taking differently: Take 50 mg by mouth nightly) 30 tablet 3    valACYclovir (VALTREX) 1 g tablet TAKE 1 TABLET BY MOUTH EVERY DAY AS NEEDED FOR FEVER BLISTER (Patient taking differently: Take 1,000 mg by mouth as needed TAKE 1 TABLET BY MOUTH EVERY DAY AS NEEDED FOR FEVER BLISTER) 30 tablet 0    atenolol (TENORMIN) 25 MG tablet Take 1 tablet by mouth in the morning and at bedtime (Patient taking differently: Take 25 mg by mouth nightly) 180 tablet 2    omeprazole (PRILOSEC) 20 MG delayed release capsule Take 1 capsule by mouth 2 times daily (before meals) (Patient taking differently: Take 20 mg by mouth nightly) 60 capsule 5    Multiple Vitamin (MULTI-VITAMIN DAILY PO) Take 1 tablet by mouth daily      doxepin (SINEQUAN) 10 MG capsule Take 1 capsule by mouth nightly (Patient taking differently: Take 10 mg by mouth nightly as needed) 30 capsule 0    hydrochlorothiazide (HYDRODIURIL) 25 MG tablet Take 1 tablet by mouth daily (Patient taking differently: Take 25 mg by mouth as needed) 30 tablet 5     No current facility-administered medications for this visit. Allergies:    Allergies   Allergen Reactions    Codeine Itching    Percocet [Oxycodone-Acetaminophen] Itching    Toradol [Ketorolac Tromethamine] Itching         Subjective   REVIEW OF SYSTEMS:   CONSTITUTIONAL: no fever, no night sweats, no fatigue;  HEENT:impaired vision, no blurring of vision, no double vision, no hearing difficulty, no tinnitus, no ulceration, no dysplasia, no epistaxis;  LUNGS: no cough, no hemoptysis, no wheeze,  no shortness of breath;  CARDIOVASCULAR: palpitation, no chest pain, no shortness of breath;  GI: no abdominal pain, no nausea, no vomiting, no diarrhea, no constipation;  ERIC: no dysuria, no hematuria, no frequency or urgency, no nephrolithiasis;  MUSCULOSKELETAL: joint pain, no swelling, no stiffness;  ENDOCRINE: no polyuria, no polydipsia, , no hot intolerance,cold intolerance;  HEMATOLOGY: no easy bruising or bleeding, no history of clotting disorder;  DERMATOLOGY: no skin rash, no eczema, no pruritus;  PSYCHIATRY: no depression, no anxiety, no panic attacks, no suicidal ideation, no homicidal ideation;  NEUROLOGY:involuntary movement, no syncope, no seizures, no numbness or tingling of hands, no numbness or tingling of feet, no paresis;    Objective   /80 (Site: Left Upper Arm, Position: Sitting)   Pulse 57 Comment: pt states pulse is always low  Ht 5' 9\" (1.753 m)   Wt 131 lb 11.2 oz (59.7 kg)   LMP 06/08/2014   SpO2 100%   BMI 19.45 kg/m²     PHYSICAL EXAM:  CONSTITUTIONAL: Alert, appropriate, no acute distress  EYES: Non icteric, EOM intact, pupils equal round   ENT: Mucus membranes moist, no oral pharyngeal lesions, external inspection of ears and nose are normal  NECK: Supple, no masses. No palpable thyroid mass  CHEST/LUNGS: CTA bilaterally, normal respiratory effort   CARDIOVASCULAR: RRR, no murmurs. No lower extremity edema  ABDOMEN: soft non-tender, active bowel sounds, no HSM. No palpable masses  EXTREMITIES: warm, full ROM in all 4 extremities, no focal weakness. SKIN: warm, dry with no rashes or lesions  LYMPH: No cervical, clavicular, axillary, or inguinal lymphadenopathy  NEUROLOGIC: follows commands, non focal   PSYCH: mood and affect appropriate.   Alert and oriented to time, place, person      LABORATORY RESULTS REVIEWED/ANALYZED BY ME:  12/14/22 CBC  WBC 6.56  HGB 14.0    Neut 2. 95    RADIOLOGY STUDIES REVIEWED BY ME:  As above      ASSESSMENT:    Orders Placed This Encounter   Procedures    Echo 2d w doppler w color w contrast     Standing Status:   Future     Standing Expiration Date:   6/14/2024     Order Specific Question:   Reason for exam:     Answer:   assess cardiac function to due chemo        Rayne was seen today for new patient. Diagnoses and all orders for this visit:    Malignant neoplasm of overlapping sites of left breast (HCC)  -     ondansetron (ZOFRAN) 4 MG tablet; Take 1 tablet by mouth every 6 hours as needed for Nausea or Vomiting  -     promethazine (PHENERGAN) 25 MG tablet; Take 0.5 tablets by mouth every 6 hours as needed for Nausea  -     OLANZapine (ZYPREXA) 10 MG tablet; Take 1 tablet at night x4 nights ONLY with each cycle Adriamycin/Cytoxan every 2 weeks x4 cycles  -     lidocaine-prilocaine (EMLA) 2.5-2.5 % cream; Apply topically as needed. Triple negative breast cancer [facility])    Care plan discussed with patient    Encounter for monitoring cardiotoxic drug therapy  -     Echo 2d w doppler w color w contrast; Future    Examination prior to chemotherapy  -     Echo 2d w doppler w color w contrast; Future    At risk for cardiac complication  -     Echo 2d w doppler w color w contrast; Future    Encounter for preventive care  -     ondansetron (ZOFRAN) 4 MG tablet; Take 1 tablet by mouth every 6 hours as needed for Nausea or Vomiting  -     promethazine (PHENERGAN) 25 MG tablet; Take 0.5 tablets by mouth every 6 hours as needed for Nausea  -     OLANZapine (ZYPREXA) 10 MG tablet; Take 1 tablet at night x4 nights ONLY with each cycle Adriamycin/Cytoxan every 2 weeks x4 cycles  -     lidocaine-prilocaine (EMLA) 2.5-2.5 % cream; Apply topically as needed. Invasive ductal carcinoma, left breast, Nov 2022, Grade 3, ER 0.2%, WA 0%, HER-2 0/negative, Ki67 83%, Stage IIIB  12/14/2022-she was first seen by me.   Essentially, clinical T2 N1 Mx triple negative breast cancer I reviewed imaging, pathology. Discussed treatment recommendations, side effects, logistics with the patient at length. I recommend neoadjuvant chemotherapy with dose dense Adriamycin and cyclophosphamide followed by carboplatin/Taxol. 1 year of Keytruda. Awaiting CT C/A/P and bone scan. Order 2D echo today. Recommended regimen  Pembrolizumab every 6 weeks x1 year  Adriamycin, Cyclophosphamide + GCSF every 2 weeks x4 cycles  Followed by weekly Taxol, Carbo x12 cycles      PLAN:  RTC with MD in treatment room C#2  Recommend Pembrolizumab every 6 weeks x1 year with Adriamycin, Cyclophosphamide + GCSF every 2 weeks x4 cycles followed by weekly Taxol, Carbo x12 cycles-once ins approves  Treatment consent signed  Patient education given  Recommend 2D echo ASAP-prior to chemo  Recommend Zofran 4mg every 6 hrs as needed-script sent  Recommend Phenergan 12.5mg every 6 hrs as needed-script sent  Recommend Olanzapine 10mg nightly   Emla cream to port as needed-script sent  Recommend cold gloves/socks with weekly Taxol  Follow-up Willem results  Proceed with 12/15 CT scans  Continue follow-up with Dr Delano Álvarez, cody pre-charting as a registered nurse for Le Pierce MD. Electronically signed by Kacey Joe RN on 12/14/2022 at 2:16 PM CST. Silvio Shah am scribing for Le Pierce MD. Electronically signed by Kacey Joe RN on 12/14/2022 at 2:31 PM CST. I, Dr Gretchen Davies, personally performed the services described in this documentation as scribed by Kacey Joe RN in my presence and is both accurate and complete. I have seen, examined and reviewed this patient medication list, appropriate labs and imaging studies. I reviewed relevant medical records and others physicians notes. I discussed the plans of care with the patient. I answered all the questions to the patients satisfaction.  I have also reviewed the chief complaint (CC) and part of the history (History of Present Illness (HPI), Past Family Social History [facility]), or Review of Systems (ROS) and made changes when appropriated. (Please note that portions of this note were completed with a voice recognition program. Efforts were made to edit the dictations but occasionally words are mis-transcribed.)  Electronically signed by Asha Lambert MD on 12/14/2022 at 3:15 PM       The total time, 70min I spent to see the patient today includes at least one or more of the following: preparing to see the patient by reviewing prior tests, prior notes or other relevant information, performing appropriate independent examination and evaluation, counseling, ordering of medications, tests or procedures, referrals, communicating with other healthcare professionals when appropriated to coordinate care, documenting clinic information in the electronic medical record or other health records, independently interpreting results of tests, managing test results and communicating the results to the patient/family or caregiver.

## 2022-12-10 NOTE — OP NOTE
[facility] ROBERTA [address], [facility]                                OPERATIVE REPORT    PATIENT NAME: Jasmyn Evans                      :        1975  MED REC NO:   [id number]                              ROOM:  ACCOUNT NO:   [de-identified]                           ADMIT DATE: 2022  PROVIDER:     Tejal Peterson MD    DATE OF PROCEDURE:  2022    PREOPERATIVE DIAGNOSIS:  Triple-negative breast cancer, for  chemotherapy. POSTOPERATIVE DIAGNOSIS:  Triple-negative breast cancer, for  chemotherapy. PROCEDURE PERFORMED:  Left subclavian single-lumen LifePort catheter  placement. SURGEON:  Tejal Peterson MD    ASSISTANT:  Ashkan Ryder PA-C    ANESTHESIA:  Local with sedation. INDICATIONS:  The patient is a 78-year-old lady, recently diagnosed with  a triple-negative breast cancer. She is going to be receiving  neoadjuvant chemotherapy. We discussed the risks and benefits of port  placement. She understood and was agreeable. OPERATIVE PROCEDURE:  Today, she was brought to the operating room,  adequately sedated, and prepped and draped in a sterile fashion. The  left infraclavicular area was anesthetized with 1/16% Xylocaine,  subclavian vein was cannulated without incident. Wire was passed into  the superior vena cava under fluoroscopic guidance. We then  anesthetized the subcutaneous pocket. We made a lateral incision  laterally and elevated our pocket, made sure we had good hemostasis and  then passed the dilator through the initial stab incision of the skin. We threaded the catheter and positioned it appropriately, peeled away  the sheath, and did utilize intravenous contrast to facilitate  positioning of the catheter.   We then tunneled the catheter into our  pocket, checked position once more, attached the port in the usual  fashion, sutured the port in place with 2-0 silk stitches, once more  checked position, flushed and aspirated with heparin, irrigated the  pocket with antibiotic solution, closed with 2-0 and 3-0 Vicryl and 4-0  Monocryl for the skin. A Dermabond dressing was applied. Estimated  blood loss, minimal.  Complications, none. She tolerated the procedure  well.         Lobito Colón MD    D: 12/09/2022 11:34:17      T: 12/09/2022 23:27:47     [email or web address]  Job#: [id number]     Doc#: [id number]    CC:

## 2022-12-11 DIAGNOSIS — E55.9 VITAMIN D DEFICIENCY: ICD-10-CM

## 2022-12-12 RX ORDER — ERGOCALCIFEROL 1.25 MG/1
CAPSULE ORAL
Qty: 12 CAPSULE | Refills: 1 | Status: SHIPPED | OUTPATIENT
Start: 2022-12-12

## 2022-12-12 RX ORDER — ROPINIROLE 0.5 MG/1
TABLET, FILM COATED ORAL
Qty: 90 TABLET | Refills: 5 | Status: SHIPPED | OUTPATIENT
Start: 2022-12-12

## 2022-12-12 NOTE — TELEPHONE ENCOUNTER
This has been canceled per Tammy Blanton does not need follow up unless patient has issue with port.

## 2022-12-13 DIAGNOSIS — C50.812 MALIGNANT NEOPLASM OF OVERLAPPING SITES OF LEFT BREAST (HCC): Primary | ICD-10-CM

## 2022-12-14 ENCOUNTER — HOSPITAL ENCOUNTER (OUTPATIENT)
Dept: INFUSION THERAPY | Age: 47
Discharge: HOME | End: 2022-12-14
Payer: COMMERCIAL

## 2022-12-14 ENCOUNTER — OFFICE VISIT (OUTPATIENT)
Dept: HEMATOLOGY | Age: 47
End: 2022-12-14
Payer: COMMERCIAL

## 2022-12-14 VITALS
OXYGEN SATURATION: 100 % | BODY MASS INDEX: 19.51 KG/M2 | WEIGHT: 131.7 LBS | SYSTOLIC BLOOD PRESSURE: 112 MMHG | HEART RATE: 57 BPM | HEIGHT: 69 IN | DIASTOLIC BLOOD PRESSURE: 80 MMHG

## 2022-12-14 DIAGNOSIS — C50.812 MALIGNANT NEOPLASM OF OVERLAPPING SITES OF LEFT BREAST (HCC): Primary | ICD-10-CM

## 2022-12-14 DIAGNOSIS — Z91.89 AT RISK FOR CARDIAC COMPLICATION: ICD-10-CM

## 2022-12-14 DIAGNOSIS — Z00.00 ENCOUNTER FOR PREVENTIVE CARE: ICD-10-CM

## 2022-12-14 DIAGNOSIS — Z01.818 EXAMINATION PRIOR TO CHEMOTHERAPY: ICD-10-CM

## 2022-12-14 DIAGNOSIS — C50.919 TRIPLE NEGATIVE BREAST CANCER (HCC): ICD-10-CM

## 2022-12-14 DIAGNOSIS — Z79.899 ENCOUNTER FOR MONITORING CARDIOTOXIC DRUG THERAPY: ICD-10-CM

## 2022-12-14 DIAGNOSIS — Z71.89 CARE PLAN DISCUSSED WITH PATIENT: ICD-10-CM

## 2022-12-14 DIAGNOSIS — Z71.89 COORDINATION OF COMPLEX CARE: ICD-10-CM

## 2022-12-14 DIAGNOSIS — C50.812 MALIGNANT NEOPLASM OF OVERLAPPING SITES OF LEFT BREAST (HCC): ICD-10-CM

## 2022-12-14 DIAGNOSIS — Z51.81 ENCOUNTER FOR MONITORING CARDIOTOXIC DRUG THERAPY: ICD-10-CM

## 2022-12-14 LAB
BASOPHILS ABSOLUTE: 0.07 K/UL (ref 0.01–0.08)
BASOPHILS RELATIVE PERCENT: 1.1 % (ref 0.1–1.2)
EOSINOPHILS ABSOLUTE: 0.06 K/UL (ref 0.04–0.54)
EOSINOPHILS RELATIVE PERCENT: 0.9 % (ref 0.7–7)
HCT VFR BLD CALC: 41.4 % (ref 34.1–44.9)
HEMOGLOBIN: 14 G/DL (ref 11.2–15.7)
LYMPHOCYTES ABSOLUTE: 3.08 K/UL (ref 1.18–3.74)
LYMPHOCYTES RELATIVE PERCENT: 47 % (ref 19.3–53.1)
MCH RBC QN AUTO: 33.6 PG (ref 25.6–32.2)
MCHC RBC AUTO-ENTMCNC: 33.8 G/DL (ref 32.3–35.5)
MCV RBC AUTO: 99.3 FL (ref 79.4–94.8)
MONOCYTES ABSOLUTE: 0.39 K/UL (ref 0.24–0.82)
MONOCYTES RELATIVE PERCENT: 5.9 % (ref 4.7–12.5)
NEUTROPHILS ABSOLUTE: 2.95 K/UL (ref 1.56–6.13)
NEUTROPHILS RELATIVE PERCENT: 44.9 % (ref 34–71.1)
PDW BLD-RTO: 11.3 % (ref 11.7–14.4)
PLATELET # BLD: 150 K/UL (ref 182–369)
PMV BLD AUTO: 10.4 FL (ref 7.4–10.4)
RBC # BLD: 4.17 M/UL (ref 3.93–5.22)
WBC # BLD: 6.56 K/UL (ref 3.98–10.04)

## 2022-12-14 PROCEDURE — 99205 OFFICE O/P NEW HI 60 MIN: CPT | Performed by: INTERNAL MEDICINE

## 2022-12-14 PROCEDURE — 3078F DIAST BP <80 MM HG: CPT | Performed by: INTERNAL MEDICINE

## 2022-12-14 PROCEDURE — 99212 OFFICE O/P EST SF 10 MIN: CPT

## 2022-12-14 PROCEDURE — 36415 COLL VENOUS BLD VENIPUNCTURE: CPT

## 2022-12-14 PROCEDURE — 85025 COMPLETE CBC W/AUTO DIFF WBC: CPT

## 2022-12-14 PROCEDURE — 3074F SYST BP LT 130 MM HG: CPT | Performed by: INTERNAL MEDICINE

## 2022-12-14 RX ORDER — ONDANSETRON 4 MG/1
4 TABLET, FILM COATED ORAL EVERY 6 HOURS PRN
Qty: 30 TABLET | Refills: 5 | Status: SHIPPED | OUTPATIENT
Start: 2022-12-14

## 2022-12-14 RX ORDER — LIDOCAINE AND PRILOCAINE 25; 25 MG/G; MG/G
CREAM TOPICAL
Qty: 30 G | Refills: 5 | Status: SHIPPED | OUTPATIENT
Start: 2022-12-14

## 2022-12-14 RX ORDER — PROMETHAZINE HYDROCHLORIDE 25 MG/1
12.5 TABLET ORAL EVERY 6 HOURS PRN
Qty: 30 TABLET | Refills: 5 | Status: SHIPPED | OUTPATIENT
Start: 2022-12-14

## 2022-12-14 RX ORDER — OLANZAPINE 10 MG/1
TABLET ORAL
Qty: 16 TABLET | Refills: 0 | Status: SHIPPED | OUTPATIENT
Start: 2022-12-14

## 2022-12-14 RX ORDER — MONTELUKAST SODIUM 10 MG/1
10 TABLET ORAL NIGHTLY
COMMUNITY

## 2022-12-14 ASSESSMENT — PROMIS GLOBAL HEALTH SCALE
IN THE PAST 7 DAYS, HOW OFTEN HAVE YOU BEEN BOTHERED BY EMOTIONAL PROBLEMS, SUCH AS FEELING ANXIOUS, DEPRESSED, OR IRRITABLE [ON A SCALE FROM 1 (NEVER) TO 5 (ALWAYS)]?: 2
IN GENERAL, PLEASE RATE HOW WELL YOU CARRY OUT YOUR USUAL SOCIAL ACTIVITIES (INCLUDES ACTIVITIES AT HOME, AT WORK, AND IN YOUR COMMUNITY, AND RESPONSIBILITIES AS A PARENT, CHILD, SPOUSE, EMPLOYEE, FRIEND, ETC) [ON A SCALE OF 1 (POOR) TO 5 (EXCELLENT)]?: 4
TO WHAT EXTENT ARE YOU ABLE TO CARRY OUT YOUR EVERYDAY PHYSICAL ACTIVITIES SUCH AS WALKING, CLIMBING STAIRS, CARRYING GROCERIES, OR MOVING A CHAIR [ON A SCALE OF 1 (NOT AT ALL) TO 5 (COMPLETELY)]?: 5
SUM OF RESPONSES TO QUESTIONS 2, 4, 5, & 10: 12
SUM OF RESPONSES TO QUESTIONS 3, 6, 7, & 8: 11
IN GENERAL, HOW WOULD YOU RATE YOUR MENTAL HEALTH, INCLUDING YOUR MOOD AND YOUR ABILITY TO THINK [ON A SCALE OF 1 (POOR) TO 5 (EXCELLENT)]?: 3
IN GENERAL, WOULD YOU SAY YOUR QUALITY OF LIFE IS...[ON A SCALE OF 1 (POOR) TO 5 (EXCELLENT)]: 3
IN THE PAST 7 DAYS, HOW WOULD YOU RATE YOUR PAIN ON AVERAGE [ON A SCALE FROM 0 (NO PAIN) TO 10 (WORST IMAGINABLE PAIN)]?: 0
IN GENERAL, HOW WOULD YOU RATE YOUR PHYSICAL HEALTH [ON A SCALE OF 1 (POOR) TO 5 (EXCELLENT)]?: 3
IN GENERAL, WOULD YOU SAY YOUR HEALTH IS...[ON A SCALE OF 1 (POOR) TO 5 (EXCELLENT)]: 3
IN THE PAST 7 DAYS, HOW WOULD YOU RATE YOUR FATIGUE ON AVERAGE [ON A SCALE FROM 1 (NONE) TO 5 (VERY SEVERE)]?: 3
IN GENERAL, HOW WOULD YOU RATE YOUR SATISFACTION WITH YOUR SOCIAL ACTIVITIES AND RELATIONSHIPS [ON A SCALE OF 1 (POOR) TO 5 (EXCELLENT)]?: 4

## 2022-12-15 ENCOUNTER — HOSPITAL ENCOUNTER (OUTPATIENT)
Dept: CT IMAGING | Age: 47
Discharge: HOME | End: 2022-12-15
Payer: COMMERCIAL

## 2022-12-15 ENCOUNTER — PATIENT MESSAGE (OUTPATIENT)
Dept: OTHER | Age: 47
End: 2022-12-15

## 2022-12-15 DIAGNOSIS — C50.812 MALIGNANT NEOPLASM OF OVERLAPPING SITES OF LEFT BREAST (HCC): ICD-10-CM

## 2022-12-15 PROCEDURE — 6360000004 HC RX CONTRAST MEDICATION: Performed by: SURGERY

## 2022-12-15 PROCEDURE — 71260 CT THORAX DX C+: CPT

## 2022-12-15 PROCEDURE — 74177 CT ABD & PELVIS W/CONTRAST: CPT

## 2022-12-15 RX ADMIN — IOPAMIDOL 75 ML: 755 INJECTION, SOLUTION INTRAVENOUS at 11:23

## 2022-12-19 ENCOUNTER — HOSPITAL ENCOUNTER (OUTPATIENT)
Dept: NON INVASIVE DIAGNOSTICS | Age: 47
Discharge: HOME | End: 2022-12-19
Payer: COMMERCIAL

## 2022-12-19 DIAGNOSIS — Z79.899 ENCOUNTER FOR MONITORING CARDIOTOXIC DRUG THERAPY: ICD-10-CM

## 2022-12-19 DIAGNOSIS — Z91.89 AT RISK FOR CARDIAC COMPLICATION: ICD-10-CM

## 2022-12-19 DIAGNOSIS — Z01.818 EXAMINATION PRIOR TO CHEMOTHERAPY: ICD-10-CM

## 2022-12-19 DIAGNOSIS — Z51.81 ENCOUNTER FOR MONITORING CARDIOTOXIC DRUG THERAPY: ICD-10-CM

## 2022-12-19 PROCEDURE — 93306 TTE W/DOPPLER COMPLETE: CPT

## 2022-12-19 PROCEDURE — 93356 MYOCRD STRAIN IMG SPCKL TRCK: CPT

## 2022-12-27 ENCOUNTER — TELEPHONE (OUTPATIENT)
Dept: INFUSION THERAPY | Age: 47
End: 2022-12-27

## 2022-12-27 NOTE — TELEPHONE ENCOUNTER
Rayne Called back and gave me permission to apply for co-pay cards for her [facility]. I will mail out my contact information for any future questions.

## 2022-12-29 ENCOUNTER — HOSPITAL ENCOUNTER (OUTPATIENT)
Dept: INFUSION THERAPY | Age: 47
Discharge: HOME | End: 2022-12-29
Payer: COMMERCIAL

## 2022-12-29 VITALS
TEMPERATURE: 98.1 F | BODY MASS INDEX: 19.06 KG/M2 | SYSTOLIC BLOOD PRESSURE: 109 MMHG | RESPIRATION RATE: 18 BRPM | HEART RATE: 54 BPM | HEIGHT: 69 IN | DIASTOLIC BLOOD PRESSURE: 51 MMHG | WEIGHT: 128.7 LBS | OXYGEN SATURATION: 100 %

## 2022-12-29 DIAGNOSIS — C50.919 TRIPLE NEGATIVE BREAST CANCER (HCC): ICD-10-CM

## 2022-12-29 DIAGNOSIS — C50.812 MALIGNANT NEOPLASM OF OVERLAPPING SITES OF LEFT BREAST (HCC): Primary | ICD-10-CM

## 2022-12-29 DIAGNOSIS — R53.83 OTHER FATIGUE: ICD-10-CM

## 2022-12-29 DIAGNOSIS — C50.812 MALIGNANT NEOPLASM OF OVERLAPPING SITES OF LEFT BREAST (HCC): ICD-10-CM

## 2022-12-29 LAB
ALBUMIN SERPL-MCNC: 3.8 G/DL (ref 3.5–5.2)
ALP BLD-CCNC: 60 U/L (ref 35–104)
ALT SERPL-CCNC: 13 U/L (ref 9–52)
ANION GAP SERPL CALCULATED.3IONS-SCNC: 8 MMOL/L (ref 7–19)
AST SERPL-CCNC: 22 U/L (ref 14–36)
BILIRUB SERPL-MCNC: 0.5 MG/DL (ref 0.2–1.3)
BUN BLDV-MCNC: 8 MG/DL (ref 7–17)
CALCIUM SERPL-MCNC: 9.3 MG/DL (ref 8.4–10.2)
CHLORIDE BLD-SCNC: 106 MMOL/L (ref 98–111)
CO2: 25 MMOL/L (ref 22–29)
CREAT SERPL-MCNC: 0.7 MG/DL (ref 0.5–1)
GFR SERPL CREATININE-BSD FRML MDRD: >60 ML/MIN/{1.73_M2}
GLOBULIN: 2.9 G/DL
GLUCOSE BLD-MCNC: 94 MG/DL (ref 74–106)
HCT VFR BLD CALC: 34.7 % (ref 34.1–44.9)
HEMOGLOBIN: 11.9 G/DL (ref 11.2–15.7)
LYMPHOCYTES ABSOLUTE: 2.42 K/UL (ref 1.18–3.74)
LYMPHOCYTES RELATIVE PERCENT: 42.7 % (ref 19.3–53.1)
MCH RBC QN AUTO: 33.6 PG (ref 25.6–32.2)
MCHC RBC AUTO-ENTMCNC: 34.3 G/DL (ref 32.3–35.5)
MCV RBC AUTO: 98 FL (ref 79.4–94.8)
MONOCYTES ABSOLUTE: 0.34 K/UL (ref 0.24–0.82)
MONOCYTES RELATIVE PERCENT: 6 % (ref 4.7–12.5)
NEUTROPHILS ABSOLUTE: 2.83 K/UL (ref 1.56–6.13)
NEUTROPHILS RELATIVE PERCENT: 49.9 % (ref 34–71.1)
PDW BLD-RTO: 11.5 % (ref 11.7–14.4)
PLATELET # BLD: 174 K/UL (ref 182–369)
PMV BLD AUTO: 9.6 FL (ref 7.4–10.4)
POTASSIUM SERPL-SCNC: 3.7 MMOL/L (ref 3.5–5.1)
RBC # BLD: 3.54 M/UL (ref 3.93–5.22)
SODIUM BLD-SCNC: 139 MMOL/L (ref 137–145)
TOTAL PROTEIN: 6.7 G/DL (ref 6.3–8.2)
TSH SERPL DL<=0.05 MIU/L-ACNC: 1.79 UIU/ML (ref 0.27–4.2)
WBC # BLD: 5.67 K/UL (ref 3.98–10.04)

## 2022-12-29 PROCEDURE — 36415 COLL VENOUS BLD VENIPUNCTURE: CPT

## 2022-12-29 PROCEDURE — 85025 COMPLETE CBC W/AUTO DIFF WBC: CPT

## 2022-12-29 PROCEDURE — 6360000002 HC RX W HCPCS: Performed by: INTERNAL MEDICINE

## 2022-12-29 PROCEDURE — 6360000002 HC RX W HCPCS: Performed by: PHYSICIAN ASSISTANT

## 2022-12-29 PROCEDURE — 96375 TX/PRO/DX INJ NEW DRUG ADDON: CPT

## 2022-12-29 PROCEDURE — 96417 CHEMO IV INFUS EACH ADDL SEQ: CPT

## 2022-12-29 PROCEDURE — 2580000003 HC RX 258: Performed by: INTERNAL MEDICINE

## 2022-12-29 PROCEDURE — 96367 TX/PROPH/DG ADDL SEQ IV INF: CPT

## 2022-12-29 PROCEDURE — 2580000003 HC RX 258: Performed by: PHYSICIAN ASSISTANT

## 2022-12-29 PROCEDURE — 80053 COMPREHEN METABOLIC PANEL: CPT

## 2022-12-29 PROCEDURE — 96413 CHEMO IV INFUSION 1 HR: CPT

## 2022-12-29 RX ORDER — SODIUM CHLORIDE 9 MG/ML
5-40 INJECTION INTRAVENOUS PRN
Status: DISCONTINUED | OUTPATIENT
Start: 2022-12-29 | End: 2022-12-30 | Stop reason: HOSPADM

## 2022-12-29 RX ORDER — SODIUM CHLORIDE 9 MG/ML
5-40 INJECTION INTRAVENOUS PRN
Status: CANCELLED | OUTPATIENT
Start: 2022-12-29

## 2022-12-29 RX ORDER — FAMOTIDINE 10 MG/ML
20 INJECTION, SOLUTION INTRAVENOUS
Status: CANCELLED | OUTPATIENT
Start: 2022-12-29

## 2022-12-29 RX ORDER — PALONOSETRON 0.05 MG/ML
0.25 INJECTION, SOLUTION INTRAVENOUS ONCE
Status: COMPLETED | OUTPATIENT
Start: 2022-12-29 | End: 2022-12-29

## 2022-12-29 RX ORDER — ALBUTEROL SULFATE 90 UG/1
4 AEROSOL, METERED RESPIRATORY (INHALATION) PRN
Status: CANCELLED | OUTPATIENT
Start: 2022-12-29

## 2022-12-29 RX ORDER — SODIUM CHLORIDE 9 MG/ML
5-250 INJECTION, SOLUTION INTRAVENOUS PRN
Status: CANCELLED | OUTPATIENT
Start: 2022-12-29

## 2022-12-29 RX ORDER — ACETAMINOPHEN 325 MG/1
650 TABLET ORAL
Status: CANCELLED | OUTPATIENT
Start: 2022-12-29

## 2022-12-29 RX ORDER — HEPARIN SODIUM (PORCINE) LOCK FLUSH IV SOLN 100 UNIT/ML 100 UNIT/ML
500 SOLUTION INTRAVENOUS PRN
Status: DISCONTINUED | OUTPATIENT
Start: 2022-12-29 | End: 2022-12-30 | Stop reason: HOSPADM

## 2022-12-29 RX ORDER — DOXORUBICIN HYDROCHLORIDE 2 MG/ML
60 INJECTION, SOLUTION INTRAVENOUS ONCE
Status: DISCONTINUED | OUTPATIENT
Start: 2022-12-29 | End: 2022-12-29

## 2022-12-29 RX ORDER — EPINEPHRINE 1 MG/ML
0.3 INJECTION, SOLUTION, CONCENTRATE INTRAVENOUS PRN
Status: CANCELLED | OUTPATIENT
Start: 2022-12-29

## 2022-12-29 RX ORDER — DIPHENHYDRAMINE HYDROCHLORIDE 50 MG/ML
50 INJECTION INTRAMUSCULAR; INTRAVENOUS
Status: CANCELLED | OUTPATIENT
Start: 2022-12-29

## 2022-12-29 RX ORDER — ONDANSETRON 2 MG/ML
8 INJECTION INTRAMUSCULAR; INTRAVENOUS
Status: CANCELLED | OUTPATIENT
Start: 2022-12-29

## 2022-12-29 RX ORDER — SODIUM CHLORIDE 9 MG/ML
INJECTION, SOLUTION INTRAVENOUS CONTINUOUS
Status: CANCELLED | OUTPATIENT
Start: 2022-12-29

## 2022-12-29 RX ORDER — HEPARIN SODIUM (PORCINE) LOCK FLUSH IV SOLN 100 UNIT/ML 100 UNIT/ML
500 SOLUTION INTRAVENOUS PRN
Status: CANCELLED | OUTPATIENT
Start: 2022-12-29

## 2022-12-29 RX ORDER — PALONOSETRON 0.05 MG/ML
0.25 INJECTION, SOLUTION INTRAVENOUS ONCE
Status: CANCELLED | OUTPATIENT
Start: 2022-12-29 | End: 2022-12-29

## 2022-12-29 RX ORDER — MEPERIDINE HYDROCHLORIDE 50 MG/ML
12.5 INJECTION INTRAMUSCULAR; INTRAVENOUS; SUBCUTANEOUS PRN
Status: CANCELLED | OUTPATIENT
Start: 2022-12-29

## 2022-12-29 RX ORDER — SODIUM CHLORIDE 9 MG/ML
5-250 INJECTION, SOLUTION INTRAVENOUS PRN
Status: DISCONTINUED | OUTPATIENT
Start: 2022-12-29 | End: 2022-12-30 | Stop reason: HOSPADM

## 2022-12-29 RX ORDER — DOXORUBICIN HYDROCHLORIDE 2 MG/ML
60 INJECTION, SOLUTION INTRAVENOUS ONCE
Status: CANCELLED | OUTPATIENT
Start: 2022-12-29 | End: 2022-12-29

## 2022-12-29 RX ADMIN — Medication 10 ML: at 17:00

## 2022-12-29 RX ADMIN — SODIUM CHLORIDE 50 ML/HR: 9 INJECTION, SOLUTION INTRAVENOUS at 14:37

## 2022-12-29 RX ADMIN — SODIUM CHLORIDE 100 MG: 9 INJECTION, SOLUTION INTRAVENOUS at 16:08

## 2022-12-29 RX ADMIN — PALONOSETRON 0.25 MG: 0.05 INJECTION, SOLUTION INTRAVENOUS at 14:36

## 2022-12-29 RX ADMIN — DEXAMETHASONE SODIUM PHOSPHATE: 10 INJECTION, SOLUTION INTRAMUSCULAR; INTRAVENOUS at 14:37

## 2022-12-29 RX ADMIN — FOSAPREPITANT 150 MG: 150 INJECTION, POWDER, LYOPHILIZED, FOR SOLUTION INTRAVENOUS at 14:45

## 2022-12-29 RX ADMIN — SODIUM CHLORIDE 400 MG: 9 INJECTION, SOLUTION INTRAVENOUS at 15:37

## 2022-12-29 RX ADMIN — Medication 500 UNITS: at 17:00

## 2022-12-29 RX ADMIN — CYCLOPHOSPHAMIDE 1000 MG: 1 INJECTION, POWDER, FOR SOLUTION INTRAVENOUS; ORAL at 16:28

## 2022-12-30 ENCOUNTER — HOSPITAL ENCOUNTER (OUTPATIENT)
Dept: INFUSION THERAPY | Age: 47
Discharge: HOME | End: 2022-12-30
Payer: COMMERCIAL

## 2022-12-30 DIAGNOSIS — C50.919 TRIPLE NEGATIVE BREAST CANCER (HCC): Primary | ICD-10-CM

## 2022-12-30 DIAGNOSIS — C50.812 MALIGNANT NEOPLASM OF OVERLAPPING SITES OF LEFT BREAST (HCC): ICD-10-CM

## 2022-12-30 PROCEDURE — 96372 THER/PROPH/DIAG INJ SC/IM: CPT

## 2022-12-30 PROCEDURE — 6360000002 HC RX W HCPCS: Performed by: PHYSICIAN ASSISTANT

## 2022-12-30 RX ORDER — ACETAMINOPHEN 325 MG/1
650 TABLET ORAL
Status: CANCELLED | OUTPATIENT
Start: 2023-01-12

## 2022-12-30 RX ORDER — SODIUM CHLORIDE 9 MG/ML
5-40 INJECTION INTRAVENOUS PRN
Status: CANCELLED | OUTPATIENT
Start: 2023-01-12

## 2022-12-30 RX ORDER — ALBUTEROL SULFATE 90 UG/1
4 AEROSOL, METERED RESPIRATORY (INHALATION) PRN
Status: CANCELLED | OUTPATIENT
Start: 2023-01-12

## 2022-12-30 RX ORDER — EPINEPHRINE 1 MG/ML
0.3 INJECTION, SOLUTION, CONCENTRATE INTRAVENOUS PRN
Status: CANCELLED | OUTPATIENT
Start: 2023-01-12

## 2022-12-30 RX ORDER — ONDANSETRON 2 MG/ML
8 INJECTION INTRAMUSCULAR; INTRAVENOUS
Status: CANCELLED | OUTPATIENT
Start: 2023-01-12

## 2022-12-30 RX ORDER — DIPHENHYDRAMINE HYDROCHLORIDE 50 MG/ML
50 INJECTION INTRAMUSCULAR; INTRAVENOUS
Status: CANCELLED | OUTPATIENT
Start: 2023-01-12

## 2022-12-30 RX ORDER — PALONOSETRON 0.05 MG/ML
0.25 INJECTION, SOLUTION INTRAVENOUS ONCE
Status: CANCELLED | OUTPATIENT
Start: 2023-01-12 | End: 2023-01-12

## 2022-12-30 RX ORDER — HEPARIN SODIUM (PORCINE) LOCK FLUSH IV SOLN 100 UNIT/ML 100 UNIT/ML
500 SOLUTION INTRAVENOUS PRN
Status: CANCELLED | OUTPATIENT
Start: 2023-01-12

## 2022-12-30 RX ORDER — SODIUM CHLORIDE 9 MG/ML
5-250 INJECTION, SOLUTION INTRAVENOUS PRN
Status: CANCELLED | OUTPATIENT
Start: 2023-01-12

## 2022-12-30 RX ORDER — SODIUM CHLORIDE 9 MG/ML
INJECTION, SOLUTION INTRAVENOUS CONTINUOUS
Status: CANCELLED | OUTPATIENT
Start: 2023-01-12

## 2022-12-30 RX ORDER — MEPERIDINE HYDROCHLORIDE 25 MG/ML
12.5 INJECTION INTRAMUSCULAR; INTRAVENOUS; SUBCUTANEOUS PRN
Status: CANCELLED | OUTPATIENT
Start: 2023-01-12

## 2022-12-30 RX ORDER — DOXORUBICIN HYDROCHLORIDE 2 MG/ML
60 INJECTION, SOLUTION INTRAVENOUS ONCE
Status: CANCELLED | OUTPATIENT
Start: 2023-01-12 | End: 2023-01-12

## 2022-12-30 RX ADMIN — PEGFILGRASTIM-CBQV 6 MG: 6 INJECTION, SOLUTION SUBCUTANEOUS at 11:44

## 2023-01-03 ENCOUNTER — OFFICE VISIT (OUTPATIENT)
Dept: SURGERY | Age: 48
End: 2023-01-03

## 2023-01-03 ENCOUNTER — TELEPHONE (OUTPATIENT)
Dept: INFUSION THERAPY | Age: 48
End: 2023-01-03

## 2023-01-03 VITALS
HEIGHT: 69 IN | BODY MASS INDEX: 19.11 KG/M2 | HEART RATE: 68 BPM | WEIGHT: 129 LBS | OXYGEN SATURATION: 95 % | TEMPERATURE: 97.4 F

## 2023-01-03 DIAGNOSIS — C50.919 TRIPLE NEGATIVE BREAST CANCER (HCC): Primary | ICD-10-CM

## 2023-01-03 PROCEDURE — 99024 POSTOP FOLLOW-UP VISIT: CPT | Performed by: PHYSICIAN ASSISTANT

## 2023-01-03 RX ORDER — DOXYCYCLINE HYCLATE 100 MG/1
100 CAPSULE ORAL 2 TIMES DAILY
Qty: 20 CAPSULE | Refills: 0 | Status: SHIPPED | OUTPATIENT
Start: 2023-01-03 | End: 2023-01-13

## 2023-01-03 NOTE — TELEPHONE ENCOUNTER
Spoke with Rayne regarding area in breast. She stated that it is larger and red. She has apt at Dr. Diya Monsalve to have it eval today. She did state she has had low grade fever.

## 2023-01-03 NOTE — PROGRESS NOTES
Subjective  Rayne Cast is a 49-year-old female with a known triple negative breast cancer to the left breast.  She has some redness within the left breast mass which is been getting worse. She states she is a low-grade fever and severe pain. She has completed her first chemotherapy. Objective  Patient Active Problem List    Diagnosis Date Noted    Triple negative breast cancer ([facility] [address] 12/09/2022    Malignant neoplasm of overlapping sites of left breast ([facility] [address] 11/22/2022    Overactive bladder 07/16/2018    Restless leg syndrome 07/16/2018    Insomnia 07/16/2018    Breast pain 03/12/2018    Diffuse cystic mastopathy 03/12/2018    History of breast augmentation 03/12/2018    Gastroesophageal reflux disease without esophagitis 08/28/2017    PVC (premature ventricular contraction) 07/25/2017    Essential hypertension 06/21/2017    Anxiety 06/21/2017    Tobacco abuse 06/21/2017    Primary osteoarthritis of right hip 04/21/2017    S/P laparoscopic cholecystectomy 07/10/2014    Uterine mass 06/18/2014       Current Outpatient Medications   Medication Sig Dispense Refill    doxycycline hyclate (VIBRAMYCIN) 100 MG capsule Take 1 capsule by mouth 2 times daily for 10 days 20 capsule 0    montelukast (SINGULAIR) 10 MG tablet Take 10 mg by mouth nightly      ondansetron (ZOFRAN) 4 MG tablet Take 1 tablet by mouth every 6 hours as needed for Nausea or Vomiting 30 tablet 5    promethazine (PHENERGAN) 25 MG tablet Take 0.5 tablets by mouth every 6 hours as needed for Nausea 30 tablet 5    OLANZapine (ZYPREXA) 10 MG tablet Take 1 tablet at night x4 nights ONLY with each cycle Adriamycin/Cytoxan every 2 weeks x4 cycles 16 tablet 0    lidocaine-prilocaine (EMLA) 2.5-2.5 % cream Apply topically as needed.  30 g 5    vitamin D (ERGOCALCIFEROL) 1.25 MG (92553 UT) CAPS capsule TAKE 1 CAPSULE BY MOUTH 1 TIME A WEEK 12 capsule 1    rOPINIRole (REQUIP) 0.5 MG tablet TAKE 1 TO 3 TABLETS BY MOUTH EVERY NIGHT 90 tablet 5    sertraline (ZOLOFT) 50 MG tablet TAKE 1 TABLET BY MOUTH DAILY (Patient taking differently: Take 50 mg by mouth nightly) 30 tablet 3    valACYclovir (VALTREX) 1 g tablet TAKE 1 TABLET BY MOUTH EVERY DAY AS NEEDED FOR FEVER BLISTER (Patient taking differently: Take 1,000 mg by mouth as needed TAKE 1 TABLET BY MOUTH EVERY DAY AS NEEDED FOR FEVER BLISTER) 30 tablet 0    atenolol (TENORMIN) 25 MG tablet Take 1 tablet by mouth in the morning and at bedtime (Patient taking differently: Take 25 mg by mouth nightly) 180 tablet 2    omeprazole (PRILOSEC) 20 MG delayed release capsule Take 1 capsule by mouth 2 times daily (before meals) (Patient taking differently: Take 20 mg by mouth nightly) 60 capsule 5    Multiple Vitamin (MULTI-VITAMIN DAILY PO) Take 1 tablet by mouth daily      doxepin (SINEQUAN) 10 MG capsule Take 1 capsule by mouth nightly (Patient taking differently: Take 10 mg by mouth nightly as needed) 30 capsule 0    hydrochlorothiazide (HYDRODIURIL) 25 MG tablet Take 1 tablet by mouth daily (Patient taking differently: Take 25 mg by mouth as needed) 30 tablet 5     No current facility-administered medications for this visit.        Allergies Codeine, Percocet [oxycodone-acetaminophen], and Toradol [ketorolac tromethamine]    Past Medical History:   Diagnosis Date    Breast cancer ([facility] [address] 11/2022    Triple negative breast cancer    Malignant neoplasm of overlapping sites of left breast ([facility] [address] 11/22/2022    PVC (premature ventricular contraction)     Restless leg syndrome 07/16/2018    Uterine mass 06/17/2014    9x9 cm on CT scan       Past Surgical History:   Procedure Laterality Date    BREAST RECONSTRUCTION  04/2019    BREAST SURGERY Bilateral 8534    silicone Memory Gel    CHOLECYSTECTOMY  6/24/14    HC INJECT OTHER PERPHRL NERV Right 8/3/2016    HIP FLUOROSCOPIC GUIDED CORTICOSTEROID INJECTION  performed by Naseem Allen MD at [facility] NERV Right 4/21/2017    FLURO GUIDED HIP INJECTION performed by Iveth Werner MD at [address] [place] [address], VAGINAL      PORT SURGERY Left 12/9/2022    PORT INSERTION WITH FLUOROSCOPY performed by Ace Pimentel MD at [facility] BREAST NEEDLE BIOPSY LEFT Left 11/21/2022    US BREAST NEEDLE BIOPSY LEFT LPS GENERAL SURGERY       Family History   Problem Relation Age of Onset    Cancer Mother         Melanoma    Hypertension Mother     Cancer Brother         synovial cell sarcoma    Cancer Maternal Grandfather         lung    Other Daughter         gallbladder disease       Social History     Tobacco Use    Smoking status: Every Day     Packs/day: 0.50     Years: 34.00     Pack years: 17.00     Types: Cigarettes     Start date: 24    Smokeless tobacco: Never   Substance Use Topics    Alcohol use: Not Currently     Comment: occ        Review of systems  Reviewed and positive for the above all other systems noted to be negative    Exam  Vitals:    01/03/23 1357   Pulse: 68   Temp: 97.4 °F (36.3 °C)   SpO2: 95%        On examination, she has some cellulitis to the left breast and known mass associated with her breast cancer. There is no exudate. Assessment  Left breast mass cellulitis    Plan  I will add doxycycline to her regimen. Hopefully, some of this inflammation is secondary to infection.     (Please note that portions of this note were completed with a voice recognition program. Efforts were made to edit the dictations but occasionally words are mis-transcribed.)

## 2023-01-06 ENCOUNTER — APPOINTMENT (OUTPATIENT)
Dept: INFUSION THERAPY | Age: 48
End: 2023-01-06
Payer: COMMERCIAL

## 2023-01-08 DIAGNOSIS — C50.812 MALIGNANT NEOPLASM OF OVERLAPPING SITES OF LEFT BREAST (HCC): ICD-10-CM

## 2023-01-08 DIAGNOSIS — Z00.00 ENCOUNTER FOR PREVENTIVE CARE: ICD-10-CM

## 2023-01-09 RX ORDER — OLANZAPINE 10 MG/1
TABLET ORAL
Qty: 16 TABLET | Refills: 0 | Status: SHIPPED | OUTPATIENT
Start: 2023-01-09

## 2023-01-10 NOTE — PROGRESS NOTES
MEDICAL ONCOLOGY PROGRESS NOTE    Pt Name: Leroy Sanderson  MRN: [id number]  YOB: 1975  Date of evaluation: 1/12/2023    HISTORY OF PRESENT ILLNESS:    Diagnosis  Invasive ductal carcinoma, left breast, Nov 2022  Grade 3  Stage IIIB, clinical T2 N1 M0  ER 0.2%, RI 0%, HER-2 0/negative, Ki67 83%  MammaPrint: Basal type/high risk  Germline [facility] 81 gene genetic panel: Negative    Treatment Summary  12/29/22-Initiated Pembrolizumab every 6 weeks x1 year with Adriamycin, Cyclophosphamide + GCSF every 2 weeks x4 cycles followed by weekly Taxol, Carbo x12 cycles  Anticipate surgery  Anticipate radiation therapy    Cancer History  Rayne Knott was first seen by me on 12/14/2022. She was referred by Dr. Liliana Caldwell for a diagnosis of triple negative breast cancer. This was a palpable lesion on her left breast.  11/2/22 Bilateral diagnostic mammogram: Left breast, BI-RADS 4B, intermediate concern for malignancy involving the palpable 1.7 cm complex cystic mass in the upper outer quadrant. Recommendation is for ultrasound-guided percutaneous core needle biopsy for diagnosis in order to exclude malignancy. 1.7 cm axillary lymph node with borderline thickened cortical elements. Recommendation is for percutaneous needle biopsy versus fine-needle aspiration (depending on performing physician preference) in order to exclude malignancy. 11/2/22 US left breast: Palpable area of concern is labeled in the left breast upper outer quadrant at 3:00 4 cm from the nipple. Ultrasound at this location demonstrates an underlying partially circumscribed partially indistinct complex cystic mass measuring 1.7 x 1.6 x 1 cm. The surrounding tissue is unaffected in sonographic appearance. The underlying implant is noted and unremarkable. Ultrasound also performed of the left axilla which demonstrates a solitary borderline abnormal axillary lymph node with appearance of thickened cortical elements.  The lymph node measures up to 1.7 0.9 x 0.7 cm with mildly thickened cortex of 4 mm.   11/21/22 Breast, left breast needle core biopsies at 3 o'clock position: Invasive ductal carcinoma, no special type, grade 3. Invasive carcinoma measures 1.1 cm in greatest linear dimension and is present in multiple cores. Lymph node, left axillary lymph node fine-needle aspiration, smears and ThinPrep: Small round lymphocytes present, negative for cytologic evidence of malignancy. ER 0.2%, ME 0%, HER-2 0/negative, Ki67 83%. MammaPrint: Basal type/high risk. 12/2/22 MRI bilateral breast: Left breast, BI-RADS 6, biopsy-proven malignant mass in the left breast upper outer quadrant. MRI measurement is estimated as 24 x 20 x 20 mm in length by with by height. This is noted to be larger than that seen on previous ultrasounds, and this may be due to some associated surrounding nonmass enhancement which is immediately contiguous with mass margins. The mass is noted to be immediately contiguous, with no intervening fat plane between the posterior margin of the mass and the implant/implant capsule. Axillary lymph nodes appear within normal by MRI. No abnormal internal mammary chain lymph nodes are seen. No obvious extramammary significant findings seen. Recommend appropriate clinical, medical oncology and surgical management. Overall assessment BI-RADS 6, biopsy-proven malignancy. 12/14/22 Patient education given to patient for chemotherapy. Treatment consent signed. 12/14/2022-she was first seen by me. Essentially, clinical T2 N1 Mx triple negative breast cancer I reviewed imaging, pathology. Discussed treatment recommendations, side effects, logistics with the patient at length. I recommend neoadjuvant chemotherapy with dose dense Adriamycin and cyclophosphamide followed by carboplatin/Taxol. 1 year of Keytruda. Awaiting CT C/A/P and bone scan. Order 2D echo today.   12/15/22 CT Chest W Contrast No evidence for metastatic disease within the chest. Lobulated 1.2 x 2.4 cm left anterior breast lesion. Correlate with prior mammograms and breast history. Mild emphysema. Asymmetric mildly prominent left axillary lymph node measuring 7 mm. Attention on follow-up exams. 12/15/22 CT Abd/Pelvis W IV Contrast (oral) No evidence of metastatic disease within the abdomen or pelvis. 12/15/22 NM Bone Scan There is no evidence of osseous metastatic disease. 12/19/22 2D Echo  LV is normal in size with normal systolic function. LV ejection fraction estimated 55 to 60%. Diastolic function appears within normal range. Normal wall thickness. RV is normal in size with normal systolic function. Normal left atrial size. Normal right atrial size. Aortic valve is trileaflet with normal leaflet mobility. No significant stenosis or regurgitation. Mitral valve is structurally normal with normal leaflet mobility. No significant stenosis or regurgitation. No significant tricuspid regurgitation. No significant pericardial effusion.   12/29/22-Initiated Pembrolizumab every 6 weeks x1 year with Adriamycin, Cyclophosphamide + GCSF every 2 weeks x4 cycles followed by weekly Taxol, Carbo x12 cycles  12/20/22 [facility] Genetic Panel: Negative    12/20/22 [facility] Genetic Panel         Past Medical History:    Past Medical History:   Diagnosis Date    Breast cancer ([address] 11/2022    Triple negative breast cancer    Malignant neoplasm of overlapping sites of left breast ([address] 11/22/2022    PVC (premature ventricular contraction)     Restless leg syndrome 07/16/2018    Uterine mass 06/17/2014    9x9 cm on CT scan       Past Surgical History:    Past Surgical History:   Procedure Laterality Date    BREAST RECONSTRUCTION  04/2019    BREAST SURGERY Bilateral 2123    silicone Memory Gel    CHOLECYSTECTOMY  6/24/14    HC INJECT OTHER PERPHRL NERV Right 8/3/2016    HIP FLUOROSCOPIC GUIDED CORTICOSTEROID INJECTION  performed by Russell Schilder, MD at [address] [facility] NERV Right 4/21/2017    FLURO GUIDED HIP INJECTION performed by Harjinder Delcid MD at [address], VAGINAL      PORT SURGERY Left 12/9/2022    PORT INSERTION WITH FLUOROSCOPY performed by Karla Mosquera MD at [address] LEFT Left 11/21/2022     BREAST NEEDLE BIOPSY LEFT LPS GENERAL SURGERY       Social History:    Marital status:   Smoking status: Currently; 1/2 pack daily for 34 years  ETOH status: No  Resides: [facility]    Family History:   Family History   Problem Relation Age of Onset    Cancer Mother         Melanoma    Hypertension Mother     Cancer Brother         synovial cell sarcoma    Cancer Maternal Grandfather         lung    Other Daughter         gallbladder disease       Current Hospital Medications:    Current Outpatient Medications   Medication Sig Dispense Refill    OLANZapine (ZYPREXA) 10 MG tablet TAKE 1 TABLET BY MOUTH DAILY AT NIGHT FOR 4 NIGHTS ONLY WITH EACH CYCLE OF ADRIAMYCIN/CYTOXAN EVERY 2 WEEKS 16 tablet 0    doxycycline hyclate (VIBRAMYCIN) 100 MG capsule Take 1 capsule by mouth 2 times daily for 10 days 20 capsule 0    montelukast (SINGULAIR) 10 MG tablet Take 10 mg by mouth nightly      ondansetron (ZOFRAN) 4 MG tablet Take 1 tablet by mouth every 6 hours as needed for Nausea or Vomiting 30 tablet 5    promethazine (PHENERGAN) 25 MG tablet Take 0.5 tablets by mouth every 6 hours as needed for Nausea 30 tablet 5    lidocaine-prilocaine (EMLA) 2.5-2.5 % cream Apply topically as needed.  30 g 5    vitamin D (ERGOCALCIFEROL) 1.25 MG (06388 UT) CAPS capsule TAKE 1 CAPSULE BY MOUTH 1 TIME A WEEK 12 capsule 1    rOPINIRole (REQUIP) 0.5 MG tablet TAKE 1 TO 3 TABLETS BY MOUTH EVERY NIGHT 90 tablet 5    sertraline (ZOLOFT) 50 MG tablet TAKE 1 TABLET BY MOUTH DAILY (Patient taking differently: Take 50 mg by mouth nightly) 30 tablet 3    valACYclovir (VALTREX) 1 g tablet TAKE 1 TABLET BY MOUTH EVERY DAY AS NEEDED FOR FEVER BLISTER (Patient taking differently: Take 1,000 mg by mouth as needed TAKE 1 TABLET BY MOUTH EVERY DAY AS NEEDED FOR FEVER BLISTER) 30 tablet 0    atenolol (TENORMIN) 25 MG tablet Take 1 tablet by mouth in the morning and at bedtime (Patient taking differently: Take 25 mg by mouth nightly) 180 tablet 2    omeprazole (PRILOSEC) 20 MG delayed release capsule Take 1 capsule by mouth 2 times daily (before meals) (Patient taking differently: Take 20 mg by mouth nightly) 60 capsule 5    Multiple Vitamin (MULTI-VITAMIN DAILY PO) Take 1 tablet by mouth daily      doxepin (SINEQUAN) 10 MG capsule Take 1 capsule by mouth nightly (Patient taking differently: Take 10 mg by mouth nightly as needed) 30 capsule 0    hydrochlorothiazide (HYDRODIURIL) 25 MG tablet Take 1 tablet by mouth daily (Patient taking differently: Take 25 mg by mouth as needed) 30 tablet 5     No current facility-administered medications for this visit. Facility-Administered Medications Ordered in Other Visits   Medication Dose Route Frequency Provider Last Rate Last Admin    0.9 % sodium chloride infusion  5-250 mL/hr IntraVENous PRN Morena Ibarra  mL/hr at 01/12/23 1309 250 mL/hr at 01/12/23 1309    fosaprepitant (EMEND) 150 mg in sodium chloride 0.9 % 150 mL IVPB  150 mg IntraVENous Once Morena Ibarra  mL/hr at 01/12/23 1321 150 mg at 01/12/23 1321    cyclophosphamide (CYTOXAN) 1,000 mg in sodium chloride 0.9 % 250 mL chemo IVPB  1,000 mg IntraVENous Once Morena Ibarra MD        sodium chloride (PF) 0.9 % injection 5-40 mL  5-40 mL IntraVENous PRN Morena Ibarra MD        heparin flush 100 UNIT/ML injection 500 Units  500 Units IntraCATHeter PRN Morena Ibarra MD        DOXOrubicin HCl (ADRIAMYCIN) 100 mg in sodium chloride 0.9 % 100 mL chemo IVPB  100 mg IntraVENous Once Morena Ibarra MD           Allergies:    Allergies   Allergen Reactions    Codeine Itching    Percocet [Oxycodone-Acetaminophen] Itching Toradol [Ketorolac Tromethamine] Itching         Subjective   REVIEW OF SYSTEMS:   CONSTITUTIONAL: no fever, no night sweats,  fatigue;  HEENT: no blurring of vision, no double vision, no hearing difficulty, no tinnitus, no ulceration, no dysplasia, no epistaxis;   LUNGS: no cough, no hemoptysis, no wheeze,  no shortness of breath;  CARDIOVASCULAR: no palpitation, no chest pain, no shortness of breath;  GI: no abdominal pain, no nausea, no vomiting, no diarrhea, no constipation;  ERIC: no dysuria, no hematuria, no frequency or urgency, no nephrolithiasis;  MUSCULOSKELETAL: no joint pain, no swelling, no stiffness;  ENDOCRINE: no polyuria, no polydipsia, no cold or heat intolerance;  HEMATOLOGY: no easy bruising or bleeding, no history of clotting disorder;  DERMATOLOGY: no skin rash, no eczema, no pruritus;  PSYCHIATRY: no depression, no anxiety, no panic attacks, no suicidal ideation, no homicidal ideation;  NEUROLOGY: no syncope, no seizures, no numbness or tingling of hands, no numbness or tingling of feet, no paresis;     Objective   BP (!) 108/41   Pulse 72   Temp 98.5 °F (36.9 °C)   Resp 16   Ht 5' 9\" (1.753 m)   Wt 130 lb 1.6 oz (59 kg)   LMP 06/08/2014   SpO2 98%   BMI 19.21 kg/m²     PHYSICAL EXAM:  CONSTITUTIONAL: Alert, appropriate, no acute distress  EYES: Non icteric, EOM intact, pupils equal round   ENT: Mucus membranes moist, no oral pharyngeal lesions, external inspection of ears and nose are normal  NECK: Supple, no masses. No palpable thyroid mass  CHEST/LUNGS: CTA bilaterally, normal respiratory effort   CARDIOVASCULAR: RRR, no murmurs. No lower extremity edema  ABDOMEN: soft non-tender, active bowel sounds, no HSM. No palpable masses  EXTREMITIES: warm, full ROM in all 4 extremities, no focal weakness.   SKIN: warm, dry with no rashes or lesions  LYMPH: No cervical, clavicular, axillary, or inguinal lymphadenopathy  NEUROLOGIC: follows commands, non focal   PSYCH: mood and affect appropriate. Alert and oriented to time, place, person      LABORATORY RESULTS REVIEWED/ANALYZED BY ME:  12/20/22 [facility] Genetic Panel: Negative  Lab Results   Component Value Date    WBC 8.01 01/12/2023    HGB 14.1 01/12/2023    HCT 41.8 01/12/2023    MCV 98.8 (H) 01/12/2023     (L) 01/12/2023     Lab Results   Component Value Date    NEUTROABS 5.54 01/12/2023     Lab Results   Component Value Date     01/12/2023    K 3.6 01/12/2023     01/12/2023    CO2 24 01/12/2023    BUN 9 01/12/2023    CREATININE 0.7 01/12/2023    GLUCOSE 99 01/12/2023    CALCIUM 9.3 01/12/2023    PROT 6.8 01/12/2023    LABALBU 3.8 01/12/2023    BILITOT 0.3 01/12/2023    ALKPHOS 87 01/12/2023    AST 22 01/12/2023    ALT 16 01/12/2023    LABGLOM >60 01/12/2023    GFRAA >59 08/03/2021    GLOB 3.0 01/12/2023       RADIOLOGY STUDIES REVIEWED BY ME:  12/15/22 CT Chest W Contrast No evidence for metastatic disease within the chest. Lobulated 1.2 x 2.4 cm left anterior breast lesion. Correlate with prior mammograms and breast history. Mild emphysema. Asymmetric mildly prominent left axillary lymph node measuring 7 mm. Attention on follow-up exams. 12/15/22 CT Abd/Pelvis W IV Contrast (oral) No evidence of metastatic disease within the abdomen or pelvis. 12/15/22 NM Bone Scan There is no evidence of osseous metastatic disease. 12/19/22 2D Echo  LV is normal in size with normal systolic function. LV ejection fraction   estimated 55 to 60%. Diastolic function appears within normal range. Normal wall thickness. RV is normal in size with normal systolic function. Normal left atrial size. Normal right atrial size. Aortic valve is trileaflet with normal leaflet mobility. No significant   stenosis or regurgitation. Mitral valve is structurally normal with normal leaflet mobility. No   significant stenosis or regurgitation. No significant tricuspid regurgitation. No significant pericardial effusion.       ASSESSMENT:    Orders Placed This Encounter   Procedures    CBC with Auto Differential     Standing Status:   Future     Number of Occurrences:   1     Standing Expiration Date:   1/12/2024    Comprehensive Metabolic Panel     Standing Status:   Future     Number of Occurrences:   1     Standing Expiration Date:   1/12/2024          Rayne was seen today for breast cancer. Diagnoses and all orders for this visit:    Malignant neoplasm of overlapping sites of left breast ([address]  -     CBC with Auto Differential; Future  -     Comprehensive Metabolic Panel; Future         Invasive ductal carcinoma, left breast, Nov 2022, Grade 3, ER 0.2%, MT 0%, HER-2 0/negative, Ki67 83%, Stage IIIB  12/14/2022-she was first seen by me. Essentially, clinical T2 N1 Mx triple negative breast cancer I reviewed imaging, pathology. Discussed treatment recommendations, side effects, logistics with the patient at length. I recommend neoadjuvant chemotherapy with dose dense Adriamycin and cyclophosphamide followed by carboplatin/Taxol. 1 year of Keytruda. Awaiting CT C/A/P and bone scan. Order 2D echo today. Recommended regimen  Pembrolizumab every 6 weeks x1 year  Adriamycin, Cyclophosphamide + GCSF every 2 weeks x4 cycles  Followed by weekly Taxol, Carbo x12 cycles    Proceed with cycle #2 dose dense AC with G-CSF support. PLAN:  RTC with MD in treatment room 4 weeks  C# 2/4 dose dense Adriamycin, Cyclophosphamide + GCSF today and every 2 weeks with Pembrolizumab  Next Pembrolizumab due 2/9/23  Continue Zofran and Phenergan as needed  Continue Olanzapine 10mg nightly x4 nights with each dd AC  Emla cream to port as needed  Continue cold gloves/socks with weekly Taxol  Continue follow-up with Dr Prashanth Garcia, Kelvin Sullivan am pre charting  as Medical Assistant for Indiana Nguyen MD. Electronically signed by Kelvin Sullivan MA on 1/12/2023 at 4:08 PM CST.     Mimi Burr am scribing for Indiana Nguyen MD. Electronically signed by Sajan Young RN on 1/12/2023 at 1:33 PM CST. I, Dr Andrew Hoyos, personally performed the services described in this documentation as scribed by Sajan Young RN in my presence and is both accurate and complete. I have seen, examined and reviewed this patient medication list, appropriate labs and imaging studies. I reviewed relevant medical records and others physicians notes. I discussed the plans of care with the patient. I answered all the questions to the patients satisfaction. I have also reviewed the chief complaint (CC) and part of the history (History of Present Illness (HPI), Past Family Social History [facility]), or Review of Systems (ROS) and made changes when appropriated. (Please note that portions of this note were completed with a voice recognition program. Efforts were made to edit the dictations but occasionally words are mis-transcribed. )Electronically signed by Theodore Ratliff MD on 1/12/2023 at 1:33 PM

## 2023-01-12 ENCOUNTER — HOSPITAL ENCOUNTER (OUTPATIENT)
Dept: INFUSION THERAPY | Age: 48
Discharge: HOME | End: 2023-01-12
Payer: COMMERCIAL

## 2023-01-12 ENCOUNTER — OFFICE VISIT (OUTPATIENT)
Dept: HEMATOLOGY | Age: 48
End: 2023-01-12
Payer: COMMERCIAL

## 2023-01-12 VITALS
RESPIRATION RATE: 16 BRPM | WEIGHT: 130.1 LBS | DIASTOLIC BLOOD PRESSURE: 41 MMHG | HEART RATE: 72 BPM | TEMPERATURE: 98.5 F | OXYGEN SATURATION: 98 % | HEIGHT: 69 IN | SYSTOLIC BLOOD PRESSURE: 108 MMHG | BODY MASS INDEX: 19.27 KG/M2

## 2023-01-12 DIAGNOSIS — Z71.89 CARE PLAN DISCUSSED WITH PATIENT: ICD-10-CM

## 2023-01-12 DIAGNOSIS — Z51.11 CHEMOTHERAPY MANAGEMENT, ENCOUNTER FOR: ICD-10-CM

## 2023-01-12 DIAGNOSIS — C50.919 TRIPLE NEGATIVE BREAST CANCER (HCC): ICD-10-CM

## 2023-01-12 DIAGNOSIS — C50.812 MALIGNANT NEOPLASM OF OVERLAPPING SITES OF LEFT BREAST (HCC): Primary | ICD-10-CM

## 2023-01-12 DIAGNOSIS — Z01.818 EXAMINATION PRIOR TO CHEMOTHERAPY: ICD-10-CM

## 2023-01-12 DIAGNOSIS — T45.1X5D ADVERSE EFFECT OF CHEMOTHERAPY, SUBSEQUENT ENCOUNTER: ICD-10-CM

## 2023-01-12 DIAGNOSIS — Z51.12 ENCOUNTER FOR ANTINEOPLASTIC IMMUNOTHERAPY: ICD-10-CM

## 2023-01-12 LAB
ALBUMIN SERPL-MCNC: 3.8 G/DL (ref 3.5–5.2)
ALP BLD-CCNC: 87 U/L (ref 35–104)
ALT SERPL-CCNC: 16 U/L (ref 9–52)
ANION GAP SERPL CALCULATED.3IONS-SCNC: 9 MMOL/L (ref 7–19)
AST SERPL-CCNC: 22 U/L (ref 14–36)
BILIRUB SERPL-MCNC: 0.3 MG/DL (ref 0.2–1.3)
BUN BLDV-MCNC: 9 MG/DL (ref 7–17)
CALCIUM SERPL-MCNC: 9.3 MG/DL (ref 8.4–10.2)
CHLORIDE BLD-SCNC: 108 MMOL/L (ref 98–111)
CO2: 24 MMOL/L (ref 22–29)
CREAT SERPL-MCNC: 0.7 MG/DL (ref 0.5–1)
GFR SERPL CREATININE-BSD FRML MDRD: >60 ML/MIN/{1.73_M2}
GLOBULIN: 3 G/DL
GLUCOSE BLD-MCNC: 99 MG/DL (ref 74–106)
HCT VFR BLD CALC: 41.8 % (ref 34.1–44.9)
HEMOGLOBIN: 14.1 G/DL (ref 11.2–15.7)
LYMPHOCYTES ABSOLUTE: 1.61 K/UL (ref 1.18–3.74)
LYMPHOCYTES RELATIVE PERCENT: 20.1 % (ref 19.3–53.1)
MCH RBC QN AUTO: 33.3 PG (ref 25.6–32.2)
MCHC RBC AUTO-ENTMCNC: 33.7 G/DL (ref 32.3–35.5)
MCV RBC AUTO: 98.8 FL (ref 79.4–94.8)
MONOCYTES ABSOLUTE: 0.5 K/UL (ref 0.24–0.82)
MONOCYTES RELATIVE PERCENT: 6.2 % (ref 4.7–12.5)
NEUTROPHILS ABSOLUTE: 5.54 K/UL (ref 1.56–6.13)
NEUTROPHILS RELATIVE PERCENT: 69.2 % (ref 34–71.1)
PDW BLD-RTO: 11.9 % (ref 11.7–14.4)
PLATELET # BLD: 155 K/UL (ref 182–369)
PMV BLD AUTO: 9.8 FL (ref 7.4–10.4)
POTASSIUM SERPL-SCNC: 3.6 MMOL/L (ref 3.5–5.1)
RBC # BLD: 4.23 M/UL (ref 3.93–5.22)
SODIUM BLD-SCNC: 141 MMOL/L (ref 137–145)
TOTAL PROTEIN: 6.8 G/DL (ref 6.3–8.2)
WBC # BLD: 8.01 K/UL (ref 3.98–10.04)

## 2023-01-12 PROCEDURE — 3074F SYST BP LT 130 MM HG: CPT | Performed by: INTERNAL MEDICINE

## 2023-01-12 PROCEDURE — 6360000002 HC RX W HCPCS: Performed by: INTERNAL MEDICINE

## 2023-01-12 PROCEDURE — 85025 COMPLETE CBC W/AUTO DIFF WBC: CPT

## 2023-01-12 PROCEDURE — 2580000003 HC RX 258: Performed by: INTERNAL MEDICINE

## 2023-01-12 PROCEDURE — 96375 TX/PRO/DX INJ NEW DRUG ADDON: CPT

## 2023-01-12 PROCEDURE — 96367 TX/PROPH/DG ADDL SEQ IV INF: CPT

## 2023-01-12 PROCEDURE — 80053 COMPREHEN METABOLIC PANEL: CPT

## 2023-01-12 PROCEDURE — 36415 COLL VENOUS BLD VENIPUNCTURE: CPT

## 2023-01-12 PROCEDURE — 3078F DIAST BP <80 MM HG: CPT | Performed by: INTERNAL MEDICINE

## 2023-01-12 PROCEDURE — 96413 CHEMO IV INFUSION 1 HR: CPT

## 2023-01-12 PROCEDURE — 96417 CHEMO IV INFUS EACH ADDL SEQ: CPT

## 2023-01-12 PROCEDURE — 99214 OFFICE O/P EST MOD 30 MIN: CPT | Performed by: INTERNAL MEDICINE

## 2023-01-12 RX ORDER — PALONOSETRON 0.05 MG/ML
0.25 INJECTION, SOLUTION INTRAVENOUS ONCE
Status: COMPLETED | OUTPATIENT
Start: 2023-01-12 | End: 2023-01-12

## 2023-01-12 RX ORDER — DOXORUBICIN HYDROCHLORIDE 2 MG/ML
60 INJECTION, SOLUTION INTRAVENOUS ONCE
Status: DISCONTINUED | OUTPATIENT
Start: 2023-01-12 | End: 2023-01-12 | Stop reason: SDUPTHER

## 2023-01-12 RX ORDER — HEPARIN SODIUM (PORCINE) LOCK FLUSH IV SOLN 100 UNIT/ML 100 UNIT/ML
500 SOLUTION INTRAVENOUS PRN
Status: DISCONTINUED | OUTPATIENT
Start: 2023-01-12 | End: 2023-01-13 | Stop reason: HOSPADM

## 2023-01-12 RX ORDER — SODIUM CHLORIDE 9 MG/ML
5-250 INJECTION, SOLUTION INTRAVENOUS PRN
Status: DISCONTINUED | OUTPATIENT
Start: 2023-01-12 | End: 2023-01-13 | Stop reason: HOSPADM

## 2023-01-12 RX ORDER — SODIUM CHLORIDE 9 MG/ML
5-40 INJECTION INTRAVENOUS PRN
Status: DISCONTINUED | OUTPATIENT
Start: 2023-01-12 | End: 2023-01-13 | Stop reason: HOSPADM

## 2023-01-12 RX ADMIN — PALONOSETRON 0.25 MG: 0.05 INJECTION, SOLUTION INTRAVENOUS at 13:07

## 2023-01-12 RX ADMIN — HEPARIN 500 UNITS: 100 SYRINGE at 14:59

## 2023-01-12 RX ADMIN — SODIUM CHLORIDE 250 ML/HR: 9 INJECTION, SOLUTION INTRAVENOUS at 13:09

## 2023-01-12 RX ADMIN — SODIUM CHLORIDE, PRESERVATIVE FREE 10 ML: 5 INJECTION INTRAVENOUS at 14:59

## 2023-01-12 RX ADMIN — SODIUM CHLORIDE 100 MG: 9 INJECTION, SOLUTION INTRAVENOUS at 14:06

## 2023-01-12 RX ADMIN — CYCLOPHOSPHAMIDE 1000 MG: 1 INJECTION, POWDER, FOR SOLUTION INTRAVENOUS; ORAL at 14:26

## 2023-01-12 RX ADMIN — FOSAPREPITANT 150 MG: 150 INJECTION, POWDER, LYOPHILIZED, FOR SOLUTION INTRAVENOUS at 13:21

## 2023-01-12 RX ADMIN — DEXAMETHASONE SODIUM PHOSPHATE 10 MG: 10 INJECTION, SOLUTION INTRAMUSCULAR; INTRAVENOUS at 13:07

## 2023-01-13 ENCOUNTER — HOSPITAL ENCOUNTER (OUTPATIENT)
Dept: INFUSION THERAPY | Age: 48
Discharge: HOME | End: 2023-01-13
Payer: COMMERCIAL

## 2023-01-13 DIAGNOSIS — C50.812 MALIGNANT NEOPLASM OF OVERLAPPING SITES OF LEFT BREAST (HCC): ICD-10-CM

## 2023-01-13 DIAGNOSIS — C50.919 TRIPLE NEGATIVE BREAST CANCER (HCC): Primary | ICD-10-CM

## 2023-01-13 PROCEDURE — 96372 THER/PROPH/DIAG INJ SC/IM: CPT

## 2023-01-13 PROCEDURE — 6360000002 HC RX W HCPCS: Performed by: INTERNAL MEDICINE

## 2023-01-13 RX ADMIN — PEGFILGRASTIM-CBQV 6 MG: 6 INJECTION, SOLUTION SUBCUTANEOUS at 15:34

## 2023-01-19 DIAGNOSIS — C50.919 TRIPLE NEGATIVE BREAST CANCER (HCC): ICD-10-CM

## 2023-01-19 DIAGNOSIS — C50.919 TRIPLE NEGATIVE BREAST CANCER (HCC): Primary | ICD-10-CM

## 2023-01-23 ENCOUNTER — PATIENT MESSAGE (OUTPATIENT)
Dept: HEMATOLOGY | Age: 48
End: 2023-01-23

## 2023-01-23 DIAGNOSIS — R13.14 PHARYNGOESOPHAGEAL DYSPHAGIA: Primary | ICD-10-CM

## 2023-01-24 ENCOUNTER — TELEPHONE (OUTPATIENT)
Dept: HEMATOLOGY | Age: 48
End: 2023-01-24

## 2023-01-26 ENCOUNTER — HOSPITAL ENCOUNTER (OUTPATIENT)
Dept: INFUSION THERAPY | Age: 48
Discharge: HOME | End: 2023-01-26
Payer: COMMERCIAL

## 2023-01-26 ENCOUNTER — HOSPITAL ENCOUNTER (OUTPATIENT)
Dept: GENERAL RADIOLOGY | Age: 48
Discharge: HOME | End: 2023-01-26
Payer: COMMERCIAL

## 2023-01-26 VITALS
HEIGHT: 69 IN | HEART RATE: 67 BPM | TEMPERATURE: 98.2 F | WEIGHT: 123.3 LBS | BODY MASS INDEX: 18.26 KG/M2 | RESPIRATION RATE: 16 BRPM | OXYGEN SATURATION: 98 % | DIASTOLIC BLOOD PRESSURE: 71 MMHG | SYSTOLIC BLOOD PRESSURE: 114 MMHG

## 2023-01-26 DIAGNOSIS — R13.14 PHARYNGOESOPHAGEAL DYSPHAGIA: ICD-10-CM

## 2023-01-26 DIAGNOSIS — C50.812 MALIGNANT NEOPLASM OF OVERLAPPING SITES OF LEFT BREAST (HCC): Primary | ICD-10-CM

## 2023-01-26 DIAGNOSIS — C50.812 MALIGNANT NEOPLASM OF OVERLAPPING SITES OF LEFT BREAST (HCC): ICD-10-CM

## 2023-01-26 DIAGNOSIS — C50.919 TRIPLE NEGATIVE BREAST CANCER (HCC): Primary | ICD-10-CM

## 2023-01-26 DIAGNOSIS — C50.919 TRIPLE NEGATIVE BREAST CANCER (HCC): ICD-10-CM

## 2023-01-26 LAB
ALBUMIN SERPL-MCNC: 4.1 G/DL (ref 3.5–5.2)
ALP BLD-CCNC: 105 U/L (ref 35–104)
ALT SERPL-CCNC: 14 U/L (ref 9–52)
ANION GAP SERPL CALCULATED.3IONS-SCNC: 8 MMOL/L (ref 7–19)
AST SERPL-CCNC: 23 U/L (ref 14–36)
BILIRUB SERPL-MCNC: 0.4 MG/DL (ref 0.2–1.3)
BUN BLDV-MCNC: 13 MG/DL (ref 7–17)
CALCIUM SERPL-MCNC: 9.5 MG/DL (ref 8.4–10.2)
CHLORIDE BLD-SCNC: 107 MMOL/L (ref 98–111)
CO2: 25 MMOL/L (ref 22–29)
CREAT SERPL-MCNC: 0.7 MG/DL (ref 0.5–1)
GFR SERPL CREATININE-BSD FRML MDRD: >60 ML/MIN/{1.73_M2}
GLOBULIN: 3.1 G/DL
GLUCOSE BLD-MCNC: 101 MG/DL (ref 74–106)
HCT VFR BLD CALC: 32.2 % (ref 34.1–44.9)
HEMOGLOBIN: 10.9 G/DL (ref 11.2–15.7)
LYMPHOCYTES ABSOLUTE: 2.77 K/UL (ref 1.18–3.74)
LYMPHOCYTES RELATIVE PERCENT: 23.5 % (ref 19.3–53.1)
MCH RBC QN AUTO: 33.3 PG (ref 25.6–32.2)
MCHC RBC AUTO-ENTMCNC: 33.9 G/DL (ref 32.3–35.5)
MCV RBC AUTO: 98.5 FL (ref 79.4–94.8)
MONOCYTES ABSOLUTE: 0.98 K/UL (ref 0.24–0.82)
MONOCYTES RELATIVE PERCENT: 8.3 % (ref 4.7–12.5)
NEUTROPHILS ABSOLUTE: 7.67 K/UL (ref 1.56–6.13)
NEUTROPHILS RELATIVE PERCENT: 65.1 % (ref 34–71.1)
PDW BLD-RTO: 12.3 % (ref 11.7–14.4)
PLATELET # BLD: 182 K/UL (ref 182–369)
PMV BLD AUTO: 9.2 FL (ref 7.4–10.4)
POTASSIUM SERPL-SCNC: 3.8 MMOL/L (ref 3.5–5.1)
RBC # BLD: 3.27 M/UL (ref 3.93–5.22)
SODIUM BLD-SCNC: 140 MMOL/L (ref 137–145)
TOTAL PROTEIN: 7.2 G/DL (ref 6.3–8.2)
WBC # BLD: 11.77 K/UL (ref 3.98–10.04)

## 2023-01-26 PROCEDURE — 96413 CHEMO IV INFUSION 1 HR: CPT

## 2023-01-26 PROCEDURE — 96417 CHEMO IV INFUS EACH ADDL SEQ: CPT

## 2023-01-26 PROCEDURE — 96367 TX/PROPH/DG ADDL SEQ IV INF: CPT

## 2023-01-26 PROCEDURE — 80053 COMPREHEN METABOLIC PANEL: CPT

## 2023-01-26 PROCEDURE — 85025 COMPLETE CBC W/AUTO DIFF WBC: CPT

## 2023-01-26 PROCEDURE — 96375 TX/PRO/DX INJ NEW DRUG ADDON: CPT

## 2023-01-26 PROCEDURE — 74220 X-RAY XM ESOPHAGUS 1CNTRST: CPT | Performed by: RADIOLOGY

## 2023-01-26 PROCEDURE — 2580000003 HC RX 258: Performed by: INTERNAL MEDICINE

## 2023-01-26 PROCEDURE — 36415 COLL VENOUS BLD VENIPUNCTURE: CPT

## 2023-01-26 PROCEDURE — 6360000002 HC RX W HCPCS: Performed by: INTERNAL MEDICINE

## 2023-01-26 PROCEDURE — 74220 X-RAY XM ESOPHAGUS 1CNTRST: CPT

## 2023-01-26 RX ORDER — DIPHENHYDRAMINE HYDROCHLORIDE 50 MG/ML
50 INJECTION INTRAMUSCULAR; INTRAVENOUS
Status: CANCELLED | OUTPATIENT
Start: 2023-01-26

## 2023-01-26 RX ORDER — SODIUM CHLORIDE 9 MG/ML
INJECTION, SOLUTION INTRAVENOUS CONTINUOUS
Status: CANCELLED | OUTPATIENT
Start: 2023-01-26

## 2023-01-26 RX ORDER — ONDANSETRON 2 MG/ML
8 INJECTION INTRAMUSCULAR; INTRAVENOUS
Status: CANCELLED | OUTPATIENT
Start: 2023-01-26

## 2023-01-26 RX ORDER — SODIUM CHLORIDE 9 MG/ML
5-250 INJECTION, SOLUTION INTRAVENOUS PRN
Status: CANCELLED | OUTPATIENT
Start: 2023-01-26

## 2023-01-26 RX ORDER — ALBUTEROL SULFATE 90 UG/1
4 AEROSOL, METERED RESPIRATORY (INHALATION) PRN
Status: CANCELLED | OUTPATIENT
Start: 2023-01-26

## 2023-01-26 RX ORDER — SODIUM CHLORIDE 9 MG/ML
5-40 INJECTION INTRAVENOUS PRN
Status: CANCELLED | OUTPATIENT
Start: 2023-01-26

## 2023-01-26 RX ORDER — 0.9 % SODIUM CHLORIDE 0.9 %
500 INTRAVENOUS SOLUTION INTRAVENOUS ONCE
Status: COMPLETED | OUTPATIENT
Start: 2023-01-26 | End: 2023-01-26

## 2023-01-26 RX ORDER — EPINEPHRINE 1 MG/ML
0.3 INJECTION, SOLUTION, CONCENTRATE INTRAVENOUS PRN
Status: CANCELLED | OUTPATIENT
Start: 2023-01-26

## 2023-01-26 RX ORDER — 0.9 % SODIUM CHLORIDE 0.9 %
500 INTRAVENOUS SOLUTION INTRAVENOUS ONCE
Status: CANCELLED
Start: 2023-01-26 | End: 2023-01-26

## 2023-01-26 RX ORDER — PALONOSETRON 0.05 MG/ML
0.25 INJECTION, SOLUTION INTRAVENOUS ONCE
Status: COMPLETED | OUTPATIENT
Start: 2023-01-26 | End: 2023-01-26

## 2023-01-26 RX ORDER — MEPERIDINE HYDROCHLORIDE 50 MG/ML
12.5 INJECTION INTRAMUSCULAR; INTRAVENOUS; SUBCUTANEOUS PRN
Status: CANCELLED | OUTPATIENT
Start: 2023-01-26

## 2023-01-26 RX ORDER — SODIUM CHLORIDE 9 MG/ML
5-40 INJECTION INTRAVENOUS PRN
Status: DISCONTINUED | OUTPATIENT
Start: 2023-01-26 | End: 2023-01-27 | Stop reason: HOSPADM

## 2023-01-26 RX ORDER — HEPARIN SODIUM (PORCINE) LOCK FLUSH IV SOLN 100 UNIT/ML 100 UNIT/ML
500 SOLUTION INTRAVENOUS PRN
Status: DISCONTINUED | OUTPATIENT
Start: 2023-01-26 | End: 2023-01-27 | Stop reason: HOSPADM

## 2023-01-26 RX ORDER — FAMOTIDINE 10 MG/ML
20 INJECTION, SOLUTION INTRAVENOUS
Status: CANCELLED | OUTPATIENT
Start: 2023-01-26

## 2023-01-26 RX ORDER — ACETAMINOPHEN 325 MG/1
650 TABLET ORAL
Status: CANCELLED | OUTPATIENT
Start: 2023-01-26

## 2023-01-26 RX ORDER — PALONOSETRON 0.05 MG/ML
0.25 INJECTION, SOLUTION INTRAVENOUS ONCE
Status: CANCELLED | OUTPATIENT
Start: 2023-01-26 | End: 2023-01-26

## 2023-01-26 RX ORDER — HEPARIN SODIUM (PORCINE) LOCK FLUSH IV SOLN 100 UNIT/ML 100 UNIT/ML
500 SOLUTION INTRAVENOUS PRN
Status: CANCELLED | OUTPATIENT
Start: 2023-01-26

## 2023-01-26 RX ADMIN — SODIUM CHLORIDE, PRESERVATIVE FREE 10 ML: 5 INJECTION INTRAVENOUS at 15:28

## 2023-01-26 RX ADMIN — PALONOSETRON 0.25 MG: 0.05 INJECTION, SOLUTION INTRAVENOUS at 13:35

## 2023-01-26 RX ADMIN — FOSAPREPITANT 150 MG: 150 INJECTION, POWDER, LYOPHILIZED, FOR SOLUTION INTRAVENOUS at 13:46

## 2023-01-26 RX ADMIN — DEXAMETHASONE SODIUM PHOSPHATE: 10 INJECTION, SOLUTION INTRAMUSCULAR; INTRAVENOUS at 13:37

## 2023-01-26 RX ADMIN — CYCLOPHOSPHAMIDE 1000 MG: 1 INJECTION, POWDER, FOR SOLUTION INTRAVENOUS; ORAL at 14:55

## 2023-01-26 RX ADMIN — DOXORUBICIN HYDROCHLORIDE 100 MG: 2 INJECTION, SOLUTION INTRAVENOUS at 14:32

## 2023-01-26 RX ADMIN — SODIUM CHLORIDE 500 ML: 9 INJECTION, SOLUTION INTRAVENOUS at 13:35

## 2023-01-26 RX ADMIN — Medication 500 UNITS: at 15:28

## 2023-01-26 NOTE — PROGRESS NOTES
Lab Results   Component Value Date    WBC 11.77 (H) 01/26/2023    HGB 10.9 (L) 01/26/2023    HCT 32.2 (L) 01/26/2023    MCV 98.5 (H) 01/26/2023     01/26/2023     Lab Results   Component Value Date    NEUTROABS 7.67 (H) 01/26/2023

## 2023-01-27 ENCOUNTER — HOSPITAL ENCOUNTER (OUTPATIENT)
Dept: INFUSION THERAPY | Age: 48
Discharge: HOME | End: 2023-01-27
Payer: COMMERCIAL

## 2023-01-27 DIAGNOSIS — C50.919 TRIPLE NEGATIVE BREAST CANCER (HCC): Primary | ICD-10-CM

## 2023-01-27 DIAGNOSIS — C50.812 MALIGNANT NEOPLASM OF OVERLAPPING SITES OF LEFT BREAST (HCC): ICD-10-CM

## 2023-01-27 PROCEDURE — 96372 THER/PROPH/DIAG INJ SC/IM: CPT

## 2023-01-27 PROCEDURE — 6360000002 HC RX W HCPCS: Performed by: INTERNAL MEDICINE

## 2023-01-27 RX ADMIN — PEGFILGRASTIM-CBQV 6 MG: 6 INJECTION, SOLUTION SUBCUTANEOUS at 15:48

## 2023-02-08 NOTE — PROGRESS NOTES
MEDICAL ONCOLOGY PROGRESS NOTE    Pt Name: José Miguel Montague  MRN: [id number]  YOB: 1975  Date of evaluation: 2/9/2023    HISTORY OF PRESENT ILLNESS:    Reason for MD visit-toxicity assessment/disease management  The patient presents today for cycle #4 neoadjuvant chemotherapy with dose dense AC. She has been tolerating treatments complains of fatigue, hair loss and mild nausea. She denies any vomiting. She also complains of occasional neuropathic pain in the bilateral feet. She is scheduled for interval ultrasound on 2/15/2022 to assess disease response. Diagnosis  Invasive ductal carcinoma, left breast, Nov 2022  Grade 3  Stage IIIB, clinical T2 N1 M0  ER 0.2%, RI 0%, HER-2 0/negative, Ki67 83%  MammaPrint: Basal type/high risk  Germline [facility] 81 gene genetic panel: Negative    Treatment Summary  12/29/22-Initiated Pembrolizumab every 6 weeks x1 year with Adriamycin, Cyclophosphamide + GCSF every 2 weeks x4 cycles followed by weekly Taxol, Carbo x12 cycles  Anticipate surgery  Anticipate radiation therapy    Cancer History  Rayne Aguilar was first seen by me on 12/14/2022. She was referred by Dr. Paula Martinez for a diagnosis of triple negative breast cancer. This was a palpable lesion on her left breast.  11/2/22 Bilateral diagnostic mammogram: Left breast, BI-RADS 4B, intermediate concern for malignancy involving the palpable 1.7 cm complex cystic mass in the upper outer quadrant. Recommendation is for ultrasound-guided percutaneous core needle biopsy for diagnosis in order to exclude malignancy. 1.7 cm axillary lymph node with borderline thickened cortical elements. Recommendation is for percutaneous needle biopsy versus fine-needle aspiration (depending on performing physician preference) in order to exclude malignancy. 11/2/22 US left breast: Palpable area of concern is labeled in the left breast upper outer quadrant at 3:00 4 cm from the nipple.  Ultrasound at this location demonstrates an underlying partially circumscribed partially indistinct complex cystic mass measuring 1.7 x 1.6 x 1 cm. The surrounding tissue is unaffected in sonographic appearance. The underlying implant is noted and unremarkable. Ultrasound also performed of the left axilla which demonstrates a solitary borderline abnormal axillary lymph node with appearance of thickened cortical elements. The lymph node measures up to 1.7 0.9 x 0.7 cm with mildly thickened cortex of 4 mm.   11/21/22 Breast, left breast needle core biopsies at 3 o'clock position: Invasive ductal carcinoma, no special type, grade 3. Invasive carcinoma measures 1.1 cm in greatest linear dimension and is present in multiple cores. Lymph node, left axillary lymph node fine-needle aspiration, smears and ThinPrep: Small round lymphocytes present, negative for cytologic evidence of malignancy. ER 0.2%, SD 0%, HER-2 0/negative, Ki67 83%. MammaPrint: Basal type/high risk. 12/2/22 MRI bilateral breast: Left breast, BI-RADS 6, biopsy-proven malignant mass in the left breast upper outer quadrant. MRI measurement is estimated as 24 x 20 x 20 mm in length by with by height. This is noted to be larger than that seen on previous ultrasounds, and this may be due to some associated surrounding nonmass enhancement which is immediately contiguous with mass margins. The mass is noted to be immediately contiguous, with no intervening fat plane between the posterior margin of the mass and the implant/implant capsule. Axillary lymph nodes appear within normal by MRI. No abnormal internal mammary chain lymph nodes are seen. No obvious extramammary significant findings seen. Recommend appropriate clinical, medical oncology and surgical management. Overall assessment BI-RADS 6, biopsy-proven malignancy. 12/14/22 Patient education given to patient for chemotherapy. Treatment consent signed. 12/14/2022-she was first seen by me.   Essentially, clinical T2 N1 Mx triple negative breast cancer I reviewed imaging, pathology. Discussed treatment recommendations, side effects, logistics with the patient at length. I recommend neoadjuvant chemotherapy with dose dense Adriamycin and cyclophosphamide followed by carboplatin/Taxol. 1 year of Keytruda. Awaiting CT C/A/P and bone scan. Order 2D echo today. 12/15/22 CT Chest W Contrast No evidence for metastatic disease within the chest. Lobulated 1.2 x 2.4 cm left anterior breast lesion. Correlate with prior mammograms and breast history. Mild emphysema. Asymmetric mildly prominent left axillary lymph node measuring 7 mm. Attention on follow-up exams. 12/15/22 CT Abd/Pelvis W IV Contrast (oral) No evidence of metastatic disease within the abdomen or pelvis. 12/15/22 NM Bone Scan There is no evidence of osseous metastatic disease. 12/19/22 2D Echo  LV is normal in size with normal systolic function. LV ejection fraction estimated 55 to 60%. Diastolic function appears within normal range. Normal wall thickness. RV is normal in size with normal systolic function. Normal left atrial size. Normal right atrial size. Aortic valve is trileaflet with normal leaflet mobility. No significant stenosis or regurgitation. Mitral valve is structurally normal with normal leaflet mobility. No significant stenosis or regurgitation. No significant tricuspid regurgitation. No significant pericardial effusion.   12/29/22-Initiated Pembrolizumab every 6 weeks x1 year with Adriamycin, Cyclophosphamide + GCSF every 2 weeks x4 cycles followed by weekly Taxol, Carbo x12 cycles  12/20/22 W[facility] Genetic Panel: Negative  1/26/23 [place] Esophagram Grade 3 esophageal reflux yes      12/20/22 [facility] Genetic Panel         Past Medical History:    Past Medical History:   Diagnosis Date    Adverse effect of chemotherapy     Breast cancer ([address] 11/2022    Triple negative breast cancer    Malignant neoplasm of overlapping sites of left breast ([address] 11/22/2022    PVC (premature ventricular contraction)     Restless leg syndrome 07/16/2018    Uterine mass 06/17/2014    9x9 cm on CT scan       Past Surgical History:    Past Surgical History:   Procedure Laterality Date    BREAST RECONSTRUCTION  04/2019    BREAST SURGERY Bilateral 2879    silicone Memory Gel    CHOLECYSTECTOMY  6/24/14    HC INJECT OTHER PERPHRL NERV Right 8/3/2016    HIP FLUOROSCOPIC GUIDED CORTICOSTEROID INJECTION  performed by Nova Morse MD at [address] [facility] [place] NERV Right 4/21/2017    FLURO GUIDED HIP INJECTION performed by Nolan Kiser MD at [address], VAGINAL      PORT SURGERY Left 12/9/2022    PORT INSERTION WITH FLUOROSCOPY performed by Leana Carrizales MD at [address] LOC DEVICE 1ST LESION LEFT Left 11/21/2022    US BREAST NEEDLE BIOPSY LEFT LPS GENERAL SURGERY       Social History:    Marital status:   Smoking status: Currently; 1/2 pack daily for 34 years  ETOH status: No  Resides: [facility]    Family History:   Family History   Problem Relation Age of Onset    Cancer Mother         Melanoma    Hypertension Mother     Cancer Brother         synovial cell sarcoma    Cancer Maternal Grandfather         lung    Other Daughter         gallbladder disease       Current Hospital Medications:    Current Outpatient Medications   Medication Sig Dispense Refill    Magic Mouthwash (MIRACLE MOUTHWASH) Swish and spit 10 mLs 4 times daily as needed for Irritation 480 mL 3    OLANZapine (ZYPREXA) 10 MG tablet TAKE 1 TABLET BY MOUTH DAILY AT NIGHT FOR 4 NIGHTS ONLY WITH EACH CYCLE OF ADRIAMYCIN/CYTOXAN EVERY 2 WEEKS 16 tablet 0    montelukast (SINGULAIR) 10 MG tablet Take 10 mg by mouth nightly      ondansetron (ZOFRAN) 4 MG tablet Take 1 tablet by mouth every 6 hours as needed for Nausea or Vomiting 30 tablet 5    promethazine (PHENERGAN) 25 MG tablet Take 0.5 tablets by mouth every 6 hours as needed for Nausea 30 tablet 5    lidocaine-prilocaine (EMLA) 2.5-2.5 % cream Apply topically as needed. 30 g 5    vitamin D (ERGOCALCIFEROL) 1.25 MG (38985 UT) CAPS capsule TAKE 1 CAPSULE BY MOUTH 1 TIME A WEEK 12 capsule 1    rOPINIRole (REQUIP) 0.5 MG tablet TAKE 1 TO 3 TABLETS BY MOUTH EVERY NIGHT 90 tablet 5    sertraline (ZOLOFT) 50 MG tablet TAKE 1 TABLET BY MOUTH DAILY (Patient taking differently: Take 50 mg by mouth nightly) 30 tablet 3    valACYclovir (VALTREX) 1 g tablet TAKE 1 TABLET BY MOUTH EVERY DAY AS NEEDED FOR FEVER BLISTER (Patient taking differently: Take 1,000 mg by mouth as needed TAKE 1 TABLET BY MOUTH EVERY DAY AS NEEDED FOR FEVER BLISTER) 30 tablet 0    atenolol (TENORMIN) 25 MG tablet Take 1 tablet by mouth in the morning and at bedtime (Patient taking differently: Take 25 mg by mouth nightly) 180 tablet 2    omeprazole (PRILOSEC) 20 MG delayed release capsule Take 1 capsule by mouth 2 times daily (before meals) (Patient taking differently: Take 20 mg by mouth nightly) 60 capsule 5    Multiple Vitamin (MULTI-VITAMIN DAILY PO) Take 1 tablet by mouth daily      doxepin (SINEQUAN) 10 MG capsule Take 1 capsule by mouth nightly (Patient taking differently: Take 10 mg by mouth nightly as needed) 30 capsule 0    hydrochlorothiazide (HYDRODIURIL) 25 MG tablet Take 1 tablet by mouth daily (Patient taking differently: Take 25 mg by mouth as needed) 30 tablet 5     No current facility-administered medications for this visit.      Facility-Administered Medications Ordered in Other Visits   Medication Dose Route Frequency Provider Last Rate Last Admin    0.9 % sodium chloride infusion  5-250 mL/hr IntraVENous PRN Jo Valdez MD        0.9 % sodium chloride bolus  500 mL IntraVENous Once Jo Valdez  mL/hr at 02/09/23 1451 500 mL at 02/09/23 1451    DOXOrubicin HCl (ADRIAMYCIN) 100 mg in sodium chloride 0.9 % 100 mL chemo IVPB  100 mg IntraVENous Once Jo Valdez MD        pembrolizumab (KEYTRUDA) 400 mg in sodium chloride 0.9 % 100 mL chemo IVPB  400 mg IntraVENous Once Roseanne Unger MD        cyclophosphamide (CYTOXAN) 1,000 mg in sodium chloride 0.9 % 250 mL chemo IVPB  1,000 mg IntraVENous Once Roseanne Unger MD        sodium chloride (PF) 0.9 % injection 5-40 mL  5-40 mL IntraVENous PRN Roseanne Unger MD        heparin flush 100 UNIT/ML injection 500 Units  500 Units IntraCATHeter PRN Roseanne Unger MD           Allergies:    Allergies   Allergen Reactions    Codeine Itching    Percocet [Oxycodone-Acetaminophen] Itching    Toradol [Ketorolac Tromethamine] Itching         Subjective   REVIEW OF SYSTEMS:   CONSTITUTIONAL: no fever, no night sweats,  fatigue;  HEENT: no blurring of vision, no double vision, no hearing difficulty, no tinnitus, no ulceration, no dysplasia, no epistaxis;   LUNGS: no cough, no hemoptysis, no wheeze,  no shortness of breath;  CARDIOVASCULAR: no palpitation, no chest pain, no shortness of breath;  GI: no abdominal pain, no nausea, no vomiting, no diarrhea, no constipation;  ERIC: no dysuria, no hematuria, no frequency or urgency, no nephrolithiasis;  MUSCULOSKELETAL: no joint pain, no swelling, no stiffness;  ENDOCRINE: no polyuria, no polydipsia, no cold or heat intolerance;  HEMATOLOGY: no easy bruising or bleeding, no history of clotting disorder;  DERMATOLOGY: no skin rash, no eczema, no pruritus;  PSYCHIATRY: no depression, no anxiety, no panic attacks, no suicidal ideation, no homicidal ideation;  NEUROLOGY: no syncope, no seizures, no numbness or tingling of hands, no numbness or tingling of feet, no paresis;     Objective   BP (!) 100/50   Pulse 60   Temp 98.2 °F (36.8 °C)   Resp 16   Ht 5' 9\" (1.753 m)   Wt 132 lb 4.8 oz (60 kg)   LMP 06/08/2014   SpO2 100%   BMI 19.54 kg/m²     PHYSICAL EXAM:  CONSTITUTIONAL: Alert, appropriate, no acute distress  EYES: Non icteric, EOM intact, pupils equal round   ENT: Mucus membranes moist, no oral pharyngeal lesions, external inspection of ears and nose are normal  NECK: Supple, no masses. No palpable thyroid mass  CHEST/LUNGS: CTA bilaterally, normal respiratory effort   CARDIOVASCULAR: RRR, no murmurs. No lower extremity edema  ABDOMEN: soft non-tender, active bowel sounds, no HSM. No palpable masses  EXTREMITIES: warm, full ROM in all 4 extremities, no focal weakness. SKIN: warm, dry with no rashes or lesions  LYMPH: No cervical, clavicular, axillary, or inguinal lymphadenopathy  NEUROLOGIC: follows commands, non focal   PSYCH: mood and affect appropriate. Alert and oriented to time, place, person    LABORATORY RESULTS REVIEWED/ANALYZED BY ME:  Lab Results   Component Value Date    WBC 12.11 (H) 02/09/2023    HGB 9.4 (L) 02/09/2023    HCT 27.9 (L) 02/09/2023    .7 (H) 02/09/2023     02/09/2023     Lab Results   Component Value Date    NEUTROABS 8.62 (H) 02/09/2023     Lab Results   Component Value Date     02/09/2023    K 3.5 02/09/2023     02/09/2023    CO2 28 02/09/2023    BUN 10 02/09/2023    CREATININE 0.8 02/09/2023    GLUCOSE 103 02/09/2023    CALCIUM 9.1 02/09/2023    PROT 5.8 (L) 02/09/2023    LABALBU 4.0 02/09/2023    BILITOT 0.3 02/09/2023    ALKPHOS 94 02/09/2023    AST 22 02/09/2023    ALT 13 02/09/2023    LABGLOM >60 02/09/2023    GFRAA >59 08/03/2021    GLOB 1.9 02/09/2023           RADIOLOGY STUDIES REVIEWED BY ME:  None      ASSESSMENT:    Orders Placed This Encounter   Procedures    CBC With Auto Differential     Standing Status:   Future     Standing Expiration Date:   2/9/2024    Comprehensive metabolic panel     Standing Status:   Future     Standing Expiration Date:   2/9/2024       Rayne was seen today for breast cancer. Diagnoses and all orders for this visit:    Triple negative breast cancer ([facility] [address]  -     CBC With Auto Differential; Future  -     Comprehensive metabolic panel;  Future  -     Cancel: 0.9 % sodium chloride infusion  -     Cancel: 0.9 % sodium chloride bolus  -     Cancel: dexamethasone (DECADRON) 10 mg in sodium chloride 0.9 % 50 mL IVPB  -     Cancel: fosaprepitant (EMEND) 150 mg in sodium chloride 0.9 % 150 mL IVPB  -     Cancel: palonosetron (ALOXI) injection 0.25 mg  -     Cancel: DOXOrubicin HCl (ADRIAMYCIN) 102 mg in sodium chloride 0.9 % 100 mL chemo IVPB  -     Cancel: pembrolizumab (KEYTRUDA) 400 mg in sodium chloride 0.9 % 100 mL chemo IVPB  -     Cancel: cyclophosphamide (CYTOXAN) 1,020 mg in sodium chloride 0.9 % 250 mL chemo IVPB  -     Cancel: sodium chloride (PF) 0.9 % injection 5-40 mL  -     Cancel: heparin flush 100 UNIT/ML injection 500 Units    Malignant neoplasm of overlapping sites of left breast (HCC)  -     CBC With Auto Differential; Future  -     Comprehensive metabolic panel;  Future  -     Cancel: 0.9 % sodium chloride infusion  -     Cancel: 0.9 % sodium chloride bolus  -     Cancel: dexamethasone (DECADRON) 10 mg in sodium chloride 0.9 % 50 mL IVPB  -     Cancel: fosaprepitant (EMEND) 150 mg in sodium chloride 0.9 % 150 mL IVPB  -     Cancel: palonosetron (ALOXI) injection 0.25 mg  -     Cancel: DOXOrubicin HCl (ADRIAMYCIN) 102 mg in sodium chloride 0.9 % 100 mL chemo IVPB  -     Cancel: pembrolizumab (KEYTRUDA) 400 mg in sodium chloride 0.9 % 100 mL chemo IVPB  -     Cancel: cyclophosphamide (CYTOXAN) 1,020 mg in sodium chloride 0.9 % 250 mL chemo IVPB  -     Cancel: sodium chloride (PF) 0.9 % injection 5-40 mL  -     Cancel: heparin flush 100 UNIT/ML injection 500 Units    Other fatigue    Care plan discussed with patient    Chemotherapy management, encounter for    Encounter for antineoplastic immunotherapy    Adverse effect of chemotherapy, subsequent encounter    Antineoplastic chemotherapy induced anemia    Neutrophilic leukocytosis    Chemotherapy-induced nausea    Other orders  -     0.9 % sodium chloride infusion  -     alteplase (CATHFLO) 2 mg in sterile water 2 mL injection  -     0.9 % sodium chloride infusion  -     diphenhydrAMINE (BENADRYL) injection 50 mg  -     famotidine (PEPCID) injection 20 mg  -     hydrocortisone sodium succinate PF (SOLU-CORTEF) injection 100 mg  -     acetaminophen (TYLENOL) tablet 650 mg  -     meperidine (DEMEROL) injection 12.5 mg  -     ondansetron (ZOFRAN) injection 8 mg  -     EPINEPHrine PF 1 MG/ML injection (Anaphylaxis) 0.3 mg  -     albuterol sulfate HFA (PROVENTIL;VENTOLIN;PROAIR) 108 (90 Base) MCG/ACT inhaler 4 puff  -     pegfilgrastim-jmdb (FULPHILA) injection 6 mg       Invasive ductal carcinoma, left breast, Nov 2022, Grade 3, ER 0.2%, NJ 0%, HER-2 0/negative, Ki67 83%, Stage IIIB  -Continue cycle #4 neoadjuvant chemotherapy dose dense AC    Recommended regimen  Pembrolizumab every 6 weeks x1 year  Adriamycin, Cyclophosphamide + GCSF every 2 weeks x4 cycles  Followed by weekly Taxol, Carbo x12 cycles    Proceed with cycle #4 dose dense AC with G-CSF support. Treatment related side effects-nausea, fatigue, location, mild skin rash  -OTC steroid cream for skin rash  -Continue antiemetics    PLAN:  RTC with MD in treatment room 4 weeks  C# 4 dose dense Adriamycin, Cyclophosphamide + GCSF today and every 2 weeks with Pembrolizumab  Start weekly carboplatin/Taxol in 2 weeks  CBC, CMP, TSH T4 today  Next Pembrolizumab due 3/16/23  Continue Zofran and Phenergan as needed  Continue Olanzapine 10mg nightly x4 nights with each dd AC  Emla cream to port as needed  Continue cold gloves/socks with weekly Taxol  Continue follow-up with Dr Elzbieta Ibarra, Jitendra Dc am pre charting  as Medical Assistant for Ace Maldonado MD. Electronically signed by Jitendra Dc MA on 2/9/2023 at 2:28 PM CST. Leonel Chavez am scribing for Ace Maldonado MD. Electronically signed by Hipolito Thomas RN on 2/9/2023 at 2:11 PM CST.     I, Dr Eloy Moody, personally performed the services described in this documentation as scribed by Hipolito Thomas RN in my presence and is both accurate and complete. I have seen, examined and reviewed this patient medication list, appropriate labs and imaging studies. I reviewed relevant medical records and others physicians notes. I discussed the plans of care with the patient. I answered all the questions to the patients satisfaction. I have also reviewed the chief complaint (CC) and part of the history (History of Present Illness (HPI), Past Family Social History [facility]), or Review of Systems (ROS) and made changes when appropriated. (Please note that portions of this note were completed with a voice recognition program. Efforts were made to edit the dictations but occasionally words are mis-transcribed. )Electronically signed by Babita Wyatt MD on 2/9/2023 at 3:41 PM

## 2023-02-09 ENCOUNTER — HOSPITAL ENCOUNTER (OUTPATIENT)
Dept: INFUSION THERAPY | Age: 48
Discharge: HOME | End: 2023-02-09
Payer: COMMERCIAL

## 2023-02-09 ENCOUNTER — OFFICE VISIT (OUTPATIENT)
Dept: HEMATOLOGY | Age: 48
End: 2023-02-09
Payer: COMMERCIAL

## 2023-02-09 VITALS
BODY MASS INDEX: 19.6 KG/M2 | HEART RATE: 60 BPM | SYSTOLIC BLOOD PRESSURE: 100 MMHG | DIASTOLIC BLOOD PRESSURE: 50 MMHG | HEIGHT: 69 IN | OXYGEN SATURATION: 100 % | TEMPERATURE: 98.2 F | RESPIRATION RATE: 16 BRPM | WEIGHT: 132.3 LBS

## 2023-02-09 DIAGNOSIS — R53.83 OTHER FATIGUE: ICD-10-CM

## 2023-02-09 DIAGNOSIS — Z71.89 CARE PLAN DISCUSSED WITH PATIENT: ICD-10-CM

## 2023-02-09 DIAGNOSIS — T45.1X5A CHEMOTHERAPY-INDUCED NAUSEA: ICD-10-CM

## 2023-02-09 DIAGNOSIS — Z51.11 CHEMOTHERAPY MANAGEMENT, ENCOUNTER FOR: ICD-10-CM

## 2023-02-09 DIAGNOSIS — C50.919 TRIPLE NEGATIVE BREAST CANCER (HCC): Primary | ICD-10-CM

## 2023-02-09 DIAGNOSIS — C50.812 MALIGNANT NEOPLASM OF OVERLAPPING SITES OF LEFT BREAST (HCC): Primary | ICD-10-CM

## 2023-02-09 DIAGNOSIS — C50.812 MALIGNANT NEOPLASM OF OVERLAPPING SITES OF LEFT BREAST (HCC): ICD-10-CM

## 2023-02-09 DIAGNOSIS — C50.919 TRIPLE NEGATIVE BREAST CANCER (HCC): ICD-10-CM

## 2023-02-09 DIAGNOSIS — T45.1X5D ADVERSE EFFECT OF CHEMOTHERAPY, SUBSEQUENT ENCOUNTER: ICD-10-CM

## 2023-02-09 DIAGNOSIS — D64.81 ANTINEOPLASTIC CHEMOTHERAPY INDUCED ANEMIA: ICD-10-CM

## 2023-02-09 DIAGNOSIS — D72.9 NEUTROPHILIC LEUKOCYTOSIS: ICD-10-CM

## 2023-02-09 DIAGNOSIS — Z51.12 ENCOUNTER FOR ANTINEOPLASTIC IMMUNOTHERAPY: ICD-10-CM

## 2023-02-09 DIAGNOSIS — T45.1X5A ANTINEOPLASTIC CHEMOTHERAPY INDUCED ANEMIA: ICD-10-CM

## 2023-02-09 DIAGNOSIS — R11.0 CHEMOTHERAPY-INDUCED NAUSEA: ICD-10-CM

## 2023-02-09 LAB
ALBUMIN SERPL-MCNC: 4 G/DL (ref 3.5–5.2)
ALP BLD-CCNC: 94 U/L (ref 35–104)
ALT SERPL-CCNC: 13 U/L (ref 9–52)
ANION GAP SERPL CALCULATED.3IONS-SCNC: 2 MMOL/L (ref 7–19)
AST SERPL-CCNC: 22 U/L (ref 14–36)
BILIRUB SERPL-MCNC: 0.3 MG/DL (ref 0.2–1.3)
BUN BLDV-MCNC: 10 MG/DL (ref 7–17)
CALCIUM SERPL-MCNC: 9.1 MG/DL (ref 8.4–10.2)
CHLORIDE BLD-SCNC: 110 MMOL/L (ref 98–111)
CO2: 28 MMOL/L (ref 22–29)
CREAT SERPL-MCNC: 0.8 MG/DL (ref 0.5–1)
GFR SERPL CREATININE-BSD FRML MDRD: >60 ML/MIN/{1.73_M2}
GLOBULIN: 1.9 G/DL
GLUCOSE BLD-MCNC: 103 MG/DL (ref 74–106)
HCT VFR BLD CALC: 27.9 % (ref 34.1–44.9)
HEMOGLOBIN: 9.4 G/DL (ref 11.2–15.7)
LYMPHOCYTES ABSOLUTE: 1.91 K/UL (ref 1.18–3.74)
LYMPHOCYTES RELATIVE PERCENT: 15.8 % (ref 19.3–53.1)
MCH RBC QN AUTO: 33.9 PG (ref 25.6–32.2)
MCHC RBC AUTO-ENTMCNC: 33.7 G/DL (ref 32.3–35.5)
MCV RBC AUTO: 100.7 FL (ref 79.4–94.8)
MONOCYTES ABSOLUTE: 0.98 K/UL (ref 0.24–0.82)
MONOCYTES RELATIVE PERCENT: 8.1 % (ref 4.7–12.5)
NEUTROPHILS ABSOLUTE: 8.62 K/UL (ref 1.56–6.13)
NEUTROPHILS RELATIVE PERCENT: 71.2 % (ref 34–71.1)
PDW BLD-RTO: 13.8 % (ref 11.7–14.4)
PLATELET # BLD: 198 K/UL (ref 182–369)
PMV BLD AUTO: 8.8 FL (ref 7.4–10.4)
POTASSIUM SERPL-SCNC: 3.5 MMOL/L (ref 3.5–5.1)
RBC # BLD: 2.77 M/UL (ref 3.93–5.22)
SODIUM BLD-SCNC: 140 MMOL/L (ref 137–145)
TOTAL PROTEIN: 5.8 G/DL (ref 6.3–8.2)
WBC # BLD: 12.11 K/UL (ref 3.98–10.04)

## 2023-02-09 PROCEDURE — 96413 CHEMO IV INFUSION 1 HR: CPT

## 2023-02-09 PROCEDURE — 3078F DIAST BP <80 MM HG: CPT | Performed by: INTERNAL MEDICINE

## 2023-02-09 PROCEDURE — 96367 TX/PROPH/DG ADDL SEQ IV INF: CPT

## 2023-02-09 PROCEDURE — 96375 TX/PRO/DX INJ NEW DRUG ADDON: CPT

## 2023-02-09 PROCEDURE — 96417 CHEMO IV INFUS EACH ADDL SEQ: CPT

## 2023-02-09 PROCEDURE — 3074F SYST BP LT 130 MM HG: CPT | Performed by: INTERNAL MEDICINE

## 2023-02-09 PROCEDURE — 85025 COMPLETE CBC W/AUTO DIFF WBC: CPT

## 2023-02-09 PROCEDURE — 99214 OFFICE O/P EST MOD 30 MIN: CPT | Performed by: INTERNAL MEDICINE

## 2023-02-09 PROCEDURE — 96360 HYDRATION IV INFUSION INIT: CPT

## 2023-02-09 PROCEDURE — 6360000002 HC RX W HCPCS: Performed by: INTERNAL MEDICINE

## 2023-02-09 PROCEDURE — 80053 COMPREHEN METABOLIC PANEL: CPT

## 2023-02-09 PROCEDURE — 36415 COLL VENOUS BLD VENIPUNCTURE: CPT

## 2023-02-09 PROCEDURE — 2580000003 HC RX 258: Performed by: INTERNAL MEDICINE

## 2023-02-09 RX ORDER — SODIUM CHLORIDE 9 MG/ML
5-250 INJECTION, SOLUTION INTRAVENOUS PRN
Status: CANCELLED | OUTPATIENT
Start: 2023-02-09

## 2023-02-09 RX ORDER — PALONOSETRON 0.05 MG/ML
0.25 INJECTION, SOLUTION INTRAVENOUS ONCE
Status: CANCELLED | OUTPATIENT
Start: 2023-02-09 | End: 2023-02-09

## 2023-02-09 RX ORDER — EPINEPHRINE 1 MG/ML
0.3 INJECTION, SOLUTION, CONCENTRATE INTRAVENOUS PRN
Status: CANCELLED | OUTPATIENT
Start: 2023-02-09

## 2023-02-09 RX ORDER — HEPARIN SODIUM (PORCINE) LOCK FLUSH IV SOLN 100 UNIT/ML 100 UNIT/ML
500 SOLUTION INTRAVENOUS PRN
Status: DISCONTINUED | OUTPATIENT
Start: 2023-02-09 | End: 2023-02-10 | Stop reason: HOSPADM

## 2023-02-09 RX ORDER — ONDANSETRON 2 MG/ML
8 INJECTION INTRAMUSCULAR; INTRAVENOUS
Status: CANCELLED | OUTPATIENT
Start: 2023-02-09

## 2023-02-09 RX ORDER — FAMOTIDINE 10 MG/ML
20 INJECTION, SOLUTION INTRAVENOUS
Status: CANCELLED | OUTPATIENT
Start: 2023-02-09

## 2023-02-09 RX ORDER — SODIUM CHLORIDE 9 MG/ML
5-40 INJECTION INTRAVENOUS PRN
Status: CANCELLED | OUTPATIENT
Start: 2023-02-09

## 2023-02-09 RX ORDER — ACETAMINOPHEN 325 MG/1
650 TABLET ORAL
Status: CANCELLED | OUTPATIENT
Start: 2023-02-09

## 2023-02-09 RX ORDER — SODIUM CHLORIDE 9 MG/ML
INJECTION, SOLUTION INTRAVENOUS CONTINUOUS
Status: CANCELLED | OUTPATIENT
Start: 2023-02-09

## 2023-02-09 RX ORDER — 0.9 % SODIUM CHLORIDE 0.9 %
500 INTRAVENOUS SOLUTION INTRAVENOUS ONCE
Status: COMPLETED | OUTPATIENT
Start: 2023-02-09 | End: 2023-02-09

## 2023-02-09 RX ORDER — 0.9 % SODIUM CHLORIDE 0.9 %
500 INTRAVENOUS SOLUTION INTRAVENOUS ONCE
Status: CANCELLED
Start: 2023-02-09 | End: 2023-02-09

## 2023-02-09 RX ORDER — MEPERIDINE HYDROCHLORIDE 50 MG/ML
12.5 INJECTION INTRAMUSCULAR; INTRAVENOUS; SUBCUTANEOUS PRN
Status: CANCELLED | OUTPATIENT
Start: 2023-02-09

## 2023-02-09 RX ORDER — DIPHENHYDRAMINE HYDROCHLORIDE 50 MG/ML
50 INJECTION INTRAMUSCULAR; INTRAVENOUS
Status: CANCELLED | OUTPATIENT
Start: 2023-02-09

## 2023-02-09 RX ORDER — PALONOSETRON 0.05 MG/ML
0.25 INJECTION, SOLUTION INTRAVENOUS ONCE
Status: COMPLETED | OUTPATIENT
Start: 2023-02-09 | End: 2023-02-09

## 2023-02-09 RX ORDER — SODIUM CHLORIDE 9 MG/ML
5-250 INJECTION, SOLUTION INTRAVENOUS PRN
Status: DISCONTINUED | OUTPATIENT
Start: 2023-02-09 | End: 2023-02-10 | Stop reason: HOSPADM

## 2023-02-09 RX ORDER — HEPARIN SODIUM (PORCINE) LOCK FLUSH IV SOLN 100 UNIT/ML 100 UNIT/ML
500 SOLUTION INTRAVENOUS PRN
Status: CANCELLED | OUTPATIENT
Start: 2023-02-09

## 2023-02-09 RX ORDER — ALBUTEROL SULFATE 90 UG/1
4 AEROSOL, METERED RESPIRATORY (INHALATION) PRN
Status: CANCELLED | OUTPATIENT
Start: 2023-02-09

## 2023-02-09 RX ORDER — SODIUM CHLORIDE 9 MG/ML
5-40 INJECTION INTRAVENOUS PRN
Status: DISCONTINUED | OUTPATIENT
Start: 2023-02-09 | End: 2023-02-10 | Stop reason: HOSPADM

## 2023-02-09 RX ADMIN — FOSAPREPITANT 150 MG: 150 INJECTION, POWDER, LYOPHILIZED, FOR SOLUTION INTRAVENOUS at 15:07

## 2023-02-09 RX ADMIN — CYCLOPHOSPHAMIDE 1000 MG: 1 INJECTION, POWDER, FOR SOLUTION INTRAVENOUS; ORAL at 16:38

## 2023-02-09 RX ADMIN — DEXAMETHASONE SODIUM PHOSPHATE: 100 INJECTION INTRAMUSCULAR; INTRAVENOUS at 14:51

## 2023-02-09 RX ADMIN — HEPARIN 500 UNITS: 100 SYRINGE at 17:12

## 2023-02-09 RX ADMIN — PALONOSETRON 0.25 MG: 0.05 INJECTION, SOLUTION INTRAVENOUS at 14:51

## 2023-02-09 RX ADMIN — SODIUM CHLORIDE 100 MG: 9 INJECTION, SOLUTION INTRAVENOUS at 16:21

## 2023-02-09 RX ADMIN — SODIUM CHLORIDE, PRESERVATIVE FREE 10 ML: 5 INJECTION INTRAVENOUS at 17:11

## 2023-02-09 RX ADMIN — SODIUM CHLORIDE 400 MG: 9 INJECTION, SOLUTION INTRAVENOUS at 15:50

## 2023-02-09 RX ADMIN — SODIUM CHLORIDE 500 ML: 9 INJECTION, SOLUTION INTRAVENOUS at 14:51

## 2023-02-10 ENCOUNTER — HOSPITAL ENCOUNTER (OUTPATIENT)
Dept: INFUSION THERAPY | Age: 48
Discharge: HOME | End: 2023-02-10
Payer: COMMERCIAL

## 2023-02-10 DIAGNOSIS — C50.919 TRIPLE NEGATIVE BREAST CANCER (HCC): Primary | ICD-10-CM

## 2023-02-10 DIAGNOSIS — C50.812 MALIGNANT NEOPLASM OF OVERLAPPING SITES OF LEFT BREAST (HCC): ICD-10-CM

## 2023-02-10 PROCEDURE — 6360000002 HC RX W HCPCS: Performed by: INTERNAL MEDICINE

## 2023-02-10 PROCEDURE — 96372 THER/PROPH/DIAG INJ SC/IM: CPT

## 2023-02-10 RX ADMIN — PEGFILGRASTIM-CBQV 6 MG: 6 INJECTION, SOLUTION SUBCUTANEOUS at 13:11

## 2023-02-14 NOTE — PROGRESS NOTES
HISTORY OF PRESENT ILLNESS:     Ms. Cody Amaya  is recently status post ultrasound guided breast biopsy  on the left which revealed a 1.1 cm high grade invasive ductal carcinoma. ER negative. NM negative. Her2 negative. Ki67 is 83%. MammaPrint demonstrates a high risk basal-like phenotype    She has now completed 4 cycles of dose dense AC and comes in for an ultrasound to assess response to therapy. MRI-12/2/2022      EXAM REASON: Patient is a 49-year-old female with recent diagnosis of   left breast malignancy. COMPARISON:    Prior breast imaging November 2, 2022 and breast biopsy November 21, 2022   TECHNICAL:    Multiplanar, multisequence imaging was performed through the breasts   before and after the administration of 12 mL MultiHance IV contrast.   FINDINGS:   Contrast is seen in the heart and great vessels on postcontrast   sequences. There is minimal background enhancement of the patient's   heterogeneously dense tissue. In the upper outer quadrant of the left   breast is demonstration of an oval mass, which on MRI demonstrates   very irregular margins and surrounded by some nonmass enhancement. MRI   measurement is estimated as 24 x 20 x 20 mm in length by with by   height. This is noted to be larger than that seen on previous   ultrasounds, and this may be due to some associated surrounding   nonmass enhancement which is immediately contiguous with mass margins. T2 bright biopsy clip is noted within the mass. The mass is seen just   under the skin but skin thickness or enhancement is not appreciated. Mass is also noted to be immediately contiguous with the   implant/implant capsule, without any intervening fat. No other   suspicious abnormality is seen throughout the left breast, no   suspicious finding is seen in the right breast. Axillary lymph nodes   appear within normal by MRI. No abnormal internal mammary chain lymph   nodes are seen. No obvious extramammary significant findings seen. Impression   1. Left breast, BI-RADS 6, biopsy-proven malignant mass in the left   breast upper outer quadrant. Mass measures larger on MRI, possibly due   to some surrounding involved nonmass enhancement surrounding the mass. The mass is noted to be immediately contiguous, with no intervening   fat plane between the posterior margin of the mass and the   implant/implant capsule. Recommend appropriate clinical, medical   oncology and surgical management. Overall assessment BI-RADS 6, biopsy-proven malignancy. Signed by Dr Ozzie Corral      I reviewed the radiographic images with the patient. I concur with the radiologist evaluation and recommendations. I discussed her treatment plan with Dr. Emma Reid. She is having trouble swallowing and had an esophagram that showed some dysfunction in the mid esophagus and severe reflux. I have referred her to GI for evaluation of this. This is a new problem that only started after her chemotherapy. Ultrasound-2/15/2023     EXAMINATION: US BREAST LIMITED LEFT  2/15/2023 2:35 PM   HISTORY: Left breast limited ultrasound at 3:00 patient underwent left   breast biopsy in 11/21/2022 with adjuvant chemotherapy. COMPARISON: Prior study from 11/21/2022 is available. TECHNIQUE: Multiple longitudinal and transverse sonographic images of   the left breast at 3:00 were obtained. FINDINGS:   Ultrasound the left breast at 3:00 there is a density seen 4 cm from   the lesion superficial measuring 1.75 x 1.25 x 0.6 8/3/1970. There is   a biopsy clip seen in the medial aspect of this lesion. Previously   measures 1.71 x 1.77 x 0.947 cm. The lesion has decreased in size as   compared to prior study       Impression   1. Hypoechoic solid lesion in left breast at 3:00 with biopsy marker   which has decreased in size as described above. Final assessment: ACR: BI-RADS -6:    Known biopsy-proven malignancy. Management: Surgical excision when clinically appropriate. EXAM:    She has fibrocystic changes throughout both breasts as well as bilateral augmentation mammoplasty. The palpable mass in the left breast is significantly smaller than at the time of her last exam.    IMPRESSION:    Excellent response to chemotherapy. PLAN:    Return in 12 weeks. We will discuss lumpectomy versus mastectomy with or without reconstruction as well as another MRI, Mammogram, ultrasound and possibly evaluation by plastic surgery. Over 50% of visit was spent in counseling patient. 25 minutes of face to face time with patient.

## 2023-02-15 ENCOUNTER — HOSPITAL ENCOUNTER (OUTPATIENT)
Dept: WOMENS IMAGING | Age: 48
Discharge: HOME | End: 2023-02-15
Payer: COMMERCIAL

## 2023-02-15 ENCOUNTER — OFFICE VISIT (OUTPATIENT)
Dept: SURGERY | Age: 48
End: 2023-02-15
Payer: COMMERCIAL

## 2023-02-15 DIAGNOSIS — C50.812 MALIGNANT NEOPLASM OF OVERLAPPING SITES OF LEFT BREAST (HCC): ICD-10-CM

## 2023-02-15 DIAGNOSIS — C50.919 TRIPLE NEGATIVE BREAST CANCER (HCC): Primary | ICD-10-CM

## 2023-02-15 DIAGNOSIS — K21.9 GASTROESOPHAGEAL REFLUX DISEASE, UNSPECIFIED WHETHER ESOPHAGITIS PRESENT: ICD-10-CM

## 2023-02-15 PROCEDURE — 99214 OFFICE O/P EST MOD 30 MIN: CPT | Performed by: SURGERY

## 2023-02-15 PROCEDURE — 76642 ULTRASOUND BREAST LIMITED: CPT

## 2023-02-15 NOTE — Clinical Note
Follow-up 12 weeks for physical exam. We will discuss mammogram, ultrasound, and repeat MRI at that time

## 2023-02-16 ENCOUNTER — TELEPHONE (OUTPATIENT)
Dept: SURGERY | Age: 48
End: 2023-02-16

## 2023-02-16 NOTE — TELEPHONE ENCOUNTER
2/16/2023 Katalina Ochoa with [facility] called to check on a medical records request they had sent to office on patient.     Callback # [phone]  olga

## 2023-02-20 SDOH — ECONOMIC STABILITY: TRANSPORTATION INSECURITY
IN THE PAST 12 MONTHS, HAS LACK OF TRANSPORTATION KEPT YOU FROM MEETINGS, WORK, OR FROM GETTING THINGS NEEDED FOR DAILY LIVING?: NO

## 2023-02-20 SDOH — ECONOMIC STABILITY: FOOD INSECURITY: WITHIN THE PAST 12 MONTHS, YOU WORRIED THAT YOUR FOOD WOULD RUN OUT BEFORE YOU GOT MONEY TO BUY MORE.: NEVER TRUE

## 2023-02-20 SDOH — ECONOMIC STABILITY: INCOME INSECURITY: HOW HARD IS IT FOR YOU TO PAY FOR THE VERY BASICS LIKE FOOD, HOUSING, MEDICAL CARE, AND HEATING?: NOT HARD AT ALL

## 2023-02-20 SDOH — ECONOMIC STABILITY: HOUSING INSECURITY
IN THE LAST 12 MONTHS, WAS THERE A TIME WHEN YOU DID NOT HAVE A STEADY PLACE TO SLEEP OR SLEPT IN A SHELTER (INCLUDING NOW)?: NO

## 2023-02-20 SDOH — ECONOMIC STABILITY: FOOD INSECURITY: WITHIN THE PAST 12 MONTHS, THE FOOD YOU BOUGHT JUST DIDN'T LAST AND YOU DIDN'T HAVE MONEY TO GET MORE.: NEVER TRUE

## 2023-02-21 ENCOUNTER — TELEPHONE (OUTPATIENT)
Dept: GASTROENTEROLOGY | Age: 48
End: 2023-02-21

## 2023-02-22 ENCOUNTER — OFFICE VISIT (OUTPATIENT)
Dept: PRIMARY CARE CLINIC | Age: 48
End: 2023-02-22
Payer: COMMERCIAL

## 2023-02-22 VITALS
OXYGEN SATURATION: 99 % | TEMPERATURE: 97.3 F | HEART RATE: 54 BPM | WEIGHT: 130 LBS | BODY MASS INDEX: 19.2 KG/M2 | DIASTOLIC BLOOD PRESSURE: 55 MMHG | SYSTOLIC BLOOD PRESSURE: 108 MMHG

## 2023-02-22 DIAGNOSIS — C50.919 TRIPLE NEGATIVE BREAST CANCER (HCC): ICD-10-CM

## 2023-02-22 DIAGNOSIS — K21.9 GASTROESOPHAGEAL REFLUX DISEASE WITHOUT ESOPHAGITIS: ICD-10-CM

## 2023-02-22 DIAGNOSIS — I10 ESSENTIAL HYPERTENSION: ICD-10-CM

## 2023-02-22 DIAGNOSIS — C50.812 MALIGNANT NEOPLASM OF OVERLAPPING SITES OF LEFT BREAST (HCC): ICD-10-CM

## 2023-02-22 DIAGNOSIS — F41.9 ANXIETY: Primary | ICD-10-CM

## 2023-02-22 PROCEDURE — 99215 OFFICE O/P EST HI 40 MIN: CPT | Performed by: NURSE PRACTITIONER

## 2023-02-22 PROCEDURE — 3074F SYST BP LT 130 MM HG: CPT | Performed by: NURSE PRACTITIONER

## 2023-02-22 PROCEDURE — 3078F DIAST BP <80 MM HG: CPT | Performed by: NURSE PRACTITIONER

## 2023-02-22 ASSESSMENT — PATIENT HEALTH QUESTIONNAIRE - PHQ9
2. FEELING DOWN, DEPRESSED OR HOPELESS: 0
SUM OF ALL RESPONSES TO PHQ QUESTIONS 1-9: 0
1. LITTLE INTEREST OR PLEASURE IN DOING THINGS: 0
SUM OF ALL RESPONSES TO PHQ9 QUESTIONS 1 & 2: 0
SUM OF ALL RESPONSES TO PHQ QUESTIONS 1-9: 0

## 2023-02-22 NOTE — PROGRESS NOTES
[address] [facility]  [address] [address] [id number] [address]  Dept: [phone]  Dept Fax: [phone]  Loc: [phone]    Ag Knowles is a 50 y.o. female who presents today for her medical conditions/complaints as noted below. Ag Knowles is c/o of Follow-up (Has been dx of breast cancer since last visit )        HPI:     HPI   Chief Complaint   Patient presents with    Follow-up     Has been dx of breast cancer since last visit      Patient presents today for follow-up GERD, anxiety, RLS. Invasive ductal carcinoma, left breast, Nov 2022; Grade 3; Stage IIIB, clinical T2 N1 M0  She is seeing Dr Ryann Guevara and Dr Mariah Ugalde; currently undergoing chemotherapy. She has lost all her hair; she is handling this well-- better than she thought she would.        Past Medical History:   Diagnosis Date    Adverse effect of chemotherapy     Breast cancer ([facility] [address] 11/2022    Triple negative breast cancer    Malignant neoplasm of overlapping sites of left breast ([facility] [address] 11/22/2022    PVC (premature ventricular contraction)     Restless leg syndrome 07/16/2018    Uterine mass 06/17/2014    9x9 cm on CT scan      Past Surgical History:   Procedure Laterality Date    BREAST RECONSTRUCTION  04/2019    BREAST SURGERY Bilateral 5248    silicone Memory Gel    CHOLECYSTECTOMY  06/24/2014    HC INJECT OTHER PERPHRL NERV Right 08/03/2016    HIP FLUOROSCOPIC GUIDED CORTICOSTEROID INJECTION  performed by Blaine London MD at [facility] NERV Right 04/21/2017    FLURO GUIDED HIP INJECTION performed by Rosemary Chu MD at [facility], VAGINAL      PORT SURGERY Left 12/09/2022    PORT INSERTION WITH FLUOROSCOPY performed by Prosper Garnett MD at [place] [address] LEFT Left 11/21/2022    US BREAST NEEDLE BIOPSY LEFT LPS GENERAL SURGERY       Vitals 3/9/2023 3/7/2023 3/2/2023 2/23/2023 2/22/2023 1/1/1439   SYSTOLIC 845 243 380 118 345 074   DIASTOLIC 54 70 66 57 55 50   Site - Left Upper Arm - - - -   Position - - - - - -   Pulse 63 79 66 63 54 60   Temp 98.1 - 98.1 98.5 97.3 98.2   Resp 16 - 16 16 - 16   SpO2 100 99 100 100 99 100   Weight 131 lb 4.8 oz 129 lb 129 lb 6.4 oz 130 lb 4.8 oz 130 lb 132 lb 4.8 oz   Height 5' 9\" 5' 9\" 5' 9\" 5' 9\" - 5' 9\"   Body mass index 19.39 kg/m2 19.05 kg/m2 19.11 kg/m2 19.24 kg/m2 - 19.54 kg/m2   Some recent data might be hidden       Family History   Problem Relation Age of Onset    Cancer Mother         Melanoma    Hypertension Mother     Cancer Brother         synovial cell sarcoma    Cancer Maternal Grandfather         lung    Other Daughter         gallbladder disease    Colon Cancer Neg Hx     Colon Polyps Neg Hx        Social History     Tobacco Use    Smoking status: Every Day     Packs/day: 0.50     Years: 34.00     Pack years: 17.00     Types: Cigarettes     Start date: 24    Smokeless tobacco: Never   Substance Use Topics    Alcohol use: Not Currently     Comment: occ      Current Outpatient Medications on File Prior to Visit   Medication Sig Dispense Refill    Magic Mouthwash (MIRACLE MOUTHWASH) Swish and spit 10 mLs 4 times daily as needed for Irritation 480 mL 3    OLANZapine (ZYPREXA) 10 MG tablet TAKE 1 TABLET BY MOUTH DAILY AT NIGHT FOR 4 NIGHTS ONLY WITH EACH CYCLE OF ADRIAMYCIN/CYTOXAN EVERY 2 WEEKS 16 tablet 0    montelukast (SINGULAIR) 10 MG tablet Take 10 mg by mouth nightly      ondansetron (ZOFRAN) 4 MG tablet Take 1 tablet by mouth every 6 hours as needed for Nausea or Vomiting 30 tablet 5    promethazine (PHENERGAN) 25 MG tablet Take 0.5 tablets by mouth every 6 hours as needed for Nausea 30 tablet 5    lidocaine-prilocaine (EMLA) 2.5-2.5 % cream Apply topically as needed.  30 g 5    vitamin D (ERGOCALCIFEROL) 1.25 MG (69809 UT) CAPS capsule TAKE 1 CAPSULE BY MOUTH 1 TIME A WEEK 12 capsule 1    rOPINIRole (REQUIP) 0.5 MG tablet TAKE 1 TO 3 TABLETS BY MOUTH EVERY NIGHT 90 tablet 5    valACYclovir (VALTREX) 1 g tablet TAKE 1 TABLET BY MOUTH EVERY DAY AS NEEDED FOR FEVER BLISTER (Patient taking differently: Take 1,000 mg by mouth as needed TAKE 1 TABLET BY MOUTH EVERY DAY AS NEEDED FOR FEVER BLISTER) 30 tablet 0    omeprazole (PRILOSEC) 20 MG delayed release capsule Take 1 capsule by mouth 2 times daily (before meals) (Patient taking differently: Take 20 mg by mouth nightly) 60 capsule 5    Multiple Vitamin (MULTI-VITAMIN DAILY PO) Take 1 tablet by mouth daily      doxepin (SINEQUAN) 10 MG capsule Take 1 capsule by mouth nightly (Patient taking differently: Take 10 mg by mouth nightly as needed) 30 capsule 0    hydrochlorothiazide (HYDRODIURIL) 25 MG tablet Take 1 tablet by mouth daily (Patient taking differently: Take 25 mg by mouth as needed) 30 tablet 5     No current facility-administered medications on file prior to visit. Allergies   Allergen Reactions    Codeine Itching    Percocet [Oxycodone-Acetaminophen] Itching    Toradol [Ketorolac Tromethamine] Itching       Health Maintenance   Topic Date Due    COVID-19 Vaccine (1) Never done    Hepatitis C screen  Never done    DTaP/Tdap/Td vaccine (1 - Tdap) Never done    Shingles vaccine (1 of 2) Never done    Flu vaccine (1) Never done    Pneumococcal 0-64 years Vaccine (1 - PCV) 07/15/2024 (Originally 8/1/1981)    Breast cancer screen  11/02/2023    Depression Screen  02/22/2024    Colorectal Cancer Screen  03/03/2025    Lipids  12/02/2027    Hepatitis A vaccine  Aged Out    Hib vaccine  Aged Out    Meningococcal (ACWY) vaccine  Aged Out    HIV screen  Discontinued       Subjective:      Review of Systems   Constitutional:  Negative for chills and fever. HENT:  Negative for ear pain, hearing loss, rhinorrhea and voice change. Eyes:  Negative for photophobia and visual disturbance. Respiratory:  Negative for cough and shortness of breath.     Cardiovascular: Negative for chest pain and palpitations. Gastrointestinal:  Negative for nausea and vomiting. Endocrine: Negative. Negative for cold intolerance and heat intolerance. Genitourinary:  Negative for difficulty urinating and flank pain. Musculoskeletal:  Negative for back pain and neck pain. Skin:  Negative for color change and rash. Allergic/Immunologic: Negative for environmental allergies and food allergies. Neurological:  Negative for dizziness, speech difficulty and headaches. Hematological:  Does not bruise/bleed easily. Psychiatric/Behavioral:  Negative for sleep disturbance and suicidal ideas. Objective:     Physical Exam  Vitals and nursing note reviewed. Constitutional:       Appearance: She is well-developed. HENT:      Head: Atraumatic. Right Ear: External ear normal.      Left Ear: External ear normal.      Nose: Nose normal.   Eyes:      Conjunctiva/sclera: Conjunctivae normal.      Pupils: Pupils are equal, round, and reactive to light. Cardiovascular:      Rate and Rhythm: Normal rate and regular rhythm. Heart sounds: Normal heart sounds, S1 normal and S2 normal.   Pulmonary:      Effort: Pulmonary effort is normal.      Breath sounds: Normal breath sounds. Abdominal:      General: Bowel sounds are normal.      Palpations: Abdomen is soft. Musculoskeletal:         General: Normal range of motion. Cervical back: Normal range of motion and neck supple. Skin:     General: Skin is warm and dry. Neurological:      Mental Status: She is alert and oriented to person, place, and time. Psychiatric:         Behavior: Behavior normal.     BP (!) 108/55   Pulse 54   Temp 97.3 °F (36.3 °C) (Temporal)   Wt 130 lb (59 kg)   LMP 06/08/2014   SpO2 99%   BMI 19.20 kg/m²     Assessment:       Diagnosis Orders   1. Anxiety        2. Triple negative breast cancer ([facility] [address]        3. Malignant neoplasm of overlapping sites of left breast ([facility] [address]        4.  Essential hypertension        5. Gastroesophageal reflux disease without esophagitis              Plan:   More than 50% of the time was spent counseling and coordinating care for a total time of 40 min face to face. Continue treatment plan per Dr Gurvinder Lozoya; follow-up for any other concerns with me as needed. I will see her back in 6 months for routine follow-up. PDMP Monitoring:    Last PDMP [place] as Reviewed:  Review User Review Instant Review Result            Urine Drug Screenings (1 yr)    No resulted procedures found. Medication Contract and Consent for Opioid Use Documents Filed       Patient Documents       Type of Document Status Date Received Received By Description    Medication Contract Received 6/21/2017 11:09 AM Cade Pleitez                      Patient given educational materials -see patient instructions. Discussed use, benefit, and side effects of prescribed medications. All patient questions answered. Pt voiced understanding. Reviewed health maintenance. Instructed to continue currentmedications, diet and exercise. Patient agreed with treatment plan. Follow up as directed. MEDICATIONS:  No orders of the defined types were placed in this encounter. ORDERS:  No orders of the defined types were placed in this encounter. Follow-up:  Return in about 6 months (around 8/22/2023) for in office. PATIENT INSTRUCTIONS:  There are no Patient Instructions on file for this visit. Electronically signed by WHITNEY Angela on 3/14/2023 at 10:29 AM    EMR Dragon/transcription disclaimer:  Much of thisencounter note is electronic transcription/translation of spoken language to printed texts. The electronic translation of spoken language may be erroneous, or at times, nonsensical words or phrases may be inadvertentlytranscribed.   Although I have reviewed the note for such errors, some may still exist.

## 2023-02-23 ENCOUNTER — HOSPITAL ENCOUNTER (OUTPATIENT)
Dept: INFUSION THERAPY | Age: 48
Discharge: HOME | End: 2023-02-23
Payer: COMMERCIAL

## 2023-02-23 VITALS
TEMPERATURE: 98.5 F | HEIGHT: 69 IN | SYSTOLIC BLOOD PRESSURE: 122 MMHG | BODY MASS INDEX: 19.3 KG/M2 | OXYGEN SATURATION: 100 % | HEART RATE: 63 BPM | DIASTOLIC BLOOD PRESSURE: 57 MMHG | RESPIRATION RATE: 16 BRPM | WEIGHT: 130.3 LBS

## 2023-02-23 DIAGNOSIS — C50.919 TRIPLE NEGATIVE BREAST CANCER (HCC): ICD-10-CM

## 2023-02-23 DIAGNOSIS — C50.919 TRIPLE NEGATIVE BREAST CANCER (HCC): Primary | ICD-10-CM

## 2023-02-23 DIAGNOSIS — C50.812 MALIGNANT NEOPLASM OF OVERLAPPING SITES OF LEFT BREAST (HCC): ICD-10-CM

## 2023-02-23 DIAGNOSIS — C50.812 MALIGNANT NEOPLASM OF OVERLAPPING SITES OF LEFT BREAST (HCC): Primary | ICD-10-CM

## 2023-02-23 LAB
ALBUMIN SERPL-MCNC: 3.7 G/DL (ref 3.5–5.2)
ALP BLD-CCNC: 88 U/L (ref 35–104)
ALT SERPL-CCNC: 12 U/L (ref 9–52)
ANION GAP SERPL CALCULATED.3IONS-SCNC: 4 MMOL/L (ref 7–19)
AST SERPL-CCNC: 18 U/L (ref 14–36)
BILIRUB SERPL-MCNC: 0.5 MG/DL (ref 0.2–1.3)
BUN BLDV-MCNC: 11 MG/DL (ref 7–17)
CALCIUM SERPL-MCNC: 9.3 MG/DL (ref 8.4–10.2)
CHLORIDE BLD-SCNC: 111 MMOL/L (ref 98–111)
CO2: 26 MMOL/L (ref 22–29)
CREAT SERPL-MCNC: 0.8 MG/DL (ref 0.5–1)
GFR SERPL CREATININE-BSD FRML MDRD: >60 ML/MIN/{1.73_M2}
GLOBULIN: 2.3 G/DL
GLUCOSE BLD-MCNC: 97 MG/DL (ref 74–106)
HCT VFR BLD CALC: 27.7 % (ref 34.1–44.9)
HEMOGLOBIN: 8.9 G/DL (ref 11.2–15.7)
LYMPHOCYTES ABSOLUTE: 1.54 K/UL (ref 1.18–3.74)
LYMPHOCYTES RELATIVE PERCENT: 22.6 % (ref 19.3–53.1)
MCH RBC QN AUTO: 34 PG (ref 25.6–32.2)
MCHC RBC AUTO-ENTMCNC: 32.1 G/DL (ref 32.3–35.5)
MCV RBC AUTO: 105.7 FL (ref 79.4–94.8)
MONOCYTES ABSOLUTE: 0.61 K/UL (ref 0.24–0.82)
MONOCYTES RELATIVE PERCENT: 9 % (ref 4.7–12.5)
NEUTROPHILS ABSOLUTE: 4.48 K/UL (ref 1.56–6.13)
NEUTROPHILS RELATIVE PERCENT: 65.9 % (ref 34–71.1)
PDW BLD-RTO: 17.5 % (ref 11.7–14.4)
PLATELET # BLD: 189 K/UL (ref 182–369)
PMV BLD AUTO: 9.6 FL (ref 7.4–10.4)
POTASSIUM SERPL-SCNC: 4 MMOL/L (ref 3.5–5.1)
RBC # BLD: 2.62 M/UL (ref 3.93–5.22)
SODIUM BLD-SCNC: 141 MMOL/L (ref 137–145)
TOTAL PROTEIN: 6 G/DL (ref 6.3–8.2)
WBC # BLD: 6.8 K/UL (ref 3.98–10.04)

## 2023-02-23 PROCEDURE — 96417 CHEMO IV INFUS EACH ADDL SEQ: CPT

## 2023-02-23 PROCEDURE — 96413 CHEMO IV INFUSION 1 HR: CPT

## 2023-02-23 PROCEDURE — 36415 COLL VENOUS BLD VENIPUNCTURE: CPT

## 2023-02-23 PROCEDURE — 80053 COMPREHEN METABOLIC PANEL: CPT

## 2023-02-23 PROCEDURE — 96367 TX/PROPH/DG ADDL SEQ IV INF: CPT

## 2023-02-23 PROCEDURE — 6360000002 HC RX W HCPCS: Performed by: INTERNAL MEDICINE

## 2023-02-23 PROCEDURE — 96375 TX/PRO/DX INJ NEW DRUG ADDON: CPT

## 2023-02-23 PROCEDURE — 85025 COMPLETE CBC W/AUTO DIFF WBC: CPT

## 2023-02-23 PROCEDURE — 2500000003 HC RX 250 WO HCPCS: Performed by: INTERNAL MEDICINE

## 2023-02-23 PROCEDURE — 2580000003 HC RX 258: Performed by: INTERNAL MEDICINE

## 2023-02-23 RX ORDER — EPINEPHRINE 1 MG/ML
0.3 INJECTION, SOLUTION, CONCENTRATE INTRAVENOUS PRN
OUTPATIENT
Start: 2023-03-02

## 2023-02-23 RX ORDER — ACETAMINOPHEN 325 MG/1
650 TABLET ORAL
OUTPATIENT
Start: 2023-03-02

## 2023-02-23 RX ORDER — SODIUM CHLORIDE 9 MG/ML
5-250 INJECTION, SOLUTION INTRAVENOUS PRN
Status: CANCELLED | OUTPATIENT
Start: 2023-02-23

## 2023-02-23 RX ORDER — SODIUM CHLORIDE 9 MG/ML
5-250 INJECTION, SOLUTION INTRAVENOUS PRN
OUTPATIENT
Start: 2023-03-09

## 2023-02-23 RX ORDER — HEPARIN SODIUM (PORCINE) LOCK FLUSH IV SOLN 100 UNIT/ML 100 UNIT/ML
500 SOLUTION INTRAVENOUS PRN
OUTPATIENT
Start: 2023-03-02

## 2023-02-23 RX ORDER — ALBUTEROL SULFATE 90 UG/1
4 AEROSOL, METERED RESPIRATORY (INHALATION) PRN
OUTPATIENT
Start: 2023-03-09

## 2023-02-23 RX ORDER — HEPARIN SODIUM (PORCINE) LOCK FLUSH IV SOLN 100 UNIT/ML 100 UNIT/ML
500 SOLUTION INTRAVENOUS PRN
OUTPATIENT
Start: 2023-03-09

## 2023-02-23 RX ORDER — ONDANSETRON 2 MG/ML
8 INJECTION INTRAMUSCULAR; INTRAVENOUS
OUTPATIENT
Start: 2023-03-09

## 2023-02-23 RX ORDER — PALONOSETRON 0.05 MG/ML
0.25 INJECTION, SOLUTION INTRAVENOUS ONCE
Status: CANCELLED | OUTPATIENT
Start: 2023-02-23 | End: 2023-02-23

## 2023-02-23 RX ORDER — FAMOTIDINE 10 MG/ML
20 INJECTION, SOLUTION INTRAVENOUS
OUTPATIENT
Start: 2023-02-23

## 2023-02-23 RX ORDER — DIPHENHYDRAMINE HYDROCHLORIDE 50 MG/ML
50 INJECTION INTRAMUSCULAR; INTRAVENOUS ONCE
Status: COMPLETED | OUTPATIENT
Start: 2023-02-23 | End: 2023-02-23

## 2023-02-23 RX ORDER — DIPHENHYDRAMINE HYDROCHLORIDE 50 MG/ML
50 INJECTION INTRAMUSCULAR; INTRAVENOUS
OUTPATIENT
Start: 2023-03-09

## 2023-02-23 RX ORDER — SODIUM CHLORIDE 9 MG/ML
5-250 INJECTION, SOLUTION INTRAVENOUS PRN
OUTPATIENT
Start: 2023-03-02

## 2023-02-23 RX ORDER — SODIUM CHLORIDE 9 MG/ML
5-40 INJECTION INTRAVENOUS PRN
OUTPATIENT
Start: 2023-03-02

## 2023-02-23 RX ORDER — SODIUM CHLORIDE 9 MG/ML
5-250 INJECTION, SOLUTION INTRAVENOUS PRN
OUTPATIENT
Start: 2023-02-23

## 2023-02-23 RX ORDER — DIPHENHYDRAMINE HYDROCHLORIDE 50 MG/ML
50 INJECTION INTRAMUSCULAR; INTRAVENOUS
OUTPATIENT
Start: 2023-02-23

## 2023-02-23 RX ORDER — DIPHENHYDRAMINE HYDROCHLORIDE 50 MG/ML
50 INJECTION INTRAMUSCULAR; INTRAVENOUS ONCE
Status: CANCELLED | OUTPATIENT
Start: 2023-02-23 | End: 2023-02-23

## 2023-02-23 RX ORDER — HEPARIN SODIUM (PORCINE) LOCK FLUSH IV SOLN 100 UNIT/ML 100 UNIT/ML
500 SOLUTION INTRAVENOUS PRN
Status: DISCONTINUED | OUTPATIENT
Start: 2023-02-23 | End: 2023-02-24 | Stop reason: HOSPADM

## 2023-02-23 RX ORDER — DEXAMETHASONE SODIUM PHOSPHATE 10 MG/ML
10 INJECTION, SOLUTION INTRAMUSCULAR; INTRAVENOUS ONCE
Status: COMPLETED | OUTPATIENT
Start: 2023-02-23 | End: 2023-02-23

## 2023-02-23 RX ORDER — ACETAMINOPHEN 325 MG/1
650 TABLET ORAL
OUTPATIENT
Start: 2023-02-23

## 2023-02-23 RX ORDER — FAMOTIDINE 10 MG/ML
20 INJECTION, SOLUTION INTRAVENOUS ONCE
OUTPATIENT
Start: 2023-03-09 | End: 2023-03-09

## 2023-02-23 RX ORDER — SODIUM CHLORIDE 9 MG/ML
5-40 INJECTION INTRAVENOUS PRN
Status: DISCONTINUED | OUTPATIENT
Start: 2023-02-23 | End: 2023-02-24 | Stop reason: HOSPADM

## 2023-02-23 RX ORDER — MEPERIDINE HYDROCHLORIDE 50 MG/ML
12.5 INJECTION INTRAMUSCULAR; INTRAVENOUS; SUBCUTANEOUS PRN
OUTPATIENT
Start: 2023-03-09

## 2023-02-23 RX ORDER — FAMOTIDINE 10 MG/ML
20 INJECTION, SOLUTION INTRAVENOUS
OUTPATIENT
Start: 2023-03-02

## 2023-02-23 RX ORDER — EPINEPHRINE 1 MG/ML
0.3 INJECTION, SOLUTION, CONCENTRATE INTRAVENOUS PRN
OUTPATIENT
Start: 2023-02-23

## 2023-02-23 RX ORDER — ONDANSETRON 2 MG/ML
8 INJECTION INTRAMUSCULAR; INTRAVENOUS
OUTPATIENT
Start: 2023-02-23

## 2023-02-23 RX ORDER — EPINEPHRINE 1 MG/ML
0.3 INJECTION, SOLUTION, CONCENTRATE INTRAVENOUS PRN
OUTPATIENT
Start: 2023-03-09

## 2023-02-23 RX ORDER — MEPERIDINE HYDROCHLORIDE 50 MG/ML
12.5 INJECTION INTRAMUSCULAR; INTRAVENOUS; SUBCUTANEOUS PRN
OUTPATIENT
Start: 2023-02-23

## 2023-02-23 RX ORDER — DIPHENHYDRAMINE HYDROCHLORIDE 50 MG/ML
50 INJECTION INTRAMUSCULAR; INTRAVENOUS
OUTPATIENT
Start: 2023-03-02

## 2023-02-23 RX ORDER — MEPERIDINE HYDROCHLORIDE 50 MG/ML
12.5 INJECTION INTRAMUSCULAR; INTRAVENOUS; SUBCUTANEOUS PRN
OUTPATIENT
Start: 2023-03-02

## 2023-02-23 RX ORDER — SODIUM CHLORIDE 9 MG/ML
INJECTION, SOLUTION INTRAVENOUS CONTINUOUS
OUTPATIENT
Start: 2023-02-23

## 2023-02-23 RX ORDER — SODIUM CHLORIDE 9 MG/ML
5-250 INJECTION, SOLUTION INTRAVENOUS PRN
Status: DISCONTINUED | OUTPATIENT
Start: 2023-02-23 | End: 2023-02-24 | Stop reason: HOSPADM

## 2023-02-23 RX ORDER — FAMOTIDINE 10 MG/ML
20 INJECTION, SOLUTION INTRAVENOUS ONCE
Status: COMPLETED | OUTPATIENT
Start: 2023-02-23 | End: 2023-02-23

## 2023-02-23 RX ORDER — ALBUTEROL SULFATE 90 UG/1
4 AEROSOL, METERED RESPIRATORY (INHALATION) PRN
OUTPATIENT
Start: 2023-03-02

## 2023-02-23 RX ORDER — ACETAMINOPHEN 325 MG/1
650 TABLET ORAL
OUTPATIENT
Start: 2023-03-09

## 2023-02-23 RX ORDER — ONDANSETRON 2 MG/ML
8 INJECTION INTRAMUSCULAR; INTRAVENOUS
OUTPATIENT
Start: 2023-03-02

## 2023-02-23 RX ORDER — SODIUM CHLORIDE 9 MG/ML
5-40 INJECTION INTRAVENOUS PRN
Status: CANCELLED | OUTPATIENT
Start: 2023-02-23

## 2023-02-23 RX ORDER — SODIUM CHLORIDE 9 MG/ML
INJECTION, SOLUTION INTRAVENOUS CONTINUOUS
OUTPATIENT
Start: 2023-03-09

## 2023-02-23 RX ORDER — DIPHENHYDRAMINE HYDROCHLORIDE 50 MG/ML
50 INJECTION INTRAMUSCULAR; INTRAVENOUS ONCE
OUTPATIENT
Start: 2023-03-09 | End: 2023-03-09

## 2023-02-23 RX ORDER — HEPARIN SODIUM (PORCINE) LOCK FLUSH IV SOLN 100 UNIT/ML 100 UNIT/ML
500 SOLUTION INTRAVENOUS PRN
Status: CANCELLED | OUTPATIENT
Start: 2023-02-23

## 2023-02-23 RX ORDER — SODIUM CHLORIDE 9 MG/ML
5-40 INJECTION INTRAVENOUS PRN
OUTPATIENT
Start: 2023-03-09

## 2023-02-23 RX ORDER — ALBUTEROL SULFATE 90 UG/1
4 AEROSOL, METERED RESPIRATORY (INHALATION) PRN
OUTPATIENT
Start: 2023-02-23

## 2023-02-23 RX ORDER — FAMOTIDINE 10 MG/ML
20 INJECTION, SOLUTION INTRAVENOUS ONCE
Status: CANCELLED | OUTPATIENT
Start: 2023-02-23 | End: 2023-02-23

## 2023-02-23 RX ORDER — DIPHENHYDRAMINE HYDROCHLORIDE 50 MG/ML
50 INJECTION INTRAMUSCULAR; INTRAVENOUS ONCE
OUTPATIENT
Start: 2023-03-02 | End: 2023-03-02

## 2023-02-23 RX ORDER — FAMOTIDINE 10 MG/ML
20 INJECTION, SOLUTION INTRAVENOUS ONCE
OUTPATIENT
Start: 2023-03-02 | End: 2023-03-02

## 2023-02-23 RX ORDER — SODIUM CHLORIDE 9 MG/ML
INJECTION, SOLUTION INTRAVENOUS CONTINUOUS
OUTPATIENT
Start: 2023-03-02

## 2023-02-23 RX ORDER — FAMOTIDINE 10 MG/ML
20 INJECTION, SOLUTION INTRAVENOUS
OUTPATIENT
Start: 2023-03-09

## 2023-02-23 RX ORDER — PALONOSETRON 0.05 MG/ML
0.25 INJECTION, SOLUTION INTRAVENOUS ONCE
Status: COMPLETED | OUTPATIENT
Start: 2023-02-23 | End: 2023-02-23

## 2023-02-23 RX ADMIN — FAMOTIDINE 20 MG: 10 INJECTION, SOLUTION INTRAVENOUS at 11:41

## 2023-02-23 RX ADMIN — DEXAMETHASONE SODIUM PHOSPHATE 10 MG: 10 INJECTION, SOLUTION INTRAMUSCULAR; INTRAVENOUS at 11:41

## 2023-02-23 RX ADMIN — SODIUM CHLORIDE, PRESERVATIVE FREE 10 ML: 5 INJECTION INTRAVENOUS at 14:00

## 2023-02-23 RX ADMIN — HEPARIN 500 UNITS: 100 SYRINGE at 14:00

## 2023-02-23 RX ADMIN — PALONOSETRON 0.25 MG: 0.05 INJECTION, SOLUTION INTRAVENOUS at 11:41

## 2023-02-23 RX ADMIN — CARBOPLATIN 200 MG: 10 INJECTION, SOLUTION INTRAVENOUS at 13:27

## 2023-02-23 RX ADMIN — SODIUM CHLORIDE 50 ML/HR: 9 INJECTION, SOLUTION INTRAVENOUS at 11:46

## 2023-02-23 RX ADMIN — FOSAPREPITANT 150 MG: 150 INJECTION, POWDER, LYOPHILIZED, FOR SOLUTION INTRAVENOUS at 11:46

## 2023-02-23 RX ADMIN — DIPHENHYDRAMINE HYDROCHLORIDE 50 MG: 50 INJECTION, SOLUTION INTRAMUSCULAR; INTRAVENOUS at 11:41

## 2023-02-23 RX ADMIN — SODIUM CHLORIDE 130 MG: 9 INJECTION, SOLUTION INTRAVENOUS at 12:23

## 2023-02-24 ENCOUNTER — HOSPITAL ENCOUNTER (OUTPATIENT)
Dept: INFUSION THERAPY | Age: 48
Discharge: HOME | End: 2023-02-24

## 2023-02-28 DIAGNOSIS — I49.3 PVC (PREMATURE VENTRICULAR CONTRACTION): Chronic | ICD-10-CM

## 2023-02-28 RX ORDER — ATENOLOL 25 MG/1
25 TABLET ORAL 2 TIMES DAILY
Qty: 180 TABLET | Refills: 2 | Status: SHIPPED | OUTPATIENT
Start: 2023-02-28

## 2023-03-02 ENCOUNTER — HOSPITAL ENCOUNTER (OUTPATIENT)
Dept: INFUSION THERAPY | Age: 48
Discharge: HOME | End: 2023-03-02
Payer: COMMERCIAL

## 2023-03-02 VITALS
WEIGHT: 129.4 LBS | RESPIRATION RATE: 16 BRPM | TEMPERATURE: 98.1 F | OXYGEN SATURATION: 100 % | SYSTOLIC BLOOD PRESSURE: 118 MMHG | HEIGHT: 69 IN | HEART RATE: 66 BPM | DIASTOLIC BLOOD PRESSURE: 66 MMHG | BODY MASS INDEX: 19.16 KG/M2

## 2023-03-02 DIAGNOSIS — C50.919 TRIPLE NEGATIVE BREAST CANCER (HCC): ICD-10-CM

## 2023-03-02 DIAGNOSIS — C50.812 MALIGNANT NEOPLASM OF OVERLAPPING SITES OF LEFT BREAST (HCC): Primary | ICD-10-CM

## 2023-03-02 LAB
ALBUMIN SERPL-MCNC: 3.8 G/DL (ref 3.5–5.2)
ALP BLD-CCNC: 67 U/L (ref 35–104)
ALT SERPL-CCNC: 12 U/L (ref 9–52)
ANION GAP SERPL CALCULATED.3IONS-SCNC: 5 MMOL/L (ref 7–19)
AST SERPL-CCNC: 20 U/L (ref 14–36)
BILIRUB SERPL-MCNC: <0.2 MG/DL (ref 0.2–1.3)
BUN BLDV-MCNC: 13 MG/DL (ref 7–17)
CALCIUM SERPL-MCNC: 9.2 MG/DL (ref 8.4–10.2)
CHLORIDE BLD-SCNC: 109 MMOL/L (ref 98–111)
CO2: 25 MMOL/L (ref 22–29)
CREAT SERPL-MCNC: 0.7 MG/DL (ref 0.5–1)
GFR SERPL CREATININE-BSD FRML MDRD: >60 ML/MIN/{1.73_M2}
GLOBULIN: 2.4 G/DL
GLUCOSE BLD-MCNC: 91 MG/DL (ref 74–106)
HCT VFR BLD CALC: 34.1 % (ref 34.1–44.9)
HEMOGLOBIN: 11.3 G/DL (ref 11.2–15.7)
LYMPHOCYTES ABSOLUTE: 1.03 K/UL (ref 1.18–3.74)
LYMPHOCYTES RELATIVE PERCENT: 29.5 % (ref 19.3–53.1)
MCH RBC QN AUTO: 34.7 PG (ref 25.6–32.2)
MCHC RBC AUTO-ENTMCNC: 33.1 G/DL (ref 32.3–35.5)
MCV RBC AUTO: 104.6 FL (ref 79.4–94.8)
MONOCYTES ABSOLUTE: 0.23 K/UL (ref 0.24–0.82)
MONOCYTES RELATIVE PERCENT: 6.6 % (ref 4.7–12.5)
NEUTROPHILS ABSOLUTE: 2.11 K/UL (ref 1.56–6.13)
NEUTROPHILS RELATIVE PERCENT: 60.4 % (ref 34–71.1)
PDW BLD-RTO: 17.2 % (ref 11.7–14.4)
PLATELET # BLD: 203 K/UL (ref 182–369)
PMV BLD AUTO: 9.3 FL (ref 7.4–10.4)
POTASSIUM SERPL-SCNC: 4 MMOL/L (ref 3.5–5.1)
RBC # BLD: 3.26 M/UL (ref 3.93–5.22)
SODIUM BLD-SCNC: 139 MMOL/L (ref 137–145)
TOTAL PROTEIN: 6.2 G/DL (ref 6.3–8.2)
WBC # BLD: 3.49 K/UL (ref 3.98–10.04)

## 2023-03-02 PROCEDURE — 96417 CHEMO IV INFUS EACH ADDL SEQ: CPT

## 2023-03-02 PROCEDURE — 2500000003 HC RX 250 WO HCPCS: Performed by: INTERNAL MEDICINE

## 2023-03-02 PROCEDURE — 96375 TX/PRO/DX INJ NEW DRUG ADDON: CPT

## 2023-03-02 PROCEDURE — 2580000003 HC RX 258: Performed by: INTERNAL MEDICINE

## 2023-03-02 PROCEDURE — 80053 COMPREHEN METABOLIC PANEL: CPT

## 2023-03-02 PROCEDURE — 36415 COLL VENOUS BLD VENIPUNCTURE: CPT

## 2023-03-02 PROCEDURE — 96413 CHEMO IV INFUSION 1 HR: CPT

## 2023-03-02 PROCEDURE — 6360000002 HC RX W HCPCS: Performed by: INTERNAL MEDICINE

## 2023-03-02 PROCEDURE — 85025 COMPLETE CBC W/AUTO DIFF WBC: CPT

## 2023-03-02 RX ORDER — PALONOSETRON 0.05 MG/ML
0.25 INJECTION, SOLUTION INTRAVENOUS ONCE
Status: COMPLETED | OUTPATIENT
Start: 2023-03-02 | End: 2023-03-02

## 2023-03-02 RX ORDER — SODIUM CHLORIDE 9 MG/ML
5-40 INJECTION INTRAVENOUS PRN
Status: DISCONTINUED | OUTPATIENT
Start: 2023-03-02 | End: 2023-03-03 | Stop reason: HOSPADM

## 2023-03-02 RX ORDER — DIPHENHYDRAMINE HYDROCHLORIDE 50 MG/ML
50 INJECTION INTRAMUSCULAR; INTRAVENOUS ONCE
Status: COMPLETED | OUTPATIENT
Start: 2023-03-02 | End: 2023-03-02

## 2023-03-02 RX ORDER — FAMOTIDINE 10 MG/ML
20 INJECTION, SOLUTION INTRAVENOUS ONCE
Status: COMPLETED | OUTPATIENT
Start: 2023-03-02 | End: 2023-03-02

## 2023-03-02 RX ORDER — HEPARIN SODIUM (PORCINE) LOCK FLUSH IV SOLN 100 UNIT/ML 100 UNIT/ML
500 SOLUTION INTRAVENOUS PRN
Status: DISCONTINUED | OUTPATIENT
Start: 2023-03-02 | End: 2023-03-03 | Stop reason: HOSPADM

## 2023-03-02 RX ORDER — SODIUM CHLORIDE 9 MG/ML
5-250 INJECTION, SOLUTION INTRAVENOUS PRN
Status: DISCONTINUED | OUTPATIENT
Start: 2023-03-02 | End: 2023-03-03 | Stop reason: HOSPADM

## 2023-03-02 RX ORDER — DEXAMETHASONE SODIUM PHOSPHATE 10 MG/ML
10 INJECTION, SOLUTION INTRAMUSCULAR; INTRAVENOUS ONCE
Status: COMPLETED | OUTPATIENT
Start: 2023-03-02 | End: 2023-03-02

## 2023-03-02 RX ADMIN — HEPARIN 500 UNITS: 100 SYRINGE at 13:05

## 2023-03-02 RX ADMIN — SODIUM CHLORIDE 25 ML/HR: 9 INJECTION, SOLUTION INTRAVENOUS at 11:23

## 2023-03-02 RX ADMIN — SODIUM CHLORIDE 130 MG: 9 INJECTION, SOLUTION INTRAVENOUS at 11:23

## 2023-03-02 RX ADMIN — FAMOTIDINE 20 MG: 10 INJECTION INTRAVENOUS at 11:13

## 2023-03-02 RX ADMIN — PALONOSETRON 0.25 MG: 0.05 INJECTION, SOLUTION INTRAVENOUS at 11:48

## 2023-03-02 RX ADMIN — DIPHENHYDRAMINE HYDROCHLORIDE 50 MG: 50 INJECTION, SOLUTION INTRAMUSCULAR; INTRAVENOUS at 11:13

## 2023-03-02 RX ADMIN — CARBOPLATIN 200 MG: 10 INJECTION, SOLUTION INTRAVENOUS at 12:28

## 2023-03-02 RX ADMIN — DEXAMETHASONE SODIUM PHOSPHATE 10 MG: 10 INJECTION, SOLUTION INTRAMUSCULAR; INTRAVENOUS at 11:13

## 2023-03-02 RX ADMIN — SODIUM CHLORIDE, PRESERVATIVE FREE 10 ML: 5 INJECTION INTRAVENOUS at 13:05

## 2023-03-04 DIAGNOSIS — F41.9 ANXIETY: ICD-10-CM

## 2023-03-07 ENCOUNTER — TELEPHONE (OUTPATIENT)
Dept: GASTROENTEROLOGY | Age: 48
End: 2023-03-07

## 2023-03-07 ENCOUNTER — OFFICE VISIT (OUTPATIENT)
Dept: GASTROENTEROLOGY | Age: 48
End: 2023-03-07
Payer: COMMERCIAL

## 2023-03-07 VITALS
DIASTOLIC BLOOD PRESSURE: 70 MMHG | HEIGHT: 69 IN | HEART RATE: 79 BPM | OXYGEN SATURATION: 99 % | WEIGHT: 129 LBS | BODY MASS INDEX: 19.11 KG/M2 | SYSTOLIC BLOOD PRESSURE: 103 MMHG

## 2023-03-07 DIAGNOSIS — R13.10 DYSPHAGIA, UNSPECIFIED TYPE: Primary | ICD-10-CM

## 2023-03-07 PROCEDURE — 3078F DIAST BP <80 MM HG: CPT | Performed by: NURSE PRACTITIONER

## 2023-03-07 PROCEDURE — 99204 OFFICE O/P NEW MOD 45 MIN: CPT | Performed by: NURSE PRACTITIONER

## 2023-03-07 PROCEDURE — 3074F SYST BP LT 130 MM HG: CPT | Performed by: NURSE PRACTITIONER

## 2023-03-07 ASSESSMENT — ENCOUNTER SYMPTOMS
ABDOMINAL PAIN: 0
CHOKING: 0
CONSTIPATION: 0
SHORTNESS OF BREATH: 0
RECTAL PAIN: 0
ANAL BLEEDING: 0
NAUSEA: 0
VOMITING: 0
COUGH: 0
TROUBLE SWALLOWING: 1
ABDOMINAL DISTENTION: 0
BLOOD IN STOOL: 0
DIARRHEA: 0

## 2023-03-07 NOTE — PROGRESS NOTES
MEDICAL ONCOLOGY PROGRESS NOTE    Pt Name: Vincent Pablo  MRN: [id number]  YOB: 1975  Date of evaluation: 3/9/2023    HISTORY OF PRESENT ILLNESS:    Reason for MD visit-toxicity assessment/disease management  The patient presents today for cycle #3/12 neoadjuvant chemotherapy carboplatin, Taxol. She is also receiving neoadjuvant Keytruda. She has been tolerating treatment with complaints of fatigue. She denies any neuropathy. She has complaints of nail changes. Diagnosis  Invasive ductal carcinoma, left breast, Nov 2022  Grade 3  Stage IIIB, clinical T2 N1 M0  ER 0.2%, [place] 0%, HER-2 0/negative, Ki67 83%  MammaPrint: Basal type/high risk  Germline Willem 81 gene genetic panel: Negative    Treatment Summary  12/29/22-Initiated Pembrolizumab every 6 weeks x1 year with Adriamycin, Cyclophosphamide + GCSF every 2 weeks x4 cycles followed by weekly Taxol, Carbo x12 cycles  2/23/23- Initiated Carboplatin Taxol weekly  Anticipate surgery  Anticipate radiation therapy    Cancer History  Rayne Robert was first seen by me on 12/14/2022. She was referred by Dr. Nichole Segal for a diagnosis of triple negative breast cancer. This was a palpable lesion on her left breast.  11/2/22 Bilateral diagnostic mammogram: Left breast, BI-RADS 4B, intermediate concern for malignancy involving the palpable 1.7 cm complex cystic mass in the upper outer quadrant. Recommendation is for ultrasound-guided percutaneous core needle biopsy for diagnosis in order to exclude malignancy. 1.7 cm axillary lymph node with borderline thickened cortical elements. Recommendation is for percutaneous needle biopsy versus fine-needle aspiration (depending on performing physician preference) in order to exclude malignancy. 11/2/22 US left breast: Palpable area of concern is labeled in the left breast upper outer quadrant at 3:00 4 cm from the nipple.  Ultrasound at this location demonstrates an underlying partially circumscribed partially indistinct complex cystic mass measuring 1.7 x 1.6 x 1 cm. The surrounding tissue is unaffected in sonographic appearance. The underlying implant is noted and unremarkable. Ultrasound also performed of the left axilla which demonstrates a solitary borderline abnormal axillary lymph node with appearance of thickened cortical elements. The lymph node measures up to 1.7 0.9 x 0.7 cm with mildly thickened cortex of 4 mm.   11/21/22 Breast, left breast needle core biopsies at 3 o'clock position: Invasive ductal carcinoma, no special type, grade 3. Invasive carcinoma measures 1.1 cm in greatest linear dimension and is present in multiple cores. Lymph node, left axillary lymph node fine-needle aspiration, smears and ThinPrep: Small round lymphocytes present, negative for cytologic evidence of malignancy. ER 0.2%, [place] 0%, HER-2 0/negative, Ki67 83%. MammaPrint: Basal type/high risk. 12/2/22 MRI bilateral breast: Left breast, BI-RADS 6, biopsy-proven malignant mass in the left breast upper outer quadrant. MRI measurement is estimated as 24 x 20 x 20 mm in length by with by height. This is noted to be larger than that seen on previous ultrasounds, and this may be due to some associated surrounding nonmass enhancement which is immediately contiguous with mass margins. The mass is noted to be immediately contiguous, with no intervening fat plane between the posterior margin of the mass and the implant/implant capsule. Axillary lymph nodes appear within normal by MRI. No abnormal internal mammary chain lymph nodes are seen. No obvious extramammary significant findings seen. Recommend appropriate clinical, medical oncology and surgical management. Overall assessment BI-RADS 6, biopsy-proven malignancy. 12/14/22 Patient education given to patient for chemotherapy. Treatment consent signed. 12/14/2022-she was first seen by me. Essentially, clinical T2 N1 Mx triple negative breast cancer I reviewed imaging, pathology.   Discussed treatment recommendations, side effects, logistics with the patient at length. I recommend neoadjuvant chemotherapy with dose dense Adriamycin and cyclophosphamide followed by carboplatin/Taxol. 1 year of Keytruda. Awaiting CT C/A/P and bone scan. Order 2D echo today. 12/15/22 CT Chest W Contrast No evidence for metastatic disease within the chest. Lobulated 1.2 x 2.4 cm left anterior breast lesion. Correlate with prior mammograms and breast history. Mild emphysema. Asymmetric mildly prominent left axillary lymph node measuring 7 mm. Attention on follow-up exams. 12/15/22 CT Abd/Pelvis W IV Contrast (oral) No evidence of metastatic disease within the abdomen or pelvis. 12/15/22 NM Bone Scan There is no evidence of osseous metastatic disease. 12/19/22 2D Echo  LV is normal in size with normal systolic function. LV ejection fraction estimated 55 to 60%. Diastolic function appears within normal range. Normal wall thickness. RV is normal in size with normal systolic function. Normal left atrial size. Normal right atrial size. Aortic valve is trileaflet with normal leaflet mobility. No significant stenosis or regurgitation. Mitral valve is structurally normal with normal leaflet mobility. No significant stenosis or regurgitation. No significant tricuspid regurgitation. No significant pericardial effusion. 12/29/22-Initiated Pembrolizumab every 6 weeks x1 year with Adriamycin, Cyclophosphamide + GCSF every 2 weeks x4 cycles followed by weekly Taxol, Carbo x12 cycles  12/20/22 [facility]illem [facility] Genetic Panel: Negative  1/26/23 [place] Esophagram Grade 3 esophageal reflux yes  2/15/23 US Breast Limited Left Ultrasound the left breast at 3:00 there is a density seen 4 cm from the lesion superficial measuring 1.75 x 1.25 x 0.6 8/3/1970. There is a biopsy clip seen in the medial aspect of this lesion. Previously measures 1.71 x 1.77 x 0.947 cm.  The lesion has decreased in size as compared to prior studyHypoechoic solid lesion in left breast at 3:00 with biopsy marker which has decreased in size as described above.  Final Assessment: ACR: BI-RADS : Known biopsy- proven malignancy       12/20/22 Willem 81 Genetic Panel         Past Medical History:    Past Medical History:   Diagnosis Date    Adverse effect of chemotherapy     Breast cancer ([facility] [address] 11/2022    Triple negative breast cancer    Malignant neoplasm of overlapping sites of left breast ([facility] [address] 11/22/2022    PVC (premature ventricular contraction)     Restless leg syndrome 07/16/2018    Uterine mass 06/17/2014    9x9 cm on CT scan       Past Surgical History:    Past Surgical History:   Procedure Laterality Date    BREAST RECONSTRUCTION  04/2019    BREAST SURGERY Bilateral 2831    silicone Memory Gel    CHOLECYSTECTOMY  06/24/2014    HC INJECT OTHER PERPHRL NERV Right 08/03/2016    HIP FLUOROSCOPIC GUIDED CORTICOSTEROID INJECTION  performed by Deon Angel MD at [address] NERV Right 04/21/2017    FLURO GUIDED HIP INJECTION performed by Eriberto Vegas MD at [facility], VAGINAL      PORT SURGERY Left 12/09/2022    PORT INSERTION WITH FLUOROSCOPY performed by Ursula Zamudio MD at [address] [place] [address] LEFT Left 11/21/2022    US BREAST NEEDLE BIOPSY LEFT LPS GENERAL SURGERY       Social History:    Marital status:   Smoking status: Currently; 1/2 pack daily for 34 years  ETOH status: No  Resides: [place], [place]    Family History:   Family History   Problem Relation Age of Onset    Cancer Mother         Melanoma    Hypertension Mother     Cancer Brother         synovial cell sarcoma    Cancer Maternal Grandfather         lung    Other Daughter         gallbladder disease    Colon Cancer Neg Hx     Colon Polyps Neg Hx        Current Hospital Medications:    Current Outpatient Medications   Medication Sig Dispense Refill    sertraline (ZOLOFT) 50 MG tablet TAKE 1 TABLET BY MOUTH DAILY 30 tablet 3    atenolol (TENORMIN) 25 MG tablet TAKE 1 TABLET BY MOUTH IN THE MORNING AND AT BEDTIME (Patient taking differently: Take 25 mg by mouth daily) 180 tablet 2    Magic Mouthwash (MIRACLE MOUTHWASH) Swish and spit 10 mLs 4 times daily as needed for Irritation 480 mL 3    OLANZapine (ZYPREXA) 10 MG tablet TAKE 1 TABLET BY MOUTH DAILY AT NIGHT FOR 4 NIGHTS ONLY WITH EACH CYCLE OF ADRIAMYCIN/CYTOXAN EVERY 2 WEEKS 16 tablet 0    montelukast (SINGULAIR) 10 MG tablet Take 10 mg by mouth nightly      ondansetron (ZOFRAN) 4 MG tablet Take 1 tablet by mouth every 6 hours as needed for Nausea or Vomiting 30 tablet 5    promethazine (PHENERGAN) 25 MG tablet Take 0.5 tablets by mouth every 6 hours as needed for Nausea 30 tablet 5    lidocaine-prilocaine (EMLA) 2.5-2.5 % cream Apply topically as needed. 30 g 5    vitamin D (ERGOCALCIFEROL) 1.25 MG (00529 UT) CAPS capsule TAKE 1 CAPSULE BY MOUTH 1 TIME A WEEK 12 capsule 1    rOPINIRole (REQUIP) 0.5 MG tablet TAKE 1 TO 3 TABLETS BY MOUTH EVERY NIGHT 90 tablet 5    valACYclovir (VALTREX) 1 g tablet TAKE 1 TABLET BY MOUTH EVERY DAY AS NEEDED FOR FEVER BLISTER (Patient taking differently: Take 1,000 mg by mouth as needed TAKE 1 TABLET BY MOUTH EVERY DAY AS NEEDED FOR FEVER BLISTER) 30 tablet 0    omeprazole (PRILOSEC) 20 MG delayed release capsule Take 1 capsule by mouth 2 times daily (before meals) (Patient taking differently: Take 20 mg by mouth nightly) 60 capsule 5    Multiple Vitamin (MULTI-VITAMIN DAILY PO) Take 1 tablet by mouth daily      doxepin (SINEQUAN) 10 MG capsule Take 1 capsule by mouth nightly (Patient taking differently: Take 10 mg by mouth nightly as needed) 30 capsule 0    hydrochlorothiazide (HYDRODIURIL) 25 MG tablet Take 1 tablet by mouth daily (Patient taking differently: Take 25 mg by mouth as needed) 30 tablet 5     No current facility-administered medications for this visit.      Facility-Administered Medications Ordered in Other Visits   Medication Dose Route Frequency Provider Last Rate Last Admin    0.9 % sodium chloride infusion  5-250 mL/hr IntraVENous PRN Rashid Navarro MD        famotidine (PEPCID) injection 20 mg  20 mg IntraVENous Once Rashid Navarro MD        diphenhydrAMINE (BENADRYL) injection 50 mg  50 mg IntraVENous Once Rashid Navarro MD        dexamethasone (PF) (DECADRON) injection 10 mg  10 mg IntraVENous Once Rashid Navarro MD        PACLitaxel (TAXOL) 138 mg in sodium chloride 0.9 % 250 mL chemo infusion  80 mg/m2 (Treatment Plan Recorded) IntraVENous Once Rashid Navarro MD        CARBOplatin (PARAPLATIN) 233 mg in sodium chloride 0.9 % 250 mL chemo IVPB  233 mg IntraVENous Once Rashid Navarro MD        sodium chloride (PF) 0.9 % injection 5-40 mL  5-40 mL IntraVENous PRN Rashid Navarro MD        heparin flush 100 UNIT/ML injection 500 Units  500 Units IntraCATHeter PRN Rashid Navarro MD           Allergies:    Allergies   Allergen Reactions    Codeine Itching    Percocet [Oxycodone-Acetaminophen] Itching    Toradol [Ketorolac Tromethamine] Itching         Subjective   REVIEW OF SYSTEMS:   CONSTITUTIONAL: no fever, no night sweats,  fatigue;  HEENT: no blurring of vision, no double vision, no hearing difficulty, no tinnitus, no ulceration, no dysplasia, no epistaxis;   LUNGS: no cough, no hemoptysis, no wheeze,  no shortness of breath;  CARDIOVASCULAR: no palpitation, no chest pain, no shortness of breath;  GI: no abdominal pain, no nausea, no vomiting, no diarrhea, no constipation;  ERIC: no dysuria, no hematuria, no frequency or urgency, no nephrolithiasis;  MUSCULOSKELETAL: no joint pain, no swelling, no stiffness;  ENDOCRINE: no polyuria, no polydipsia, no cold or heat intolerance;  HEMATOLOGY: no easy bruising or bleeding, no history of clotting disorder;  DERMATOLOGY: nail ridges, no skin rash, no eczema, no pruritus;  PSYCHIATRY: no depression, no anxiety, no panic attacks, no suicidal ideation, no homicidal ideation;  NEUROLOGY: no syncope, no seizures, no numbness or tingling of hands, no numbness or tingling of feet, no paresis;      Objective   BP (!) 102/54   Pulse 63   Temp 98.1 °F (36.7 °C)   Resp 16   Ht 5' 9\" (1.753 m)   Wt 131 lb 4.8 oz (59.6 kg)   LMP 06/08/2014   SpO2 100%   BMI 19.39 kg/m²     PHYSICAL EXAM:  CONSTITUTIONAL: Alert, appropriate, no acute distress  EYES: Non icteric, EOM intact, pupils equal round   ENT: Mucus membranes moist, no oral pharyngeal lesions, external inspection of ears and nose are normal  NECK: Supple, no masses.  No palpable thyroid mass  CHEST/LUNGS: CTA bilaterally, normal respiratory effort   CARDIOVASCULAR: RRR, no murmurs.  No lower extremity edema  ABDOMEN: soft non-tender, active bowel sounds, no HSM.  No palpable masses  EXTREMITIES: warm, full ROM in all 4 extremities, no focal weakness.  SKIN: warm, dry with no rashes or lesions  LYMPH: No cervical, clavicular, axillary, or inguinal lymphadenopathy  NEUROLOGIC: follows commands, non focal   PSYCH: mood and affect appropriate.  Alert and oriented to time, place, person    LABORATORY RESULTS REVIEWED/ANALYZED BY ME:  Lab Results   Component Value Date    WBC 3.11 (L) 03/09/2023    HGB 8.0 (L) 03/09/2023    HCT 24.6 (LL) 03/09/2023    .4 (H) 03/09/2023     (L) 03/09/2023     Lab Results   Component Value Date    NEUTROABS 1.58 03/09/2023     Lab Results   Component Value Date     03/09/2023    K 3.5 03/09/2023     03/09/2023    CO2 25 03/09/2023    BUN 12 03/09/2023    CREATININE 0.7 03/09/2023    GLUCOSE 129 (H) 03/09/2023    CALCIUM 8.9 03/09/2023    PROT 5.6 (L) 03/09/2023    LABALBU 3.5 03/09/2023    BILITOT <0.2 03/09/2023    ALKPHOS 55 03/09/2023    AST 20 03/09/2023    ALT 16 03/09/2023    LABGLOM >60 03/09/2023    GFRAA >59 08/03/2021    GLOB 2.1 03/09/2023       RADIOLOGY STUDIES REVIEWED BY ME:  2/15/23 US Breast  Limited Left Ultrasound the left breast at 3:00 there is a density seen 4 cm from the lesion superficial measuring 1.75 x 1.25 x 0.6 8/3/1970. There is a biopsy clip seen in the medial aspect of this lesion. Previously measures 1.71 x 1.77 x 0.947 cm. The lesion has decreased in size as compared to prior studyHypoechoic solid lesion in left breast at 3:00 with biopsy marker which has decreased in size as described above. Final Assessment: ACR: BI-RADS : Known biopsy- proven malignancy       ASSESSMENT:    No orders of the defined types were placed in this encounter. Rayne was seen today for breast cancer. Diagnoses and all orders for this visit:    Chemotherapy management, encounter for    Adverse effect of chemotherapy, subsequent encounter    Encounter for antineoplastic immunotherapy    Care plan discussed with patient    Triple negative breast cancer ([facility] [address]    Other fatigue    Antineoplastic chemotherapy induced anemia    Macrocytic anemia         Invasive ductal carcinoma, left breast, Nov 2022, Grade 3, ER 0.2%, [place] 0%, HER-2 0/negative, Ki67 83%, Stage IIIB  -Continue cycle #3/12 neoadjuvant chemotherapy with carboplatin, Taxol and Keytruda.  -02/15/23-ultrasound breast showed interval response to treatment.   Recommended regimen  Pembrolizumab every 6 weeks x1 year  Adriamycin, Cyclophosphamide + GCSF every 2 weeks x4 cycles  Followed by weekly Taxol, Carbo x12 cycles      Treatment related side effects-nausea, fatigue, location, mild skin rash  -OTC steroid cream for skin rash  -Continue antiemetics    Healthcare maintenance-proceed with screening colonoscopy next week    Dysphagia-upper endoscopy next week with GI  -Esophagram showed grade 3 esophageal reflux  -Continue omeprazole 20 mg p.o. twice daily    PLAN:  RTC with MD in treatment room 4 weeks  C# 3/12 Weekly Taxol Carbo today  Pembrolizumab every 6 weeks-next dose due 3/23/2023  Proceed with upper EGD with /[facility]  CBC, CMP, TSH T4 today  Next Pembrolizumab due 3/16/23  Continue Zofran and Phenergan as needed  Continue Olanzapine 10mg nightly x4 nights with each dd AC  Emla cream to port as needed  Continue cold gloves/socks with weekly Taxol  Continue follow-up with Dr Nathan Garcia, Mo Colmeanres am pre charting  as Medical Assistant for Serina Harley MD. Electronically signed by Mo Colmenares MA on 3/9/2023 at 5:39 PM CST. Richard Taylor am scribing for Serina Harley MD. Electronically signed by Aurelia Tafoya RN on 3/9/2023 at 10:53 AM CST. I, Dr Savannah Mora, personally performed the services described in this documentation as scribed by Aurelia Tafoya RN in my presence and is both accurate and complete. I have seen, examined and reviewed this patient medication list, appropriate labs and imaging studies. I reviewed relevant medical records and others physicians notes. I discussed the plans of care with the patient. I answered all the questions to the patients satisfaction. I have also reviewed the chief complaint (CC) and part of the history (History of Present Illness (HPI), Past Family Social History [facility]), or Review of Systems (ROS) and made changes when appropriated. (Please note that portions of this note were completed with a voice recognition program. Efforts were made to edit the dictations but occasionally words are mis-transcribed. )Electronically signed by Serina Harley MD on 3/9/2023 at 11:03 AM

## 2023-03-07 NOTE — PROGRESS NOTES
Subjective:     Patient ID: Derek Richardson is a 50 y.o. female  PCP: WHITNEY Lerma  Referring Provider: Siomara Gonzáles PA-C    HPI  Patient presents to the office today with the following complaints: New Patient      Patient seen in the office today with complaints of dysphagia and globus sensation. Reports she is choking on food and medication and this has been going on for a couple of months. Reports she is getting chemo treatments weekly for breast cancer. She is also due for colonoscopy colon cancer screening. Assessment:     1. Dysphagia, unspecified type         Plan:   Schedule Colonoscopy and EGD  Instruct on bowel prep. Nothing to eat or drink after midnight the day of the exam.  Unable to drive for 24 hours after the procedure. No aspirin or nonsteroidal anti-inflammatories for 5 days before procedure. I have discussed the benefits, alternatives, and risks (including bleeding, perforation and death)  for pursuing Endoscopy (EGD/Colonscopy/EUS/ERCP) with the patient and they are willing to continue. We also discussed the need for anesthesia, IV access, proper dietary changes, medication changes if necessary, and need for bowel prep (if ordered) prior to their Endoscopic procedure. They are aware they must have someone accompany them to their scheduled procedure to drive them home - they agree to the above and are willing to continue. Orders  No orders of the defined types were placed in this encounter. Medications  No orders of the defined types were placed in this encounter.         Patient History:     Past Medical History:   Diagnosis Date    Adverse effect of chemotherapy     Breast cancer ([facility] [address] 11/2022    Triple negative breast cancer    Malignant neoplasm of overlapping sites of left breast ([facility] [address] 11/22/2022    PVC (premature ventricular contraction)     Restless leg syndrome 07/16/2018    Uterine mass 06/17/2014    9x9 cm on CT scan       Past Surgical History: Procedure Laterality Date    BREAST RECONSTRUCTION  04/2019    BREAST SURGERY Bilateral 4012    silicone Memory Gel    CHOLECYSTECTOMY  06/24/2014    HC INJECT OTHER PERPHRL NERV Right 08/03/2016    HIP FLUOROSCOPIC GUIDED CORTICOSTEROID INJECTION  performed by Michelle Suazo MD at [facility], [facility]. INJECT OTHER PERPHRL NERV Right 04/21/2017    FLURO GUIDED HIP INJECTION performed by Tino Levine MD at [facility], VAGINAL      PORT SURGERY Left 12/09/2022    PORT INSERTION WITH FLUOROSCOPY performed by Kathrin Tafoya MD at [address] LOC DEVICE 1ST LESION LEFT Left 11/21/2022    US BREAST NEEDLE BIOPSY LEFT LPS GENERAL SURGERY       Family History   Problem Relation Age of Onset    Cancer Mother         Melanoma    Hypertension Mother     Cancer Brother         synovial cell sarcoma    Cancer Maternal Grandfather         lung    Other Daughter         gallbladder disease    Colon Cancer Neg Hx     Colon Polyps Neg Hx        Social History     Socioeconomic History    Marital status:    Tobacco Use    Smoking status: Every Day     Packs/day: 0.50     Years: 34.00     Pack years: 17.00     Types: Cigarettes     Start date: 1987    Smokeless tobacco: Never   Vaping Use    Vaping Use: Never used   Substance and Sexual Activity    Alcohol use: Not Currently     Comment: occ    Drug use: No     Social Determinants of Health     Financial Resource Strain: Low Risk     Difficulty of Paying Living Expenses: Not hard at all   Food Insecurity: No Food Insecurity    Worried About Running Out of Food in the Last Year: Never true    Ran Out of Food in the Last Year: Never true   Transportation Needs: Unknown    Lack of Transportation (Non-Medical): No   Housing Stability: Unknown    Unstable Housing in the Last Year: No       Current Outpatient Medications   Medication Sig Dispense Refill    sertraline (ZOLOFT) 50 MG tablet TAKE 1 TABLET BY MOUTH DAILY 30 tablet 3    atenolol (TENORMIN) 25 MG tablet TAKE 1 TABLET BY MOUTH IN THE MORNING AND AT BEDTIME (Patient taking differently: Take 25 mg by mouth daily) 180 tablet 2    Magic Mouthwash (MIRACLE MOUTHWASH) Swish and spit 10 mLs 4 times daily as needed for Irritation 480 mL 3    OLANZapine (ZYPREXA) 10 MG tablet TAKE 1 TABLET BY MOUTH DAILY AT NIGHT FOR 4 NIGHTS ONLY WITH EACH CYCLE OF ADRIAMYCIN/CYTOXAN EVERY 2 WEEKS 16 tablet 0    montelukast (SINGULAIR) 10 MG tablet Take 10 mg by mouth nightly      ondansetron (ZOFRAN) 4 MG tablet Take 1 tablet by mouth every 6 hours as needed for Nausea or Vomiting 30 tablet 5    promethazine (PHENERGAN) 25 MG tablet Take 0.5 tablets by mouth every 6 hours as needed for Nausea 30 tablet 5    lidocaine-prilocaine (EMLA) 2.5-2.5 % cream Apply topically as needed. 30 g 5    vitamin D (ERGOCALCIFEROL) 1.25 MG (51091 UT) CAPS capsule TAKE 1 CAPSULE BY MOUTH 1 TIME A WEEK 12 capsule 1    rOPINIRole (REQUIP) 0.5 MG tablet TAKE 1 TO 3 TABLETS BY MOUTH EVERY NIGHT 90 tablet 5    valACYclovir (VALTREX) 1 g tablet TAKE 1 TABLET BY MOUTH EVERY DAY AS NEEDED FOR FEVER BLISTER (Patient taking differently: Take 1,000 mg by mouth as needed TAKE 1 TABLET BY MOUTH EVERY DAY AS NEEDED FOR FEVER BLISTER) 30 tablet 0    omeprazole (PRILOSEC) 20 MG delayed release capsule Take 1 capsule by mouth 2 times daily (before meals) (Patient taking differently: Take 20 mg by mouth nightly) 60 capsule 5    Multiple Vitamin (MULTI-VITAMIN DAILY PO) Take 1 tablet by mouth daily      doxepin (SINEQUAN) 10 MG capsule Take 1 capsule by mouth nightly (Patient taking differently: Take 10 mg by mouth nightly as needed) 30 capsule 0    hydrochlorothiazide (HYDRODIURIL) 25 MG tablet Take 1 tablet by mouth daily (Patient taking differently: Take 25 mg by mouth as needed) 30 tablet 5     No current facility-administered medications for this visit.        Allergies   Allergen Reactions Codeine Itching    Percocet [Oxycodone-Acetaminophen] Itching    Toradol [Ketorolac Tromethamine] Itching       Review of Systems   Constitutional:  Negative for activity change, appetite change, fatigue, fever and unexpected weight change.   HENT:  Positive for trouble swallowing.    Respiratory:  Negative for cough, choking and shortness of breath.    Cardiovascular:  Negative for chest pain.   Gastrointestinal:  Negative for abdominal distention, abdominal pain, anal bleeding, blood in stool, constipation, diarrhea, nausea, rectal pain and vomiting.   Allergic/Immunologic: Negative for food allergies.   All other systems reviewed and are negative.      Objective:     /70 (Site: Left Upper Arm)   Pulse 79   Ht 5' 9\" (1.753 m)   Wt 129 lb (58.5 kg)   LMP 06/08/2014   SpO2 99%   BMI 19.05 kg/m²     Physical Exam  Vitals reviewed.   Constitutional:       General: She is not in acute distress.     Appearance: She is well-developed.   HENT:      Head: Normocephalic and atraumatic.      Right Ear: External ear normal.      Left Ear: External ear normal.      Nose: Nose normal.   Eyes:      General: No scleral icterus.        Right eye: No discharge.         Left eye: No discharge.      Conjunctiva/sclera: Conjunctivae normal.      Pupils: Pupils are equal, round, and reactive to light.   Cardiovascular:      Rate and Rhythm: Normal rate and regular rhythm.      Heart sounds: Normal heart sounds. No murmur heard.  Pulmonary:      Effort: Pulmonary effort is normal. No respiratory distress.      Breath sounds: Normal breath sounds. No wheezing or rales.   Abdominal:      General: Bowel sounds are normal. There is no distension.      Palpations: Abdomen is soft. There is no mass.      Tenderness: There is no abdominal tenderness. There is no guarding or rebound.   Musculoskeletal:         General: Normal range of motion.      Cervical back: Normal range of motion and neck supple.   Skin:     General: Skin is  warm and dry. Coloration: Skin is not pale. Neurological:      Mental Status: She is alert and oriented to person, place, and time.    Psychiatric:         Behavior: Behavior normal.

## 2023-03-09 ENCOUNTER — SOCIAL WORK (OUTPATIENT)
Dept: HEMATOLOGY | Age: 48
End: 2023-03-09

## 2023-03-09 ENCOUNTER — HOSPITAL ENCOUNTER (OUTPATIENT)
Dept: INFUSION THERAPY | Age: 48
Discharge: HOME | End: 2023-03-09
Payer: COMMERCIAL

## 2023-03-09 ENCOUNTER — OFFICE VISIT (OUTPATIENT)
Dept: HEMATOLOGY | Age: 48
End: 2023-03-09
Payer: COMMERCIAL

## 2023-03-09 VITALS
HEART RATE: 63 BPM | WEIGHT: 131.3 LBS | DIASTOLIC BLOOD PRESSURE: 54 MMHG | TEMPERATURE: 98.1 F | BODY MASS INDEX: 19.45 KG/M2 | SYSTOLIC BLOOD PRESSURE: 102 MMHG | OXYGEN SATURATION: 100 % | HEIGHT: 69 IN | RESPIRATION RATE: 16 BRPM

## 2023-03-09 DIAGNOSIS — T45.1X5A ANTINEOPLASTIC CHEMOTHERAPY INDUCED ANEMIA: ICD-10-CM

## 2023-03-09 DIAGNOSIS — T45.1X5D ADVERSE EFFECT OF CHEMOTHERAPY, SUBSEQUENT ENCOUNTER: ICD-10-CM

## 2023-03-09 DIAGNOSIS — Z51.12 ENCOUNTER FOR ANTINEOPLASTIC IMMUNOTHERAPY: ICD-10-CM

## 2023-03-09 DIAGNOSIS — C50.919 TRIPLE NEGATIVE BREAST CANCER (HCC): ICD-10-CM

## 2023-03-09 DIAGNOSIS — C50.812 MALIGNANT NEOPLASM OF OVERLAPPING SITES OF LEFT BREAST (HCC): Primary | ICD-10-CM

## 2023-03-09 DIAGNOSIS — Z71.89 CARE PLAN DISCUSSED WITH PATIENT: ICD-10-CM

## 2023-03-09 DIAGNOSIS — D64.81 ANTINEOPLASTIC CHEMOTHERAPY INDUCED ANEMIA: ICD-10-CM

## 2023-03-09 DIAGNOSIS — R53.83 OTHER FATIGUE: ICD-10-CM

## 2023-03-09 DIAGNOSIS — D53.9 MACROCYTIC ANEMIA: ICD-10-CM

## 2023-03-09 DIAGNOSIS — R13.10 DYSPHAGIA, UNSPECIFIED TYPE: ICD-10-CM

## 2023-03-09 DIAGNOSIS — Z51.11 CHEMOTHERAPY MANAGEMENT, ENCOUNTER FOR: Primary | ICD-10-CM

## 2023-03-09 LAB
ALBUMIN SERPL-MCNC: 3.5 G/DL (ref 3.5–5.2)
ALP BLD-CCNC: 55 U/L (ref 35–104)
ALT SERPL-CCNC: 16 U/L (ref 9–52)
ANION GAP SERPL CALCULATED.3IONS-SCNC: 4 MMOL/L (ref 7–19)
AST SERPL-CCNC: 20 U/L (ref 14–36)
BILIRUB SERPL-MCNC: <0.2 MG/DL (ref 0.2–1.3)
BUN BLDV-MCNC: 12 MG/DL (ref 7–17)
CALCIUM SERPL-MCNC: 8.9 MG/DL (ref 8.4–10.2)
CHLORIDE BLD-SCNC: 109 MMOL/L (ref 98–111)
CO2: 25 MMOL/L (ref 22–29)
CORTISOL - AM: 7.8 UG/DL (ref 6.2–19.4)
CREAT SERPL-MCNC: 0.7 MG/DL (ref 0.5–1)
GFR SERPL CREATININE-BSD FRML MDRD: >60 ML/MIN/{1.73_M2}
GLOBULIN: 2.1 G/DL
GLUCOSE BLD-MCNC: 129 MG/DL (ref 74–106)
HCT VFR BLD CALC: 24.6 % (ref 34.1–44.9)
HEMOGLOBIN: 8 G/DL (ref 11.2–15.7)
LYMPHOCYTES ABSOLUTE: 1.16 K/UL (ref 1.18–3.74)
LYMPHOCYTES RELATIVE PERCENT: 37.3 % (ref 19.3–53.1)
MCH RBC QN AUTO: 34.9 PG (ref 25.6–32.2)
MCHC RBC AUTO-ENTMCNC: 32.5 G/DL (ref 32.3–35.5)
MCV RBC AUTO: 107.4 FL (ref 79.4–94.8)
MONOCYTES ABSOLUTE: 0.25 K/UL (ref 0.24–0.82)
MONOCYTES RELATIVE PERCENT: 8 % (ref 4.7–12.5)
NEUTROPHILS ABSOLUTE: 1.58 K/UL (ref 1.56–6.13)
NEUTROPHILS RELATIVE PERCENT: 50.8 % (ref 34–71.1)
PDW BLD-RTO: 17.8 % (ref 11.7–14.4)
PLATELET # BLD: 164 K/UL (ref 182–369)
PMV BLD AUTO: 9.4 FL (ref 7.4–10.4)
POTASSIUM SERPL-SCNC: 3.5 MMOL/L (ref 3.5–5.1)
RBC # BLD: 2.29 M/UL (ref 3.93–5.22)
SODIUM BLD-SCNC: 138 MMOL/L (ref 137–145)
TOTAL PROTEIN: 5.6 G/DL (ref 6.3–8.2)
TSH SERPL DL<=0.05 MIU/L-ACNC: 1.74 UIU/ML (ref 0.27–4.2)
WBC # BLD: 3.11 K/UL (ref 3.98–10.04)

## 2023-03-09 PROCEDURE — 3074F SYST BP LT 130 MM HG: CPT | Performed by: INTERNAL MEDICINE

## 2023-03-09 PROCEDURE — 2580000003 HC RX 258: Performed by: INTERNAL MEDICINE

## 2023-03-09 PROCEDURE — 3078F DIAST BP <80 MM HG: CPT | Performed by: INTERNAL MEDICINE

## 2023-03-09 PROCEDURE — 36415 COLL VENOUS BLD VENIPUNCTURE: CPT

## 2023-03-09 PROCEDURE — 96375 TX/PRO/DX INJ NEW DRUG ADDON: CPT

## 2023-03-09 PROCEDURE — 99214 OFFICE O/P EST MOD 30 MIN: CPT | Performed by: INTERNAL MEDICINE

## 2023-03-09 PROCEDURE — 2500000003 HC RX 250 WO HCPCS: Performed by: INTERNAL MEDICINE

## 2023-03-09 PROCEDURE — 6360000002 HC RX W HCPCS: Performed by: INTERNAL MEDICINE

## 2023-03-09 PROCEDURE — 80053 COMPREHEN METABOLIC PANEL: CPT

## 2023-03-09 PROCEDURE — 85025 COMPLETE CBC W/AUTO DIFF WBC: CPT

## 2023-03-09 PROCEDURE — 96417 CHEMO IV INFUS EACH ADDL SEQ: CPT

## 2023-03-09 PROCEDURE — 96413 CHEMO IV INFUSION 1 HR: CPT

## 2023-03-09 RX ORDER — DIPHENHYDRAMINE HYDROCHLORIDE 50 MG/ML
50 INJECTION INTRAMUSCULAR; INTRAVENOUS ONCE
Status: COMPLETED | OUTPATIENT
Start: 2023-03-09 | End: 2023-03-09

## 2023-03-09 RX ORDER — HEPARIN SODIUM (PORCINE) LOCK FLUSH IV SOLN 100 UNIT/ML 100 UNIT/ML
500 SOLUTION INTRAVENOUS PRN
Status: DISCONTINUED | OUTPATIENT
Start: 2023-03-09 | End: 2023-03-10 | Stop reason: HOSPADM

## 2023-03-09 RX ORDER — SODIUM CHLORIDE 9 MG/ML
5-40 INJECTION INTRAVENOUS PRN
Status: DISCONTINUED | OUTPATIENT
Start: 2023-03-09 | End: 2023-03-10 | Stop reason: HOSPADM

## 2023-03-09 RX ORDER — PALONOSETRON 0.05 MG/ML
0.25 INJECTION, SOLUTION INTRAVENOUS ONCE
Status: COMPLETED | OUTPATIENT
Start: 2023-03-09 | End: 2023-03-09

## 2023-03-09 RX ORDER — DEXAMETHASONE SODIUM PHOSPHATE 10 MG/ML
10 INJECTION, SOLUTION INTRAMUSCULAR; INTRAVENOUS ONCE
Status: COMPLETED | OUTPATIENT
Start: 2023-03-09 | End: 2023-03-09

## 2023-03-09 RX ORDER — SODIUM CHLORIDE 9 MG/ML
5-250 INJECTION, SOLUTION INTRAVENOUS PRN
Status: DISCONTINUED | OUTPATIENT
Start: 2023-03-09 | End: 2023-03-10 | Stop reason: HOSPADM

## 2023-03-09 RX ORDER — FAMOTIDINE 10 MG/ML
20 INJECTION, SOLUTION INTRAVENOUS ONCE
Status: COMPLETED | OUTPATIENT
Start: 2023-03-09 | End: 2023-03-09

## 2023-03-09 RX ADMIN — PALONOSETRON HYDROCHLORIDE 0.25 MG: 0.25 INJECTION, SOLUTION INTRAVENOUS at 12:45

## 2023-03-09 RX ADMIN — SODIUM CHLORIDE 250 ML/HR: 9 INJECTION, SOLUTION INTRAVENOUS at 11:36

## 2023-03-09 RX ADMIN — CARBOPLATIN 233 MG: 10 INJECTION, SOLUTION INTRAVENOUS at 12:46

## 2023-03-09 RX ADMIN — DEXAMETHASONE SODIUM PHOSPHATE 10 MG: 10 INJECTION, SOLUTION INTRAMUSCULAR; INTRAVENOUS at 11:35

## 2023-03-09 RX ADMIN — DIPHENHYDRAMINE HYDROCHLORIDE 50 MG: 50 INJECTION, SOLUTION INTRAMUSCULAR; INTRAVENOUS at 11:35

## 2023-03-09 RX ADMIN — SODIUM CHLORIDE, PRESERVATIVE FREE 10 ML: 5 INJECTION INTRAVENOUS at 13:18

## 2023-03-09 RX ADMIN — FAMOTIDINE 20 MG: 10 INJECTION INTRAVENOUS at 11:35

## 2023-03-09 RX ADMIN — PACLITAXEL 130 MG: 6 INJECTION, SOLUTION INTRAVENOUS at 11:37

## 2023-03-09 RX ADMIN — HEPARIN 500 UNITS: 100 SYRINGE at 13:18

## 2023-03-09 NOTE — PROGRESS NOTES
HARPER Ron completed visit with Cara Lakhani and her daughter Mike. Patient is present for chemotherapy. Patient was brought to this SW attention by infusion nurse Idalmis Avalos RN. Patients daughter asked if there was assistance available due to financial hardship. 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 During this SW assessment it was determined patient would benefit from gas and grocery assistance. SW provided a grocery voucher of $50.00 and a gas voucher for $25.00. These vouchers were presented to the patient. SW provided additional support through active listening and discussion. Patient and her daughter both expressed appreciation. SW encouraged the patient to call if assistance is needed in the future.

## 2023-03-14 ASSESSMENT — ENCOUNTER SYMPTOMS
BACK PAIN: 0
PHOTOPHOBIA: 0
RHINORRHEA: 0
NAUSEA: 0
VOMITING: 0
COLOR CHANGE: 0
SHORTNESS OF BREATH: 0
COUGH: 0
VOICE CHANGE: 0

## 2023-03-15 ENCOUNTER — APPOINTMENT (OUTPATIENT)
Dept: OPERATING ROOM | Age: 48
End: 2023-03-15

## 2023-03-15 ENCOUNTER — HOSPITAL ENCOUNTER (OUTPATIENT)
Age: 48
Setting detail: SPECIMEN
Discharge: HOME | End: 2023-03-15
Payer: COMMERCIAL

## 2023-03-15 ENCOUNTER — HOSPITAL ENCOUNTER (OUTPATIENT)
Age: 48
Setting detail: OUTPATIENT SURGERY
Discharge: HOME | End: 2023-03-15
Attending: INTERNAL MEDICINE | Admitting: INTERNAL MEDICINE
Payer: COMMERCIAL

## 2023-03-15 ENCOUNTER — ANESTHESIA (OUTPATIENT)
Dept: OPERATING ROOM | Age: 48
End: 2023-03-15

## 2023-03-15 ENCOUNTER — ANESTHESIA EVENT (OUTPATIENT)
Dept: OPERATING ROOM | Age: 48
End: 2023-03-15

## 2023-03-15 VITALS
RESPIRATION RATE: 18 BRPM | SYSTOLIC BLOOD PRESSURE: 97 MMHG | HEART RATE: 52 BPM | DIASTOLIC BLOOD PRESSURE: 56 MMHG | WEIGHT: 131 LBS | BODY MASS INDEX: 19.4 KG/M2 | TEMPERATURE: 98 F | HEIGHT: 69 IN | OXYGEN SATURATION: 100 %

## 2023-03-15 PROCEDURE — 43239 EGD BIOPSY SINGLE/MULTIPLE: CPT | Performed by: INTERNAL MEDICINE

## 2023-03-15 PROCEDURE — 43239 EGD BIOPSY SINGLE/MULTIPLE: CPT

## 2023-03-15 PROCEDURE — 45385 COLONOSCOPY W/LESION REMOVAL: CPT

## 2023-03-15 PROCEDURE — 45381 COLONOSCOPY SUBMUCOUS NJX: CPT

## 2023-03-15 PROCEDURE — 43450 DILATE ESOPHAGUS 1/MULT PASS: CPT | Performed by: INTERNAL MEDICINE

## 2023-03-15 PROCEDURE — 45385 COLONOSCOPY W/LESION REMOVAL: CPT | Performed by: INTERNAL MEDICINE

## 2023-03-15 PROCEDURE — 88305 TISSUE EXAM BY PATHOLOGIST: CPT

## 2023-03-15 PROCEDURE — 43450 DILATE ESOPHAGUS 1/MULT PASS: CPT

## 2023-03-15 RX ORDER — LIDOCAINE HYDROCHLORIDE 10 MG/ML
INJECTION, SOLUTION EPIDURAL; INFILTRATION; INTRACAUDAL; PERINEURAL PRN
Status: DISCONTINUED | OUTPATIENT
Start: 2023-03-15 | End: 2023-03-15 | Stop reason: SDUPTHER

## 2023-03-15 RX ORDER — SODIUM CHLORIDE, SODIUM LACTATE, POTASSIUM CHLORIDE, CALCIUM CHLORIDE 600; 310; 30; 20 MG/100ML; MG/100ML; MG/100ML; MG/100ML
INJECTION, SOLUTION INTRAVENOUS CONTINUOUS
Status: DISCONTINUED | OUTPATIENT
Start: 2023-03-15 | End: 2023-03-15 | Stop reason: HOSPADM

## 2023-03-15 RX ORDER — EPINEPHRINE 1 MG/ML
INJECTION, SOLUTION, CONCENTRATE INTRAVENOUS PRN
Status: DISCONTINUED | OUTPATIENT
Start: 2023-03-15 | End: 2023-03-15 | Stop reason: ALTCHOICE

## 2023-03-15 RX ORDER — GLYCOPYRROLATE 0.2 MG/ML
INJECTION INTRAMUSCULAR; INTRAVENOUS PRN
Status: DISCONTINUED | OUTPATIENT
Start: 2023-03-15 | End: 2023-03-15 | Stop reason: SDUPTHER

## 2023-03-15 RX ORDER — PROPOFOL 10 MG/ML
INJECTION, EMULSION INTRAVENOUS PRN
Status: DISCONTINUED | OUTPATIENT
Start: 2023-03-15 | End: 2023-03-15 | Stop reason: SDUPTHER

## 2023-03-15 RX ADMIN — LIDOCAINE HYDROCHLORIDE 30 MG: 10 INJECTION, SOLUTION EPIDURAL; INFILTRATION; INTRACAUDAL; PERINEURAL at 11:24

## 2023-03-15 RX ADMIN — SODIUM CHLORIDE, SODIUM LACTATE, POTASSIUM CHLORIDE, CALCIUM CHLORIDE: 600; 310; 30; 20 INJECTION, SOLUTION INTRAVENOUS at 10:21

## 2023-03-15 RX ADMIN — PROPOFOL 450 MG: 10 INJECTION, EMULSION INTRAVENOUS at 11:24

## 2023-03-15 RX ADMIN — GLYCOPYRROLATE 0.2 MG: 0.2 INJECTION INTRAMUSCULAR; INTRAVENOUS at 11:38

## 2023-03-15 NOTE — OP NOTE
Endoscopic Procedure Note    Patient: Denver Curet : 1975  Med Rec#: [id number] Acc#: [id number]     Primary Care Provider WHITNEY Jackson    Endoscopist: Polo Shah MD, MD    Date of Procedure:  3/15/2023    Procedure:   1. EGD with a Carcamo bougie dilation of esophagus and cold biopsies    Indications: For both EGD and colonoscopy exams today:    Dysphagia, unspecified type   Macrocytic anemia  3. Personal history of breast cancer currently on neoadjuvant chemotherapy-Invasive ductal carcinoma, left breast, 2022  Grade 3  Stage IIIB, clinical T2 N1 M0  4. History of GERD    Anesthesia:  Sedation was administered by anesthesia who monitored the patient during the procedure. Estimated Blood Loss: minimal    Procedure:   After reviewing the patient's chart and obtaining informed consent, the patient was placed in the left lateral decubitus position. A forward-viewing Olympus endoscope was lubricated and inserted through the mouth into the oropharynx. Under direct visualization, the upper esophagus was intubated. The scope was advanced to the level of the third portion of duodenum. Scope was slowly withdrawn with careful inspection of the mucosal surfaces. The scope was retroflexed for inspection of the gastric fundus and incisura. Findings and maneuvers are listed in impression below. Next, a lubricated Carcamo weighted Bougie dilator-54 Fr was gently introduced into the patient's mouth and passed into the Esophagus and into the proximal stomach without much resistance and then withdrawn. Repeat EGD was performed to verify dilation and scope tip was passed into the stomach. NO evidence of perforation or excessive bleeding was noted subsequent to the dilation. The patient tolerated the procedure well. The scope was removed. There were no immediate complications. Findings/IMPRESSION:  Esophagus: normal and a normal EG junction at 40 cm.     NO erosions or ulcers or nodules or strictures or webs or rings or mass lesions or extrinsic compression or diverticula noted. An empirical Carcamo 54 fr bougie dilation was performed and random cold biopsies were taken to check for EoE and NERD. There is no obvious hiatal hernia present. Stomach:  normal.    NO ulcers or masses or gastric outlet obstruction or retained food or fluid. Rugae were normal and lumen distended well with insufflation. Retroflexed views otherwise revealed a normal GE junction, fundus and cardia as well. Duodenum: Normal. Random biopsies were taken to check for Celiac disease and other causes of villous atrophy. RECOMMENDATIONS:    1. Await path results, the patient will be contacted in 7-10 days with biopsy results. 2.  Magic mouthwash 5 ml PO Swish and swallow q3h PRN ONLY IF patient has post-procedural sorethroat or chest pain. 3. Full liquids to soft diet today chon discharge from the surgicenter; may advance diet starting in AM tomorrow. 4. May resume other meds except any ASA/NSAIDs; may use cough drops or lozenges PRN; also OTC/prescription PPI or H2RA PO qday or BID with anti-GERD measures. 5. NO ASA/NSAIDs x 2 weeks  6. OP f/u in 4-6 weeks with Ms. Loco Mireles; will consider an Esophageal manometry later if the patient's dysphagia persists. The results were discussed with the patient and family. A copy of the images obtained were given to the patient.      (Please note that portions of this note were completed with a voice recognition program. Efforts were made to edit the dictations but occasionally words may be mis-transcribed.)     Alexander Presley MD, MD  3/15/2023  11:31 AM

## 2023-03-15 NOTE — DISCHARGE INSTRUCTIONS
1. Await path results, the patient will be contacted in 7-10 days with biopsy results. 2.  Magic mouthwash 5 ml PO Swish and swallow q3h PRN ONLY IF patient has post-procedural sorethroat or chest pain. 3. Full liquids to soft diet today chon discharge from the surgicenter; may advance  diet starting in AM tomorrow. 4. May resume other meds except any ASA/NSAIDs; may use cough drops or lozenges PRN; also OTC/prescription PPI or H2RA PO qday or BID with anti-GERD measures. 5. NO ASA/NSAIDs x 2 weeks  6. OP f/u in 4-6 weeks with Ms. Jass Ramirez; will consider an Esophageal manometry later if the patient's dysphagia persists. 7. Repeat colonoscopy: pending pathology -in 1-3 years for surveillance; sooner if her personal or family history as pertaining to colorectal cancer risk changes requiring an earlier exam or if the patient were to develop lower GI symptoms such as bleeding, abdominal pain, change in bowel habits or stool caliber or if the patient has anemia or unexplained weight loss in the future. 8.  A review monitor her CBC periodically along with serum iron studies, folate and B12 levels as appropriate, given her microcytic anemia  9. Keep scheduled f/u appts with other MDs          Nonsteroidal Anti-Inflammatory Drugs (NSAIDs): Care Instructions  Overview     Nonsteroidal anti-inflammatory drugs (NSAIDs) relieve pain and fever. You also can use them to reduce swelling and inflammation. Some examples of over-the-counter NSAIDs are:  Ibuprofen (Advil, Motrin). Naproxen (Aleve). Aspirin (Alicia, Bufferin) is also an NSAID. But it doesn't work the same way as these other NSAIDs. Some examples of prescription NSAIDs are:  Diclofenac. Indomethacin. Piroxicam.  Take NSAIDS exactly as prescribed. Call your doctor if you think you are having a problem with your medicine. If you are taking over-the-counter medicine, read and follow all instructions on the label.   Follow-up care is a key part of your treatment and safety. Be sure to make and go to all appointments, and call your doctor if you are having problems. It's also a good idea to know your test results and keep a list of the medicines you take. What should you know about NSAIDs? Do not use an over-the-counter NSAID for longer than 10 days. Talk to your doctor first.  The most common side effects from NSAIDs are stomachaches, heartburn, and nausea. NSAIDs may irritate the stomach lining. If the medicine upsets your stomach, you can try taking it with food. But if that doesn't help, talk with your doctor to make sure it's not a more serious problem, such as a stomach ulcer or bleeding in the stomach or intestines. Using NSAIDs may:  Lead to high blood pressure. Make symptoms of heart failure worse. Raise the risk of heart attack, stroke, kidney damage, and skin reactions. Your risks are greater if you take NSAIDs at higher doses or for longer than the label says. People who are older than 72 or who have heart, stomach, or intestinal disease have a higher risk for problems. Aspirin  Aspirin is not like other NSAIDs. It can help people who are at high risk for heart attack or stroke. But taking aspirin isn't right for everyone, because it can cause serious bleeding. Talk to your doctor before you start taking aspirin every day. You and your doctor can decide if aspirin is a good choice for you based on your risk of a heart attack or stroke and your risk of serious bleeding. Unless you have a high risk of a heart attack or stroke, the benefits of aspirin probably won't outweigh the risk of bleeding. If you use other NSAIDs a lot, aspirin may not work as well to prevent heart attack and stroke. If you take aspirin every day for your heart, talk with your doctor before you take other NSAIDs. Do not give aspirin to anyone younger than 20. It has been linked to Reye syndrome, a serious illness. When should you call for help?    Call 911 anytime you think you may need emergency care. For example, call if:    You passed out (lost consciousness). You vomit blood or what looks like coffee grounds. You pass maroon or very bloody stools. Call your doctor now or seek immediate medical care if:    Your stools are black and tarlike or have streaks of blood. Watch closely for changes in your health, and be sure to contact your doctor if you have any problems. Where can you learn more? Go to http://www.woods.com/ and enter A328 to learn more about \"Nonsteroidal Anti-Inflammatory Drugs (NSAIDs): Care Instructions. \"  Current as of: June 6, 2022               Content Version: 13.5  © 2006-2022 [employer]. Care instructions adapted under license by [facility] ([facility]). If you have questions about a medical condition or this instruction, always ask your healthcare professional. [place] [address] any warranty or liability for your use of this information.

## 2023-03-15 NOTE — H&P
Patient Name: Jim Reddy  : 1975  MRN: [id number]  DATE: 03/15/23    Allergies: Allergies   Allergen Reactions    Codeine Itching    Percocet [Oxycodone-Acetaminophen] Itching    Toradol [Ketorolac Tromethamine] Itching        ENDOSCOPY  History and Physical    Procedure:    [] Diagnostic Colonoscopy       [x] Screening Colonoscopy  [x] EGD      [] ERCP      [] EUS       [] Other    [x] Previous office notes/History and Physical reviewed from the patients chart. Please see EMR for further details of HPI. I have examined the patient's status immediately prior to the procedure and:      Indications/HPI: For both EGD and colonoscopy exams today:    Dysphagia, unspecified type   Macrocytic anemia  3. Personal history of breast cancer currently on neoadjuvant chemotherapy-Invasive ductal carcinoma, left breast, 2022  Grade 3  Stage IIIB, clinical T2 N1 M0  4. History of GERD    []Abdominal Pain   []Cancer- GI/Lung     []Fhx of colon CA/polyps  []History of Polyps  []Barretts            []Melena  []Abnormal Imaging              []Dysphagia              []Persistent Pneumonia   []Anemia                            []Food Impaction        []History of Polyps  [] GI Bleed             []Pulmonary nodule/Mass   []Change in bowel habits []Heartburn/Reflux  []Rectal Bleed (BRBPR)  []Chest Pain - Non Cardiac []Heme (+) Stool []Ulcers  []Constipation  []Hemoptysis  []Varices  []Diarrhea  []Hypoxemia    []Nausea/Vomiting   []Screening   []Crohns/Colitis  []Other:     Anesthesia:   [x] MAC [] Moderate Sedation   [] General   [] None     ROS: 12 pt Review of Symptoms was negative unless mentioned above    Medications:   Prior to Admission medications    Medication Sig Start Date End Date Taking?  Authorizing Provider   sertraline (ZOLOFT) 50 MG tablet TAKE 1 TABLET BY MOUTH DAILY 89   WHITNEY Gonzalez   atenolol (TENORMIN) 25 MG tablet TAKE 1 TABLET BY MOUTH IN THE MORNING AND AT BEDTIME  Patient taking differently: Take 25 mg by mouth daily 8/90/85   WHITNEY Wang   Magic Mouthwash (MIRACLE MOUTHWASH) Swish and spit 10 mLs 4 times daily as needed for Irritation 1/19/23   Carla Woods MD   OLANZapine (ZYPREXA) 10 MG tablet TAKE 1 TABLET BY MOUTH DAILY AT NIGHT FOR 4 NIGHTS ONLY WITH EACH CYCLE OF ADRIAMYCIN/CYTOXAN EVERY 2 WEEKS 1/9/23   Carla Woods MD   montelukast (SINGULAIR) 10 MG tablet Take 10 mg by mouth nightly    Historical Provider, MD   ondansetron (ZOFRAN) 4 MG tablet Take 1 tablet by mouth every 6 hours as needed for Nausea or Vomiting 12/14/22   Carla Woods MD   promethazine (PHENERGAN) 25 MG tablet Take 0.5 tablets by mouth every 6 hours as needed for Nausea 12/14/22   Carla Woods MD   lidocaine-prilocaine (EMLA) 2.5-2.5 % cream Apply topically as needed.  12/14/22   Carla Woods MD   vitamin D (ERGOCALCIFEROL) 1.25 MG (36415 UT) CAPS capsule TAKE 1 CAPSULE BY MOUTH 1 TIME A WEEK 28/47/64   WHITNEY Wang   rOPINIRole (REQUIP) 0.5 MG tablet TAKE 1 TO 3 TABLETS BY MOUTH EVERY NIGHT 67/99/83   WHITNEY Wang   valACYclovir (VALTREX) 1 g tablet TAKE 1 TABLET BY MOUTH EVERY DAY AS NEEDED FOR FEVER BLISTER  Patient taking differently: Take 1,000 mg by mouth as needed TAKE 1 TABLET BY MOUTH EVERY DAY AS NEEDED FOR FEVER BLISTER 9/16/22   WHITNEY Joyce - CNP   omeprazole (PRILOSEC) 20 MG delayed release capsule Take 1 capsule by mouth 2 times daily (before meals)  Patient taking differently: Take 20 mg by mouth nightly 1/86/76   WHITNEY Wang   Multiple Vitamin (MULTI-VITAMIN DAILY PO) Take 1 tablet by mouth daily    Historical Provider, MD   doxepin (SINEQUAN) 10 MG capsule Take 1 capsule by mouth nightly  Patient taking differently: Take 10 mg by mouth nightly as needed 1/87/11   WHITNEY Wang   hydrochlorothiazide (HYDRODIURIL) 25 MG tablet Take 1 tablet by mouth daily  Patient taking differently: Take 25 mg by mouth as needed 3/82/38   WHITNEY Rojas       Past Medical History:  Past Medical History:   Diagnosis Date    Adverse effect of chemotherapy     Breast cancer ([facility] [address] 11/2022    Triple negative breast cancer    Hypotension     Malignant neoplasm of overlapping sites of left breast ([facility] [address] 11/22/2022    PVC (premature ventricular contraction)     Restless leg syndrome 07/16/2018    Uterine mass 06/17/2014    9x9 cm on CT scan       Past Surgical History:  Past Surgical History:   Procedure Laterality Date    BREAST RECONSTRUCTION  04/2019    BREAST SURGERY Bilateral 3156    silicone Memory Gel    CHOLECYSTECTOMY  06/24/2014    HC INJECT OTHER PERPHRL NERV Right 08/03/2016    HIP FLUOROSCOPIC GUIDED CORTICOSTEROID INJECTION  performed by Alexander Goodrich MD [facility] NERV Right 04/21/2017    FLURO GUIDED HIP INJECTION performed by Malaika Bruno MD at [facility], VAGINAL      PORT SURGERY Left 12/09/2022    PORT INSERTION WITH FLUOROSCOPY performed by Johann Lee MD at [place] [address] LEFT Left 11/21/2022     BREAST NEEDLE BIOPSY LEFT LPS GENERAL SURGERY       Social History:  Social History     Tobacco Use    Smoking status: Every Day     Packs/day: 0.50     Years: 34.00     Pack years: 17.00     Types: Cigarettes     Start date: 1987    Smokeless tobacco: Never   Vaping Use    Vaping Use: Never used   Substance Use Topics    Alcohol use: Not Currently     Comment: occ    Drug use: No       Vital Signs:   Vitals:    03/15/23 1006   BP: 98/60   Pulse: 60   Resp: 18   Temp: 98.6 °F (37 °C)   SpO2: 100%        Physical Exam:  Cardiac:  [x]WNL  []Comments:  Pulmonary:  [x]WNL   []Comments:  Neuro/Mental Status:  [x]WNL  []Comments:  Abdominal:  [x]WNL    []Comments:  Other:   []WNL  []Comments:    Informed Consent:  The risks and benefits of the procedure have been discussed with either the patient or if they cannot consent, their representative. Assessment:  Patient examined and appropriate for planned sedation and procedure. Plan:  Proceed with planned sedation and procedure as above.          Terrance Samaniego MD

## 2023-03-15 NOTE — ANESTHESIA PRE PROCEDURE
Department of Anesthesiology  Preprocedure Note       Name:  Kaylee King   Age:  50 y.o.  :  1975                                          MRN:  [id number]         Date:  3/15/2023      Surgeon: Brisa Lundy):  Tae Akbar MD    Procedure: Procedure(s):  EGD BIOPSY  COLORECTAL CANCER SCREENING, NOT HIGH RISK    Medications prior to admission:   Prior to Admission medications    Medication Sig Start Date End Date Taking? Authorizing Provider   sertraline (ZOLOFT) 50 MG tablet TAKE 1 TABLET BY MOUTH DAILY 3/7/76   WHITNEY Hopper   atenolol (TENORMIN) 25 MG tablet TAKE 1 TABLET BY MOUTH IN THE MORNING AND AT BEDTIME  Patient taking differently: Take 25 mg by mouth daily    WHITNEY Hopper   Magic Mouthwash (MIRACLE MOUTHWASH) Swish and spit 10 mLs 4 times daily as needed for Irritation 23   Joni Whyte MD   OLANZapine (ZYPREXA) 10 MG tablet TAKE 1 TABLET BY MOUTH DAILY AT NIGHT FOR 4 NIGHTS ONLY WITH EACH CYCLE OF ADRIAMYCIN/CYTOXAN EVERY 2 WEEKS 23   Joni Whyte MD   montelukast (SINGULAIR) 10 MG tablet Take 10 mg by mouth nightly    Historical Provider, MD   ondansetron (ZOFRAN) 4 MG tablet Take 1 tablet by mouth every 6 hours as needed for Nausea or Vomiting 22   Joni Whyte MD   promethazine (PHENERGAN) 25 MG tablet Take 0.5 tablets by mouth every 6 hours as needed for Nausea 22   Joni Whyte MD   lidocaine-prilocaine (EMLA) 2.5-2.5 % cream Apply topically as needed.  22   Joni Whyte MD   vitamin D (ERGOCALCIFEROL) 1.25 MG (33972 UT) CAPS capsule TAKE 1 CAPSULE BY MOUTH 1 TIME A WEEK    WHITNEY Hopper   rOPINIRole (REQUIP) 0.5 MG tablet TAKE 1 TO 3 TABLETS BY MOUTH EVERY NIGHT    WHITNEY Hopper   valACYclovir (VALTREX) 1 g tablet TAKE 1 TABLET BY MOUTH EVERY DAY AS NEEDED FOR FEVER BLISTER  Patient taking differently: Take 1,000 mg by mouth as needed TAKE 1 TABLET BY MOUTH EVERY DAY AS NEEDED FOR FEVER BLISTER 9/16/22   Tae Loose, APRN - CNP   omeprazole (PRILOSEC) 20 MG delayed release capsule Take 1 capsule by mouth 2 times daily (before meals)  Patient taking differently: Take 20 mg by mouth nightly 1/21/22   WHITNEY Stokes   Multiple Vitamin (MULTI-VITAMIN DAILY PO) Take 1 tablet by mouth daily    Historical Provider, MD   doxepin (SINEQUAN) 10 MG capsule Take 1 capsule by mouth nightly  Patient taking differently: Take 10 mg by mouth nightly as needed 5/38/41   WHITNEY Stokes   hydrochlorothiazide (HYDRODIURIL) 25 MG tablet Take 1 tablet by mouth daily  Patient taking differently: Take 25 mg by mouth as needed 6/55/23   WHITNEY Stokes       Current medications:    Current Facility-Administered Medications   Medication Dose Route Frequency Provider Last Rate Last Admin    lactated ringers IV soln infusion   IntraVENous Continuous [place] MD Zion           Allergies:     Allergies   Allergen Reactions    Codeine Itching    Percocet [Oxycodone-Acetaminophen] Itching    Toradol [Ketorolac Tromethamine] Itching       Problem List:    Patient Active Problem List   Diagnosis Code    Uterine mass N85.8    S/P laparoscopic cholecystectomy Z90.49    Primary osteoarthritis of right hip M16.11    Essential hypertension I10    Anxiety F41.9    Tobacco abuse Z72.0    PVC (premature ventricular contraction) I49.3    Gastroesophageal reflux disease without esophagitis K21.9    Breast pain N64.4    Diffuse cystic mastopathy N60.19    History of breast augmentation Z98.82    Overactive bladder N32.81    Restless leg syndrome G25.81    Insomnia G47.00    Malignant neoplasm of overlapping sites of left breast (HCC) C50.812    Triple negative breast cancer (HCC) C50.919       Past Medical History:        Diagnosis Date    Adverse effect of chemotherapy     Breast cancer ([facility] [address] 11/2022    Triple negative breast cancer    Malignant neoplasm of overlapping sites of left breast ([address] 11/22/2022    PVC (premature ventricular contraction)     Restless leg syndrome 07/16/2018    Uterine mass 06/17/2014    9x9 cm on CT scan       Past Surgical History:        Procedure Laterality Date    BREAST RECONSTRUCTION  04/2019    BREAST SURGERY Bilateral 2051    silicone Memory Gel    CHOLECYSTECTOMY  06/24/2014    HC INJECT OTHER PERPHRL NERV Right 08/03/2016    HIP FLUOROSCOPIC GUIDED CORTICOSTEROID INJECTION  performed by Alana Quiros MD at [facility] Right 04/21/2017    FLURO GUIDED HIP INJECTION performed by Clare Cruz MD at [address] [place] [address], VAGINAL      PORT SURGERY Left 12/09/2022    PORT INSERTION WITH FLUOROSCOPY performed by Gaviota Mcpherson MD at [address] [place] [address] LEFT Left 11/21/2022     BREAST NEEDLE BIOPSY LEFT LPS GENERAL SURGERY       Social History:    Social History     Tobacco Use    Smoking status: Every Day     Packs/day: 0.50     Years: 34.00     Pack years: 17.00     Types: Cigarettes     Start date: 1987    Smokeless tobacco: Never   Substance Use Topics    Alcohol use: Not Currently     Comment: occ                                Ready to quit: Not Answered  Counseling given: Not Answered      Vital Signs (Current):   Vitals:    03/15/23 1006   TempSrc: Temporal   Weight: 131 lb (59.4 kg)   Height: 5' 9\" (1.753 m)                                              BP Readings from Last 3 Encounters:   03/09/23 (!) 102/54   03/07/23 103/70   03/02/23 118/66       NPO Status: Time of last liquid consumption: 0600                        Time of last solid consumption: 1800                        Date of last liquid consumption: 03/15/23                        Date of last solid food consumption: 03/13/23    BMI:   Wt Readings from Last 3 Encounters:   03/15/23 131 lb (59.4 kg)   03/09/23 131 lb 4.8 oz (59.6 kg)   03/07/23 129 lb (58.5 kg)     Body mass index is 19.35 kg/m². CBC:   Lab Results   Component Value Date/Time    WBC 3.11 03/09/2023 10:38 AM    RBC 2.29 03/09/2023 10:38 AM    HGB 8.0 03/09/2023 10:38 AM    HCT 24.6 03/09/2023 10:38 AM    .4 03/09/2023 10:38 AM    RDW 17.8 03/09/2023 10:38 AM     03/09/2023 10:38 AM       CMP:   Lab Results   Component Value Date/Time     03/09/2023 10:24 AM    K 3.5 03/09/2023 10:24 AM     03/09/2023 10:24 AM    CO2 25 03/09/2023 10:24 AM    BUN 12 03/09/2023 10:24 AM    CREATININE 0.7 03/09/2023 10:24 AM    GFRAA >59 08/03/2021 12:06 PM    LABGLOM >60 03/09/2023 10:24 AM    GLUCOSE 129 03/09/2023 10:24 AM    PROT 5.6 03/09/2023 10:24 AM    CALCIUM 8.9 03/09/2023 10:24 AM    BILITOT <0.2 03/09/2023 10:24 AM    ALKPHOS 55 03/09/2023 10:24 AM    AST 20 03/09/2023 10:24 AM    ALT 16 03/09/2023 10:24 AM       POC Tests: No results for input(s): POCGLU, POCNA, POCK, POCCL, POCBUN, POCHEMO, POCHCT in the last 72 hours.     Coags:   Lab Results   Component Value Date/Time    PROTIME 13.3 04/19/2018 12:40 PM    INR 1.02 04/19/2018 12:40 PM    APTT 30.4 04/19/2018 12:40 PM       HCG (If Applicable):   Lab Results   Component Value Date    PREGTESTUR NEGATIVE (L) 06/19/2014        ABGs: No results found for: PHART, PO2ART, AOJ0HQP, XNM9LCB, BEART, E8KTMGWL     Type & Screen (If Applicable):  No results found for: LABABO, LABRH    Drug/Infectious Status (If Applicable):  No results found for: HIV, HEPCAB    COVID-19 Screening (If Applicable):   Lab Results   Component Value Date/Time    COVID19 DETECTED 12/28/2021 11:41 AM           Anesthesia Evaluation  Patient summary reviewed and Nursing notes reviewed  Airway: Mallampati: II  TM distance: >3 FB   Neck ROM: full  Mouth opening: > = 3 FB   Dental: normal exam         Pulmonary:Negative Pulmonary ROS and normal exam                               Cardiovascular:Negative CV ROS  Exercise tolerance: good (>4 METS),   (+) hypertension:,          Beta Blocker:  Not on Beta Blocker         Neuro/Psych:   Negative Neuro/Psych ROS              GI/Hepatic/Renal:   (+) GERD:,           Endo/Other: Negative Endo/Other ROS                    Abdominal:             Vascular: negative vascular ROS. Other Findings:           Anesthesia Plan      general and TIVA     ASA 2       Induction: intravenous. Anesthetic plan and risks discussed with patient. Plan discussed with CRNA.                     WHITNEY Blair - CRNA   3/15/2023

## 2023-03-15 NOTE — ANESTHESIA POSTPROCEDURE EVALUATION
Department of Anesthesiology  Postprocedure Note    Patient: Yaneli Altman  MRN: [id number]  YOB: 1975  Date of evaluation: 3/15/2023      Procedure Summary     Date: 03/15/23 Room / Location: [facility] ENDO 02 / [address]    Anesthesia Start: 1119 Anesthesia Stop:     Procedures:       EGD BIOPSY (Esophagus)      COLORECTAL CANCER SCREENING, NOT HIGH RISK (Abdomen)      EGD ESOPHAGOGASTRODUODENOSCOPY DILATATION Diagnosis:       Dysphagia, unspecified type      Globus sensation      Screen for colon cancer      (Dysphagia, unspecified type [R13.10])      (Globus sensation [R09.89])      (Screen for colon cancer [Z12.11])    Surgeons: Lolis Keys MD Responsible Provider: WHITNEY Coulter CRNA    Anesthesia Type: general, TIVA ASA Status: 2          Anesthesia Type: No value filed.     Adolph Phase I:      Adolph Phase II:        Anesthesia Post Evaluation    Patient location during evaluation: bedside  Patient participation: complete - patient participated  Level of consciousness: sleepy but conscious  Pain score: 0  Airway patency: patent  Nausea & Vomiting: no nausea and no vomiting  Complications: no  Cardiovascular status: blood pressure returned to baseline  Respiratory status: acceptable, room air and spontaneous ventilation  Hydration status: euvolemic

## 2023-03-15 NOTE — OP NOTE
Patient: Roderick Cruz : 1975  Med Rec#: [id number] Acc#: [id number]   Primary Care Provider WHITNEY Yadav    Date of Procedure:  3/15/2023    Endoscopist: Katherin Cabral MD, MD    Referring Provider: WHITNEY Yadav,     Operation Performed: Colonoscopy up to the terminal ileum with  Submucosal [place] ink tattooing and injection of dilute 1 in 10,000 epinephrine in normal saline followed by  Hot snare resection of a pedunculated distal sigmoid colon polyp    Indications: For both EGD and colonoscopy exams today:    Dysphagia, unspecified type   Macrocytic anemia  3. Personal history of breast cancer currently on neoadjuvant chemotherapy-Invasive ductal carcinoma, left breast, 2022  Grade 3  Stage IIIB, clinical T2 N1 M0  4. History of GERD    Anesthesia:  Sedation was administered by anesthesia who monitored the patient during the procedure. I met with Roderick Cruz prior to procedure. We discussed the procedure itself, and I have discussed the risks of endoscopy (including-- but not limited to-- pain, discomfort, bleeding potentially requiring second endoscopic procedure and/or blood transfusion, organ perforation requiring operative repair, damage to organs near the colon, infection, aspiration, cardiopulmonary/allergic reaction), benefits, indications to endoscopy. Additionally, we discussed options other than colonoscopy. The patient expressed understanding. All questions answered. The patient decided to proceed with the procedure. Signed informed consent was placed on the chart. Blood Loss: minimal    Withdrawal time: More than 10 minutes  Bowel Prep: adequate     Complications: no immediate complications    DESCRIPTION OF PROCEDURE:     A time out was performed. After written informed consent was obtained, the patient was placed in the left lateral position. The perianal area was inspected, and a digital rectal exam was performed.  A rectal exam was performed: normal tone, no palpable lesions. At this point, a forward viewing Olympus colonoscope was inserted into the anus and carefully advanced to the terminal ileum. The cecum was identified by the ileocecal valve and the appendiceal orifice. The colonoscope was then slowly withdrawn with careful inspection of the mucosa in a linear and circumferential fashion. The scope was retroflexed in the rectum. Suction was utilized during the procedure to remove as much air as possible from the bowel. The colonoscope was removed from the patient, and the procedure was terminated. Findings are listed below. Findings:   Approximately 1.8 to 2 cm in diameter pedunculated distal sigmoid colon polyp was removed by hot snare polypectomy technique after injection of the pedicle and the base of the polyp and the nearby wall of the colon submucosally with dilute epinephrine in sterile [place] ink and normal saline to decrease the risk of post polypectomy bleeding as well as to michelle the area for future reference. Otherwise, the mucosa appeared normal throughout the entire examined colon and the examined terminal ileum. NO larger polyps or masses or strictures or colitis. Internal hemorrhoids-Grade 1   visible, the mucosa appeared normal throughout the entire examined colon  Retroflexion in the rectum was otherwise normal and revealed no further abnormalities      Recommendations:  1. Repeat colonoscopy: pending pathology -in 1-3 years for surveillance; sooner if her personal or family history as pertaining to colorectal cancer risk changes requiring an earlier exam or if the patient were to develop lower GI symptoms such as bleeding, abdominal pain, change in bowel habits or stool caliber or if the patient has anemia or unexplained weight loss in the future.    2. Await biopsy results-you will receive a letter with your results within 7-10 days    -Monitor her CBC periodically along with serum iron studies, folate and B12 levels as appropriate, given her microcytic anemia  - Keep scheduled f/u appts with other MDs     - NO ASA/NSAIDs x 2 weeks     Findings and recommendations were discussed w/ the patient. A copy of the images was provided.     (Please note that portions of this note were completed with a voice recognition program. Efforts were made to edit the dictations but occasionally words may be mis-transcribed.)     Lolis Keys MD, MD  3/15/2023  11:31 AM

## 2023-03-16 ENCOUNTER — HOSPITAL ENCOUNTER (OUTPATIENT)
Dept: INFUSION THERAPY | Age: 48
Discharge: HOME | End: 2023-03-16
Payer: COMMERCIAL

## 2023-03-16 VITALS
HEART RATE: 67 BPM | TEMPERATURE: 98 F | WEIGHT: 131 LBS | OXYGEN SATURATION: 100 % | SYSTOLIC BLOOD PRESSURE: 104 MMHG | RESPIRATION RATE: 18 BRPM | BODY MASS INDEX: 19.4 KG/M2 | DIASTOLIC BLOOD PRESSURE: 67 MMHG | HEIGHT: 69 IN

## 2023-03-16 DIAGNOSIS — C50.919 TRIPLE NEGATIVE BREAST CANCER (HCC): ICD-10-CM

## 2023-03-16 DIAGNOSIS — C50.812 MALIGNANT NEOPLASM OF OVERLAPPING SITES OF LEFT BREAST (HCC): Primary | ICD-10-CM

## 2023-03-16 DIAGNOSIS — C50.812 MALIGNANT NEOPLASM OF OVERLAPPING SITES OF LEFT BREAST (HCC): ICD-10-CM

## 2023-03-16 DIAGNOSIS — C50.919 TRIPLE NEGATIVE BREAST CANCER (HCC): Primary | ICD-10-CM

## 2023-03-16 LAB
ALBUMIN SERPL-MCNC: 3.6 G/DL (ref 3.5–5.2)
ALP SERPL-CCNC: 57 U/L (ref 35–104)
ALT SERPL-CCNC: 21 U/L (ref 9–52)
ANION GAP SERPL CALCULATED.3IONS-SCNC: 3 MMOL/L (ref 7–19)
AST SERPL-CCNC: 27 U/L (ref 14–36)
BILIRUB SERPL-MCNC: 0.3 MG/DL (ref 0.2–1.3)
BUN SERPL-MCNC: 8 MG/DL (ref 7–17)
CALCIUM SERPL-MCNC: 9 MG/DL (ref 8.4–10.2)
CHLORIDE SERPL-SCNC: 107 MMOL/L (ref 98–111)
CO2 SERPL-SCNC: 27 MMOL/L (ref 22–29)
CREAT SERPL-MCNC: 0.7 MG/DL (ref 0.5–1)
ERYTHROCYTE [DISTWIDTH] IN BLOOD BY AUTOMATED COUNT: 17.7 % (ref 11.7–14.4)
GLOBULIN: 2.5 G/DL
GLUCOSE SERPL-MCNC: 124 MG/DL (ref 74–106)
HCT VFR BLD AUTO: 25.2 % (ref 34.1–44.9)
HGB BLD-MCNC: 8.4 G/DL (ref 11.2–15.7)
LYMPHOCYTES # BLD: 1.09 K/UL (ref 1.18–3.74)
LYMPHOCYTES NFR BLD: 34.3 % (ref 19.3–53.1)
MCH RBC QN AUTO: 35.7 PG (ref 25.6–32.2)
MCHC RBC AUTO-ENTMCNC: 33.3 G/DL (ref 32.3–35.5)
MCV RBC AUTO: 107.2 FL (ref 79.4–94.8)
MONOCYTES # BLD: 0.19 K/UL (ref 0.24–0.82)
MONOCYTES NFR BLD: 6 % (ref 4.7–12.5)
NEUTROPHILS # BLD: 1.81 K/UL (ref 1.56–6.13)
NEUTS SEG NFR BLD: 56.8 % (ref 34–71.1)
PLATELET # BLD AUTO: 115 K/UL (ref 182–369)
PMV BLD AUTO: 9.6 FL (ref 7.4–10.4)
POTASSIUM SERPL-SCNC: 3.4 MMOL/L (ref 3.5–5.1)
PROT SERPL-MCNC: 6.1 G/DL (ref 6.3–8.2)
RBC # BLD AUTO: 2.35 M/UL (ref 3.93–5.22)
SODIUM SERPL-SCNC: 137 MMOL/L (ref 137–145)
WBC # BLD AUTO: 3.18 K/UL (ref 3.98–10.04)

## 2023-03-16 PROCEDURE — 36415 COLL VENOUS BLD VENIPUNCTURE: CPT

## 2023-03-16 PROCEDURE — 96417 CHEMO IV INFUS EACH ADDL SEQ: CPT

## 2023-03-16 PROCEDURE — 2580000003 HC RX 258: Performed by: INTERNAL MEDICINE

## 2023-03-16 PROCEDURE — 85025 COMPLETE CBC W/AUTO DIFF WBC: CPT

## 2023-03-16 PROCEDURE — 80053 COMPREHEN METABOLIC PANEL: CPT

## 2023-03-16 PROCEDURE — 6360000002 HC RX W HCPCS: Performed by: INTERNAL MEDICINE

## 2023-03-16 PROCEDURE — 2500000003 HC RX 250 WO HCPCS: Performed by: INTERNAL MEDICINE

## 2023-03-16 PROCEDURE — 96375 TX/PRO/DX INJ NEW DRUG ADDON: CPT

## 2023-03-16 PROCEDURE — 96413 CHEMO IV INFUSION 1 HR: CPT

## 2023-03-16 RX ORDER — PALONOSETRON 0.05 MG/ML
0.25 INJECTION, SOLUTION INTRAVENOUS ONCE
Status: COMPLETED | OUTPATIENT
Start: 2023-03-16 | End: 2023-03-16

## 2023-03-16 RX ORDER — SODIUM CHLORIDE 9 MG/ML
INJECTION, SOLUTION INTRAVENOUS CONTINUOUS
OUTPATIENT
Start: 2023-03-30

## 2023-03-16 RX ORDER — DIPHENHYDRAMINE HYDROCHLORIDE 50 MG/ML
50 INJECTION INTRAMUSCULAR; INTRAVENOUS ONCE
OUTPATIENT
Start: 2023-03-23 | End: 2023-03-23

## 2023-03-16 RX ORDER — ONDANSETRON 2 MG/ML
8 INJECTION INTRAMUSCULAR; INTRAVENOUS
Status: CANCELLED | OUTPATIENT
Start: 2023-03-16

## 2023-03-16 RX ORDER — EPINEPHRINE 1 MG/ML
0.3 INJECTION, SOLUTION, CONCENTRATE INTRAVENOUS PRN
Status: CANCELLED | OUTPATIENT
Start: 2023-03-16

## 2023-03-16 RX ORDER — ONDANSETRON 2 MG/ML
8 INJECTION INTRAMUSCULAR; INTRAVENOUS
OUTPATIENT
Start: 2023-03-23

## 2023-03-16 RX ORDER — ACETAMINOPHEN 325 MG/1
650 TABLET ORAL
OUTPATIENT
Start: 2023-03-23

## 2023-03-16 RX ORDER — ACETAMINOPHEN 325 MG/1
650 TABLET ORAL
OUTPATIENT
Start: 2023-03-30

## 2023-03-16 RX ORDER — SODIUM CHLORIDE 9 MG/ML
5-40 INJECTION INTRAVENOUS PRN
OUTPATIENT
Start: 2023-03-23

## 2023-03-16 RX ORDER — DIPHENHYDRAMINE HYDROCHLORIDE 50 MG/ML
50 INJECTION INTRAMUSCULAR; INTRAVENOUS ONCE
OUTPATIENT
Start: 2023-03-30 | End: 2023-03-30

## 2023-03-16 RX ORDER — HEPARIN SODIUM (PORCINE) LOCK FLUSH IV SOLN 100 UNIT/ML 100 UNIT/ML
500 SOLUTION INTRAVENOUS PRN
OUTPATIENT
Start: 2023-03-30

## 2023-03-16 RX ORDER — ONDANSETRON 2 MG/ML
8 INJECTION INTRAMUSCULAR; INTRAVENOUS
OUTPATIENT
Start: 2023-03-30

## 2023-03-16 RX ORDER — FAMOTIDINE 10 MG/ML
20 INJECTION, SOLUTION INTRAVENOUS ONCE
Status: CANCELLED | OUTPATIENT
Start: 2023-03-16 | End: 2023-03-16

## 2023-03-16 RX ORDER — DIPHENHYDRAMINE HYDROCHLORIDE 50 MG/ML
50 INJECTION INTRAMUSCULAR; INTRAVENOUS
OUTPATIENT
Start: 2023-03-30

## 2023-03-16 RX ORDER — HEPARIN SODIUM (PORCINE) LOCK FLUSH IV SOLN 100 UNIT/ML 100 UNIT/ML
500 SOLUTION INTRAVENOUS PRN
Status: DISCONTINUED | OUTPATIENT
Start: 2023-03-16 | End: 2023-03-17 | Stop reason: HOSPADM

## 2023-03-16 RX ORDER — FAMOTIDINE 10 MG/ML
20 INJECTION, SOLUTION INTRAVENOUS
OUTPATIENT
Start: 2023-03-30

## 2023-03-16 RX ORDER — SODIUM CHLORIDE 9 MG/ML
5-40 INJECTION INTRAVENOUS PRN
Status: CANCELLED | OUTPATIENT
Start: 2023-03-16

## 2023-03-16 RX ORDER — ALBUTEROL SULFATE 90 UG/1
4 AEROSOL, METERED RESPIRATORY (INHALATION) PRN
OUTPATIENT
Start: 2023-03-23

## 2023-03-16 RX ORDER — PALONOSETRON 0.05 MG/ML
0.25 INJECTION, SOLUTION INTRAVENOUS ONCE
Status: CANCELLED | OUTPATIENT
Start: 2023-03-16 | End: 2023-03-16

## 2023-03-16 RX ORDER — EPINEPHRINE 1 MG/ML
0.3 INJECTION, SOLUTION, CONCENTRATE INTRAVENOUS PRN
OUTPATIENT
Start: 2023-03-23

## 2023-03-16 RX ORDER — DIPHENHYDRAMINE HYDROCHLORIDE 50 MG/ML
50 INJECTION INTRAMUSCULAR; INTRAVENOUS ONCE
Status: COMPLETED | OUTPATIENT
Start: 2023-03-16 | End: 2023-03-16

## 2023-03-16 RX ORDER — SODIUM CHLORIDE 9 MG/ML
5-250 INJECTION, SOLUTION INTRAVENOUS PRN
Status: CANCELLED | OUTPATIENT
Start: 2023-03-16

## 2023-03-16 RX ORDER — DIPHENHYDRAMINE HYDROCHLORIDE 50 MG/ML
50 INJECTION INTRAMUSCULAR; INTRAVENOUS
OUTPATIENT
Start: 2023-03-23

## 2023-03-16 RX ORDER — MEPERIDINE HYDROCHLORIDE 50 MG/ML
12.5 INJECTION INTRAMUSCULAR; INTRAVENOUS; SUBCUTANEOUS PRN
OUTPATIENT
Start: 2023-03-30

## 2023-03-16 RX ORDER — SODIUM CHLORIDE 9 MG/ML
INJECTION, SOLUTION INTRAVENOUS CONTINUOUS
Status: CANCELLED | OUTPATIENT
Start: 2023-03-16

## 2023-03-16 RX ORDER — FAMOTIDINE 10 MG/ML
20 INJECTION, SOLUTION INTRAVENOUS ONCE
OUTPATIENT
Start: 2023-03-23 | End: 2023-03-23

## 2023-03-16 RX ORDER — FAMOTIDINE 10 MG/ML
20 INJECTION, SOLUTION INTRAVENOUS
Status: CANCELLED | OUTPATIENT
Start: 2023-03-16

## 2023-03-16 RX ORDER — SODIUM CHLORIDE 9 MG/ML
5-250 INJECTION, SOLUTION INTRAVENOUS PRN
OUTPATIENT
Start: 2023-03-23

## 2023-03-16 RX ORDER — SODIUM CHLORIDE 9 MG/ML
5-40 INJECTION INTRAVENOUS PRN
OUTPATIENT
Start: 2023-03-30

## 2023-03-16 RX ORDER — SODIUM CHLORIDE 9 MG/ML
5-40 INJECTION INTRAVENOUS PRN
Status: DISCONTINUED | OUTPATIENT
Start: 2023-03-16 | End: 2023-03-17 | Stop reason: HOSPADM

## 2023-03-16 RX ORDER — SODIUM CHLORIDE 9 MG/ML
5-250 INJECTION, SOLUTION INTRAVENOUS PRN
Status: DISCONTINUED | OUTPATIENT
Start: 2023-03-16 | End: 2023-03-17 | Stop reason: HOSPADM

## 2023-03-16 RX ORDER — FAMOTIDINE 10 MG/ML
20 INJECTION, SOLUTION INTRAVENOUS
OUTPATIENT
Start: 2023-03-23

## 2023-03-16 RX ORDER — ALBUTEROL SULFATE 90 UG/1
4 AEROSOL, METERED RESPIRATORY (INHALATION) PRN
OUTPATIENT
Start: 2023-03-30

## 2023-03-16 RX ORDER — SODIUM CHLORIDE 9 MG/ML
5-250 INJECTION, SOLUTION INTRAVENOUS PRN
OUTPATIENT
Start: 2023-03-30

## 2023-03-16 RX ORDER — ACETAMINOPHEN 325 MG/1
650 TABLET ORAL
Status: CANCELLED | OUTPATIENT
Start: 2023-03-16

## 2023-03-16 RX ORDER — MEPERIDINE HYDROCHLORIDE 50 MG/ML
12.5 INJECTION INTRAMUSCULAR; INTRAVENOUS; SUBCUTANEOUS PRN
Status: CANCELLED | OUTPATIENT
Start: 2023-03-16

## 2023-03-16 RX ORDER — FAMOTIDINE 10 MG/ML
20 INJECTION, SOLUTION INTRAVENOUS ONCE
Status: COMPLETED | OUTPATIENT
Start: 2023-03-16 | End: 2023-03-16

## 2023-03-16 RX ORDER — FAMOTIDINE 10 MG/ML
20 INJECTION, SOLUTION INTRAVENOUS ONCE
OUTPATIENT
Start: 2023-03-30 | End: 2023-03-30

## 2023-03-16 RX ORDER — DIPHENHYDRAMINE HYDROCHLORIDE 50 MG/ML
50 INJECTION INTRAMUSCULAR; INTRAVENOUS ONCE
Status: CANCELLED | OUTPATIENT
Start: 2023-03-16 | End: 2023-03-16

## 2023-03-16 RX ORDER — MEPERIDINE HYDROCHLORIDE 50 MG/ML
12.5 INJECTION INTRAMUSCULAR; INTRAVENOUS; SUBCUTANEOUS PRN
OUTPATIENT
Start: 2023-03-23

## 2023-03-16 RX ORDER — DIPHENHYDRAMINE HYDROCHLORIDE 50 MG/ML
50 INJECTION INTRAMUSCULAR; INTRAVENOUS
Status: CANCELLED | OUTPATIENT
Start: 2023-03-16

## 2023-03-16 RX ORDER — SODIUM CHLORIDE 9 MG/ML
INJECTION, SOLUTION INTRAVENOUS CONTINUOUS
OUTPATIENT
Start: 2023-03-23

## 2023-03-16 RX ORDER — HEPARIN SODIUM (PORCINE) LOCK FLUSH IV SOLN 100 UNIT/ML 100 UNIT/ML
500 SOLUTION INTRAVENOUS PRN
Status: CANCELLED | OUTPATIENT
Start: 2023-03-16

## 2023-03-16 RX ORDER — HEPARIN SODIUM (PORCINE) LOCK FLUSH IV SOLN 100 UNIT/ML 100 UNIT/ML
500 SOLUTION INTRAVENOUS PRN
OUTPATIENT
Start: 2023-03-23

## 2023-03-16 RX ORDER — EPINEPHRINE 1 MG/ML
0.3 INJECTION, SOLUTION, CONCENTRATE INTRAVENOUS PRN
OUTPATIENT
Start: 2023-03-30

## 2023-03-16 RX ORDER — ALBUTEROL SULFATE 90 UG/1
4 AEROSOL, METERED RESPIRATORY (INHALATION) PRN
Status: CANCELLED | OUTPATIENT
Start: 2023-03-16

## 2023-03-16 RX ADMIN — HEPARIN 500 UNITS: 100 SYRINGE at 13:03

## 2023-03-16 RX ADMIN — PACLITAXEL 130 MG: 6 INJECTION, SOLUTION INTRAVENOUS at 11:18

## 2023-03-16 RX ADMIN — FAMOTIDINE 20 MG: 10 INJECTION, SOLUTION INTRAVENOUS at 11:01

## 2023-03-16 RX ADMIN — CARBOPLATIN 233 MG: 10 INJECTION, SOLUTION INTRAVENOUS at 12:29

## 2023-03-16 RX ADMIN — DIPHENHYDRAMINE HYDROCHLORIDE 50 MG: 50 INJECTION, SOLUTION INTRAMUSCULAR; INTRAVENOUS at 11:02

## 2023-03-16 RX ADMIN — DEXAMETHASONE SODIUM PHOSPHATE: 10 INJECTION, SOLUTION INTRAMUSCULAR; INTRAVENOUS at 11:02

## 2023-03-16 RX ADMIN — SODIUM CHLORIDE, PRESERVATIVE FREE 10 ML: 5 INJECTION INTRAVENOUS at 13:03

## 2023-03-16 RX ADMIN — SODIUM CHLORIDE 50 ML/HR: 9 INJECTION, SOLUTION INTRAVENOUS at 11:04

## 2023-03-16 RX ADMIN — PALONOSETRON 0.25 MG: 0.05 INJECTION, SOLUTION INTRAVENOUS at 11:02

## 2023-03-23 ENCOUNTER — HOSPITAL ENCOUNTER (OUTPATIENT)
Dept: INFUSION THERAPY | Age: 48
Discharge: HOME | End: 2023-03-23
Payer: COMMERCIAL

## 2023-03-23 VITALS
DIASTOLIC BLOOD PRESSURE: 73 MMHG | RESPIRATION RATE: 18 BRPM | HEIGHT: 69 IN | BODY MASS INDEX: 19.49 KG/M2 | SYSTOLIC BLOOD PRESSURE: 116 MMHG | TEMPERATURE: 98.2 F | HEART RATE: 79 BPM | OXYGEN SATURATION: 100 % | WEIGHT: 131.6 LBS

## 2023-03-23 DIAGNOSIS — C50.919 TRIPLE NEGATIVE BREAST CANCER (HCC): ICD-10-CM

## 2023-03-23 DIAGNOSIS — C50.919 TRIPLE NEGATIVE BREAST CANCER (HCC): Primary | ICD-10-CM

## 2023-03-23 DIAGNOSIS — C50.812 MALIGNANT NEOPLASM OF OVERLAPPING SITES OF LEFT BREAST (HCC): Primary | ICD-10-CM

## 2023-03-23 LAB
ALBUMIN SERPL-MCNC: 3.9 G/DL (ref 3.5–5.2)
ALP SERPL-CCNC: 54 U/L (ref 35–104)
ALT SERPL-CCNC: 21 U/L (ref 9–52)
ANION GAP SERPL CALCULATED.3IONS-SCNC: 5 MMOL/L (ref 7–19)
AST SERPL-CCNC: 28 U/L (ref 14–36)
BILIRUB SERPL-MCNC: <0.2 MG/DL (ref 0.2–1.3)
BUN SERPL-MCNC: 8 MG/DL (ref 7–17)
CALCIUM SERPL-MCNC: 9.3 MG/DL (ref 8.4–10.2)
CHLORIDE SERPL-SCNC: 108 MMOL/L (ref 98–111)
CO2 SERPL-SCNC: 25 MMOL/L (ref 22–29)
CREAT SERPL-MCNC: 0.6 MG/DL (ref 0.5–1)
ERYTHROCYTE [DISTWIDTH] IN BLOOD BY AUTOMATED COUNT: 17.6 % (ref 11.7–14.4)
GLOBULIN: 2.8 G/DL
GLUCOSE SERPL-MCNC: 126 MG/DL (ref 74–106)
HCT VFR BLD AUTO: 23.7 % (ref 34.1–44.9)
HGB BLD-MCNC: 7.9 G/DL (ref 11.2–15.7)
LYMPHOCYTES # BLD: 0.79 K/UL (ref 1.18–3.74)
LYMPHOCYTES NFR BLD: 36.1 % (ref 19.3–53.1)
MCH RBC QN AUTO: 36.6 PG (ref 25.6–32.2)
MCHC RBC AUTO-ENTMCNC: 33.3 G/DL (ref 32.3–35.5)
MCV RBC AUTO: 109.7 FL (ref 79.4–94.8)
MONOCYTES # BLD: 0.09 K/UL (ref 0.24–0.82)
MONOCYTES NFR BLD: 4.1 % (ref 4.7–12.5)
NEUTROPHILS # BLD: 1.22 K/UL (ref 1.56–6.13)
NEUTS SEG NFR BLD: 55.6 % (ref 34–71.1)
PLATELET # BLD AUTO: 60 K/UL (ref 182–369)
PMV BLD AUTO: 9.9 FL (ref 7.4–10.4)
POTASSIUM SERPL-SCNC: 3.8 MMOL/L (ref 3.5–5.1)
PROT SERPL-MCNC: 6.7 G/DL (ref 6.3–8.2)
RBC # BLD AUTO: 2.16 M/UL (ref 3.93–5.22)
SODIUM SERPL-SCNC: 138 MMOL/L (ref 137–145)
WBC # BLD AUTO: 2.19 K/UL (ref 3.98–10.04)

## 2023-03-23 PROCEDURE — 85025 COMPLETE CBC W/AUTO DIFF WBC: CPT

## 2023-03-23 PROCEDURE — 96523 IRRIG DRUG DELIVERY DEVICE: CPT

## 2023-03-23 PROCEDURE — 2580000003 HC RX 258: Performed by: INTERNAL MEDICINE

## 2023-03-23 PROCEDURE — 6360000002 HC RX W HCPCS: Performed by: INTERNAL MEDICINE

## 2023-03-23 PROCEDURE — 36415 COLL VENOUS BLD VENIPUNCTURE: CPT

## 2023-03-23 PROCEDURE — 80053 COMPREHEN METABOLIC PANEL: CPT

## 2023-03-23 RX ORDER — SODIUM CHLORIDE 9 MG/ML
5-40 INJECTION INTRAVENOUS PRN
Status: CANCELLED | OUTPATIENT
Start: 2023-03-30

## 2023-03-23 RX ORDER — HEPARIN SODIUM (PORCINE) LOCK FLUSH IV SOLN 100 UNIT/ML 100 UNIT/ML
500 SOLUTION INTRAVENOUS PRN
Status: DISCONTINUED | OUTPATIENT
Start: 2023-03-23 | End: 2023-03-24 | Stop reason: HOSPADM

## 2023-03-23 RX ORDER — HEPARIN SODIUM (PORCINE) LOCK FLUSH IV SOLN 100 UNIT/ML 100 UNIT/ML
500 SOLUTION INTRAVENOUS PRN
Status: CANCELLED | OUTPATIENT
Start: 2023-03-30

## 2023-03-23 RX ORDER — SODIUM CHLORIDE 9 MG/ML
5-40 INJECTION INTRAVENOUS PRN
Status: DISCONTINUED | OUTPATIENT
Start: 2023-03-23 | End: 2023-03-24 | Stop reason: HOSPADM

## 2023-03-23 RX ADMIN — Medication 500 UNITS: at 11:05

## 2023-03-23 RX ADMIN — SODIUM CHLORIDE, PRESERVATIVE FREE 10 ML: 5 INJECTION INTRAVENOUS at 11:05

## 2023-03-23 ASSESSMENT — PAIN SCALES - GENERAL: PAINLEVEL_OUTOF10: 0

## 2023-03-23 NOTE — PROGRESS NOTES
Patient treatment day deferred to next week due to lab results, per MD. All patient questions and concerns addressed.      Electronically signed by Polina Olmstead RN on 3/23/2023 at 11:13 AM    HGB 7.9  HCT 23.7  Platelets 60    Electronically signed by Polina Olmstead RN on 3/23/2023 at 11:14 AM

## 2023-03-30 ENCOUNTER — HOSPITAL ENCOUNTER (OUTPATIENT)
Dept: INFUSION THERAPY | Age: 48
Discharge: HOME | End: 2023-03-30
Payer: COMMERCIAL

## 2023-03-30 VITALS
SYSTOLIC BLOOD PRESSURE: 113 MMHG | OXYGEN SATURATION: 100 % | TEMPERATURE: 98.2 F | DIASTOLIC BLOOD PRESSURE: 63 MMHG | RESPIRATION RATE: 18 BRPM | HEART RATE: 70 BPM

## 2023-03-30 DIAGNOSIS — C50.919 TRIPLE NEGATIVE BREAST CANCER (HCC): ICD-10-CM

## 2023-03-30 DIAGNOSIS — D70.1 CHEMOTHERAPY-INDUCED NEUTROPENIA (HCC): ICD-10-CM

## 2023-03-30 DIAGNOSIS — C50.812 MALIGNANT NEOPLASM OF OVERLAPPING SITES OF LEFT BREAST (HCC): Primary | ICD-10-CM

## 2023-03-30 DIAGNOSIS — T45.1X5A CHEMOTHERAPY-INDUCED NEUTROPENIA (HCC): ICD-10-CM

## 2023-03-30 LAB
ALBUMIN SERPL-MCNC: 4.3 G/DL (ref 3.5–5.2)
ALP SERPL-CCNC: 52 U/L (ref 35–104)
ALT SERPL-CCNC: 19 U/L (ref 9–52)
ANION GAP SERPL CALCULATED.3IONS-SCNC: 4 MMOL/L (ref 7–19)
AST SERPL-CCNC: 26 U/L (ref 14–36)
BILIRUB SERPL-MCNC: 0.3 MG/DL (ref 0.2–1.3)
BUN SERPL-MCNC: 8 MG/DL (ref 7–17)
CALCIUM SERPL-MCNC: 9.5 MG/DL (ref 8.4–10.2)
CHLORIDE SERPL-SCNC: 108 MMOL/L (ref 98–111)
CO2 SERPL-SCNC: 27 MMOL/L (ref 22–29)
CREAT SERPL-MCNC: 0.7 MG/DL (ref 0.5–1)
ERYTHROCYTE [DISTWIDTH] IN BLOOD BY AUTOMATED COUNT: 17.4 % (ref 11.7–14.4)
GLOBULIN: 2.8 G/DL
GLUCOSE SERPL-MCNC: 101 MG/DL (ref 74–106)
HCT VFR BLD AUTO: 27 % (ref 34.1–44.9)
HGB BLD-MCNC: 8.9 G/DL (ref 11.2–15.7)
LYMPHOCYTES # BLD: 1.33 K/UL (ref 1.18–3.74)
LYMPHOCYTES NFR BLD: 54.5 % (ref 19.3–53.1)
MCH RBC QN AUTO: 36.6 PG (ref 25.6–32.2)
MCHC RBC AUTO-ENTMCNC: 33 G/DL (ref 32.3–35.5)
MCV RBC AUTO: 111.1 FL (ref 79.4–94.8)
MONOCYTES # BLD: 0.16 K/UL (ref 0.24–0.82)
MONOCYTES NFR BLD: 6.6 % (ref 4.7–12.5)
NEUTROPHILS # BLD: 0.86 K/UL (ref 1.56–6.13)
NEUTS SEG NFR BLD: 35.2 % (ref 34–71.1)
PLATELET # BLD AUTO: 47 K/UL (ref 182–369)
PMV BLD AUTO: 9.8 FL (ref 7.4–10.4)
POTASSIUM SERPL-SCNC: 3.6 MMOL/L (ref 3.5–5.1)
PROT SERPL-MCNC: 7.1 G/DL (ref 6.3–8.2)
RBC # BLD AUTO: 2.43 M/UL (ref 3.93–5.22)
SODIUM SERPL-SCNC: 139 MMOL/L (ref 137–145)
WBC # BLD AUTO: 2.44 K/UL (ref 3.98–10.04)

## 2023-03-30 PROCEDURE — 85025 COMPLETE CBC W/AUTO DIFF WBC: CPT

## 2023-03-30 PROCEDURE — 6360000002 HC RX W HCPCS: Performed by: INTERNAL MEDICINE

## 2023-03-30 PROCEDURE — 80053 COMPREHEN METABOLIC PANEL: CPT

## 2023-03-30 PROCEDURE — 2580000003 HC RX 258: Performed by: INTERNAL MEDICINE

## 2023-03-30 PROCEDURE — 36415 COLL VENOUS BLD VENIPUNCTURE: CPT

## 2023-03-30 PROCEDURE — 96523 IRRIG DRUG DELIVERY DEVICE: CPT

## 2023-03-30 RX ORDER — HEPARIN SODIUM (PORCINE) LOCK FLUSH IV SOLN 100 UNIT/ML 100 UNIT/ML
500 SOLUTION INTRAVENOUS PRN
OUTPATIENT
Start: 2023-04-06

## 2023-03-30 RX ORDER — SODIUM CHLORIDE 9 MG/ML
INJECTION, SOLUTION INTRAVENOUS CONTINUOUS
OUTPATIENT
Start: 2023-04-06

## 2023-03-30 RX ORDER — ACETAMINOPHEN 325 MG/1
650 TABLET ORAL
OUTPATIENT
Start: 2023-04-06

## 2023-03-30 RX ORDER — HEPARIN SODIUM (PORCINE) LOCK FLUSH IV SOLN 100 UNIT/ML 100 UNIT/ML
500 SOLUTION INTRAVENOUS PRN
Status: DISCONTINUED | OUTPATIENT
Start: 2023-03-30 | End: 2023-03-31 | Stop reason: HOSPADM

## 2023-03-30 RX ORDER — SODIUM CHLORIDE 0.9 % (FLUSH) 0.9 %
5-40 SYRINGE (ML) INJECTION PRN
OUTPATIENT
Start: 2023-04-06

## 2023-03-30 RX ORDER — ONDANSETRON 2 MG/ML
8 INJECTION INTRAMUSCULAR; INTRAVENOUS
OUTPATIENT
Start: 2023-04-06

## 2023-03-30 RX ORDER — DIPHENHYDRAMINE HYDROCHLORIDE 50 MG/ML
50 INJECTION INTRAMUSCULAR; INTRAVENOUS
OUTPATIENT
Start: 2023-04-06

## 2023-03-30 RX ORDER — EPINEPHRINE 1 MG/ML
0.3 INJECTION, SOLUTION, CONCENTRATE INTRAVENOUS PRN
OUTPATIENT
Start: 2023-04-06

## 2023-03-30 RX ORDER — SODIUM CHLORIDE 9 MG/ML
5-40 INJECTION INTRAVENOUS PRN
Status: DISCONTINUED | OUTPATIENT
Start: 2023-03-30 | End: 2023-03-31 | Stop reason: HOSPADM

## 2023-03-30 RX ORDER — ALBUTEROL SULFATE 90 UG/1
4 AEROSOL, METERED RESPIRATORY (INHALATION) PRN
OUTPATIENT
Start: 2023-04-06

## 2023-03-30 RX ORDER — FAMOTIDINE 10 MG/ML
20 INJECTION, SOLUTION INTRAVENOUS
OUTPATIENT
Start: 2023-04-06

## 2023-03-30 RX ADMIN — SODIUM CHLORIDE, PRESERVATIVE FREE 10 ML: 5 INJECTION INTRAVENOUS at 15:23

## 2023-03-30 RX ADMIN — Medication 500 UNITS: at 15:23

## 2023-03-30 NOTE — PROGRESS NOTES
Treatment held today due to platelets of 85,772 and neutrophils of 0.86. Patient to return in one week for CBC and treatment.

## 2023-04-04 NOTE — PROGRESS NOTES
patient at length. I recommend neoadjuvant chemotherapy with dose dense Adriamycin and cyclophosphamide followed by carboplatin/Taxol. 1 year of Keytruda. Awaiting CT C/A/P and bone scan. Order 2D echo today. 12/15/22 CT Chest W Contrast No evidence for metastatic disease within the chest. Lobulated 1.2 x 2.4 cm left anterior breast lesion. Correlate with prior mammograms and breast history. Mild emphysema. Asymmetric mildly prominent left axillary lymph node measuring 7 mm. Attention on follow-up exams. 12/15/22 CT Abd/Pelvis W IV Contrast (oral) No evidence of metastatic disease within the abdomen or pelvis. 12/15/22 NM Bone Scan There is no evidence of osseous metastatic disease. 12/19/22 2D Echo  LV is normal in size with normal systolic function. LV ejection fraction estimated 55 to 60%. Diastolic function appears within normal range. Normal wall thickness. RV is normal in size with normal systolic function. Normal left atrial size. Normal right atrial size. Aortic valve is trileaflet with normal leaflet mobility. No significant stenosis or regurgitation. Mitral valve is structurally normal with normal leaflet mobility. No significant stenosis or regurgitation. No significant tricuspid regurgitation. No significant pericardial effusion. 12/29/22-Initiated Pembrolizumab every 6 weeks x1 year with Adriamycin, Cyclophosphamide + GCSF every 2 weeks x4 cycles followed by weekly Taxol, Carbo x12 cycles  12/20/22 [facility] 79 Genetic Panel: Negative  1/26/23 FL Esophagram Grade 3 esophageal reflux yes  2/15/23 US Breast Limited Left Ultrasound the left breast at 3:00 there is a density seen 4 cm from the lesion superficial measuring 1.75 x 1.25 x 0.6 8/3/1970. There is a biopsy clip seen in the medial aspect of this lesion. Previously measures 1.71 x 1.77 x 0.947 cm.  The lesion has decreased in size as compared to prior studyHypoechoic solid lesion in left breast at 3:00 with biopsy marker which has decreased in

## 2023-04-06 ENCOUNTER — HOSPITAL ENCOUNTER (OUTPATIENT)
Dept: INFUSION THERAPY | Age: 48
Discharge: HOME | End: 2023-04-06
Payer: COMMERCIAL

## 2023-04-06 ENCOUNTER — OFFICE VISIT (OUTPATIENT)
Dept: HEMATOLOGY | Age: 48
End: 2023-04-06
Payer: COMMERCIAL

## 2023-04-06 VITALS
BODY MASS INDEX: 19.6 KG/M2 | TEMPERATURE: 98.2 F | WEIGHT: 132.3 LBS | SYSTOLIC BLOOD PRESSURE: 107 MMHG | OXYGEN SATURATION: 100 % | HEART RATE: 63 BPM | HEIGHT: 69 IN | DIASTOLIC BLOOD PRESSURE: 78 MMHG | RESPIRATION RATE: 18 BRPM

## 2023-04-06 DIAGNOSIS — D70.1 CHEMOTHERAPY-INDUCED NEUTROPENIA (HCC): ICD-10-CM

## 2023-04-06 DIAGNOSIS — C50.919 TRIPLE NEGATIVE BREAST CANCER (HCC): ICD-10-CM

## 2023-04-06 DIAGNOSIS — T45.1X5A CHEMOTHERAPY-INDUCED THROMBOCYTOPENIA: ICD-10-CM

## 2023-04-06 DIAGNOSIS — D69.59 CHEMOTHERAPY-INDUCED THROMBOCYTOPENIA: ICD-10-CM

## 2023-04-06 DIAGNOSIS — Z71.89 CARE PLAN DISCUSSED WITH PATIENT: ICD-10-CM

## 2023-04-06 DIAGNOSIS — T45.1X5D ADVERSE EFFECT OF CHEMOTHERAPY, SUBSEQUENT ENCOUNTER: ICD-10-CM

## 2023-04-06 DIAGNOSIS — C50.812 MALIGNANT NEOPLASM OF OVERLAPPING SITES OF LEFT BREAST (HCC): Primary | ICD-10-CM

## 2023-04-06 DIAGNOSIS — T45.1X5A CHEMOTHERAPY-INDUCED NEUTROPENIA (HCC): ICD-10-CM

## 2023-04-06 DIAGNOSIS — Z51.11 CHEMOTHERAPY MANAGEMENT, ENCOUNTER FOR: Primary | ICD-10-CM

## 2023-04-06 DIAGNOSIS — Z51.12 ENCOUNTER FOR ANTINEOPLASTIC IMMUNOTHERAPY: ICD-10-CM

## 2023-04-06 LAB
ALBUMIN SERPL-MCNC: 4.1 G/DL (ref 3.5–5.2)
ALP SERPL-CCNC: 57 U/L (ref 35–104)
ALT SERPL-CCNC: 16 U/L (ref 9–52)
ANION GAP SERPL CALCULATED.3IONS-SCNC: 5 MMOL/L (ref 7–19)
AST SERPL-CCNC: 23 U/L (ref 14–36)
BILIRUB SERPL-MCNC: <0.2 MG/DL (ref 0.2–1.3)
BUN SERPL-MCNC: 8 MG/DL (ref 7–17)
CALCIUM SERPL-MCNC: 9.3 MG/DL (ref 8.4–10.2)
CHLORIDE SERPL-SCNC: 108 MMOL/L (ref 98–111)
CO2 SERPL-SCNC: 26 MMOL/L (ref 22–29)
CREAT SERPL-MCNC: 0.7 MG/DL (ref 0.5–1)
ERYTHROCYTE [DISTWIDTH] IN BLOOD BY AUTOMATED COUNT: 16.1 % (ref 11.7–14.4)
GLOBULIN: 2.9 G/DL
GLUCOSE SERPL-MCNC: 137 MG/DL (ref 74–106)
HCT VFR BLD AUTO: 27.5 % (ref 34.1–44.9)
HGB BLD-MCNC: 9.2 G/DL (ref 11.2–15.7)
LYMPHOCYTES # BLD: 1.28 K/UL (ref 1.18–3.74)
LYMPHOCYTES NFR BLD: 57.4 % (ref 19.3–53.1)
MCH RBC QN AUTO: 38.2 PG (ref 25.6–32.2)
MCHC RBC AUTO-ENTMCNC: 33.5 G/DL (ref 32.3–35.5)
MCV RBC AUTO: 114.1 FL (ref 79.4–94.8)
MONOCYTES # BLD: 0.16 K/UL (ref 0.24–0.82)
MONOCYTES NFR BLD: 7.2 % (ref 4.7–12.5)
NEUTROPHILS # BLD: 0.73 K/UL (ref 1.56–6.13)
NEUTS SEG NFR BLD: 32.8 % (ref 34–71.1)
PLATELET # BLD AUTO: 55 K/UL (ref 182–369)
PMV BLD AUTO: 10.4 FL (ref 7.4–10.4)
POTASSIUM SERPL-SCNC: 3.6 MMOL/L (ref 3.5–5.1)
PROT SERPL-MCNC: 7 G/DL (ref 6.3–8.2)
RBC # BLD AUTO: 2.41 M/UL (ref 3.93–5.22)
SODIUM SERPL-SCNC: 139 MMOL/L (ref 137–145)
WBC # BLD AUTO: 2.23 K/UL (ref 3.98–10.04)

## 2023-04-06 PROCEDURE — 80053 COMPREHEN METABOLIC PANEL: CPT

## 2023-04-06 PROCEDURE — 96372 THER/PROPH/DIAG INJ SC/IM: CPT

## 2023-04-06 PROCEDURE — 99214 OFFICE O/P EST MOD 30 MIN: CPT | Performed by: INTERNAL MEDICINE

## 2023-04-06 PROCEDURE — 96523 IRRIG DRUG DELIVERY DEVICE: CPT

## 2023-04-06 PROCEDURE — 85025 COMPLETE CBC W/AUTO DIFF WBC: CPT

## 2023-04-06 PROCEDURE — 6360000002 HC RX W HCPCS: Performed by: INTERNAL MEDICINE

## 2023-04-06 PROCEDURE — 3074F SYST BP LT 130 MM HG: CPT | Performed by: INTERNAL MEDICINE

## 2023-04-06 PROCEDURE — 3078F DIAST BP <80 MM HG: CPT | Performed by: INTERNAL MEDICINE

## 2023-04-06 PROCEDURE — 2580000003 HC RX 258: Performed by: INTERNAL MEDICINE

## 2023-04-06 RX ORDER — HEPARIN SODIUM (PORCINE) LOCK FLUSH IV SOLN 100 UNIT/ML 100 UNIT/ML
500 SOLUTION INTRAVENOUS PRN
Status: DISCONTINUED | OUTPATIENT
Start: 2023-04-06 | End: 2023-04-07 | Stop reason: HOSPADM

## 2023-04-06 RX ORDER — EPINEPHRINE 1 MG/ML
0.3 INJECTION, SOLUTION, CONCENTRATE INTRAVENOUS PRN
OUTPATIENT
Start: 2023-04-06

## 2023-04-06 RX ORDER — HEPARIN SODIUM (PORCINE) LOCK FLUSH IV SOLN 100 UNIT/ML 100 UNIT/ML
500 SOLUTION INTRAVENOUS PRN
OUTPATIENT
Start: 2023-04-06

## 2023-04-06 RX ORDER — ONDANSETRON 2 MG/ML
8 INJECTION INTRAMUSCULAR; INTRAVENOUS
OUTPATIENT
Start: 2023-04-06

## 2023-04-06 RX ORDER — ACETAMINOPHEN 325 MG/1
650 TABLET ORAL
OUTPATIENT
Start: 2023-04-06

## 2023-04-06 RX ORDER — SODIUM CHLORIDE 9 MG/ML
25 INJECTION, SOLUTION INTRAVENOUS PRN
OUTPATIENT
Start: 2023-04-06

## 2023-04-06 RX ORDER — FAMOTIDINE 10 MG/ML
20 INJECTION, SOLUTION INTRAVENOUS
OUTPATIENT
Start: 2023-04-06

## 2023-04-06 RX ORDER — DIPHENHYDRAMINE HYDROCHLORIDE 50 MG/ML
50 INJECTION INTRAMUSCULAR; INTRAVENOUS
OUTPATIENT
Start: 2023-04-06

## 2023-04-06 RX ORDER — SODIUM CHLORIDE 9 MG/ML
INJECTION, SOLUTION INTRAVENOUS CONTINUOUS
OUTPATIENT
Start: 2023-04-06

## 2023-04-06 RX ORDER — SODIUM CHLORIDE 0.9 % (FLUSH) 0.9 %
5-40 SYRINGE (ML) INJECTION PRN
Status: DISCONTINUED | OUTPATIENT
Start: 2023-04-06 | End: 2023-04-07 | Stop reason: HOSPADM

## 2023-04-06 RX ORDER — ALBUTEROL SULFATE 90 UG/1
4 AEROSOL, METERED RESPIRATORY (INHALATION) PRN
OUTPATIENT
Start: 2023-04-06

## 2023-04-06 RX ORDER — SODIUM CHLORIDE 0.9 % (FLUSH) 0.9 %
5-40 SYRINGE (ML) INJECTION PRN
OUTPATIENT
Start: 2023-04-06

## 2023-04-06 RX ADMIN — HEPARIN 500 UNITS: 100 SYRINGE at 11:47

## 2023-04-06 RX ADMIN — FILGRASTIM-SNDZ 480 MCG: 480 INJECTION, SOLUTION INTRAVENOUS; SUBCUTANEOUS at 11:46

## 2023-04-06 RX ADMIN — SODIUM CHLORIDE, PRESERVATIVE FREE 10 ML: 5 INJECTION INTRAVENOUS at 11:47

## 2023-04-06 NOTE — Clinical Note
April 6, 7692      Kateryna Gant, [address] [place] [address]      Patient: Too Rosario   MR Number: [id number]   YOB: 1975   Date of Visit: 3/4/8822       Dear Kateryna Gant: Thank you for referring Too Rosario to me for evaluation/treatment. Below are the relevant portions of my assessment and plan of care. If you have questions, please do not hesitate to call me. I look forward to following Rayne along with you.     Sincerely,        Mikel Best MD

## 2023-04-07 ENCOUNTER — HOSPITAL ENCOUNTER (OUTPATIENT)
Dept: INFUSION THERAPY | Age: 48
Discharge: HOME | End: 2023-04-07
Payer: COMMERCIAL

## 2023-04-07 DIAGNOSIS — T45.1X5A CHEMOTHERAPY-INDUCED NEUTROPENIA (HCC): Primary | ICD-10-CM

## 2023-04-07 DIAGNOSIS — D70.1 CHEMOTHERAPY-INDUCED NEUTROPENIA (HCC): Primary | ICD-10-CM

## 2023-04-07 PROCEDURE — 6360000002 HC RX W HCPCS: Performed by: INTERNAL MEDICINE

## 2023-04-07 RX ORDER — ONDANSETRON 2 MG/ML
8 INJECTION INTRAMUSCULAR; INTRAVENOUS
OUTPATIENT
Start: 2023-04-07

## 2023-04-07 RX ORDER — ALBUTEROL SULFATE 90 UG/1
4 AEROSOL, METERED RESPIRATORY (INHALATION) PRN
OUTPATIENT
Start: 2023-04-07

## 2023-04-07 RX ORDER — ACETAMINOPHEN 325 MG/1
650 TABLET ORAL
OUTPATIENT
Start: 2023-04-07

## 2023-04-07 RX ORDER — DIPHENHYDRAMINE HYDROCHLORIDE 50 MG/ML
50 INJECTION INTRAMUSCULAR; INTRAVENOUS
OUTPATIENT
Start: 2023-04-07

## 2023-04-07 RX ORDER — EPINEPHRINE 1 MG/ML
0.3 INJECTION, SOLUTION, CONCENTRATE INTRAVENOUS PRN
OUTPATIENT
Start: 2023-04-07

## 2023-04-07 RX ORDER — FAMOTIDINE 10 MG/ML
20 INJECTION, SOLUTION INTRAVENOUS
OUTPATIENT
Start: 2023-04-07

## 2023-04-07 RX ORDER — SODIUM CHLORIDE 9 MG/ML
INJECTION, SOLUTION INTRAVENOUS CONTINUOUS
OUTPATIENT
Start: 2023-04-07

## 2023-04-07 RX ADMIN — FILGRASTIM-SNDZ 480 MCG: 480 INJECTION, SOLUTION INTRAVENOUS; SUBCUTANEOUS at 12:23

## 2023-04-10 ENCOUNTER — HOSPITAL ENCOUNTER (OUTPATIENT)
Dept: INFUSION THERAPY | Age: 48
Discharge: HOME | End: 2023-04-10
Payer: COMMERCIAL

## 2023-04-10 VITALS
WEIGHT: 128.3 LBS | DIASTOLIC BLOOD PRESSURE: 68 MMHG | BODY MASS INDEX: 19 KG/M2 | SYSTOLIC BLOOD PRESSURE: 96 MMHG | TEMPERATURE: 97.6 F | HEIGHT: 69 IN | OXYGEN SATURATION: 100 %

## 2023-04-10 DIAGNOSIS — C50.812 MALIGNANT NEOPLASM OF OVERLAPPING SITES OF LEFT BREAST (HCC): Primary | ICD-10-CM

## 2023-04-10 DIAGNOSIS — T45.1X5A CHEMOTHERAPY-INDUCED NEUTROPENIA (HCC): ICD-10-CM

## 2023-04-10 DIAGNOSIS — D70.1 CHEMOTHERAPY-INDUCED NEUTROPENIA (HCC): ICD-10-CM

## 2023-04-10 LAB
BASOPHILS # BLD: 0.03 K/UL (ref 0.01–0.08)
BASOPHILS NFR BLD: 0.9 % (ref 0.1–1.2)
EOSINOPHIL # BLD: 0.03 K/UL (ref 0.04–0.54)
EOSINOPHIL NFR BLD: 0.9 % (ref 0.7–7)
ERYTHROCYTE [DISTWIDTH] IN BLOOD BY AUTOMATED COUNT: 15.8 % (ref 11.7–14.4)
HCT VFR BLD AUTO: 31.2 % (ref 34.1–44.9)
HGB BLD-MCNC: 10.1 G/DL (ref 11.2–15.7)
LYMPHOCYTES # BLD: 1.75 K/UL (ref 1.18–3.74)
LYMPHOCYTES NFR BLD: 55 % (ref 19.3–53.1)
MCH RBC QN AUTO: 38.1 PG (ref 25.6–32.2)
MCHC RBC AUTO-ENTMCNC: 32.4 G/DL (ref 32.3–35.5)
MCV RBC AUTO: 117.7 FL (ref 79.4–94.8)
MONOCYTES # BLD: 0.34 K/UL (ref 0.24–0.82)
MONOCYTES NFR BLD: 10.7 % (ref 4.7–12.5)
NEUTROPHILS # BLD: 1.03 K/UL (ref 1.56–6.13)
NEUTS SEG NFR BLD: 32.5 % (ref 34–71.1)
PLATELET # BLD AUTO: 79 K/UL (ref 182–369)
PMV BLD AUTO: 10.9 FL (ref 7.4–10.4)
RBC # BLD AUTO: 2.65 M/UL (ref 3.93–5.22)
WBC # BLD AUTO: 3.18 K/UL (ref 3.98–10.04)

## 2023-04-10 PROCEDURE — 6360000002 HC RX W HCPCS: Performed by: INTERNAL MEDICINE

## 2023-04-10 PROCEDURE — 85025 COMPLETE CBC W/AUTO DIFF WBC: CPT

## 2023-04-10 PROCEDURE — 96372 THER/PROPH/DIAG INJ SC/IM: CPT

## 2023-04-10 PROCEDURE — 36415 COLL VENOUS BLD VENIPUNCTURE: CPT

## 2023-04-10 RX ORDER — SODIUM CHLORIDE 9 MG/ML
INJECTION, SOLUTION INTRAVENOUS CONTINUOUS
Status: CANCELLED | OUTPATIENT
Start: 2023-04-10

## 2023-04-10 RX ORDER — ALBUTEROL SULFATE 90 UG/1
4 AEROSOL, METERED RESPIRATORY (INHALATION) PRN
Status: CANCELLED | OUTPATIENT
Start: 2023-04-10

## 2023-04-10 RX ORDER — ACETAMINOPHEN 325 MG/1
650 TABLET ORAL
Status: CANCELLED | OUTPATIENT
Start: 2023-04-10

## 2023-04-10 RX ORDER — ONDANSETRON 2 MG/ML
8 INJECTION INTRAMUSCULAR; INTRAVENOUS
Status: CANCELLED | OUTPATIENT
Start: 2023-04-10

## 2023-04-10 RX ORDER — EPINEPHRINE 1 MG/ML
0.3 INJECTION, SOLUTION, CONCENTRATE INTRAVENOUS PRN
Status: CANCELLED | OUTPATIENT
Start: 2023-04-10

## 2023-04-10 RX ORDER — DIPHENHYDRAMINE HYDROCHLORIDE 50 MG/ML
50 INJECTION INTRAMUSCULAR; INTRAVENOUS
Status: CANCELLED | OUTPATIENT
Start: 2023-04-10

## 2023-04-10 RX ORDER — FAMOTIDINE 10 MG/ML
20 INJECTION, SOLUTION INTRAVENOUS
Status: CANCELLED | OUTPATIENT
Start: 2023-04-10

## 2023-04-10 RX ADMIN — FILGRASTIM-SNDZ 480 MCG: 480 INJECTION, SOLUTION INTRAVENOUS; SUBCUTANEOUS at 12:18

## 2023-04-13 ENCOUNTER — HOSPITAL ENCOUNTER (OUTPATIENT)
Dept: INFUSION THERAPY | Age: 48
Discharge: HOME | End: 2023-04-13
Payer: COMMERCIAL

## 2023-04-13 VITALS
HEART RATE: 65 BPM | OXYGEN SATURATION: 100 % | HEIGHT: 69 IN | TEMPERATURE: 98.4 F | BODY MASS INDEX: 19.49 KG/M2 | SYSTOLIC BLOOD PRESSURE: 140 MMHG | RESPIRATION RATE: 18 BRPM | WEIGHT: 131.6 LBS | DIASTOLIC BLOOD PRESSURE: 51 MMHG

## 2023-04-13 DIAGNOSIS — C50.812 MALIGNANT NEOPLASM OF OVERLAPPING SITES OF LEFT BREAST (HCC): Primary | ICD-10-CM

## 2023-04-13 DIAGNOSIS — C50.919 TRIPLE NEGATIVE BREAST CANCER (HCC): ICD-10-CM

## 2023-04-13 LAB
ALBUMIN SERPL-MCNC: 4 G/DL (ref 3.5–5.2)
ALP SERPL-CCNC: 69 U/L (ref 35–104)
ALT SERPL-CCNC: 14 U/L (ref 9–52)
ANION GAP SERPL CALCULATED.3IONS-SCNC: 7 MMOL/L (ref 7–19)
AST SERPL-CCNC: 21 U/L (ref 14–36)
BILIRUB SERPL-MCNC: <0.2 MG/DL (ref 0.2–1.3)
BUN SERPL-MCNC: 7 MG/DL (ref 7–17)
CALCIUM SERPL-MCNC: 9.3 MG/DL (ref 8.4–10.2)
CHLORIDE SERPL-SCNC: 107 MMOL/L (ref 98–111)
CO2 SERPL-SCNC: 25 MMOL/L (ref 22–29)
CREAT SERPL-MCNC: 0.7 MG/DL (ref 0.5–1)
ERYTHROCYTE [DISTWIDTH] IN BLOOD BY AUTOMATED COUNT: 15.3 % (ref 11.7–14.4)
GLOBULIN: 3 G/DL
GLUCOSE SERPL-MCNC: 112 MG/DL (ref 74–106)
HCT VFR BLD AUTO: 28.1 % (ref 34.1–44.9)
HGB BLD-MCNC: 9.2 G/DL (ref 11.2–15.7)
LYMPHOCYTES # BLD: 1.87 K/UL (ref 1.18–3.74)
LYMPHOCYTES NFR BLD: 40.9 % (ref 19.3–53.1)
MCH RBC QN AUTO: 38.3 PG (ref 25.6–32.2)
MCHC RBC AUTO-ENTMCNC: 32.7 G/DL (ref 32.3–35.5)
MCV RBC AUTO: 117.1 FL (ref 79.4–94.8)
MONOCYTES # BLD: 0.45 K/UL (ref 0.24–0.82)
MONOCYTES NFR BLD: 9.8 % (ref 4.7–12.5)
NEUTROPHILS # BLD: 2.16 K/UL (ref 1.56–6.13)
NEUTS SEG NFR BLD: 47.3 % (ref 34–71.1)
PLATELET # BLD AUTO: 128 K/UL (ref 182–369)
PMV BLD AUTO: 9.8 FL (ref 7.4–10.4)
POTASSIUM SERPL-SCNC: 3.4 MMOL/L (ref 3.5–5.1)
PROT SERPL-MCNC: 7 G/DL (ref 6.3–8.2)
RBC # BLD AUTO: 2.4 M/UL (ref 3.93–5.22)
SODIUM SERPL-SCNC: 139 MMOL/L (ref 137–145)
WBC # BLD AUTO: 4.57 K/UL (ref 3.98–10.04)

## 2023-04-13 PROCEDURE — 85025 COMPLETE CBC W/AUTO DIFF WBC: CPT

## 2023-04-13 PROCEDURE — 6360000002 HC RX W HCPCS: Performed by: INTERNAL MEDICINE

## 2023-04-13 PROCEDURE — 2580000003 HC RX 258: Performed by: INTERNAL MEDICINE

## 2023-04-13 PROCEDURE — 96375 TX/PRO/DX INJ NEW DRUG ADDON: CPT

## 2023-04-13 PROCEDURE — 96413 CHEMO IV INFUSION 1 HR: CPT

## 2023-04-13 PROCEDURE — 80053 COMPREHEN METABOLIC PANEL: CPT

## 2023-04-13 PROCEDURE — 96417 CHEMO IV INFUS EACH ADDL SEQ: CPT

## 2023-04-13 PROCEDURE — 36415 COLL VENOUS BLD VENIPUNCTURE: CPT

## 2023-04-13 PROCEDURE — 2500000003 HC RX 250 WO HCPCS: Performed by: INTERNAL MEDICINE

## 2023-04-13 RX ORDER — DIPHENHYDRAMINE HYDROCHLORIDE 50 MG/ML
25 INJECTION INTRAMUSCULAR; INTRAVENOUS ONCE
Status: COMPLETED | OUTPATIENT
Start: 2023-04-13 | End: 2023-04-13

## 2023-04-13 RX ORDER — SODIUM CHLORIDE 0.9 % (FLUSH) 0.9 %
5-40 SYRINGE (ML) INJECTION PRN
Status: DISCONTINUED | OUTPATIENT
Start: 2023-04-13 | End: 2023-04-14 | Stop reason: HOSPADM

## 2023-04-13 RX ORDER — HEPARIN SODIUM (PORCINE) LOCK FLUSH IV SOLN 100 UNIT/ML 100 UNIT/ML
500 SOLUTION INTRAVENOUS PRN
Status: DISCONTINUED | OUTPATIENT
Start: 2023-04-13 | End: 2023-04-14 | Stop reason: HOSPADM

## 2023-04-13 RX ORDER — FAMOTIDINE 10 MG/ML
20 INJECTION, SOLUTION INTRAVENOUS ONCE
Status: COMPLETED | OUTPATIENT
Start: 2023-04-13 | End: 2023-04-13

## 2023-04-13 RX ADMIN — FAMOTIDINE 20 MG: 10 INJECTION, SOLUTION INTRAVENOUS at 14:44

## 2023-04-13 RX ADMIN — DIPHENHYDRAMINE HYDROCHLORIDE 25 MG: 50 INJECTION, SOLUTION INTRAMUSCULAR; INTRAVENOUS at 14:44

## 2023-04-13 RX ADMIN — SODIUM CHLORIDE 130 MG: 9 INJECTION, SOLUTION INTRAVENOUS at 15:31

## 2023-04-13 RX ADMIN — CARBOPLATIN 233 MG: 600 INJECTION, SOLUTION INTRAVENOUS at 16:35

## 2023-04-13 RX ADMIN — DEXAMETHASONE SODIUM PHOSPHATE: 10 INJECTION, SOLUTION INTRAMUSCULAR; INTRAVENOUS at 14:44

## 2023-04-13 RX ADMIN — SODIUM CHLORIDE, PRESERVATIVE FREE 10 ML: 5 INJECTION INTRAVENOUS at 17:04

## 2023-04-13 RX ADMIN — Medication 500 UNITS: at 17:04

## 2023-04-13 RX ADMIN — SODIUM CHLORIDE 400 MG: 9 INJECTION, SOLUTION INTRAVENOUS at 14:59

## 2023-04-14 ENCOUNTER — HOSPITAL ENCOUNTER (OUTPATIENT)
Dept: INFUSION THERAPY | Age: 48
Discharge: HOME | End: 2023-04-14
Payer: COMMERCIAL

## 2023-04-14 DIAGNOSIS — T45.1X5A CHEMOTHERAPY-INDUCED NEUTROPENIA (HCC): Primary | ICD-10-CM

## 2023-04-14 DIAGNOSIS — D70.1 CHEMOTHERAPY-INDUCED NEUTROPENIA (HCC): Primary | ICD-10-CM

## 2023-04-14 PROCEDURE — 96372 THER/PROPH/DIAG INJ SC/IM: CPT

## 2023-04-14 PROCEDURE — 6360000002 HC RX W HCPCS: Performed by: INTERNAL MEDICINE

## 2023-04-14 RX ORDER — PALONOSETRON 0.05 MG/ML
0.25 INJECTION, SOLUTION INTRAVENOUS ONCE
Status: CANCELLED
Start: 2023-04-20

## 2023-04-14 RX ORDER — ONDANSETRON 2 MG/ML
8 INJECTION INTRAMUSCULAR; INTRAVENOUS
Status: CANCELLED | OUTPATIENT
Start: 2023-04-14

## 2023-04-14 RX ORDER — ALBUTEROL SULFATE 90 UG/1
4 AEROSOL, METERED RESPIRATORY (INHALATION) PRN
Status: CANCELLED | OUTPATIENT
Start: 2023-04-14

## 2023-04-14 RX ORDER — SODIUM CHLORIDE 9 MG/ML
INJECTION, SOLUTION INTRAVENOUS CONTINUOUS
Status: CANCELLED | OUTPATIENT
Start: 2023-04-14

## 2023-04-14 RX ORDER — EPINEPHRINE 1 MG/ML
0.3 INJECTION, SOLUTION, CONCENTRATE INTRAVENOUS PRN
Status: CANCELLED | OUTPATIENT
Start: 2023-04-14

## 2023-04-14 RX ORDER — FAMOTIDINE 10 MG/ML
20 INJECTION, SOLUTION INTRAVENOUS
Status: CANCELLED | OUTPATIENT
Start: 2023-04-14

## 2023-04-14 RX ORDER — ACETAMINOPHEN 325 MG/1
650 TABLET ORAL
Status: CANCELLED | OUTPATIENT
Start: 2023-04-14

## 2023-04-14 RX ORDER — DIPHENHYDRAMINE HYDROCHLORIDE 50 MG/ML
50 INJECTION INTRAMUSCULAR; INTRAVENOUS
Status: CANCELLED | OUTPATIENT
Start: 2023-04-14

## 2023-04-14 RX ADMIN — FILGRASTIM-SNDZ 480 MCG: 480 INJECTION, SOLUTION INTRAVENOUS; SUBCUTANEOUS at 10:51

## 2023-04-17 ENCOUNTER — HOSPITAL ENCOUNTER (OUTPATIENT)
Dept: INFUSION THERAPY | Age: 48
Discharge: HOME | End: 2023-04-17
Payer: COMMERCIAL

## 2023-04-17 DIAGNOSIS — D70.1 CHEMOTHERAPY-INDUCED NEUTROPENIA (HCC): Primary | ICD-10-CM

## 2023-04-17 DIAGNOSIS — T45.1X5A CHEMOTHERAPY-INDUCED NEUTROPENIA (HCC): Primary | ICD-10-CM

## 2023-04-17 PROCEDURE — 96372 THER/PROPH/DIAG INJ SC/IM: CPT

## 2023-04-17 PROCEDURE — 6360000002 HC RX W HCPCS: Performed by: INTERNAL MEDICINE

## 2023-04-17 RX ORDER — ALBUTEROL SULFATE 90 UG/1
4 AEROSOL, METERED RESPIRATORY (INHALATION) PRN
OUTPATIENT
Start: 2023-04-17

## 2023-04-17 RX ORDER — ACETAMINOPHEN 325 MG/1
650 TABLET ORAL
OUTPATIENT
Start: 2023-04-17

## 2023-04-17 RX ORDER — DIPHENHYDRAMINE HYDROCHLORIDE 50 MG/ML
50 INJECTION INTRAMUSCULAR; INTRAVENOUS
OUTPATIENT
Start: 2023-04-17

## 2023-04-17 RX ORDER — EPINEPHRINE 1 MG/ML
0.3 INJECTION, SOLUTION, CONCENTRATE INTRAVENOUS PRN
OUTPATIENT
Start: 2023-04-17

## 2023-04-17 RX ORDER — FAMOTIDINE 10 MG/ML
20 INJECTION, SOLUTION INTRAVENOUS
OUTPATIENT
Start: 2023-04-17

## 2023-04-17 RX ORDER — SODIUM CHLORIDE 9 MG/ML
INJECTION, SOLUTION INTRAVENOUS CONTINUOUS
OUTPATIENT
Start: 2023-04-17

## 2023-04-17 RX ORDER — ONDANSETRON 2 MG/ML
8 INJECTION INTRAMUSCULAR; INTRAVENOUS
OUTPATIENT
Start: 2023-04-17

## 2023-04-17 RX ADMIN — FILGRASTIM-SNDZ 480 MCG: 480 INJECTION, SOLUTION INTRAVENOUS; SUBCUTANEOUS at 11:01

## 2023-04-18 ENCOUNTER — HOSPITAL ENCOUNTER (OUTPATIENT)
Dept: INFUSION THERAPY | Age: 48
Discharge: HOME | End: 2023-04-18
Payer: COMMERCIAL

## 2023-04-18 DIAGNOSIS — T45.1X5A CHEMOTHERAPY-INDUCED NEUTROPENIA (HCC): Primary | ICD-10-CM

## 2023-04-18 DIAGNOSIS — D70.1 CHEMOTHERAPY-INDUCED NEUTROPENIA (HCC): Primary | ICD-10-CM

## 2023-04-18 PROCEDURE — 96372 THER/PROPH/DIAG INJ SC/IM: CPT

## 2023-04-18 PROCEDURE — 6360000002 HC RX W HCPCS: Performed by: INTERNAL MEDICINE

## 2023-04-18 RX ORDER — ALBUTEROL SULFATE 90 UG/1
4 AEROSOL, METERED RESPIRATORY (INHALATION) PRN
OUTPATIENT
Start: 2023-04-18

## 2023-04-18 RX ORDER — EPINEPHRINE 1 MG/ML
0.3 INJECTION, SOLUTION, CONCENTRATE INTRAVENOUS PRN
OUTPATIENT
Start: 2023-04-18

## 2023-04-18 RX ORDER — ONDANSETRON 2 MG/ML
8 INJECTION INTRAMUSCULAR; INTRAVENOUS
OUTPATIENT
Start: 2023-04-18

## 2023-04-18 RX ORDER — DIPHENHYDRAMINE HYDROCHLORIDE 50 MG/ML
50 INJECTION INTRAMUSCULAR; INTRAVENOUS
OUTPATIENT
Start: 2023-04-18

## 2023-04-18 RX ORDER — FAMOTIDINE 10 MG/ML
20 INJECTION, SOLUTION INTRAVENOUS
OUTPATIENT
Start: 2023-04-18

## 2023-04-18 RX ORDER — SODIUM CHLORIDE 9 MG/ML
INJECTION, SOLUTION INTRAVENOUS CONTINUOUS
OUTPATIENT
Start: 2023-04-18

## 2023-04-18 RX ORDER — ACETAMINOPHEN 325 MG/1
650 TABLET ORAL
OUTPATIENT
Start: 2023-04-18

## 2023-04-18 RX ADMIN — FILGRASTIM-SNDZ 480 MCG: 480 INJECTION, SOLUTION INTRAVENOUS; SUBCUTANEOUS at 11:14

## 2023-04-20 ENCOUNTER — HOSPITAL ENCOUNTER (OUTPATIENT)
Dept: INFUSION THERAPY | Age: 48
Discharge: HOME | End: 2023-04-20
Payer: COMMERCIAL

## 2023-04-20 VITALS
TEMPERATURE: 98.2 F | RESPIRATION RATE: 18 BRPM | HEART RATE: 70 BPM | BODY MASS INDEX: 19.12 KG/M2 | OXYGEN SATURATION: 100 % | DIASTOLIC BLOOD PRESSURE: 66 MMHG | HEIGHT: 69 IN | SYSTOLIC BLOOD PRESSURE: 108 MMHG | WEIGHT: 129.1 LBS

## 2023-04-20 DIAGNOSIS — C50.812 MALIGNANT NEOPLASM OF OVERLAPPING SITES OF LEFT BREAST (HCC): Primary | ICD-10-CM

## 2023-04-20 DIAGNOSIS — C50.919 TRIPLE NEGATIVE BREAST CANCER (HCC): ICD-10-CM

## 2023-04-20 LAB
ALBUMIN SERPL-MCNC: 3.8 G/DL (ref 3.5–5.2)
ALP SERPL-CCNC: 81 U/L (ref 35–104)
ALT SERPL-CCNC: 17 U/L (ref 9–52)
ANION GAP SERPL CALCULATED.3IONS-SCNC: 9 MMOL/L (ref 7–19)
AST SERPL-CCNC: 21 U/L (ref 14–36)
BILIRUB SERPL-MCNC: <0.2 MG/DL (ref 0.2–1.3)
BUN SERPL-MCNC: 12 MG/DL (ref 7–17)
CALCIUM SERPL-MCNC: 9 MG/DL (ref 8.4–10.2)
CHLORIDE SERPL-SCNC: 105 MMOL/L (ref 98–111)
CO2 SERPL-SCNC: 23 MMOL/L (ref 22–29)
CREAT SERPL-MCNC: 0.7 MG/DL (ref 0.5–1)
ERYTHROCYTE [DISTWIDTH] IN BLOOD BY AUTOMATED COUNT: 13.6 % (ref 11.7–14.4)
GLOBULIN: 2.9 G/DL
GLUCOSE SERPL-MCNC: 118 MG/DL (ref 74–106)
HCT VFR BLD AUTO: 26.8 % (ref 34.1–44.9)
HGB BLD-MCNC: 8.7 G/DL (ref 11.2–15.7)
LYMPHOCYTES # BLD: 1.79 K/UL (ref 1.18–3.74)
LYMPHOCYTES NFR BLD: 28.6 % (ref 19.3–53.1)
MCH RBC QN AUTO: 38.8 PG (ref 25.6–32.2)
MCHC RBC AUTO-ENTMCNC: 32.5 G/DL (ref 32.3–35.5)
MCV RBC AUTO: 119.6 FL (ref 79.4–94.8)
MONOCYTES # BLD: 0.44 K/UL (ref 0.24–0.82)
MONOCYTES NFR BLD: 7 % (ref 4.7–12.5)
NEUTROPHILS # BLD: 3.93 K/UL (ref 1.56–6.13)
NEUTS SEG NFR BLD: 62.8 % (ref 34–71.1)
PLATELET # BLD AUTO: 168 K/UL (ref 182–369)
PMV BLD AUTO: 9.9 FL (ref 7.4–10.4)
POTASSIUM SERPL-SCNC: 3.7 MMOL/L (ref 3.5–5.1)
PROT SERPL-MCNC: 6.7 G/DL (ref 6.3–8.2)
RBC # BLD AUTO: 2.24 M/UL (ref 3.93–5.22)
SODIUM SERPL-SCNC: 137 MMOL/L (ref 137–145)
WBC # BLD AUTO: 6.26 K/UL (ref 3.98–10.04)

## 2023-04-20 PROCEDURE — 96417 CHEMO IV INFUS EACH ADDL SEQ: CPT

## 2023-04-20 PROCEDURE — 96413 CHEMO IV INFUSION 1 HR: CPT

## 2023-04-20 PROCEDURE — 2580000003 HC RX 258: Performed by: INTERNAL MEDICINE

## 2023-04-20 PROCEDURE — 6360000002 HC RX W HCPCS: Performed by: INTERNAL MEDICINE

## 2023-04-20 PROCEDURE — 2500000003 HC RX 250 WO HCPCS: Performed by: INTERNAL MEDICINE

## 2023-04-20 PROCEDURE — 85025 COMPLETE CBC W/AUTO DIFF WBC: CPT

## 2023-04-20 PROCEDURE — 96375 TX/PRO/DX INJ NEW DRUG ADDON: CPT

## 2023-04-20 PROCEDURE — 36415 COLL VENOUS BLD VENIPUNCTURE: CPT

## 2023-04-20 PROCEDURE — 80053 COMPREHEN METABOLIC PANEL: CPT

## 2023-04-20 RX ORDER — FAMOTIDINE 10 MG/ML
20 INJECTION, SOLUTION INTRAVENOUS ONCE
Status: COMPLETED | OUTPATIENT
Start: 2023-04-20 | End: 2023-04-20

## 2023-04-20 RX ORDER — HEPARIN SODIUM (PORCINE) LOCK FLUSH IV SOLN 100 UNIT/ML 100 UNIT/ML
500 SOLUTION INTRAVENOUS PRN
Status: DISCONTINUED | OUTPATIENT
Start: 2023-04-20 | End: 2023-04-21 | Stop reason: HOSPADM

## 2023-04-20 RX ORDER — PALONOSETRON 0.05 MG/ML
0.25 INJECTION, SOLUTION INTRAVENOUS ONCE
Status: COMPLETED | OUTPATIENT
Start: 2023-04-20 | End: 2023-04-20

## 2023-04-20 RX ORDER — DIPHENHYDRAMINE HYDROCHLORIDE 50 MG/ML
25 INJECTION INTRAMUSCULAR; INTRAVENOUS ONCE
Status: COMPLETED | OUTPATIENT
Start: 2023-04-20 | End: 2023-04-20

## 2023-04-20 RX ORDER — SODIUM CHLORIDE 0.9 % (FLUSH) 0.9 %
5-40 SYRINGE (ML) INJECTION PRN
Status: DISCONTINUED | OUTPATIENT
Start: 2023-04-20 | End: 2023-04-21 | Stop reason: HOSPADM

## 2023-04-20 RX ADMIN — HEPARIN 500 UNITS: 100 SYRINGE at 15:29

## 2023-04-20 RX ADMIN — FAMOTIDINE 20 MG: 10 INJECTION, SOLUTION INTRAVENOUS at 13:34

## 2023-04-20 RX ADMIN — PALONOSETRON 0.25 MG: 0.05 INJECTION, SOLUTION INTRAVENOUS at 13:34

## 2023-04-20 RX ADMIN — SODIUM CHLORIDE, PRESERVATIVE FREE 10 ML: 5 INJECTION INTRAVENOUS at 15:29

## 2023-04-20 RX ADMIN — DIPHENHYDRAMINE HYDROCHLORIDE 25 MG: 50 INJECTION, SOLUTION INTRAMUSCULAR; INTRAVENOUS at 13:34

## 2023-04-20 RX ADMIN — DEXAMETHASONE SODIUM PHOSPHATE: 10 INJECTION, SOLUTION INTRAMUSCULAR; INTRAVENOUS at 13:34

## 2023-04-20 RX ADMIN — CARBOPLATIN 230 MG: 600 INJECTION, SOLUTION INTRAVENOUS at 14:57

## 2023-04-20 RX ADMIN — SODIUM CHLORIDE 130 MG: 9 INJECTION, SOLUTION INTRAVENOUS at 13:52

## 2023-04-27 ENCOUNTER — HOSPITAL ENCOUNTER (OUTPATIENT)
Dept: INFUSION THERAPY | Age: 48
Discharge: HOME | End: 2023-04-27
Payer: COMMERCIAL

## 2023-04-27 VITALS
SYSTOLIC BLOOD PRESSURE: 131 MMHG | WEIGHT: 131.4 LBS | DIASTOLIC BLOOD PRESSURE: 66 MMHG | HEIGHT: 69 IN | RESPIRATION RATE: 18 BRPM | HEART RATE: 62 BPM | BODY MASS INDEX: 19.46 KG/M2 | OXYGEN SATURATION: 100 % | TEMPERATURE: 98.5 F

## 2023-04-27 DIAGNOSIS — Z51.12 ENCOUNTER FOR ANTINEOPLASTIC CHEMOTHERAPY AND IMMUNOTHERAPY: ICD-10-CM

## 2023-04-27 DIAGNOSIS — Z51.11 ENCOUNTER FOR ANTINEOPLASTIC CHEMOTHERAPY AND IMMUNOTHERAPY: ICD-10-CM

## 2023-04-27 DIAGNOSIS — C50.919 TRIPLE NEGATIVE BREAST CANCER (HCC): Primary | ICD-10-CM

## 2023-04-27 DIAGNOSIS — R53.83 FATIGUE DUE TO TREATMENT: ICD-10-CM

## 2023-04-27 DIAGNOSIS — C50.919 TRIPLE NEGATIVE BREAST CANCER (HCC): ICD-10-CM

## 2023-04-27 DIAGNOSIS — C50.812 MALIGNANT NEOPLASM OF OVERLAPPING SITES OF LEFT BREAST (HCC): Primary | ICD-10-CM

## 2023-04-27 LAB
ALBUMIN SERPL-MCNC: 3.8 G/DL (ref 3.5–5.2)
ALP SERPL-CCNC: 59 U/L (ref 35–104)
ALT SERPL-CCNC: 24 U/L (ref 9–52)
ANION GAP SERPL CALCULATED.3IONS-SCNC: 8 MMOL/L (ref 7–19)
AST SERPL-CCNC: 31 U/L (ref 14–36)
BILIRUB SERPL-MCNC: <0.2 MG/DL (ref 0.2–1.3)
BUN SERPL-MCNC: 11 MG/DL (ref 7–17)
CALCIUM SERPL-MCNC: 9.2 MG/DL (ref 8.4–10.2)
CHLORIDE SERPL-SCNC: 106 MMOL/L (ref 98–111)
CO2 SERPL-SCNC: 24 MMOL/L (ref 22–29)
CREAT SERPL-MCNC: 0.6 MG/DL (ref 0.5–1)
ERYTHROCYTE [DISTWIDTH] IN BLOOD BY AUTOMATED COUNT: 12.5 % (ref 11.7–14.4)
GLOBULIN: 2.9 G/DL
GLUCOSE SERPL-MCNC: 93 MG/DL (ref 74–106)
HCT VFR BLD AUTO: 26.2 % (ref 34.1–44.9)
HGB BLD-MCNC: 8.6 G/DL (ref 11.2–15.7)
LYMPHOCYTES # BLD: 1.34 K/UL (ref 1.18–3.74)
LYMPHOCYTES NFR BLD: 47.5 % (ref 19.3–53.1)
MCH RBC QN AUTO: 38.9 PG (ref 25.6–32.2)
MCHC RBC AUTO-ENTMCNC: 32.8 G/DL (ref 32.3–35.5)
MCV RBC AUTO: 118.6 FL (ref 79.4–94.8)
MONOCYTES # BLD: 0.25 K/UL (ref 0.24–0.82)
MONOCYTES NFR BLD: 8.9 % (ref 4.7–12.5)
NEUTROPHILS # BLD: 1.14 K/UL (ref 1.56–6.13)
NEUTS SEG NFR BLD: 40.3 % (ref 34–71.1)
PLATELET # BLD AUTO: 186 K/UL (ref 182–369)
PMV BLD AUTO: 10 FL (ref 7.4–10.4)
POTASSIUM SERPL-SCNC: 3.8 MMOL/L (ref 3.5–5.1)
PROT SERPL-MCNC: 6.8 G/DL (ref 6.3–8.2)
RBC # BLD AUTO: 2.21 M/UL (ref 3.93–5.22)
SODIUM SERPL-SCNC: 138 MMOL/L (ref 137–145)
TSH SERPL DL<=0.005 MIU/L-ACNC: 1.71 UIU/ML (ref 0.27–4.2)
WBC # BLD AUTO: 2.82 K/UL (ref 3.98–10.04)

## 2023-04-27 PROCEDURE — 2580000003 HC RX 258: Performed by: INTERNAL MEDICINE

## 2023-04-27 PROCEDURE — 85025 COMPLETE CBC W/AUTO DIFF WBC: CPT

## 2023-04-27 PROCEDURE — 80053 COMPREHEN METABOLIC PANEL: CPT

## 2023-04-27 PROCEDURE — 6360000002 HC RX W HCPCS: Performed by: INTERNAL MEDICINE

## 2023-04-27 PROCEDURE — 96523 IRRIG DRUG DELIVERY DEVICE: CPT

## 2023-04-27 RX ORDER — PALONOSETRON 0.05 MG/ML
0.25 INJECTION, SOLUTION INTRAVENOUS ONCE
Status: CANCELLED
Start: 2023-05-18 | End: 2023-05-04

## 2023-04-27 RX ORDER — SODIUM CHLORIDE 9 MG/ML
5-250 INJECTION, SOLUTION INTRAVENOUS PRN
Status: CANCELLED | OUTPATIENT
Start: 2023-05-10

## 2023-04-27 RX ORDER — ACETAMINOPHEN 325 MG/1
650 TABLET ORAL
Status: CANCELLED | OUTPATIENT
Start: 2023-05-18

## 2023-04-27 RX ORDER — PALONOSETRON 0.05 MG/ML
0.25 INJECTION, SOLUTION INTRAVENOUS ONCE
Status: CANCELLED
Start: 2023-05-10 | End: 2023-04-27

## 2023-04-27 RX ORDER — MEPERIDINE HYDROCHLORIDE 50 MG/ML
12.5 INJECTION INTRAMUSCULAR; INTRAVENOUS; SUBCUTANEOUS PRN
Status: CANCELLED | OUTPATIENT
Start: 2023-05-10

## 2023-04-27 RX ORDER — MEPERIDINE HYDROCHLORIDE 50 MG/ML
12.5 INJECTION INTRAMUSCULAR; INTRAVENOUS; SUBCUTANEOUS PRN
Status: CANCELLED | OUTPATIENT
Start: 2023-05-25

## 2023-04-27 RX ORDER — SODIUM CHLORIDE 0.9 % (FLUSH) 0.9 %
5-40 SYRINGE (ML) INJECTION PRN
Status: DISCONTINUED | OUTPATIENT
Start: 2023-04-27 | End: 2023-04-28 | Stop reason: HOSPADM

## 2023-04-27 RX ORDER — MEPERIDINE HYDROCHLORIDE 50 MG/ML
12.5 INJECTION INTRAMUSCULAR; INTRAVENOUS; SUBCUTANEOUS PRN
Status: CANCELLED | OUTPATIENT
Start: 2023-05-18

## 2023-04-27 RX ORDER — DIPHENHYDRAMINE HYDROCHLORIDE 50 MG/ML
25 INJECTION INTRAMUSCULAR; INTRAVENOUS ONCE
Status: CANCELLED | OUTPATIENT
Start: 2023-05-25 | End: 2023-05-11

## 2023-04-27 RX ORDER — FAMOTIDINE 10 MG/ML
20 INJECTION, SOLUTION INTRAVENOUS ONCE
Status: CANCELLED | OUTPATIENT
Start: 2023-05-10 | End: 2023-04-27

## 2023-04-27 RX ORDER — SODIUM CHLORIDE 0.9 % (FLUSH) 0.9 %
5-40 SYRINGE (ML) INJECTION PRN
Status: CANCELLED | OUTPATIENT
Start: 2023-05-18

## 2023-04-27 RX ORDER — SODIUM CHLORIDE 0.9 % (FLUSH) 0.9 %
5-40 SYRINGE (ML) INJECTION PRN
Status: CANCELLED | OUTPATIENT
Start: 2023-04-27

## 2023-04-27 RX ORDER — ACETAMINOPHEN 325 MG/1
650 TABLET ORAL
Status: CANCELLED | OUTPATIENT
Start: 2023-05-25

## 2023-04-27 RX ORDER — HEPARIN SODIUM (PORCINE) LOCK FLUSH IV SOLN 100 UNIT/ML 100 UNIT/ML
500 SOLUTION INTRAVENOUS PRN
Status: DISCONTINUED | OUTPATIENT
Start: 2023-04-27 | End: 2023-04-28 | Stop reason: HOSPADM

## 2023-04-27 RX ORDER — ONDANSETRON 2 MG/ML
8 INJECTION INTRAMUSCULAR; INTRAVENOUS
Status: CANCELLED | OUTPATIENT
Start: 2023-05-25

## 2023-04-27 RX ORDER — EPINEPHRINE 1 MG/ML
0.3 INJECTION, SOLUTION, CONCENTRATE INTRAVENOUS PRN
Status: CANCELLED | OUTPATIENT
Start: 2023-05-10

## 2023-04-27 RX ORDER — ACETAMINOPHEN 325 MG/1
650 TABLET ORAL
Status: CANCELLED | OUTPATIENT
Start: 2023-05-10

## 2023-04-27 RX ORDER — HEPARIN SODIUM (PORCINE) LOCK FLUSH IV SOLN 100 UNIT/ML 100 UNIT/ML
500 SOLUTION INTRAVENOUS PRN
Status: CANCELLED | OUTPATIENT
Start: 2023-05-25

## 2023-04-27 RX ORDER — EPINEPHRINE 1 MG/ML
0.3 INJECTION, SOLUTION, CONCENTRATE INTRAVENOUS PRN
Status: CANCELLED | OUTPATIENT
Start: 2023-05-18

## 2023-04-27 RX ORDER — HEPARIN SODIUM (PORCINE) LOCK FLUSH IV SOLN 100 UNIT/ML 100 UNIT/ML
500 SOLUTION INTRAVENOUS PRN
Status: CANCELLED | OUTPATIENT
Start: 2023-04-27

## 2023-04-27 RX ORDER — HEPARIN SODIUM (PORCINE) LOCK FLUSH IV SOLN 100 UNIT/ML 100 UNIT/ML
500 SOLUTION INTRAVENOUS PRN
Status: CANCELLED | OUTPATIENT
Start: 2023-05-18

## 2023-04-27 RX ORDER — DIPHENHYDRAMINE HYDROCHLORIDE 50 MG/ML
50 INJECTION INTRAMUSCULAR; INTRAVENOUS
Status: CANCELLED | OUTPATIENT
Start: 2023-05-10

## 2023-04-27 RX ORDER — FAMOTIDINE 10 MG/ML
20 INJECTION, SOLUTION INTRAVENOUS ONCE
Status: CANCELLED | OUTPATIENT
Start: 2023-05-25 | End: 2023-05-11

## 2023-04-27 RX ORDER — SODIUM CHLORIDE 0.9 % (FLUSH) 0.9 %
5-40 SYRINGE (ML) INJECTION PRN
Status: CANCELLED | OUTPATIENT
Start: 2023-05-25

## 2023-04-27 RX ORDER — DIPHENHYDRAMINE HYDROCHLORIDE 50 MG/ML
50 INJECTION INTRAMUSCULAR; INTRAVENOUS
Status: CANCELLED | OUTPATIENT
Start: 2023-05-18

## 2023-04-27 RX ORDER — ALBUTEROL SULFATE 90 UG/1
4 AEROSOL, METERED RESPIRATORY (INHALATION) PRN
Status: CANCELLED | OUTPATIENT
Start: 2023-05-25

## 2023-04-27 RX ORDER — DIPHENHYDRAMINE HYDROCHLORIDE 50 MG/ML
25 INJECTION INTRAMUSCULAR; INTRAVENOUS ONCE
Status: CANCELLED | OUTPATIENT
Start: 2023-05-10 | End: 2023-04-27

## 2023-04-27 RX ORDER — SODIUM CHLORIDE 9 MG/ML
5-250 INJECTION, SOLUTION INTRAVENOUS PRN
Status: CANCELLED | OUTPATIENT
Start: 2023-05-18

## 2023-04-27 RX ORDER — ALBUTEROL SULFATE 90 UG/1
4 AEROSOL, METERED RESPIRATORY (INHALATION) PRN
Status: CANCELLED | OUTPATIENT
Start: 2023-05-18

## 2023-04-27 RX ORDER — DIPHENHYDRAMINE HYDROCHLORIDE 50 MG/ML
50 INJECTION INTRAMUSCULAR; INTRAVENOUS
Status: CANCELLED | OUTPATIENT
Start: 2023-05-25

## 2023-04-27 RX ORDER — SODIUM CHLORIDE 9 MG/ML
25 INJECTION, SOLUTION INTRAVENOUS PRN
OUTPATIENT
Start: 2023-04-27

## 2023-04-27 RX ORDER — SODIUM CHLORIDE 0.9 % (FLUSH) 0.9 %
5-40 SYRINGE (ML) INJECTION PRN
Status: CANCELLED | OUTPATIENT
Start: 2023-05-10

## 2023-04-27 RX ORDER — HEPARIN SODIUM (PORCINE) LOCK FLUSH IV SOLN 100 UNIT/ML 100 UNIT/ML
500 SOLUTION INTRAVENOUS PRN
Status: CANCELLED | OUTPATIENT
Start: 2023-05-10

## 2023-04-27 RX ORDER — DIPHENHYDRAMINE HYDROCHLORIDE 50 MG/ML
25 INJECTION INTRAMUSCULAR; INTRAVENOUS ONCE
Status: CANCELLED | OUTPATIENT
Start: 2023-05-18 | End: 2023-05-04

## 2023-04-27 RX ORDER — PALONOSETRON 0.05 MG/ML
0.25 INJECTION, SOLUTION INTRAVENOUS ONCE
Status: CANCELLED | OUTPATIENT
Start: 2023-05-10 | End: 2023-04-27

## 2023-04-27 RX ORDER — PALONOSETRON 0.05 MG/ML
0.25 INJECTION, SOLUTION INTRAVENOUS ONCE
Status: CANCELLED
Start: 2023-05-25 | End: 2023-05-11

## 2023-04-27 RX ORDER — SODIUM CHLORIDE 9 MG/ML
INJECTION, SOLUTION INTRAVENOUS CONTINUOUS
Status: CANCELLED | OUTPATIENT
Start: 2023-05-18

## 2023-04-27 RX ORDER — ALBUTEROL SULFATE 90 UG/1
4 AEROSOL, METERED RESPIRATORY (INHALATION) PRN
Status: CANCELLED | OUTPATIENT
Start: 2023-05-10

## 2023-04-27 RX ORDER — SODIUM CHLORIDE 9 MG/ML
INJECTION, SOLUTION INTRAVENOUS CONTINUOUS
Status: CANCELLED | OUTPATIENT
Start: 2023-05-25

## 2023-04-27 RX ORDER — FAMOTIDINE 10 MG/ML
20 INJECTION, SOLUTION INTRAVENOUS
Status: CANCELLED | OUTPATIENT
Start: 2023-05-25

## 2023-04-27 RX ORDER — SODIUM CHLORIDE 9 MG/ML
INJECTION, SOLUTION INTRAVENOUS CONTINUOUS
Status: CANCELLED | OUTPATIENT
Start: 2023-05-10

## 2023-04-27 RX ORDER — ONDANSETRON 2 MG/ML
8 INJECTION INTRAMUSCULAR; INTRAVENOUS
Status: CANCELLED | OUTPATIENT
Start: 2023-05-18

## 2023-04-27 RX ORDER — SODIUM CHLORIDE 9 MG/ML
5-250 INJECTION, SOLUTION INTRAVENOUS PRN
Status: CANCELLED | OUTPATIENT
Start: 2023-05-25

## 2023-04-27 RX ORDER — FAMOTIDINE 10 MG/ML
20 INJECTION, SOLUTION INTRAVENOUS
Status: CANCELLED | OUTPATIENT
Start: 2023-05-10

## 2023-04-27 RX ORDER — ONDANSETRON 2 MG/ML
8 INJECTION INTRAMUSCULAR; INTRAVENOUS
Status: CANCELLED | OUTPATIENT
Start: 2023-05-10

## 2023-04-27 RX ORDER — FAMOTIDINE 10 MG/ML
20 INJECTION, SOLUTION INTRAVENOUS ONCE
Status: CANCELLED | OUTPATIENT
Start: 2023-05-18 | End: 2023-05-04

## 2023-04-27 RX ORDER — EPINEPHRINE 1 MG/ML
0.3 INJECTION, SOLUTION, CONCENTRATE INTRAVENOUS PRN
Status: CANCELLED | OUTPATIENT
Start: 2023-05-25

## 2023-04-27 RX ORDER — FAMOTIDINE 10 MG/ML
20 INJECTION, SOLUTION INTRAVENOUS
Status: CANCELLED | OUTPATIENT
Start: 2023-05-18

## 2023-04-27 RX ADMIN — HEPARIN 500 UNITS: 100 SYRINGE at 14:47

## 2023-04-27 RX ADMIN — SODIUM CHLORIDE, PRESERVATIVE FREE 10 ML: 5 INJECTION INTRAVENOUS at 14:47

## 2023-04-27 NOTE — PROGRESS NOTES
Treatment held today due to neutrophils of 1.14. Rayne requested not to get treatment this week with Lacy Keto injections due to son being in hospital at [facility]. She is to return next Thursday for treatment.

## 2023-05-02 NOTE — PROGRESS NOTES
Date    BREAST RECONSTRUCTION  04/2019    BREAST SURGERY Bilateral 9652    silicone Memory Gel    CHOLECYSTECTOMY  06/24/2014    COLONOSCOPY N/A 03/15/2023    Dr Wesley Sánchez, mili ink tattooing placed in distal sigmoid colon, Nicole TA (-)Dysplasia, int hem Gr 1, 3 year    HC INJECT OTHER PERPHRL NERV Right 08/03/2016    HIP FLUOROSCOPIC GUIDED CORTICOSTEROID INJECTION  performed by Sasha Michaud MD at [address] [place] NERV Right 04/21/2017    FLURO GUIDED HIP INJECTION performed by Mendel Ronco, MD at [facility], VAGINAL      PORT SURGERY Left 12/09/2022    PORT INSERTION WITH FLUOROSCOPY performed by Jason Espinoza MD at [facility] ENDOSCOPY N/A 03/15/2023    Dr Wesley Sánchez,  47 fr dil, (+)GERD, (-)Sprue    UPPER GASTROINTESTINAL ENDOSCOPY  03/15/2023    Dr Najma Harris 54 fr bougie dilation    US BREAST BIOPSY W LOC DEVICE 1ST LESION LEFT Left 11/21/2022    US BREAST NEEDLE BIOPSY LEFT LPS GENERAL SURGERY       Social History:    Marital status:   Smoking status: Currently; 1/2 pack daily for 34 years  ETOH status: No  Resides: [place], [place]    Family History:   Family History   Problem Relation Age of Onset    Cancer Mother         Melanoma    Hypertension Mother     Cancer Brother         synovial cell sarcoma    Cancer Maternal Grandfather         lung    Other Daughter         gallbladder disease    Colon Cancer Neg Hx     Colon Polyps Neg Hx        Current Hospital Medications:    Current Outpatient Medications   Medication Sig Dispense Refill    potassium chloride (MICRO-K) 10 MEQ extended release capsule Take 2 capsules by mouth 2 times daily for 5 days 20 capsule 0    sertraline (ZOLOFT) 50 MG tablet TAKE 1 TABLET BY MOUTH DAILY 30 tablet 3    atenolol (TENORMIN) 25 MG tablet TAKE 1 TABLET BY MOUTH IN THE MORNING AND AT BEDTIME (Patient taking differently: Take

## 2023-05-04 ENCOUNTER — HOSPITAL ENCOUNTER (OUTPATIENT)
Dept: INFUSION THERAPY | Age: 48
Discharge: HOME | End: 2023-05-04
Payer: COMMERCIAL

## 2023-05-04 ENCOUNTER — OFFICE VISIT (OUTPATIENT)
Dept: HEMATOLOGY | Age: 48
End: 2023-05-04
Payer: COMMERCIAL

## 2023-05-04 VITALS
WEIGHT: 130.6 LBS | BODY MASS INDEX: 19.34 KG/M2 | TEMPERATURE: 98.3 F | DIASTOLIC BLOOD PRESSURE: 64 MMHG | OXYGEN SATURATION: 99 % | RESPIRATION RATE: 18 BRPM | SYSTOLIC BLOOD PRESSURE: 102 MMHG | HEART RATE: 64 BPM | HEIGHT: 69 IN

## 2023-05-04 DIAGNOSIS — Z51.12 ENCOUNTER FOR ANTINEOPLASTIC IMMUNOTHERAPY: ICD-10-CM

## 2023-05-04 DIAGNOSIS — C50.919 TRIPLE NEGATIVE BREAST CANCER (HCC): ICD-10-CM

## 2023-05-04 DIAGNOSIS — T45.1X5A CHEMOTHERAPY-INDUCED NEUTROPENIA (HCC): ICD-10-CM

## 2023-05-04 DIAGNOSIS — R53.83 FATIGUE DUE TO TREATMENT: ICD-10-CM

## 2023-05-04 DIAGNOSIS — Z51.11 CHEMOTHERAPY MANAGEMENT, ENCOUNTER FOR: Primary | ICD-10-CM

## 2023-05-04 DIAGNOSIS — D70.1 CHEMOTHERAPY-INDUCED NEUTROPENIA (HCC): ICD-10-CM

## 2023-05-04 DIAGNOSIS — Z51.11 CHEMOTHERAPY MANAGEMENT, ENCOUNTER FOR: ICD-10-CM

## 2023-05-04 DIAGNOSIS — T45.1X5A CHEMOTHERAPY INDUCED NEUTROPENIA (HCC): ICD-10-CM

## 2023-05-04 DIAGNOSIS — D70.1 CHEMOTHERAPY INDUCED NEUTROPENIA (HCC): ICD-10-CM

## 2023-05-04 LAB
ALBUMIN SERPL-MCNC: 3.8 G/DL (ref 3.5–5.2)
ALP SERPL-CCNC: 62 U/L (ref 35–104)
ALT SERPL-CCNC: 20 U/L (ref 9–52)
ANION GAP SERPL CALCULATED.3IONS-SCNC: 4 MMOL/L (ref 7–19)
AST SERPL-CCNC: 23 U/L (ref 14–36)
BILIRUB SERPL-MCNC: <0.2 MG/DL (ref 0.2–1.3)
BUN SERPL-MCNC: 15 MG/DL (ref 7–17)
CALCIUM SERPL-MCNC: 9.2 MG/DL (ref 8.4–10.2)
CHLORIDE SERPL-SCNC: 106 MMOL/L (ref 98–111)
CO2 SERPL-SCNC: 25 MMOL/L (ref 22–29)
CORTIS AM PEAK SERPL-MCNC: 13.1 UG/DL (ref 6.2–19.4)
CREAT SERPL-MCNC: 0.7 MG/DL (ref 0.5–1)
ERYTHROCYTE [DISTWIDTH] IN BLOOD BY AUTOMATED COUNT: 12.7 % (ref 11.7–14.4)
GLOBULIN: 3.1 G/DL
GLUCOSE SERPL-MCNC: 93 MG/DL (ref 74–106)
HCT VFR BLD AUTO: 28.8 % (ref 34.1–44.9)
HGB BLD-MCNC: 9.3 G/DL (ref 11.2–15.7)
LYMPHOCYTES # BLD: 1.06 K/UL (ref 1.18–3.74)
LYMPHOCYTES NFR BLD: 55.2 % (ref 19.3–53.1)
MCH RBC QN AUTO: 38.4 PG (ref 25.6–32.2)
MCHC RBC AUTO-ENTMCNC: 32.3 G/DL (ref 32.3–35.5)
MCV RBC AUTO: 119 FL (ref 79.4–94.8)
MONOCYTES # BLD: 0.31 K/UL (ref 0.24–0.82)
MONOCYTES NFR BLD: 16.1 % (ref 4.7–12.5)
NEUTROPHILS # BLD: 0.48 K/UL (ref 1.56–6.13)
NEUTS SEG NFR BLD: 25 % (ref 34–71.1)
PLATELET # BLD AUTO: 139 K/UL (ref 182–369)
PMV BLD AUTO: 9 FL (ref 7.4–10.4)
POTASSIUM SERPL-SCNC: 4 MMOL/L (ref 3.5–5.1)
PROT SERPL-MCNC: 6.8 G/DL (ref 6.3–8.2)
RBC # BLD AUTO: 2.42 M/UL (ref 3.93–5.22)
SODIUM SERPL-SCNC: 135 MMOL/L (ref 137–145)
T4 FREE SERPL-MCNC: 1.29 NG/DL (ref 0.93–1.7)
TSH SERPL DL<=0.005 MIU/L-ACNC: 1.1 UIU/ML (ref 0.27–4.2)
WBC # BLD AUTO: 1.92 K/UL (ref 3.98–10.04)

## 2023-05-04 PROCEDURE — 6360000002 HC RX W HCPCS: Performed by: INTERNAL MEDICINE

## 2023-05-04 PROCEDURE — 80053 COMPREHEN METABOLIC PANEL: CPT

## 2023-05-04 PROCEDURE — 96372 THER/PROPH/DIAG INJ SC/IM: CPT

## 2023-05-04 PROCEDURE — 99213 OFFICE O/P EST LOW 20 MIN: CPT | Performed by: INTERNAL MEDICINE

## 2023-05-04 PROCEDURE — 3078F DIAST BP <80 MM HG: CPT | Performed by: INTERNAL MEDICINE

## 2023-05-04 PROCEDURE — 96523 IRRIG DRUG DELIVERY DEVICE: CPT

## 2023-05-04 PROCEDURE — 2580000003 HC RX 258: Performed by: INTERNAL MEDICINE

## 2023-05-04 PROCEDURE — 36415 COLL VENOUS BLD VENIPUNCTURE: CPT

## 2023-05-04 PROCEDURE — 3074F SYST BP LT 130 MM HG: CPT | Performed by: INTERNAL MEDICINE

## 2023-05-04 PROCEDURE — 85025 COMPLETE CBC W/AUTO DIFF WBC: CPT

## 2023-05-04 RX ORDER — HEPARIN SODIUM (PORCINE) LOCK FLUSH IV SOLN 100 UNIT/ML 100 UNIT/ML
500 SOLUTION INTRAVENOUS PRN
Status: DISCONTINUED | OUTPATIENT
Start: 2023-05-04 | End: 2023-05-05 | Stop reason: HOSPADM

## 2023-05-04 RX ORDER — ACETAMINOPHEN 325 MG/1
650 TABLET ORAL
Status: CANCELLED | OUTPATIENT
Start: 2023-05-04

## 2023-05-04 RX ORDER — DIPHENHYDRAMINE HYDROCHLORIDE 50 MG/ML
50 INJECTION INTRAMUSCULAR; INTRAVENOUS
Status: CANCELLED | OUTPATIENT
Start: 2023-05-04

## 2023-05-04 RX ORDER — SODIUM CHLORIDE 0.9 % (FLUSH) 0.9 %
5-40 SYRINGE (ML) INJECTION PRN
OUTPATIENT
Start: 2023-05-04

## 2023-05-04 RX ORDER — ONDANSETRON 2 MG/ML
8 INJECTION INTRAMUSCULAR; INTRAVENOUS
Status: CANCELLED | OUTPATIENT
Start: 2023-05-04

## 2023-05-04 RX ORDER — FAMOTIDINE 10 MG/ML
20 INJECTION, SOLUTION INTRAVENOUS
Status: CANCELLED | OUTPATIENT
Start: 2023-05-04

## 2023-05-04 RX ORDER — SODIUM CHLORIDE 9 MG/ML
25 INJECTION, SOLUTION INTRAVENOUS PRN
OUTPATIENT
Start: 2023-05-04

## 2023-05-04 RX ORDER — HEPARIN SODIUM (PORCINE) LOCK FLUSH IV SOLN 100 UNIT/ML 100 UNIT/ML
500 SOLUTION INTRAVENOUS PRN
OUTPATIENT
Start: 2023-05-04

## 2023-05-04 RX ORDER — ALBUTEROL SULFATE 90 UG/1
4 AEROSOL, METERED RESPIRATORY (INHALATION) PRN
Status: CANCELLED | OUTPATIENT
Start: 2023-05-04

## 2023-05-04 RX ORDER — SODIUM CHLORIDE 9 MG/ML
INJECTION, SOLUTION INTRAVENOUS CONTINUOUS
Status: CANCELLED | OUTPATIENT
Start: 2023-05-04

## 2023-05-04 RX ORDER — EPINEPHRINE 1 MG/ML
0.3 INJECTION, SOLUTION, CONCENTRATE INTRAVENOUS PRN
Status: CANCELLED | OUTPATIENT
Start: 2023-05-04

## 2023-05-04 RX ORDER — SODIUM CHLORIDE 0.9 % (FLUSH) 0.9 %
5-40 SYRINGE (ML) INJECTION PRN
Status: DISCONTINUED | OUTPATIENT
Start: 2023-05-04 | End: 2023-05-05 | Stop reason: HOSPADM

## 2023-05-04 RX ADMIN — SODIUM CHLORIDE, PRESERVATIVE FREE 10 ML: 5 INJECTION INTRAVENOUS at 11:15

## 2023-05-04 RX ADMIN — HEPARIN 500 UNITS: 100 SYRINGE at 11:15

## 2023-05-04 RX ADMIN — FILGRASTIM-SNDZ 480 MCG: 480 INJECTION, SOLUTION INTRAVENOUS; SUBCUTANEOUS at 11:15

## 2023-05-05 ENCOUNTER — HOSPITAL ENCOUNTER (OUTPATIENT)
Dept: INFUSION THERAPY | Age: 48
Discharge: HOME | End: 2023-05-05
Payer: COMMERCIAL

## 2023-05-05 DIAGNOSIS — D70.1 CHEMOTHERAPY-INDUCED NEUTROPENIA (HCC): Primary | ICD-10-CM

## 2023-05-05 DIAGNOSIS — T45.1X5A CHEMOTHERAPY-INDUCED NEUTROPENIA (HCC): Primary | ICD-10-CM

## 2023-05-05 PROCEDURE — 96372 THER/PROPH/DIAG INJ SC/IM: CPT

## 2023-05-05 PROCEDURE — 6360000002 HC RX W HCPCS: Performed by: INTERNAL MEDICINE

## 2023-05-05 RX ORDER — ALBUTEROL SULFATE 90 UG/1
4 AEROSOL, METERED RESPIRATORY (INHALATION) PRN
Status: CANCELLED | OUTPATIENT
Start: 2023-05-05

## 2023-05-05 RX ORDER — SODIUM CHLORIDE 9 MG/ML
INJECTION, SOLUTION INTRAVENOUS CONTINUOUS
Status: CANCELLED | OUTPATIENT
Start: 2023-05-05

## 2023-05-05 RX ORDER — FAMOTIDINE 10 MG/ML
20 INJECTION, SOLUTION INTRAVENOUS
Status: CANCELLED | OUTPATIENT
Start: 2023-05-05

## 2023-05-05 RX ORDER — DIPHENHYDRAMINE HYDROCHLORIDE 50 MG/ML
50 INJECTION INTRAMUSCULAR; INTRAVENOUS
Status: CANCELLED | OUTPATIENT
Start: 2023-05-05

## 2023-05-05 RX ORDER — EPINEPHRINE 1 MG/ML
0.3 INJECTION, SOLUTION, CONCENTRATE INTRAVENOUS PRN
Status: CANCELLED | OUTPATIENT
Start: 2023-05-05

## 2023-05-05 RX ORDER — ACETAMINOPHEN 325 MG/1
650 TABLET ORAL
Status: CANCELLED | OUTPATIENT
Start: 2023-05-05

## 2023-05-05 RX ORDER — ONDANSETRON 2 MG/ML
8 INJECTION INTRAMUSCULAR; INTRAVENOUS
Status: CANCELLED | OUTPATIENT
Start: 2023-05-05

## 2023-05-05 RX ADMIN — FILGRASTIM-SNDZ 480 MCG: 480 INJECTION, SOLUTION INTRAVENOUS; SUBCUTANEOUS at 12:24

## 2023-05-08 ENCOUNTER — HOSPITAL ENCOUNTER (OUTPATIENT)
Dept: INFUSION THERAPY | Age: 48
Discharge: HOME | End: 2023-05-08
Payer: COMMERCIAL

## 2023-05-08 ENCOUNTER — TELEPHONE (OUTPATIENT)
Dept: SURGERY | Age: 48
End: 2023-05-08

## 2023-05-08 DIAGNOSIS — T45.1X5A CHEMOTHERAPY-INDUCED NEUTROPENIA (HCC): Primary | ICD-10-CM

## 2023-05-08 DIAGNOSIS — D70.1 CHEMOTHERAPY-INDUCED NEUTROPENIA (HCC): Primary | ICD-10-CM

## 2023-05-08 PROCEDURE — 6360000002 HC RX W HCPCS: Performed by: INTERNAL MEDICINE

## 2023-05-08 PROCEDURE — 96372 THER/PROPH/DIAG INJ SC/IM: CPT

## 2023-05-08 RX ORDER — EPINEPHRINE 1 MG/ML
0.3 INJECTION, SOLUTION, CONCENTRATE INTRAVENOUS PRN
Status: CANCELLED | OUTPATIENT
Start: 2023-05-08

## 2023-05-08 RX ORDER — DIPHENHYDRAMINE HYDROCHLORIDE 50 MG/ML
50 INJECTION INTRAMUSCULAR; INTRAVENOUS
Status: CANCELLED | OUTPATIENT
Start: 2023-05-08

## 2023-05-08 RX ORDER — FAMOTIDINE 10 MG/ML
20 INJECTION, SOLUTION INTRAVENOUS
Status: CANCELLED | OUTPATIENT
Start: 2023-05-08

## 2023-05-08 RX ORDER — ACETAMINOPHEN 325 MG/1
650 TABLET ORAL
Status: CANCELLED | OUTPATIENT
Start: 2023-05-08

## 2023-05-08 RX ORDER — SODIUM CHLORIDE 9 MG/ML
INJECTION, SOLUTION INTRAVENOUS CONTINUOUS
Status: CANCELLED | OUTPATIENT
Start: 2023-05-08

## 2023-05-08 RX ORDER — ALBUTEROL SULFATE 90 UG/1
4 AEROSOL, METERED RESPIRATORY (INHALATION) PRN
Status: CANCELLED | OUTPATIENT
Start: 2023-05-08

## 2023-05-08 RX ORDER — ONDANSETRON 2 MG/ML
8 INJECTION INTRAMUSCULAR; INTRAVENOUS
Status: CANCELLED | OUTPATIENT
Start: 2023-05-08

## 2023-05-08 RX ADMIN — FILGRASTIM-SNDZ 480 MCG: 480 INJECTION, SOLUTION INTRAVENOUS; SUBCUTANEOUS at 13:12

## 2023-05-10 ENCOUNTER — TELEPHONE (OUTPATIENT)
Dept: VASCULAR SURGERY | Age: 48
End: 2023-05-10

## 2023-05-10 ENCOUNTER — HOSPITAL ENCOUNTER (OUTPATIENT)
Dept: INFUSION THERAPY | Age: 48
Discharge: HOME | End: 2023-05-10
Payer: COMMERCIAL

## 2023-05-10 ENCOUNTER — OFFICE VISIT (OUTPATIENT)
Dept: SURGERY | Age: 48
End: 2023-05-10
Payer: COMMERCIAL

## 2023-05-10 VITALS
OXYGEN SATURATION: 100 % | RESPIRATION RATE: 18 BRPM | HEIGHT: 69 IN | DIASTOLIC BLOOD PRESSURE: 58 MMHG | WEIGHT: 128.3 LBS | BODY MASS INDEX: 19 KG/M2 | TEMPERATURE: 98.3 F | HEART RATE: 68 BPM | SYSTOLIC BLOOD PRESSURE: 107 MMHG

## 2023-05-10 DIAGNOSIS — Z45.2 ENCOUNTER FOR CARE RELATED TO VASCULAR ACCESS PORT: Primary | ICD-10-CM

## 2023-05-10 DIAGNOSIS — C50.812 MALIGNANT NEOPLASM OF OVERLAPPING SITES OF LEFT BREAST (HCC): Primary | ICD-10-CM

## 2023-05-10 LAB
ALBUMIN SERPL-MCNC: 4.2 G/DL (ref 3.5–5.2)
ALP SERPL-CCNC: 84 U/L (ref 35–104)
ALT SERPL-CCNC: 17 U/L (ref 9–52)
ANION GAP SERPL CALCULATED.3IONS-SCNC: 7 MMOL/L (ref 7–19)
AST SERPL-CCNC: 22 U/L (ref 14–36)
BILIRUB SERPL-MCNC: <0.2 MG/DL (ref 0.2–1.3)
BUN SERPL-MCNC: 9 MG/DL (ref 7–17)
CALCIUM SERPL-MCNC: 9.6 MG/DL (ref 8.4–10.2)
CHLORIDE SERPL-SCNC: 106 MMOL/L (ref 98–111)
CO2 SERPL-SCNC: 24 MMOL/L (ref 22–29)
CREAT SERPL-MCNC: 0.7 MG/DL (ref 0.5–1)
ERYTHROCYTE [DISTWIDTH] IN BLOOD BY AUTOMATED COUNT: 12.5 % (ref 11.7–14.4)
GLOBULIN: 3.1 G/DL
GLUCOSE SERPL-MCNC: 108 MG/DL (ref 74–106)
HCT VFR BLD AUTO: 31.8 % (ref 34.1–44.9)
HGB BLD-MCNC: 10.3 G/DL (ref 11.2–15.7)
LYMPHOCYTES # BLD: 2.4 K/UL (ref 1.18–3.74)
LYMPHOCYTES NFR BLD: 27.6 % (ref 19.3–53.1)
MCH RBC QN AUTO: 37.9 PG (ref 25.6–32.2)
MCHC RBC AUTO-ENTMCNC: 32.4 G/DL (ref 32.3–35.5)
MCV RBC AUTO: 116.9 FL (ref 79.4–94.8)
MONOCYTES # BLD: 0.72 K/UL (ref 0.24–0.82)
MONOCYTES NFR BLD: 8.3 % (ref 4.7–12.5)
NEUTROPHILS # BLD: 5.37 K/UL (ref 1.56–6.13)
NEUTS SEG NFR BLD: 61.8 % (ref 34–71.1)
PLATELET # BLD AUTO: 75 K/UL (ref 182–369)
PMV BLD AUTO: 9.2 FL (ref 7.4–10.4)
POTASSIUM SERPL-SCNC: 3.7 MMOL/L (ref 3.5–5.1)
PROT SERPL-MCNC: 7.3 G/DL (ref 6.3–8.2)
RBC # BLD AUTO: 2.72 M/UL (ref 3.93–5.22)
SODIUM SERPL-SCNC: 137 MMOL/L (ref 137–145)
WBC # BLD AUTO: 8.69 K/UL (ref 3.98–10.04)

## 2023-05-10 PROCEDURE — 96375 TX/PRO/DX INJ NEW DRUG ADDON: CPT

## 2023-05-10 PROCEDURE — 6360000002 HC RX W HCPCS: Performed by: INTERNAL MEDICINE

## 2023-05-10 PROCEDURE — 85025 COMPLETE CBC W/AUTO DIFF WBC: CPT

## 2023-05-10 PROCEDURE — 80053 COMPREHEN METABOLIC PANEL: CPT

## 2023-05-10 PROCEDURE — 96413 CHEMO IV INFUSION 1 HR: CPT

## 2023-05-10 PROCEDURE — 36415 COLL VENOUS BLD VENIPUNCTURE: CPT

## 2023-05-10 PROCEDURE — 99212 OFFICE O/P EST SF 10 MIN: CPT | Performed by: PHYSICIAN ASSISTANT

## 2023-05-10 PROCEDURE — 2580000003 HC RX 258: Performed by: INTERNAL MEDICINE

## 2023-05-10 PROCEDURE — 2500000003 HC RX 250 WO HCPCS: Performed by: INTERNAL MEDICINE

## 2023-05-10 RX ORDER — HEPARIN SODIUM (PORCINE) LOCK FLUSH IV SOLN 100 UNIT/ML 100 UNIT/ML
500 SOLUTION INTRAVENOUS PRN
Status: DISCONTINUED | OUTPATIENT
Start: 2023-05-10 | End: 2023-05-11 | Stop reason: HOSPADM

## 2023-05-10 RX ORDER — PALONOSETRON 0.05 MG/ML
0.25 INJECTION, SOLUTION INTRAVENOUS ONCE
Status: DISCONTINUED | OUTPATIENT
Start: 2023-05-10 | End: 2023-05-10 | Stop reason: SDUPTHER

## 2023-05-10 RX ORDER — SODIUM CHLORIDE 0.9 % (FLUSH) 0.9 %
5-40 SYRINGE (ML) INJECTION PRN
Status: DISCONTINUED | OUTPATIENT
Start: 2023-05-10 | End: 2023-05-11 | Stop reason: HOSPADM

## 2023-05-10 RX ORDER — PALONOSETRON 0.05 MG/ML
0.25 INJECTION, SOLUTION INTRAVENOUS ONCE
Status: COMPLETED | OUTPATIENT
Start: 2023-05-10 | End: 2023-05-10

## 2023-05-10 RX ORDER — SODIUM CHLORIDE 9 MG/ML
5-250 INJECTION, SOLUTION INTRAVENOUS PRN
Status: DISCONTINUED | OUTPATIENT
Start: 2023-05-10 | End: 2023-05-11 | Stop reason: HOSPADM

## 2023-05-10 RX ORDER — DIPHENHYDRAMINE HYDROCHLORIDE 50 MG/ML
25 INJECTION INTRAMUSCULAR; INTRAVENOUS ONCE
Status: COMPLETED | OUTPATIENT
Start: 2023-05-10 | End: 2023-05-10

## 2023-05-10 RX ORDER — FAMOTIDINE 10 MG/ML
20 INJECTION, SOLUTION INTRAVENOUS ONCE
Status: COMPLETED | OUTPATIENT
Start: 2023-05-10 | End: 2023-05-10

## 2023-05-10 RX ADMIN — DIPHENHYDRAMINE HYDROCHLORIDE 25 MG: 50 INJECTION, SOLUTION INTRAMUSCULAR; INTRAVENOUS at 14:25

## 2023-05-10 RX ADMIN — PALONOSETRON 0.25 MG: 0.05 INJECTION, SOLUTION INTRAVENOUS at 14:25

## 2023-05-10 RX ADMIN — FAMOTIDINE 20 MG: 10 INJECTION INTRAVENOUS at 14:25

## 2023-05-10 RX ADMIN — PACLITAXEL 100 MG: 6 INJECTION, SOLUTION, CONCENTRATE INTRAVENOUS at 14:43

## 2023-05-10 RX ADMIN — SODIUM CHLORIDE 25 ML/HR: 9 INJECTION, SOLUTION INTRAVENOUS at 14:25

## 2023-05-10 RX ADMIN — DEXAMETHASONE SODIUM PHOSPHATE: 10 INJECTION, SOLUTION INTRAMUSCULAR; INTRAVENOUS at 14:25

## 2023-05-10 RX ADMIN — SODIUM CHLORIDE, PRESERVATIVE FREE 10 ML: 5 INJECTION INTRAVENOUS at 15:47

## 2023-05-10 RX ADMIN — HEPARIN 500 UNITS: 100 SYRINGE at 15:47

## 2023-05-10 NOTE — PROGRESS NOTES
Lab Results   Component Value Date    WBC 8.69 05/10/2023    HGB 10.3 (L) 05/10/2023    HCT 31.8 (L) 05/10/2023    .9 (H) 05/10/2023    PLT 75 (L) 05/10/2023     Lab Results   Component Value Date    NEUTROABS 5.37 05/10/2023     Lab Results   Component Value Date     05/10/2023    K 3.7 05/10/2023     05/10/2023    CO2 24 05/10/2023    BUN 9 05/10/2023    CREATININE 0.7 05/10/2023    GLUCOSE 108 (H) 05/10/2023    CALCIUM 9.6 05/10/2023    PROT 7.3 05/10/2023    LABALBU 4.2 05/10/2023    BILITOT <0.2 05/10/2023    ALKPHOS 84 05/10/2023    AST 22 05/10/2023    ALT 17 05/10/2023    LABGLOM >60 05/10/2023    GFRAA >59 08/03/2021    GLOB 3.1 05/10/2023     Will give Taxol only today at decreased dose of 60 mg/m2. Will discontinue Carbo just for today's treatment. Patient will continue daily GCSF x3 days after each treatment. Will re-check CBC next week. If counts are acceptable, will give full treatment next week.

## 2023-05-10 NOTE — TELEPHONE ENCOUNTER
Spoke with the patient's daughter to let her know that we have her scheduled for a port study on Friday, May 12. She will need to arrive @ 10:00 AM for a 10:30 AM appointment. The patient's daughter acknowledged.

## 2023-05-10 NOTE — PROGRESS NOTES
extension into left shoulder and neck    Plan  I will ask vascular surgery to perform a port study and proceed with further care as needed.       (Please note that portions of this note were completed with a voice recognition program. Efforts were made to edit the dictations but occasionally words are mis-transcribed.)

## 2023-05-11 ENCOUNTER — HOSPITAL ENCOUNTER (OUTPATIENT)
Dept: INFUSION THERAPY | Age: 48
Setting detail: INFUSION SERIES
Discharge: HOME | End: 2023-05-11
Payer: COMMERCIAL

## 2023-05-11 ENCOUNTER — PREP FOR PROCEDURE (OUTPATIENT)
Dept: VASCULAR SURGERY | Age: 48
End: 2023-05-11

## 2023-05-11 DIAGNOSIS — T45.1X5A CHEMOTHERAPY-INDUCED NEUTROPENIA (HCC): Primary | ICD-10-CM

## 2023-05-11 DIAGNOSIS — D70.1 CHEMOTHERAPY-INDUCED NEUTROPENIA (HCC): Primary | ICD-10-CM

## 2023-05-11 PROCEDURE — 6360000002 HC RX W HCPCS: Performed by: INTERNAL MEDICINE

## 2023-05-11 PROCEDURE — 96372 THER/PROPH/DIAG INJ SC/IM: CPT

## 2023-05-11 RX ORDER — ONDANSETRON 2 MG/ML
8 INJECTION INTRAMUSCULAR; INTRAVENOUS
Status: CANCELLED | OUTPATIENT
Start: 2023-05-11

## 2023-05-11 RX ORDER — SODIUM CHLORIDE 9 MG/ML
INJECTION, SOLUTION INTRAVENOUS CONTINUOUS
Status: CANCELLED | OUTPATIENT
Start: 2023-05-11

## 2023-05-11 RX ORDER — EPINEPHRINE 1 MG/ML
0.3 INJECTION, SOLUTION, CONCENTRATE INTRAVENOUS PRN
Status: CANCELLED | OUTPATIENT
Start: 2023-05-11

## 2023-05-11 RX ORDER — ALBUTEROL SULFATE 90 UG/1
4 AEROSOL, METERED RESPIRATORY (INHALATION) PRN
Status: CANCELLED | OUTPATIENT
Start: 2023-05-11

## 2023-05-11 RX ORDER — SODIUM CHLORIDE 9 MG/ML
INJECTION, SOLUTION INTRAVENOUS PRN
Status: CANCELLED | OUTPATIENT
Start: 2023-05-11

## 2023-05-11 RX ORDER — SODIUM CHLORIDE 0.9 % (FLUSH) 0.9 %
5-40 SYRINGE (ML) INJECTION EVERY 12 HOURS SCHEDULED
Status: CANCELLED | OUTPATIENT
Start: 2023-05-11

## 2023-05-11 RX ORDER — ACETAMINOPHEN 325 MG/1
650 TABLET ORAL
Status: CANCELLED | OUTPATIENT
Start: 2023-05-11

## 2023-05-11 RX ORDER — SODIUM CHLORIDE 0.9 % (FLUSH) 0.9 %
5-40 SYRINGE (ML) INJECTION PRN
Status: CANCELLED | OUTPATIENT
Start: 2023-05-11

## 2023-05-11 RX ORDER — DIPHENHYDRAMINE HYDROCHLORIDE 50 MG/ML
50 INJECTION INTRAMUSCULAR; INTRAVENOUS
Status: CANCELLED | OUTPATIENT
Start: 2023-05-11

## 2023-05-11 RX ADMIN — FILGRASTIM-SNDZ 480 MCG: 480 INJECTION, SOLUTION INTRAVENOUS; SUBCUTANEOUS at 16:25

## 2023-05-11 NOTE — DISCHARGE INSTRUCTIONS
filgrastim  Pronunciation:  rey GRAS hawk  Brand:  Neupogen, Neupogen SingleJect, Nivestym, Zarxio  What is the most important information I should know about filgrastim? Filgrastim can cause your spleen to become enlarged and it could rupture (tear). Call your doctor right away if you have sudden or severe pain in your left upper stomach spreading up to your shoulder. What is filgrastim? Filgrastim is a man-made form of a protein that stimulates the growth of white blood cells in your body. White blood cells help your body fight against infection. Filgrastim is used to treat neutropenia, a lack of certain white blood cells caused by cancer, bone marrow transplant, receiving chemotherapy, or by other conditions. Filgrastim may also be used for purposes not listed in this medication guide. What should I discuss with my healthcare provider before using filgrastim? You should not use this medicine if you are allergic to filgrastim or pegfilgrastim, or to other medicines that contain the E. coli bacteria. Tell your doctor if you have ever had:  sickle cell disorder;  kidney disease;  latex allergy; or  radiation treatment. Tell your doctor if you are pregnant or breastfeeding. How should I use filgrastim? Follow all directions on your prescription label and read all medication guides or instruction sheets. Use the medicine exactly as directed. Filgrastim is injected under the skin or as an infusion into a vein. A healthcare provider will give your first dose and may teach you how to properly use the medication by yourself. Filgrastim vials and prefilled syringes do not contain the same concentrations of this medicine. Do not give an injection unless you are trained in properly measuring your dose from either the vial or the prefilled syringe. Read and carefully follow any Instructions for Use provided with your medicine. Do not use filgrastim if you don't understand all instructions for proper use.  Ask

## 2023-05-11 NOTE — H&P (VIEW-ONLY)
Patient Care Team:  WHITNEY Ledezma as PCP - General (Internal Medicine)  WHITNEY Ledezma as PCP - Empaneled Provider  Maria Luz Cui MD as Consulting Physician (Cardiology)  WHITNEY De La Garza as Advanced Practice Nurse (Neurology)  John Henson, RN as Registered Nurse      Rayne Ying 50 y.o. female with a history of left breast cancer receiving chemotherapy. She has been experiencing pain around her port to her left chest wall and shoulder. We have been asked to perform a port study.      Patient Active Problem List   Diagnosis    Uterine mass    S/P laparoscopic cholecystectomy    Primary osteoarthritis of right hip    Essential hypertension    Anxiety    Tobacco abuse    PVC (premature ventricular contraction)    Gastroesophageal reflux disease without esophagitis    Breast pain    Diffuse cystic mastopathy    History of breast augmentation    Overactive bladder    Restless leg syndrome    Insomnia    Malignant neoplasm of overlapping sites of left breast (HCC)    Triple negative breast cancer (HCC)    Chemotherapy-induced neutropenia (HCC)      See attached meds  Allergies:  Codeine, Percocet [oxycodone-acetaminophen], and Toradol [ketorolac tromethamine]  Past Medical History:   Diagnosis Date    Adverse effect of chemotherapy     Breast cancer ([facility] [address] 11/2022    Triple negative breast cancer    Hypotension     Malignant neoplasm of overlapping sites of left breast ([facility] [address] 11/22/2022    PVC (premature ventricular contraction)     Restless leg syndrome 07/16/2018    Uterine mass 06/17/2014    9x9 cm on CT scan      Past Surgical History:   Procedure Laterality Date    BREAST RECONSTRUCTION  04/2019    BREAST SURGERY Bilateral 4104    silicone Memory Gel    CHOLECYSTECTOMY  06/24/2014    COLONOSCOPY N/A 03/15/2023    Dr Suzi Tapia, mili ink tattooing placed in distal sigmoid colon, Bening TA (-)Dysplasia, int hem Gr 1, 3 year    HC INJECT OTHER PERPHRL NERV Right 08/03/2016    HIP

## 2023-05-12 ENCOUNTER — HOSPITAL ENCOUNTER (OUTPATIENT)
Dept: INFUSION THERAPY | Age: 48
Discharge: HOME | End: 2023-05-12
Payer: COMMERCIAL

## 2023-05-12 ENCOUNTER — HOSPITAL ENCOUNTER (OUTPATIENT)
Dept: INTERVENTIONAL RADIOLOGY/VASCULAR | Age: 48
Discharge: HOME | End: 2023-05-12
Payer: COMMERCIAL

## 2023-05-12 DIAGNOSIS — D70.1 CHEMOTHERAPY-INDUCED NEUTROPENIA (HCC): Primary | ICD-10-CM

## 2023-05-12 DIAGNOSIS — T45.1X5A CHEMOTHERAPY-INDUCED NEUTROPENIA (HCC): Primary | ICD-10-CM

## 2023-05-12 DIAGNOSIS — T45.1X5A CHEMOTHERAPY INDUCED NEUTROPENIA (HCC): ICD-10-CM

## 2023-05-12 DIAGNOSIS — D70.1 CHEMOTHERAPY INDUCED NEUTROPENIA (HCC): ICD-10-CM

## 2023-05-12 PROCEDURE — 2709999900 IR CONTRAST INJECTION  EXISTING CV ACCESS DEVICE EVALUATION W FLUORO

## 2023-05-12 PROCEDURE — 6360000002 HC RX W HCPCS: Performed by: INTERNAL MEDICINE

## 2023-05-12 PROCEDURE — 6360000004 HC RX CONTRAST MEDICATION: Performed by: SURGERY

## 2023-05-12 PROCEDURE — 36598 INJ W/FLUOR EVAL CV DEVICE: CPT

## 2023-05-12 PROCEDURE — 96372 THER/PROPH/DIAG INJ SC/IM: CPT

## 2023-05-12 RX ORDER — ACETAMINOPHEN 325 MG/1
650 TABLET ORAL
Status: CANCELLED | OUTPATIENT
Start: 2023-05-12

## 2023-05-12 RX ORDER — IODIXANOL 320 MG/ML
INJECTION, SOLUTION INTRAVASCULAR PRN
Status: DISCONTINUED | OUTPATIENT
Start: 2023-05-12 | End: 2023-05-14 | Stop reason: HOSPADM

## 2023-05-12 RX ORDER — ONDANSETRON 2 MG/ML
8 INJECTION INTRAMUSCULAR; INTRAVENOUS
Status: CANCELLED | OUTPATIENT
Start: 2023-05-12

## 2023-05-12 RX ORDER — EPINEPHRINE 1 MG/ML
0.3 INJECTION, SOLUTION, CONCENTRATE INTRAVENOUS PRN
Status: CANCELLED | OUTPATIENT
Start: 2023-05-12

## 2023-05-12 RX ORDER — DIPHENHYDRAMINE HYDROCHLORIDE 50 MG/ML
50 INJECTION INTRAMUSCULAR; INTRAVENOUS
Status: CANCELLED | OUTPATIENT
Start: 2023-05-12

## 2023-05-12 RX ORDER — SODIUM CHLORIDE 9 MG/ML
INJECTION, SOLUTION INTRAVENOUS CONTINUOUS
Status: CANCELLED | OUTPATIENT
Start: 2023-05-12

## 2023-05-12 RX ORDER — ALBUTEROL SULFATE 90 UG/1
4 AEROSOL, METERED RESPIRATORY (INHALATION) PRN
Status: CANCELLED | OUTPATIENT
Start: 2023-05-12

## 2023-05-12 RX ORDER — FAMOTIDINE 10 MG/ML
20 INJECTION, SOLUTION INTRAVENOUS
Status: CANCELLED | OUTPATIENT
Start: 2023-05-12

## 2023-05-12 RX ADMIN — FILGRASTIM-SNDZ 480 MCG: 480 INJECTION, SOLUTION INTRAVENOUS; SUBCUTANEOUS at 12:35

## 2023-05-12 RX ADMIN — IODIXANOL 10 ML: 320 INJECTION, SOLUTION INTRAVASCULAR at 11:24

## 2023-05-12 NOTE — INTERVAL H&P NOTE
Update History & Physical    The patient's History and Physical of May 11, 2023 was reviewed with the patient and I examined the patient. There was no change. The surgical site was confirmed by the patient and me. Plan: The risks, benefits, expected outcome, and alternative to the recommended procedure have been discussed with the patient. Patient understands and wants to proceed with the procedure.      Electronically signed by Carlton Peitt DO on 5/12/2023 at 11:26 AM

## 2023-05-12 NOTE — OP NOTE
Patient Name: Samantha Mora   YOB: 1975   MRN: [id number]     Procedure Date: 5/12/2023     Pre Op Diagnosis: discomfort at the  port site    Post Op Diagnosis: same    Procedure: Port study    Anesthesia:  none    Estimated Blood Loss: none    Complications: None    Implants: none    Procedure Details: Patient was brought to the angiogram suite and placed supine position. Her port site was prepped and draped in sterile fashion. It was accessed with [facility] needle tubing. Fluoroscopy was performed without injection and then with injection. Port is in good position left subclavian ostium at the SVC atrial junction. Port to be continued to use. Findings: Left subclavian port. Given intact. Current injection shows good flow of the tip of the port. The imaging of the SVC atrial junction.     Disposition:aroused from sedation, and taken to the recovery room in a stable condition    Liliane Jimenez DO  5/12/2023 11:27 AM

## 2023-05-15 ENCOUNTER — HOSPITAL ENCOUNTER (OUTPATIENT)
Dept: INFUSION THERAPY | Age: 48
Discharge: HOME | End: 2023-05-15
Payer: COMMERCIAL

## 2023-05-15 DIAGNOSIS — T45.1X5A CHEMOTHERAPY-INDUCED NEUTROPENIA (HCC): Primary | ICD-10-CM

## 2023-05-15 DIAGNOSIS — D70.1 CHEMOTHERAPY-INDUCED NEUTROPENIA (HCC): Primary | ICD-10-CM

## 2023-05-15 PROCEDURE — 6360000002 HC RX W HCPCS: Performed by: INTERNAL MEDICINE

## 2023-05-15 PROCEDURE — 96372 THER/PROPH/DIAG INJ SC/IM: CPT

## 2023-05-15 RX ORDER — FAMOTIDINE 10 MG/ML
20 INJECTION, SOLUTION INTRAVENOUS
OUTPATIENT
Start: 2023-05-15

## 2023-05-15 RX ORDER — ACETAMINOPHEN 325 MG/1
650 TABLET ORAL
OUTPATIENT
Start: 2023-05-15

## 2023-05-15 RX ORDER — SODIUM CHLORIDE 9 MG/ML
INJECTION, SOLUTION INTRAVENOUS CONTINUOUS
OUTPATIENT
Start: 2023-05-15

## 2023-05-15 RX ORDER — ALBUTEROL SULFATE 90 UG/1
4 AEROSOL, METERED RESPIRATORY (INHALATION) PRN
OUTPATIENT
Start: 2023-05-15

## 2023-05-15 RX ORDER — EPINEPHRINE 1 MG/ML
0.3 INJECTION, SOLUTION, CONCENTRATE INTRAVENOUS PRN
OUTPATIENT
Start: 2023-05-15

## 2023-05-15 RX ORDER — ONDANSETRON 2 MG/ML
8 INJECTION INTRAMUSCULAR; INTRAVENOUS
OUTPATIENT
Start: 2023-05-15

## 2023-05-15 RX ORDER — DIPHENHYDRAMINE HYDROCHLORIDE 50 MG/ML
50 INJECTION INTRAMUSCULAR; INTRAVENOUS
OUTPATIENT
Start: 2023-05-15

## 2023-05-15 RX ADMIN — FILGRASTIM-SNDZ 480 MCG: 480 INJECTION, SOLUTION INTRAVENOUS; SUBCUTANEOUS at 11:56

## 2023-05-17 ENCOUNTER — HOSPITAL ENCOUNTER (OUTPATIENT)
Dept: INFUSION THERAPY | Age: 48
End: 2023-05-17

## 2023-05-18 ENCOUNTER — HOSPITAL ENCOUNTER (OUTPATIENT)
Dept: INFUSION THERAPY | Age: 48
Discharge: HOME | End: 2023-05-18
Payer: COMMERCIAL

## 2023-05-18 ENCOUNTER — CLINICAL DOCUMENTATION (OUTPATIENT)
Dept: HEMATOLOGY | Age: 48
End: 2023-05-18

## 2023-05-18 VITALS
RESPIRATION RATE: 16 BRPM | TEMPERATURE: 98.5 F | OXYGEN SATURATION: 100 % | DIASTOLIC BLOOD PRESSURE: 51 MMHG | HEIGHT: 69 IN | BODY MASS INDEX: 19.6 KG/M2 | SYSTOLIC BLOOD PRESSURE: 116 MMHG | WEIGHT: 132.3 LBS | HEART RATE: 64 BPM

## 2023-05-18 DIAGNOSIS — D70.1 CHEMOTHERAPY-INDUCED NEUTROPENIA (HCC): ICD-10-CM

## 2023-05-18 DIAGNOSIS — T45.1X5A CHEMOTHERAPY-INDUCED NEUTROPENIA (HCC): ICD-10-CM

## 2023-05-18 DIAGNOSIS — C50.812 MALIGNANT NEOPLASM OF OVERLAPPING SITES OF LEFT BREAST (HCC): Primary | ICD-10-CM

## 2023-05-18 DIAGNOSIS — C50.919 TRIPLE NEGATIVE BREAST CANCER (HCC): ICD-10-CM

## 2023-05-18 LAB
ALBUMIN SERPL-MCNC: 3.9 G/DL (ref 3.5–5.2)
ALP SERPL-CCNC: 73 U/L (ref 35–104)
ALT SERPL-CCNC: 17 U/L (ref 9–52)
ANION GAP SERPL CALCULATED.3IONS-SCNC: 9 MMOL/L (ref 7–19)
AST SERPL-CCNC: 20 U/L (ref 14–36)
BILIRUB SERPL-MCNC: <0.2 MG/DL (ref 0.2–1.3)
BUN SERPL-MCNC: 10 MG/DL (ref 7–17)
CALCIUM SERPL-MCNC: 9.2 MG/DL (ref 8.4–10.2)
CHLORIDE SERPL-SCNC: 106 MMOL/L (ref 98–111)
CO2 SERPL-SCNC: 24 MMOL/L (ref 22–29)
CREAT SERPL-MCNC: 0.7 MG/DL (ref 0.5–1)
ERYTHROCYTE [DISTWIDTH] IN BLOOD BY AUTOMATED COUNT: 11.9 % (ref 11.7–14.4)
GLOBULIN: 3 G/DL
GLUCOSE SERPL-MCNC: 107 MG/DL (ref 74–106)
HCT VFR BLD AUTO: 26.9 % (ref 34.1–44.9)
HGB BLD-MCNC: 8.8 G/DL (ref 11.2–15.7)
LYMPHOCYTES # BLD: 1.54 K/UL (ref 1.18–3.74)
LYMPHOCYTES NFR BLD: 59.2 % (ref 19.3–53.1)
MCH RBC QN AUTO: 37.8 PG (ref 25.6–32.2)
MCHC RBC AUTO-ENTMCNC: 32.7 G/DL (ref 32.3–35.5)
MCV RBC AUTO: 115.5 FL (ref 79.4–94.8)
MONOCYTES # BLD: 0.1 K/UL (ref 0.24–0.82)
MONOCYTES NFR BLD: 3.8 % (ref 4.7–12.5)
NEUTROPHILS # BLD: 0.77 K/UL (ref 1.56–6.13)
NEUTS SEG NFR BLD: 29.7 % (ref 34–71.1)
PLATELET # BLD AUTO: 24 K/UL (ref 182–369)
PMV BLD AUTO: 11.7 FL (ref 7.4–10.4)
POTASSIUM SERPL-SCNC: 3.5 MMOL/L (ref 3.5–5.1)
PROT SERPL-MCNC: 6.8 G/DL (ref 6.3–8.2)
RBC # BLD AUTO: 2.33 M/UL (ref 3.93–5.22)
SODIUM SERPL-SCNC: 139 MMOL/L (ref 137–145)
WBC # BLD AUTO: 2.6 K/UL (ref 3.98–10.04)

## 2023-05-18 PROCEDURE — 36415 COLL VENOUS BLD VENIPUNCTURE: CPT

## 2023-05-18 PROCEDURE — 6360000002 HC RX W HCPCS: Performed by: INTERNAL MEDICINE

## 2023-05-18 PROCEDURE — 85025 COMPLETE CBC W/AUTO DIFF WBC: CPT

## 2023-05-18 PROCEDURE — 96372 THER/PROPH/DIAG INJ SC/IM: CPT

## 2023-05-18 PROCEDURE — 2580000003 HC RX 258: Performed by: INTERNAL MEDICINE

## 2023-05-18 PROCEDURE — 96523 IRRIG DRUG DELIVERY DEVICE: CPT

## 2023-05-18 PROCEDURE — 80053 COMPREHEN METABOLIC PANEL: CPT

## 2023-05-18 RX ORDER — SODIUM CHLORIDE 0.9 % (FLUSH) 0.9 %
5-40 SYRINGE (ML) INJECTION PRN
Status: DISCONTINUED | OUTPATIENT
Start: 2023-05-18 | End: 2023-05-19 | Stop reason: HOSPADM

## 2023-05-18 RX ORDER — HEPARIN SODIUM (PORCINE) LOCK FLUSH IV SOLN 100 UNIT/ML 100 UNIT/ML
500 SOLUTION INTRAVENOUS PRN
OUTPATIENT
Start: 2023-05-18

## 2023-05-18 RX ORDER — SODIUM CHLORIDE 9 MG/ML
25 INJECTION, SOLUTION INTRAVENOUS PRN
OUTPATIENT
Start: 2023-05-18

## 2023-05-18 RX ORDER — HEPARIN SODIUM (PORCINE) LOCK FLUSH IV SOLN 100 UNIT/ML 100 UNIT/ML
500 SOLUTION INTRAVENOUS PRN
Status: DISCONTINUED | OUTPATIENT
Start: 2023-05-18 | End: 2023-05-19 | Stop reason: HOSPADM

## 2023-05-18 RX ORDER — FAMOTIDINE 10 MG/ML
20 INJECTION, SOLUTION INTRAVENOUS
OUTPATIENT
Start: 2023-05-18

## 2023-05-18 RX ORDER — ONDANSETRON 2 MG/ML
8 INJECTION INTRAMUSCULAR; INTRAVENOUS
OUTPATIENT
Start: 2023-05-18

## 2023-05-18 RX ORDER — ALBUTEROL SULFATE 90 UG/1
4 AEROSOL, METERED RESPIRATORY (INHALATION) PRN
OUTPATIENT
Start: 2023-05-18

## 2023-05-18 RX ORDER — SODIUM CHLORIDE 9 MG/ML
INJECTION, SOLUTION INTRAVENOUS CONTINUOUS
OUTPATIENT
Start: 2023-05-18

## 2023-05-18 RX ORDER — EPINEPHRINE 1 MG/ML
0.3 INJECTION, SOLUTION, CONCENTRATE INTRAVENOUS PRN
OUTPATIENT
Start: 2023-05-18

## 2023-05-18 RX ORDER — DIPHENHYDRAMINE HYDROCHLORIDE 50 MG/ML
50 INJECTION INTRAMUSCULAR; INTRAVENOUS
OUTPATIENT
Start: 2023-05-18

## 2023-05-18 RX ORDER — ACETAMINOPHEN 325 MG/1
650 TABLET ORAL
OUTPATIENT
Start: 2023-05-18

## 2023-05-18 RX ORDER — SODIUM CHLORIDE 0.9 % (FLUSH) 0.9 %
5-40 SYRINGE (ML) INJECTION PRN
OUTPATIENT
Start: 2023-05-18

## 2023-05-18 RX ADMIN — FILGRASTIM-SNDZ 480 MCG: 480 INJECTION, SOLUTION INTRAVENOUS; SUBCUTANEOUS at 15:20

## 2023-05-18 RX ADMIN — SODIUM CHLORIDE, PRESERVATIVE FREE 10 ML: 5 INJECTION INTRAVENOUS at 15:20

## 2023-05-18 RX ADMIN — HEPARIN 500 UNITS: 100 SYRINGE at 15:20

## 2023-05-18 NOTE — PROGRESS NOTES
Lab Results   Component Value Date    WBC 2.60 (L) 05/18/2023    HGB 8.8 (L) 05/18/2023    HCT 26.9 (LL) 05/18/2023    .5 (H) 05/18/2023    PLT 24 (LL) 05/18/2023     Lab Results   Component Value Date    NEUTROABS 0.77 (L) 05/18/2023     Pt here for weekly Taxol Carbo. WBC 2.6, ANC 0.77, PLT 24. Discussed with Dr Marylou Harden who recommended to discontinue remaining chemotherapy (5 more weekly cycles), continue Pembrolizumab every 6 weeks and to proceed with surgery with Dr Shannon Sánchez, whenever he chooses. Discussed above with pt and her daughter. Both voiced understanding. Call to Patricia/Dr Cali's office to inform them of new plan. Pt to return for weekly CBC, next Pembrolizumab on 6/1 and to see Dr Marylou Harden on 6/1 to discuss further treatment options.

## 2023-05-19 ENCOUNTER — HOSPITAL ENCOUNTER (OUTPATIENT)
Dept: INFUSION THERAPY | Age: 48
End: 2023-05-19

## 2023-05-22 ENCOUNTER — OFFICE VISIT (OUTPATIENT)
Dept: SURGERY | Age: 48
End: 2023-05-22
Payer: COMMERCIAL

## 2023-05-22 VITALS — SYSTOLIC BLOOD PRESSURE: 80 MMHG | HEART RATE: 72 BPM | DIASTOLIC BLOOD PRESSURE: 60 MMHG

## 2023-05-22 DIAGNOSIS — C50.812 MALIGNANT NEOPLASM OF OVERLAPPING SITES OF LEFT BREAST (HCC): Primary | ICD-10-CM

## 2023-05-22 DIAGNOSIS — C50.919 TRIPLE NEGATIVE BREAST CANCER (HCC): ICD-10-CM

## 2023-05-22 PROCEDURE — 3078F DIAST BP <80 MM HG: CPT | Performed by: SURGERY

## 2023-05-22 PROCEDURE — 3074F SYST BP LT 130 MM HG: CPT | Performed by: SURGERY

## 2023-05-22 PROCEDURE — 99213 OFFICE O/P EST LOW 20 MIN: CPT | Performed by: SURGERY

## 2023-05-25 ENCOUNTER — HOSPITAL ENCOUNTER (OUTPATIENT)
Dept: INFUSION THERAPY | Age: 48
End: 2023-05-25

## 2023-05-31 NOTE — PROGRESS NOTES
very focal changes consistent with reflux esophagitis, negative for evidence of eosinophilic esophagitis. Colon, sigmoid polypectomy: Tubular adenoma, negative for evidence of high-grade dysplasia. Recommended repeat in 1-3 years for surveillance. PLAN:  RTC with MD in treatment room 7 weeks  CBC, CMP TSH T4 Cortisol today  C#4/9 Pembrolizumab 400 mg today and in 7 weeks   GCSF x3 days  Continue Zofran and Phenergan as needed  Emla cream to port as needed  Continue follow-up with Dr Conner Sanchez for surgery, 6/21/23  Continue follow up with Dr. Sandra Valderrama Cardiology-10/31/23    Follow Up:  Return in about 7 weeks (around 7/20/2023) for Treatment & see Donna Cano in [address]. GCSF daily x3  Keytruda 7 weeks     IEduardo am pre-charting as a registered nurse for Kavya Loza MD. Electronically signed by Eduardo Hines RN on 6/1/2023 at 9:37 AM CDT. Irma Mckenna am scribing for Kavya Loza MD. Electronically signed by Adela Ely RN on 6/1/2023 at 12:34 PM CDT. I, Dr Elisabet Olsen, personally performed the services described in this documentation as scribed by Adela Ely RN in my presence and is both accurate and complete. I have seen, examined and reviewed this patient medication list, appropriate labs and imaging studies. I reviewed relevant medical records and others physicians notes. I discussed the plans of care with the patient. I answered all the questions to the patients satisfaction. I have also reviewed the chief complaint (CC) and part of the history (History of Present Illness (HPI), Past Family Social History [facility]), or Review of Systems (ROS) and made changes when appropriated.        (Please note that portions of this note were completed with a voice recognition program. Efforts were made to edit the dictations but occasionally words are mis-transcribed.)  Electronically signed by Kavya Loza MD on 6/1/2023 at 6:06 PM

## 2023-06-01 ENCOUNTER — HOSPITAL ENCOUNTER (OUTPATIENT)
Dept: INFUSION THERAPY | Age: 48
Discharge: HOME | End: 2023-06-01
Payer: COMMERCIAL

## 2023-06-01 ENCOUNTER — OFFICE VISIT (OUTPATIENT)
Dept: HEMATOLOGY | Age: 48
End: 2023-06-01
Payer: COMMERCIAL

## 2023-06-01 VITALS
OXYGEN SATURATION: 100 % | SYSTOLIC BLOOD PRESSURE: 104 MMHG | BODY MASS INDEX: 19.3 KG/M2 | WEIGHT: 130.3 LBS | HEIGHT: 69 IN | HEART RATE: 58 BPM | DIASTOLIC BLOOD PRESSURE: 59 MMHG | RESPIRATION RATE: 18 BRPM | TEMPERATURE: 98.2 F

## 2023-06-01 DIAGNOSIS — C50.919 TRIPLE NEGATIVE BREAST CANCER (HCC): Primary | ICD-10-CM

## 2023-06-01 DIAGNOSIS — D70.1 CHEMOTHERAPY INDUCED NEUTROPENIA (HCC): ICD-10-CM

## 2023-06-01 DIAGNOSIS — R53.83 FATIGUE DUE TO TREATMENT: ICD-10-CM

## 2023-06-01 DIAGNOSIS — Z51.12 ENCOUNTER FOR ANTINEOPLASTIC IMMUNOTHERAPY: ICD-10-CM

## 2023-06-01 DIAGNOSIS — D70.1 CHEMOTHERAPY-INDUCED NEUTROPENIA (HCC): ICD-10-CM

## 2023-06-01 DIAGNOSIS — Z45.2 ENCOUNTER FOR CENTRAL LINE CARE: ICD-10-CM

## 2023-06-01 DIAGNOSIS — Z71.89 CARE PLAN DISCUSSED WITH PATIENT: ICD-10-CM

## 2023-06-01 DIAGNOSIS — C50.812 MALIGNANT NEOPLASM OF OVERLAPPING SITES OF LEFT BREAST (HCC): ICD-10-CM

## 2023-06-01 DIAGNOSIS — T45.1X5A CHEMOTHERAPY-INDUCED NEUTROPENIA (HCC): ICD-10-CM

## 2023-06-01 DIAGNOSIS — T45.1X5A CHEMOTHERAPY INDUCED NEUTROPENIA (HCC): ICD-10-CM

## 2023-06-01 DIAGNOSIS — C50.812 MALIGNANT NEOPLASM OF OVERLAPPING SITES OF LEFT BREAST (HCC): Primary | ICD-10-CM

## 2023-06-01 PROBLEM — Z17.421 TRIPLE NEGATIVE BREAST CANCER (HCC): Status: RESOLVED | Noted: 2022-12-09 | Resolved: 2023-06-01

## 2023-06-01 LAB
ALBUMIN SERPL-MCNC: 3.8 G/DL (ref 3.5–5.2)
ALP SERPL-CCNC: 64 U/L (ref 35–104)
ALT SERPL-CCNC: 16 U/L (ref 9–52)
ANION GAP SERPL CALCULATED.3IONS-SCNC: 8 MMOL/L (ref 7–19)
AST SERPL-CCNC: 24 U/L (ref 14–36)
BILIRUB SERPL-MCNC: <0.2 MG/DL (ref 0.2–1.3)
BUN SERPL-MCNC: 9 MG/DL (ref 7–17)
CALCIUM SERPL-MCNC: 9.1 MG/DL (ref 8.4–10.2)
CHLORIDE SERPL-SCNC: 107 MMOL/L (ref 98–111)
CO2 SERPL-SCNC: 23 MMOL/L (ref 22–29)
CORTIS AM PEAK SERPL-MCNC: 6.4 UG/DL (ref 6.2–19.4)
CREAT SERPL-MCNC: 0.7 MG/DL (ref 0.5–1)
ERYTHROCYTE [DISTWIDTH] IN BLOOD BY AUTOMATED COUNT: 13.1 % (ref 11.7–14.4)
GLOBULIN: 3.1 G/DL
GLUCOSE SERPL-MCNC: 111 MG/DL (ref 74–106)
HCT VFR BLD AUTO: 30.3 % (ref 34.1–44.9)
HGB BLD-MCNC: 9.8 G/DL (ref 11.2–15.7)
LYMPHOCYTES # BLD: 1.53 K/UL (ref 1.18–3.74)
LYMPHOCYTES NFR BLD: 57.7 % (ref 19.3–53.1)
MCH RBC QN AUTO: 37.5 PG (ref 25.6–32.2)
MCHC RBC AUTO-ENTMCNC: 32.3 G/DL (ref 32.3–35.5)
MCV RBC AUTO: 116.1 FL (ref 79.4–94.8)
MONOCYTES # BLD: 0.21 K/UL (ref 0.24–0.82)
MONOCYTES NFR BLD: 7.9 % (ref 4.7–12.5)
NEUTROPHILS # BLD: 0.85 K/UL (ref 1.56–6.13)
NEUTS SEG NFR BLD: 32.1 % (ref 34–71.1)
PLATELET # BLD AUTO: 171 K/UL (ref 182–369)
PMV BLD AUTO: 9 FL (ref 7.4–10.4)
POTASSIUM SERPL-SCNC: 3.5 MMOL/L (ref 3.5–5.1)
PROT SERPL-MCNC: 6.9 G/DL (ref 6.3–8.2)
RBC # BLD AUTO: 2.61 M/UL (ref 3.93–5.22)
SODIUM SERPL-SCNC: 138 MMOL/L (ref 137–145)
T4 FREE SERPL-MCNC: 0.88 NG/DL (ref 0.93–1.7)
TSH SERPL DL<=0.005 MIU/L-ACNC: 8.12 UIU/ML (ref 0.27–4.2)
WBC # BLD AUTO: 2.65 K/UL (ref 3.98–10.04)

## 2023-06-01 PROCEDURE — 99214 OFFICE O/P EST MOD 30 MIN: CPT | Performed by: INTERNAL MEDICINE

## 2023-06-01 PROCEDURE — 2580000003 HC RX 258: Performed by: INTERNAL MEDICINE

## 2023-06-01 PROCEDURE — 36415 COLL VENOUS BLD VENIPUNCTURE: CPT

## 2023-06-01 PROCEDURE — 85025 COMPLETE CBC W/AUTO DIFF WBC: CPT

## 2023-06-01 PROCEDURE — 3074F SYST BP LT 130 MM HG: CPT | Performed by: INTERNAL MEDICINE

## 2023-06-01 PROCEDURE — 96372 THER/PROPH/DIAG INJ SC/IM: CPT

## 2023-06-01 PROCEDURE — 80053 COMPREHEN METABOLIC PANEL: CPT

## 2023-06-01 PROCEDURE — 6360000002 HC RX W HCPCS: Performed by: INTERNAL MEDICINE

## 2023-06-01 PROCEDURE — 96413 CHEMO IV INFUSION 1 HR: CPT

## 2023-06-01 PROCEDURE — 3078F DIAST BP <80 MM HG: CPT | Performed by: INTERNAL MEDICINE

## 2023-06-01 RX ORDER — FAMOTIDINE 10 MG/ML
20 INJECTION, SOLUTION INTRAVENOUS
Status: CANCELLED | OUTPATIENT
Start: 2023-06-01

## 2023-06-01 RX ORDER — SODIUM CHLORIDE 9 MG/ML
5-250 INJECTION, SOLUTION INTRAVENOUS PRN
Status: CANCELLED | OUTPATIENT
Start: 2023-06-01

## 2023-06-01 RX ORDER — ACETAMINOPHEN 325 MG/1
650 TABLET ORAL
Status: CANCELLED | OUTPATIENT
Start: 2023-06-01

## 2023-06-01 RX ORDER — HEPARIN SODIUM (PORCINE) LOCK FLUSH IV SOLN 100 UNIT/ML 100 UNIT/ML
500 SOLUTION INTRAVENOUS PRN
Status: CANCELLED | OUTPATIENT
Start: 2023-06-01

## 2023-06-01 RX ORDER — SODIUM CHLORIDE 0.9 % (FLUSH) 0.9 %
5-40 SYRINGE (ML) INJECTION PRN
Status: CANCELLED | OUTPATIENT
Start: 2023-06-01

## 2023-06-01 RX ORDER — ONDANSETRON 2 MG/ML
8 INJECTION INTRAMUSCULAR; INTRAVENOUS
Status: CANCELLED | OUTPATIENT
Start: 2023-06-01

## 2023-06-01 RX ORDER — EPINEPHRINE 1 MG/ML
0.3 INJECTION, SOLUTION, CONCENTRATE INTRAVENOUS PRN
Status: CANCELLED | OUTPATIENT
Start: 2023-06-01

## 2023-06-01 RX ORDER — SODIUM CHLORIDE 9 MG/ML
INJECTION, SOLUTION INTRAVENOUS CONTINUOUS
Status: CANCELLED | OUTPATIENT
Start: 2023-06-01

## 2023-06-01 RX ORDER — MEPERIDINE HYDROCHLORIDE 50 MG/ML
12.5 INJECTION INTRAMUSCULAR; INTRAVENOUS; SUBCUTANEOUS PRN
Status: CANCELLED | OUTPATIENT
Start: 2023-06-01

## 2023-06-01 RX ORDER — ALBUTEROL SULFATE 90 UG/1
4 AEROSOL, METERED RESPIRATORY (INHALATION) PRN
Status: CANCELLED | OUTPATIENT
Start: 2023-06-01

## 2023-06-01 RX ORDER — SODIUM CHLORIDE 0.9 % (FLUSH) 0.9 %
5-40 SYRINGE (ML) INJECTION PRN
Status: DISCONTINUED | OUTPATIENT
Start: 2023-06-01 | End: 2023-06-02 | Stop reason: HOSPADM

## 2023-06-01 RX ORDER — SODIUM CHLORIDE 9 MG/ML
5-250 INJECTION, SOLUTION INTRAVENOUS PRN
Status: DISCONTINUED | OUTPATIENT
Start: 2023-06-01 | End: 2023-06-02 | Stop reason: HOSPADM

## 2023-06-01 RX ORDER — DIPHENHYDRAMINE HYDROCHLORIDE 50 MG/ML
50 INJECTION INTRAMUSCULAR; INTRAVENOUS
Status: CANCELLED | OUTPATIENT
Start: 2023-06-01

## 2023-06-01 RX ORDER — HEPARIN SODIUM (PORCINE) LOCK FLUSH IV SOLN 100 UNIT/ML 100 UNIT/ML
500 SOLUTION INTRAVENOUS PRN
Status: DISCONTINUED | OUTPATIENT
Start: 2023-06-01 | End: 2023-06-02 | Stop reason: HOSPADM

## 2023-06-01 RX ADMIN — SODIUM CHLORIDE 400 MG: 9 INJECTION, SOLUTION INTRAVENOUS at 13:07

## 2023-06-01 RX ADMIN — HEPARIN 500 UNITS: 100 SYRINGE at 13:37

## 2023-06-01 RX ADMIN — Medication 10 ML: at 13:37

## 2023-06-01 RX ADMIN — FILGRASTIM-SNDZ 480 MCG: 480 INJECTION, SOLUTION INTRAVENOUS; SUBCUTANEOUS at 13:37

## 2023-06-02 ENCOUNTER — HOSPITAL ENCOUNTER (OUTPATIENT)
Dept: INFUSION THERAPY | Age: 48
Discharge: HOME | End: 2023-06-02
Payer: COMMERCIAL

## 2023-06-02 DIAGNOSIS — T45.1X5A CHEMOTHERAPY-INDUCED NEUTROPENIA (HCC): Primary | ICD-10-CM

## 2023-06-02 DIAGNOSIS — D70.1 CHEMOTHERAPY-INDUCED NEUTROPENIA (HCC): Primary | ICD-10-CM

## 2023-06-02 PROCEDURE — 96372 THER/PROPH/DIAG INJ SC/IM: CPT

## 2023-06-02 PROCEDURE — 6360000002 HC RX W HCPCS: Performed by: INTERNAL MEDICINE

## 2023-06-02 RX ORDER — DIPHENHYDRAMINE HYDROCHLORIDE 50 MG/ML
50 INJECTION INTRAMUSCULAR; INTRAVENOUS
OUTPATIENT
Start: 2023-06-02

## 2023-06-02 RX ORDER — ACETAMINOPHEN 325 MG/1
650 TABLET ORAL
OUTPATIENT
Start: 2023-06-02

## 2023-06-02 RX ORDER — EPINEPHRINE 1 MG/ML
0.3 INJECTION, SOLUTION, CONCENTRATE INTRAVENOUS PRN
OUTPATIENT
Start: 2023-06-02

## 2023-06-02 RX ORDER — FAMOTIDINE 10 MG/ML
20 INJECTION, SOLUTION INTRAVENOUS
OUTPATIENT
Start: 2023-06-02

## 2023-06-02 RX ORDER — ALBUTEROL SULFATE 90 UG/1
4 AEROSOL, METERED RESPIRATORY (INHALATION) PRN
OUTPATIENT
Start: 2023-06-02

## 2023-06-02 RX ORDER — ONDANSETRON 2 MG/ML
8 INJECTION INTRAMUSCULAR; INTRAVENOUS
OUTPATIENT
Start: 2023-06-02

## 2023-06-02 RX ORDER — SODIUM CHLORIDE 9 MG/ML
INJECTION, SOLUTION INTRAVENOUS CONTINUOUS
OUTPATIENT
Start: 2023-06-02

## 2023-06-02 RX ADMIN — FILGRASTIM-SNDZ 480 MCG: 480 INJECTION, SOLUTION INTRAVENOUS; SUBCUTANEOUS at 11:01

## 2023-06-05 ENCOUNTER — HOSPITAL ENCOUNTER (OUTPATIENT)
Dept: INFUSION THERAPY | Age: 48
Discharge: HOME | End: 2023-06-05
Payer: COMMERCIAL

## 2023-06-05 DIAGNOSIS — T45.1X5A CHEMOTHERAPY-INDUCED NEUTROPENIA (HCC): Primary | ICD-10-CM

## 2023-06-05 DIAGNOSIS — D70.1 CHEMOTHERAPY-INDUCED NEUTROPENIA (HCC): Primary | ICD-10-CM

## 2023-06-05 PROCEDURE — 96372 THER/PROPH/DIAG INJ SC/IM: CPT

## 2023-06-05 PROCEDURE — 6360000002 HC RX W HCPCS: Performed by: INTERNAL MEDICINE

## 2023-06-05 RX ORDER — SODIUM CHLORIDE 9 MG/ML
INJECTION, SOLUTION INTRAVENOUS CONTINUOUS
OUTPATIENT
Start: 2023-06-05

## 2023-06-05 RX ORDER — FAMOTIDINE 10 MG/ML
20 INJECTION, SOLUTION INTRAVENOUS
OUTPATIENT
Start: 2023-06-05

## 2023-06-05 RX ORDER — DIPHENHYDRAMINE HYDROCHLORIDE 50 MG/ML
50 INJECTION INTRAMUSCULAR; INTRAVENOUS
OUTPATIENT
Start: 2023-06-05

## 2023-06-05 RX ORDER — EPINEPHRINE 1 MG/ML
0.3 INJECTION, SOLUTION, CONCENTRATE INTRAVENOUS PRN
OUTPATIENT
Start: 2023-06-05

## 2023-06-05 RX ORDER — ONDANSETRON 2 MG/ML
8 INJECTION INTRAMUSCULAR; INTRAVENOUS
OUTPATIENT
Start: 2023-06-05

## 2023-06-05 RX ORDER — ACETAMINOPHEN 325 MG/1
650 TABLET ORAL
OUTPATIENT
Start: 2023-06-05

## 2023-06-05 RX ORDER — ALBUTEROL SULFATE 90 UG/1
4 AEROSOL, METERED RESPIRATORY (INHALATION) PRN
OUTPATIENT
Start: 2023-06-05

## 2023-06-05 RX ADMIN — FILGRASTIM-SNDZ 480 MCG: 480 INJECTION, SOLUTION INTRAVENOUS; SUBCUTANEOUS at 10:57

## 2023-06-06 ENCOUNTER — HOSPITAL ENCOUNTER (OUTPATIENT)
Dept: WOMENS IMAGING | Age: 48
Discharge: HOME | End: 2023-06-06
Attending: SURGERY
Payer: COMMERCIAL

## 2023-06-06 ENCOUNTER — HOSPITAL ENCOUNTER (OUTPATIENT)
Dept: MRI IMAGING | Age: 48
Discharge: HOME | End: 2023-06-06
Attending: SURGERY
Payer: COMMERCIAL

## 2023-06-06 DIAGNOSIS — C50.919 TRIPLE NEGATIVE BREAST CANCER (HCC): ICD-10-CM

## 2023-06-06 DIAGNOSIS — C50.812 MALIGNANT NEOPLASM OF OVERLAPPING SITES OF LEFT BREAST (HCC): ICD-10-CM

## 2023-06-06 PROCEDURE — 77049 MRI BREAST C-+ W/CAD BI: CPT | Performed by: RADIOLOGY

## 2023-06-06 PROCEDURE — 76642 ULTRASOUND BREAST LIMITED: CPT

## 2023-06-06 PROCEDURE — 6360000004 HC RX CONTRAST MEDICATION: Performed by: SURGERY

## 2023-06-06 PROCEDURE — 77065 DX MAMMO INCL CAD UNI: CPT | Performed by: RADIOLOGY

## 2023-06-06 PROCEDURE — C8908 MRI W/O FOL W/CONT, BREAST,: HCPCS

## 2023-06-06 PROCEDURE — 76642 ULTRASOUND BREAST LIMITED: CPT | Performed by: RADIOLOGY

## 2023-06-06 PROCEDURE — A9577 INJ MULTIHANCE: HCPCS | Performed by: SURGERY

## 2023-06-06 PROCEDURE — G0279 TOMOSYNTHESIS, MAMMO: HCPCS

## 2023-06-06 RX ADMIN — GADOBENATE DIMEGLUMINE 12 ML: 529 INJECTION, SOLUTION INTRAVENOUS at 16:07

## 2023-06-15 ENCOUNTER — HOSPITAL ENCOUNTER (OUTPATIENT)
Dept: INFUSION THERAPY | Age: 48
Discharge: HOME | End: 2023-06-15
Payer: COMMERCIAL

## 2023-06-15 DIAGNOSIS — C50.812 MALIGNANT NEOPLASM OF OVERLAPPING SITES OF LEFT BREAST (HCC): ICD-10-CM

## 2023-06-15 LAB
ERYTHROCYTE [DISTWIDTH] IN BLOOD BY AUTOMATED COUNT: 12.8 % (ref 11.7–14.4)
HCT VFR BLD AUTO: 37.1 % (ref 34.1–44.9)
HGB BLD-MCNC: 12 G/DL (ref 11.2–15.7)
LYMPHOCYTES # BLD: 1.9 K/UL (ref 1.18–3.74)
LYMPHOCYTES NFR BLD: 43.7 % (ref 19.3–53.1)
MCH RBC QN AUTO: 37.3 PG (ref 25.6–32.2)
MCHC RBC AUTO-ENTMCNC: 32.3 G/DL (ref 32.3–35.5)
MCV RBC AUTO: 115.2 FL (ref 79.4–94.8)
MONOCYTES # BLD: 0.3 K/UL (ref 0.24–0.82)
MONOCYTES NFR BLD: 7 % (ref 4.7–12.5)
NEUTROPHILS # BLD: 2.2 K/UL (ref 1.56–6.13)
NEUTS SEG NFR BLD: 49.3 % (ref 34–71.1)
PLATELET # BLD AUTO: 213 K/UL (ref 182–369)
PMV BLD AUTO: 8.6 FL (ref 7.4–10.4)
RBC # BLD AUTO: 3.22 M/UL (ref 3.93–5.22)
WBC # BLD AUTO: 4.4 K/UL (ref 3.98–10.04)

## 2023-06-15 PROCEDURE — 36415 COLL VENOUS BLD VENIPUNCTURE: CPT

## 2023-06-15 PROCEDURE — 85025 COMPLETE CBC W/AUTO DIFF WBC: CPT

## 2023-06-21 ENCOUNTER — OFFICE VISIT (OUTPATIENT)
Dept: SURGERY | Age: 48
End: 2023-06-21
Payer: COMMERCIAL

## 2023-06-21 VITALS — HEART RATE: 68 BPM | DIASTOLIC BLOOD PRESSURE: 68 MMHG | SYSTOLIC BLOOD PRESSURE: 90 MMHG

## 2023-06-21 DIAGNOSIS — C50.919 TRIPLE NEGATIVE BREAST CANCER (HCC): Primary | ICD-10-CM

## 2023-06-21 PROCEDURE — 99214 OFFICE O/P EST MOD 30 MIN: CPT | Performed by: SURGERY

## 2023-06-21 PROCEDURE — 3078F DIAST BP <80 MM HG: CPT | Performed by: SURGERY

## 2023-06-21 PROCEDURE — 3074F SYST BP LT 130 MM HG: CPT | Performed by: SURGERY

## 2023-06-22 DIAGNOSIS — C50.812 MALIGNANT NEOPLASM OF OVERLAPPING SITES OF LEFT BREAST (HCC): Primary | ICD-10-CM

## 2023-06-22 DIAGNOSIS — E55.9 VITAMIN D DEFICIENCY: ICD-10-CM

## 2023-06-22 RX ORDER — ERGOCALCIFEROL 1.25 MG/1
CAPSULE ORAL
Qty: 12 CAPSULE | Refills: 1 | Status: SHIPPED | OUTPATIENT
Start: 2023-06-22

## 2023-06-30 DIAGNOSIS — C50.919 TRIPLE NEGATIVE BREAST CANCER (HCC): Primary | ICD-10-CM

## 2023-07-06 ENCOUNTER — HOSPITAL ENCOUNTER (OUTPATIENT)
Dept: PREADMISSION TESTING | Age: 48
Discharge: HOME | End: 2023-07-10
Payer: COMMERCIAL

## 2023-07-06 VITALS — WEIGHT: 138 LBS | HEIGHT: 69 IN | BODY MASS INDEX: 20.44 KG/M2

## 2023-07-06 LAB
EKG P AXIS: 48 DEGREES
EKG P-R INTERVAL: 166 MS
EKG Q-T INTERVAL: 492 MS
EKG QRS DURATION: 84 MS
EKG QTC CALCULATION (BAZETT): 456 MS
EKG T AXIS: 71 DEGREES

## 2023-07-06 PROCEDURE — 93005 ELECTROCARDIOGRAM TRACING: CPT | Performed by: SURGERY

## 2023-07-06 RX ORDER — GABAPENTIN 300 MG/1
300 CAPSULE ORAL ONCE
Status: CANCELLED | OUTPATIENT
Start: 2023-07-14

## 2023-07-06 RX ORDER — ACETAMINOPHEN 325 MG/1
975 TABLET ORAL ONCE
Status: CANCELLED | OUTPATIENT
Start: 2023-07-14

## 2023-07-11 ENCOUNTER — HOSPITAL ENCOUNTER (OUTPATIENT)
Dept: OCCUPATIONAL THERAPY | Age: 48
Setting detail: THERAPIES SERIES
Discharge: HOME | End: 2023-07-11
Payer: COMMERCIAL

## 2023-07-11 ENCOUNTER — PATIENT MESSAGE (OUTPATIENT)
Dept: SURGERY | Age: 48
End: 2023-07-11

## 2023-07-11 PROCEDURE — 97165 OT EVAL LOW COMPLEX 30 MIN: CPT

## 2023-07-11 PROCEDURE — 93702 BIS XTRACELL FLUID ANALYSIS: CPT

## 2023-07-11 NOTE — PROGRESS NOTES
Occupational Therapy: Initial Evaluation   Patient: Michael Monahan (46 y.o. female)   Examination Date:   Plan of Care Certification Period: 2023 to  2023      :  1975  MRN: [id number]  CSN: [id number]   Insurance: Payor: Susu Gray / Plan: BCBS - [place] PPO / Product Type: *No Product type* /   Insurance ID: [id number] - ([address] [place] [address]) Secondary Insurance (if applicable):     Insurance Information: [facility]   Referring Physician: MD Fahad Bang MD   PCP: WHITNEY Larsen Visits to Date/Visits Approved:   /      No Show/Cancelled Appts:    /       Medical Diagnosis: Malignant neoplasm of overlapping sites of left female breast [C50.812] C50.812  No data recorded     [address]   Patient assessed for rehabilitation services?: Yes  Self reported health status[de-identified] Excellent    Medical History: Chart Reviewed: Yes   Past Medical History:   Diagnosis Date    Adverse effect of chemotherapy     Breast cancer ([address]) 2022    Triple negative breast cancer    Hypotension     Malignant neoplasm of overlapping sites of left breast ([address]) 2022    PVC (premature ventricular contraction)     Restless leg syndrome 2018    Uterine mass 2014    9x9 cm on CT scan     Surgical History:   Past Surgical History:   Procedure Laterality Date    BREAST RECONSTRUCTION  2019    BREAST SURGERY Bilateral 1111    silicone Memory Gel    CHOLECYSTECTOMY  2014    COLONOSCOPY N/A 03/15/2023    Dr Gomez [facility] [place] [place], mili ink tattooing placed in distal sigmoid colon, Bening TA (-)Dysplasia, int hem Gr 1, 3 year    HC INJECT OTHER PERPHRL NERV Right 2016    HIP FLUOROSCOPIC GUIDED CORTICOSTEROID INJECTION  performed by Aurelia Weathers MD at [facility] NERV Right 2017    FLURO GUIDED HIP INJECTION performed by Neo Stallworth MD at [address] [facility], VAGINAL      PORT SURGERY Left 2022    PORT INSERTION WITH

## 2023-07-13 ENCOUNTER — HOSPITAL ENCOUNTER (OUTPATIENT)
Dept: WOMENS IMAGING | Age: 48
Discharge: HOME | End: 2023-07-13
Attending: SURGERY
Payer: COMMERCIAL

## 2023-07-13 DIAGNOSIS — C50.812 MALIGNANT NEOPLASM OF OVERLAPPING SITES OF LEFT BREAST (HCC): ICD-10-CM

## 2023-07-13 PROCEDURE — 76642 ULTRASOUND BREAST LIMITED: CPT

## 2023-07-14 ENCOUNTER — ANESTHESIA (OUTPATIENT)
Dept: OPERATING ROOM | Age: 48
End: 2023-07-14
Payer: COMMERCIAL

## 2023-07-14 ENCOUNTER — HOSPITAL ENCOUNTER (OUTPATIENT)
Dept: ULTRASOUND IMAGING | Age: 48
Discharge: HOME | End: 2023-07-14
Payer: COMMERCIAL

## 2023-07-14 ENCOUNTER — ANESTHESIA EVENT (OUTPATIENT)
Dept: OPERATING ROOM | Age: 48
End: 2023-07-14
Payer: COMMERCIAL

## 2023-07-14 ENCOUNTER — HOSPITAL ENCOUNTER (OUTPATIENT)
Age: 48
Setting detail: OUTPATIENT SURGERY
Discharge: HOME | End: 2023-07-14
Attending: SURGERY | Admitting: SURGERY
Payer: COMMERCIAL

## 2023-07-14 ENCOUNTER — HOSPITAL ENCOUNTER (OUTPATIENT)
Dept: NUCLEAR MEDICINE | Age: 48
Discharge: HOME | End: 2023-07-16
Attending: SURGERY
Payer: COMMERCIAL

## 2023-07-14 VITALS
HEIGHT: 69 IN | WEIGHT: 138 LBS | TEMPERATURE: 97.1 F | SYSTOLIC BLOOD PRESSURE: 106 MMHG | RESPIRATION RATE: 18 BRPM | HEART RATE: 48 BPM | DIASTOLIC BLOOD PRESSURE: 60 MMHG | BODY MASS INDEX: 20.44 KG/M2 | OXYGEN SATURATION: 100 %

## 2023-07-14 DIAGNOSIS — C50.919 MALIGNANT NEOPLASM OF FEMALE BREAST, UNSPECIFIED ESTROGEN RECEPTOR STATUS, UNSPECIFIED LATERALITY, UNSPECIFIED SITE OF BREAST (HCC): ICD-10-CM

## 2023-07-14 DIAGNOSIS — C50.812 MALIGNANT NEOPLASM OF OVERLAPPING SITES OF LEFT BREAST (HCC): ICD-10-CM

## 2023-07-14 DIAGNOSIS — C50.919 TRIPLE NEGATIVE BREAST CANCER (HCC): Primary | ICD-10-CM

## 2023-07-14 PROCEDURE — 38525 BIOPSY/REMOVAL LYMPH NODES: CPT | Performed by: SURGERY

## 2023-07-14 PROCEDURE — 6360000002 HC RX W HCPCS: Performed by: SURGERY

## 2023-07-14 PROCEDURE — 88307 TISSUE EXAM BY PATHOLOGIST: CPT

## 2023-07-14 PROCEDURE — 6360000002 HC RX W HCPCS

## 2023-07-14 PROCEDURE — A9520 TC99 TILMANOCEPT DIAG 0.5MCI: HCPCS | Performed by: SURGERY

## 2023-07-14 PROCEDURE — 19301 PARTIAL MASTECTOMY: CPT | Performed by: SURGERY

## 2023-07-14 PROCEDURE — 38525 BIOPSY/REMOVAL LYMPH NODES: CPT | Performed by: PHYSICIAN ASSISTANT

## 2023-07-14 PROCEDURE — 3430000000 HC RX DIAGNOSTIC RADIOPHARMACEUTICAL: Performed by: SURGERY

## 2023-07-14 PROCEDURE — 19301 PARTIAL MASTECTOMY: CPT | Performed by: PHYSICIAN ASSISTANT

## 2023-07-14 PROCEDURE — 3700000000 HC ANESTHESIA ATTENDED CARE: Performed by: SURGERY

## 2023-07-14 PROCEDURE — 7100000011 HC PHASE II RECOVERY - ADDTL 15 MIN: Performed by: SURGERY

## 2023-07-14 PROCEDURE — 2500000003 HC RX 250 WO HCPCS: Performed by: SURGERY

## 2023-07-14 PROCEDURE — 7100000010 HC PHASE II RECOVERY - FIRST 15 MIN: Performed by: SURGERY

## 2023-07-14 PROCEDURE — 7100000000 HC PACU RECOVERY - FIRST 15 MIN: Performed by: SURGERY

## 2023-07-14 PROCEDURE — C1713 ANCHOR/SCREW BN/BN,TIS/BN: HCPCS | Performed by: SURGERY

## 2023-07-14 PROCEDURE — 2580000003 HC RX 258: Performed by: SURGERY

## 2023-07-14 PROCEDURE — 38792 RA TRACER ID OF SENTINL NODE: CPT

## 2023-07-14 PROCEDURE — 6370000000 HC RX 637 (ALT 250 FOR IP): Performed by: ANESTHESIOLOGY

## 2023-07-14 PROCEDURE — 2500000003 HC RX 250 WO HCPCS

## 2023-07-14 PROCEDURE — A4217 STERILE WATER/SALINE, 500 ML: HCPCS | Performed by: SURGERY

## 2023-07-14 PROCEDURE — 2580000003 HC RX 258: Performed by: PHYSICIAN ASSISTANT

## 2023-07-14 PROCEDURE — 3600000015 HC SURGERY LEVEL 5 ADDTL 15MIN: Performed by: SURGERY

## 2023-07-14 PROCEDURE — 38900 IO MAP OF SENT LYMPH NODE: CPT | Performed by: SURGERY

## 2023-07-14 PROCEDURE — 7100000001 HC PACU RECOVERY - ADDTL 15 MIN: Performed by: SURGERY

## 2023-07-14 PROCEDURE — 3600000005 HC SURGERY LEVEL 5 BASE: Performed by: SURGERY

## 2023-07-14 PROCEDURE — 3700000001 HC ADD 15 MINUTES (ANESTHESIA): Performed by: SURGERY

## 2023-07-14 PROCEDURE — 2709999900 HC NON-CHARGEABLE SUPPLY: Performed by: SURGERY

## 2023-07-14 PROCEDURE — 76998 US GUIDE INTRAOP: CPT | Performed by: SURGERY

## 2023-07-14 PROCEDURE — 6370000000 HC RX 637 (ALT 250 FOR IP): Performed by: SURGERY

## 2023-07-14 PROCEDURE — 2580000003 HC RX 258: Performed by: ANESTHESIOLOGY

## 2023-07-14 PROCEDURE — 19285 PERQ DEV BREAST 1ST US IMAG: CPT

## 2023-07-14 PROCEDURE — 88329 PATH CONSLTJ DRG SURG: CPT

## 2023-07-14 RX ORDER — SODIUM CHLORIDE, SODIUM LACTATE, POTASSIUM CHLORIDE, CALCIUM CHLORIDE 600; 310; 30; 20 MG/100ML; MG/100ML; MG/100ML; MG/100ML
INJECTION, SOLUTION INTRAVENOUS CONTINUOUS
Status: DISCONTINUED | OUTPATIENT
Start: 2023-07-14 | End: 2023-07-14 | Stop reason: HOSPADM

## 2023-07-14 RX ORDER — GABAPENTIN 300 MG/1
300 CAPSULE ORAL ONCE
Status: COMPLETED | OUTPATIENT
Start: 2023-07-14 | End: 2023-07-14

## 2023-07-14 RX ORDER — LIDOCAINE HYDROCHLORIDE 10 MG/ML
1 INJECTION, SOLUTION EPIDURAL; INFILTRATION; INTRACAUDAL; PERINEURAL
Status: DISCONTINUED | OUTPATIENT
Start: 2023-07-14 | End: 2023-07-14 | Stop reason: HOSPADM

## 2023-07-14 RX ORDER — LIDOCAINE HYDROCHLORIDE 10 MG/ML
INJECTION, SOLUTION EPIDURAL; INFILTRATION; INTRACAUDAL; PERINEURAL PRN
Status: DISCONTINUED | OUTPATIENT
Start: 2023-07-14 | End: 2023-07-14 | Stop reason: SDUPTHER

## 2023-07-14 RX ORDER — LABETALOL HYDROCHLORIDE 5 MG/ML
10 INJECTION, SOLUTION INTRAVENOUS
Status: DISCONTINUED | OUTPATIENT
Start: 2023-07-14 | End: 2023-07-14 | Stop reason: HOSPADM

## 2023-07-14 RX ORDER — ISOSULFAN BLUE 50 MG/5ML
INJECTION, SOLUTION SUBCUTANEOUS PRN
Status: DISCONTINUED | OUTPATIENT
Start: 2023-07-14 | End: 2023-07-14 | Stop reason: ALTCHOICE

## 2023-07-14 RX ORDER — SODIUM CHLORIDE 0.9 % (FLUSH) 0.9 %
5-40 SYRINGE (ML) INJECTION EVERY 12 HOURS SCHEDULED
Status: DISCONTINUED | OUTPATIENT
Start: 2023-07-14 | End: 2023-07-14 | Stop reason: HOSPADM

## 2023-07-14 RX ORDER — SODIUM CHLORIDE 0.9 % (FLUSH) 0.9 %
5-40 SYRINGE (ML) INJECTION PRN
Status: DISCONTINUED | OUTPATIENT
Start: 2023-07-14 | End: 2023-07-14 | Stop reason: HOSPADM

## 2023-07-14 RX ORDER — MEPERIDINE HYDROCHLORIDE 25 MG/ML
12.5 INJECTION INTRAMUSCULAR; INTRAVENOUS; SUBCUTANEOUS EVERY 5 MIN PRN
Status: DISCONTINUED | OUTPATIENT
Start: 2023-07-14 | End: 2023-07-14 | Stop reason: HOSPADM

## 2023-07-14 RX ORDER — DIPHENHYDRAMINE HYDROCHLORIDE 50 MG/ML
INJECTION INTRAMUSCULAR; INTRAVENOUS PRN
Status: DISCONTINUED | OUTPATIENT
Start: 2023-07-14 | End: 2023-07-14 | Stop reason: SDUPTHER

## 2023-07-14 RX ORDER — HYDROCODONE BITARTRATE AND ACETAMINOPHEN 5; 325 MG/1; MG/1
1 TABLET ORAL EVERY 6 HOURS PRN
Status: DISCONTINUED | OUTPATIENT
Start: 2023-07-14 | End: 2023-07-14 | Stop reason: HOSPADM

## 2023-07-14 RX ORDER — IPRATROPIUM BROMIDE AND ALBUTEROL SULFATE 2.5; .5 MG/3ML; MG/3ML
1 SOLUTION RESPIRATORY (INHALATION)
Status: DISCONTINUED | OUTPATIENT
Start: 2023-07-14 | End: 2023-07-14 | Stop reason: HOSPADM

## 2023-07-14 RX ORDER — SODIUM CHLORIDE 9 MG/ML
INJECTION, SOLUTION INTRAVENOUS PRN
Status: DISCONTINUED | OUTPATIENT
Start: 2023-07-14 | End: 2023-07-14 | Stop reason: HOSPADM

## 2023-07-14 RX ORDER — FAMOTIDINE 20 MG/1
20 TABLET, FILM COATED ORAL ONCE
Status: COMPLETED | OUTPATIENT
Start: 2023-07-14 | End: 2023-07-14

## 2023-07-14 RX ORDER — MORPHINE SULFATE 4 MG/ML
4 INJECTION, SOLUTION INTRAMUSCULAR; INTRAVENOUS EVERY 5 MIN PRN
Status: DISCONTINUED | OUTPATIENT
Start: 2023-07-14 | End: 2023-07-14 | Stop reason: HOSPADM

## 2023-07-14 RX ORDER — HYDROCODONE BITARTRATE AND ACETAMINOPHEN 5; 325 MG/1; MG/1
1 TABLET ORAL EVERY 6 HOURS PRN
Qty: 10 TABLET | Refills: 0 | Status: SHIPPED | OUTPATIENT
Start: 2023-07-14 | End: 2024-07-13

## 2023-07-14 RX ORDER — MORPHINE SULFATE 2 MG/ML
2 INJECTION, SOLUTION INTRAMUSCULAR; INTRAVENOUS EVERY 5 MIN PRN
Status: DISCONTINUED | OUTPATIENT
Start: 2023-07-14 | End: 2023-07-14 | Stop reason: HOSPADM

## 2023-07-14 RX ORDER — MIDAZOLAM HYDROCHLORIDE 2 MG/2ML
2 INJECTION, SOLUTION INTRAMUSCULAR; INTRAVENOUS
Status: DISCONTINUED | OUTPATIENT
Start: 2023-07-14 | End: 2023-07-14 | Stop reason: HOSPADM

## 2023-07-14 RX ORDER — PROPOFOL 10 MG/ML
INJECTION, EMULSION INTRAVENOUS PRN
Status: DISCONTINUED | OUTPATIENT
Start: 2023-07-14 | End: 2023-07-14 | Stop reason: SDUPTHER

## 2023-07-14 RX ORDER — DIPHENHYDRAMINE HYDROCHLORIDE 50 MG/ML
12.5 INJECTION INTRAMUSCULAR; INTRAVENOUS
Status: DISCONTINUED | OUTPATIENT
Start: 2023-07-14 | End: 2023-07-14 | Stop reason: HOSPADM

## 2023-07-14 RX ORDER — METOCLOPRAMIDE HYDROCHLORIDE 5 MG/ML
10 INJECTION INTRAMUSCULAR; INTRAVENOUS
Status: DISCONTINUED | OUTPATIENT
Start: 2023-07-14 | End: 2023-07-14 | Stop reason: HOSPADM

## 2023-07-14 RX ORDER — ACETAMINOPHEN 325 MG/1
975 TABLET ORAL ONCE
Status: COMPLETED | OUTPATIENT
Start: 2023-07-14 | End: 2023-07-14

## 2023-07-14 RX ORDER — FENTANYL CITRATE 50 UG/ML
INJECTION, SOLUTION INTRAMUSCULAR; INTRAVENOUS PRN
Status: DISCONTINUED | OUTPATIENT
Start: 2023-07-14 | End: 2023-07-14 | Stop reason: SDUPTHER

## 2023-07-14 RX ORDER — DROPERIDOL 2.5 MG/ML
0.62 INJECTION, SOLUTION INTRAMUSCULAR; INTRAVENOUS
Status: DISCONTINUED | OUTPATIENT
Start: 2023-07-14 | End: 2023-07-14 | Stop reason: HOSPADM

## 2023-07-14 RX ORDER — ONDANSETRON 2 MG/ML
INJECTION INTRAMUSCULAR; INTRAVENOUS PRN
Status: DISCONTINUED | OUTPATIENT
Start: 2023-07-14 | End: 2023-07-14 | Stop reason: SDUPTHER

## 2023-07-14 RX ORDER — LORAZEPAM 2 MG/ML
0.5 INJECTION INTRAMUSCULAR
Status: DISCONTINUED | OUTPATIENT
Start: 2023-07-14 | End: 2023-07-14 | Stop reason: HOSPADM

## 2023-07-14 RX ORDER — SCOLOPAMINE TRANSDERMAL SYSTEM 1 MG/1
1 PATCH, EXTENDED RELEASE TRANSDERMAL
Status: DISCONTINUED | OUTPATIENT
Start: 2023-07-14 | End: 2023-07-14 | Stop reason: HOSPADM

## 2023-07-14 RX ADMIN — FENTANYL CITRATE 25 MCG: 0.05 INJECTION, SOLUTION INTRAMUSCULAR; INTRAVENOUS at 13:49

## 2023-07-14 RX ADMIN — HYDROCORTISONE SODIUM SUCCINATE 100 MG: 100 INJECTION, POWDER, FOR SOLUTION INTRAMUSCULAR; INTRAVENOUS at 12:45

## 2023-07-14 RX ADMIN — ONDANSETRON 4 MG: 2 INJECTION INTRAMUSCULAR; INTRAVENOUS at 13:53

## 2023-07-14 RX ADMIN — FENTANYL CITRATE 25 MCG: 0.05 INJECTION, SOLUTION INTRAMUSCULAR; INTRAVENOUS at 13:12

## 2023-07-14 RX ADMIN — SODIUM CHLORIDE, SODIUM LACTATE, POTASSIUM CHLORIDE, AND CALCIUM CHLORIDE: 600; 310; 30; 20 INJECTION, SOLUTION INTRAVENOUS at 13:33

## 2023-07-14 RX ADMIN — DIPHENHYDRAMINE HYDROCHLORIDE 12.5 MG: 50 INJECTION, SOLUTION INTRAMUSCULAR; INTRAVENOUS at 12:45

## 2023-07-14 RX ADMIN — FENTANYL CITRATE 50 MCG: 0.05 INJECTION, SOLUTION INTRAMUSCULAR; INTRAVENOUS at 14:30

## 2023-07-14 RX ADMIN — HYDROCODONE BITARTRATE AND ACETAMINOPHEN 1 TABLET: 5; 325 TABLET ORAL at 15:14

## 2023-07-14 RX ADMIN — LIDOCAINE HYDROCHLORIDE 5 ML: 10 INJECTION, SOLUTION EPIDURAL; INFILTRATION; INTRACAUDAL; PERINEURAL at 12:45

## 2023-07-14 RX ADMIN — FENTANYL CITRATE 50 MCG: 0.05 INJECTION, SOLUTION INTRAMUSCULAR; INTRAVENOUS at 12:50

## 2023-07-14 RX ADMIN — PROPOFOL 140 MCG/KG/MIN: 10 INJECTION, EMULSION INTRAVENOUS at 12:47

## 2023-07-14 RX ADMIN — FENTANYL CITRATE 25 MCG: 0.05 INJECTION, SOLUTION INTRAMUSCULAR; INTRAVENOUS at 13:07

## 2023-07-14 RX ADMIN — GABAPENTIN 300 MG: 300 CAPSULE ORAL at 10:22

## 2023-07-14 RX ADMIN — CEFAZOLIN 2000 MG: 2 INJECTION, POWDER, FOR SOLUTION INTRAMUSCULAR; INTRAVENOUS at 12:53

## 2023-07-14 RX ADMIN — FENTANYL CITRATE 25 MCG: 0.05 INJECTION, SOLUTION INTRAMUSCULAR; INTRAVENOUS at 13:33

## 2023-07-14 RX ADMIN — ACETAMINOPHEN 975 MG: 325 TABLET ORAL at 10:22

## 2023-07-14 RX ADMIN — SODIUM CHLORIDE: 9 INJECTION, SOLUTION INTRAVENOUS at 13:33

## 2023-07-14 RX ADMIN — PROPOFOL 50 MG: 10 INJECTION, EMULSION INTRAVENOUS at 12:45

## 2023-07-14 RX ADMIN — SODIUM CHLORIDE, SODIUM LACTATE, POTASSIUM CHLORIDE, AND CALCIUM CHLORIDE: 600; 310; 30; 20 INJECTION, SOLUTION INTRAVENOUS at 09:32

## 2023-07-14 RX ADMIN — TILMANOCEPT 500 MICRO CURIE: KIT at 12:51

## 2023-07-14 RX ADMIN — FAMOTIDINE 20 MG: 20 TABLET ORAL at 10:22

## 2023-07-14 ASSESSMENT — PAIN DESCRIPTION - ORIENTATION
ORIENTATION: LEFT
ORIENTATION: LEFT

## 2023-07-14 ASSESSMENT — PAIN - FUNCTIONAL ASSESSMENT: PAIN_FUNCTIONAL_ASSESSMENT: NONE - DENIES PAIN

## 2023-07-14 ASSESSMENT — PAIN SCALES - GENERAL
PAINLEVEL_OUTOF10: 5
PAINLEVEL_OUTOF10: 8

## 2023-07-14 ASSESSMENT — PAIN DESCRIPTION - DESCRIPTORS: DESCRIPTORS: ACHING;THROBBING

## 2023-07-14 ASSESSMENT — PAIN DESCRIPTION - LOCATION
LOCATION: BREAST
LOCATION: BREAST

## 2023-07-14 ASSESSMENT — ENCOUNTER SYMPTOMS: SHORTNESS OF BREATH: 0

## 2023-07-14 ASSESSMENT — LIFESTYLE VARIABLES: SMOKING_STATUS: 0

## 2023-07-14 NOTE — DISCHARGE INSTRUCTIONS
1. You may remove the dressing or Bandaid after 2 days. Leave steri strips on for 1-2 weeks. 2. Wearing a supportive bra, such as a sports bra can significantly reduce postoperative pain and swelling. 3. You may bathe or shower as desired 48 hours after surgery. Always just pat the incision dry, never rub. 4. If a small area of the incision becomes red and inflamed, clean the wound with peroxide 3-4 times daily and apply Neosporin. Call the office with a temperature of 101.1 or greater, [phone].  5. Firmness in the area of the incision will last 1-2 months. 6. You may drive 2 days after surgery. 7. If you become constipated you may use one of the following over-the-counter products, Fleets enema, 1 bottle Magnesium Citrate,Ducolax tablets or suppositories, If no results after using one or more of these call the office. 8. You may ordinarily return to work 2-3 days after surgery. 9.Call the office with any questions during regular hours mon-fri 9:00-4:00 . In case of after hours emergencies the answering service will take your call. 10. Follow up 1 month, call to schedule if it was not scheduled by hospital.   11. Call 1 hour before appointment to see if office is running on time. 12. Call the office for the pathology report in 3 days .

## 2023-07-14 NOTE — ANESTHESIA PRE PROCEDURE
Department of Anesthesiology  Preprocedure Note       Name:  Minnie Lin   Age:  50 y.o.  :  1975                                          MRN:  [id number]         Date:  2023      Surgeon: Laron Darling):  Jenelle Blum MD    Procedure: Procedure(s):  LEFT LUMPECTOMY & SNB, PREOP & INTRAOP US GUIDED NEEDLE LOC, PEC BLOCK, BIOZORB, FLAPS & MARGINPROBE    Medications prior to admission:   Prior to Admission medications    Medication Sig Start Date End Date Taking? Authorizing Provider   mupirocin (BACTROBAN) 2 % ointment Applied to each nostril twice daily for 5 days prior to surgery 23   Margaret Hill PA-C   vitamin D (ERGOCALCIFEROL) 1.25 MG (80572 UT) CAPS capsule TAKE 1 CAPSULE BY MOUTH 1 TIME A WEEK  Patient taking differently: Take 1 capsule by mouth once a week 72   WHITNEY Dalal   sertraline (ZOLOFT) 50 MG tablet TAKE 1 TABLET BY MOUTH DAILY  Patient taking differently: Take 1 tablet by mouth nightly 11   WHITNEY Dalal   atenolol (TENORMIN) 25 MG tablet TAKE 1 TABLET BY MOUTH IN THE MORNING AND AT BEDTIME  Patient taking differently: Take 1 tablet by mouth nightly    WHITNEY Dalal   Magic Mouthwash (MIRACLE MOUTHWASH) Swish and spit 10 mLs 4 times daily as needed for Irritation 23   Castro Penaloza MD   montelukast (SINGULAIR) 10 MG tablet Take 1 tablet by mouth nightly    Historical Provider, MD   ondansetron (ZOFRAN) 4 MG tablet Take 1 tablet by mouth every 6 hours as needed for Nausea or Vomiting 22   Castro Penaloza MD   promethazine (PHENERGAN) 25 MG tablet Take 0.5 tablets by mouth every 6 hours as needed for Nausea 22   Castro Penaloza MD   lidocaine-prilocaine (EMLA) 2.5-2.5 % cream Apply topically as needed.  22   Castro Penaloza MD   rOPINIRole (REQUIP) 0.5 MG tablet TAKE 1 TO 3 TABLETS BY MOUTH EVERY NIGHT  Patient taking differently: Take 2 tablets by mouth nightly    WHITNEY Dalal

## 2023-07-14 NOTE — PROGRESS NOTES
CLINICAL PHARMACY NOTE: MEDS TO BEDS    Total # of Prescriptions Filled: 1   The following medications were delivered to the patient:  Discharge Medication List as of 7/14/2023  3:17 PM        START taking these medications    Details   HYDROcodone-acetaminophen (NORCO) 5-325 MG per tablet Take 1 tablet by mouth every 6 hours as needed for Pain. Max Daily Amount: 4 tablets, Disp-10 tablet, R-0Normal           Narcan Nasal Spray    Additional Documentation:    Delivered RX to patient bedside. Also dispensed free Narcan. Paid with cash.

## 2023-07-14 NOTE — ANESTHESIA POSTPROCEDURE EVALUATION
Department of Anesthesiology  Postprocedure Note    Patient: Taina Gallegos  MRN: [id number]  YOB: 1975  Date of evaluation: 7/14/2023      Procedure Summary     Date: 07/14/23 Room / Location: [address]    Anesthesia Start: 9271 Anesthesia Stop:     Procedure: LEFT LUMPECTOMY & SNB, PREOP & INTRAOP US GUIDED NEEDLE LOC, PEC BLOCK, BIOZORB, FLAPS & Jadiel Alan (Left) Diagnosis:       Malignant neoplasm of female breast, unspecified estrogen receptor status, unspecified laterality, unspecified site of breast ([address])      (Malignant neoplasm of female breast, unspecified estrogen receptor status, unspecified laterality, unspecified site of breast ([address]) Mauricio Burton)    Surgeons: Rene Jaffe MD Responsible Provider: WHITNEY Sanchez CRNA    Anesthesia Type: MAC, TIVA ASA Status: 2          Anesthesia Type: No value filed.     Adolph Phase I: Adolph Score: 10    Adolph Phase II:        Anesthesia Post Evaluation    Patient location during evaluation: PACU  Patient participation: complete - patient participated  Level of consciousness: awake and alert  Pain score: 0  Airway patency: patent  Nausea & Vomiting: no nausea and no vomiting  Complications: no  Cardiovascular status: blood pressure returned to baseline  Respiratory status: acceptable and room air  Hydration status: euvolemic

## 2023-07-14 NOTE — BRIEF OP NOTE
Brief Postoperative Note      DATE OF PROCEDURE: 7/14/2023     SURGEON: Pura Dotson MD    PREOPERATIVE DIAGNOSIS:  Malignant neoplasm of female breast, unspecified estrogen receptor status, unspecified laterality, unspecified site of breast ([address]) [C50.919]    POSTOPERATIVE DIAGNOSIS: Same     OPERATION: Procedure(s):  LEFT LUMPECTOMY & SNB, PREOP & INTRAOP US GUIDED NEEDLE LOC, PEC BLOCK, BIOZORB, FLAPS & MARGINPROBE    ANESTHESIA: Monitor Anesthesia Care/pec block    ESTIMATED BLOOD LOSS: Minimal    COMPLICATIONS: None. SPECIMENS:   ID Type Source Tests Collected by Time Destination   A : left breast tissue Tissue Breast SURGICAL PATHOLOGY Pura Dotson MD 7/14/2023 1321    B : left breast tissue- additional medial margin Tissue Breast SURGICAL PATHOLOGY Pura Dotson MD 7/14/2023 1322    C : left breast tissue- additional lateral margin Tissue Breast SURGICAL PATHOLOGY Pura Dotson MD 7/14/2023 1322    D : left breast tissue- additional deep margin Tissue Breast SURGICAL PATHOLOGY Pura Dotson MD 7/14/2023 1322    E : left sentinel node  Tissue Breast SURGICAL PATHOLOGY Pura Dotson MD 7/14/2023 1336        DRAINS: None    The patient tolerated the procedure well.     Electronically signed by Pura Dotson MD  on 7/14/2023 at 2:29 PM

## 2023-07-14 NOTE — H&P
HISTORY OF PRESENT ILLNESS:     Ms. Keny Delong presents today following MRI, Mammogram and ultrasound to discuss surgery. She is recently status post ultrasound guided breast biopsy  on the left which revealed a 1.1 cm high grade invasive ductal carcinoma. ER negative. CA negative. Her2 negative. Ki67 is 83%. MammaPrint demonstrates a high risk basal-like phenotype     She has completed 4 cycles of dose dense AC and part of her CarboTaxol. Unfortunately her counts have not enabled her to finish her complete course. I discussed this with Dr. Margean Kawasaki and we will proceed with surgery. She may get additional cycles of carbo and Taxol after surgery if indicated. MRI-6/7/2023  FINDINGS:  Contrast is seen in the heart and great vessels on postcontrast  sequences consistent with an adequate bolus. There is moderate  background enhancement of the patient's heterogeneously dense  fibroglandular tissues. There is redemonstration of unchanged  bilateral retropectoral single-lumen appearing silicone implants. In  the upper outer quadrant of the left breast at the known side of  malignancy, the previous mass is no longer seen. T2 bright nonanatomic  biopsy clip is evident. No suspicious MR abnormality identified in  either breast. On current study there are no abnormal axillary or  internal mammary chain lymph nodes. The visualized extent of  extramammary findings no significant abnormality is seen. 1. Left breast, BI-RADS 6, biopsy-proven malignancy in the left breast  upper outer quadrant. MRI suggests response to therapy as the mass is  no longer seen by MRI. Biopsy clip is present. Recommend appropriate  clinical and surgical management. Overall assessment BI-RADS 6, biopsy proven malignancy. Signed by Dr Mayda Sotelo     Mammogram/Ultrasound-6/6/2023  There is a normal-appearing silicone implant  as noted previously.  The biopsy-proven malignant mass is  redemonstrated as unobscured high density mass in

## 2023-07-14 NOTE — OP NOTE
Dictated    Operation performed with curative intent: Yes  Tracer(s) used to identify sentinel nodes in the upfront surgery (nonneoadjuvant) setting (select all that apply) : Radioactive tracer  Tracer(s) used to identify sentinel nodes in the neoadjuvant setting (select all that apply): Not Applicable  All nodes (colored or noncolored) present at the end of a dye-filled lymphatic channel were removed: Not Applicable  All significantly radioactive nodes were removed:  Yes  All palpably suspicious nodes were removed: Not Applicable  Biopsy-proven positive nodes marked with clips prior to chemotherapy were identified and removed: Not Applicable

## 2023-07-15 NOTE — OP NOTE
[facility] [address], [address] [place] [address]                                OPERATIVE REPORT    PATIENT NAME: Belen Peterson                      :        1975  MED REC NO:   [id number]                              ROOM:  ACCOUNT NO:   [de-identified]                           ADMIT DATE: 2023  PROVIDER:     Lorraine Cobb MD    DATE OF PROCEDURE:  2023    PREOPERATIVE DIAGNOSIS:  Triple-negative carcinoma of the left breast  status post neoadjuvant chemotherapy. PREOPERATIVE DIAGNOSIS:  Triple-negative carcinoma of the left breast  status post neoadjuvant chemotherapy. PROCEDURES:  1. Left pectoral block - 6 levels. 2.  Injection of radionuclide. 3.  Lymphatic mapping. 4.  Intraoperative ultrasound. 5.  Left partial mastectomy. 6.  Utilization of MarginProbe intraoperative margin assessment device. 7.  Left sentinel lymph node biopsy. SURGEON:  Lorraine Cobb MD    ASSISTANT:  Nereyda Renteria PA-C. He was present for all portions of  the case including all critical portions as well as closure. ANESTHESIA:  Pec block with MAC. INDICATION:  The patient is a 50-year lady who presented with a  triple-negative breast cancer in left breast.  She had prior implant as  well as a prior mastopexy and had very minimal distance between her skin  and her implant that contained tumor. She received neoadjuvant  chemotherapy and all metabolic activity disappeared, but there is still  a palpable mass in this area. We discussed risks and benefits of  lumpectomy and sentinel node biopsy. She understands and was agreeable. OPERATIVE PROCEDURE:  Today, she was brought to the operating room,  adequately sedated and after she was sedated, she was injected with 500  microcuries of Lymphoseek in the left periareolar dermis. Once this was  done, we proceeded with our pectoral block.   We utilized a solution of  50 mL of 0.2% ropivacaine, 50 mL

## 2023-07-18 NOTE — PROGRESS NOTES
HISTORY OF PRESENT ILLNESS:     Ms. Bertrand Jamil presents today for a one week post op following left partial mastectomy with left sentinel lymph node biopsy on 7/14/2023    She is recently status post ultrasound guided breast biopsy  on the left which revealed a 1.1 cm high grade invasive ductal carcinoma. ER negative. MN negative. Her2 negative. Ki67 is 83%. MammaPrint demonstrates a high risk basal-like phenotype    She has completed 4 cycles of dose dense AC and part of her CarboTaxol. Unfortunately her counts have not enabled her to finish her complete course. I discussed this with Dr. Leopoldo Dykes and we will proceed with surgery. She may get additional cycles of carbo and Taxol after surgery if indicated. She had a pathologic complete response      MRI-6/7/2023  FINDINGS:  Contrast is seen in the heart and great vessels on postcontrast  sequences consistent with an adequate bolus. There is moderate  background enhancement of the patient's heterogeneously dense  fibroglandular tissues. There is redemonstration of unchanged  bilateral retropectoral single-lumen appearing silicone implants. In  the upper outer quadrant of the left breast at the known side of  malignancy, the previous mass is no longer seen. T2 bright nonanatomic  biopsy clip is evident. No suspicious MR abnormality identified in  either breast. On current study there are no abnormal axillary or  internal mammary chain lymph nodes. The visualized extent of  extramammary findings no significant abnormality is seen. 1. Left breast, BI-RADS 6, biopsy-proven malignancy in the left breast  upper outer quadrant. MRI suggests response to therapy as the mass is  no longer seen by MRI. Biopsy clip is present. Recommend appropriate  clinical and surgical management. Overall assessment BI-RADS 6, biopsy proven malignancy. Signed by Dr Reva Azar:  FINAL DIAGNOSIS:     A. Left breast, lumpectomy:   No malignancy identified.

## 2023-07-19 ENCOUNTER — OFFICE VISIT (OUTPATIENT)
Dept: SURGERY | Age: 48
End: 2023-07-19

## 2023-07-19 VITALS — SYSTOLIC BLOOD PRESSURE: 118 MMHG | HEART RATE: 80 BPM | DIASTOLIC BLOOD PRESSURE: 72 MMHG

## 2023-07-19 DIAGNOSIS — Z98.890 STATUS POST LEFT BREAST LUMPECTOMY: ICD-10-CM

## 2023-07-19 PROCEDURE — 99024 POSTOP FOLLOW-UP VISIT: CPT | Performed by: SURGERY

## 2023-07-20 ENCOUNTER — OFFICE VISIT (OUTPATIENT)
Dept: HEMATOLOGY | Age: 48
End: 2023-07-20
Payer: COMMERCIAL

## 2023-07-20 ENCOUNTER — HOSPITAL ENCOUNTER (OUTPATIENT)
Dept: INFUSION THERAPY | Age: 48
Discharge: HOME | End: 2023-07-20
Payer: COMMERCIAL

## 2023-07-20 VITALS
WEIGHT: 137.9 LBS | DIASTOLIC BLOOD PRESSURE: 63 MMHG | RESPIRATION RATE: 18 BRPM | SYSTOLIC BLOOD PRESSURE: 112 MMHG | BODY MASS INDEX: 20.42 KG/M2 | TEMPERATURE: 98.2 F | HEIGHT: 69 IN | OXYGEN SATURATION: 100 % | HEART RATE: 54 BPM

## 2023-07-20 DIAGNOSIS — C50.812 MALIGNANT NEOPLASM OF OVERLAPPING SITES OF LEFT BREAST (HCC): Primary | ICD-10-CM

## 2023-07-20 DIAGNOSIS — Z71.89 CARE PLAN DISCUSSED WITH PATIENT: ICD-10-CM

## 2023-07-20 DIAGNOSIS — R53.83 FATIGUE, UNSPECIFIED TYPE: Primary | ICD-10-CM

## 2023-07-20 DIAGNOSIS — E03.2 DRUG-INDUCED HYPOTHYROIDISM: ICD-10-CM

## 2023-07-20 DIAGNOSIS — C50.812 MALIGNANT NEOPLASM OF OVERLAPPING SITES OF LEFT BREAST (HCC): ICD-10-CM

## 2023-07-20 DIAGNOSIS — Z71.89 COORDINATION OF COMPLEX CARE: ICD-10-CM

## 2023-07-20 DIAGNOSIS — Z51.12 ENCOUNTER FOR ANTINEOPLASTIC IMMUNOTHERAPY: ICD-10-CM

## 2023-07-20 DIAGNOSIS — R53.83 FATIGUE DUE TO TREATMENT: ICD-10-CM

## 2023-07-20 DIAGNOSIS — C50.919 TRIPLE NEGATIVE BREAST CANCER (HCC): Primary | ICD-10-CM

## 2023-07-20 DIAGNOSIS — R53.83 FATIGUE, UNSPECIFIED TYPE: ICD-10-CM

## 2023-07-20 LAB
ALBUMIN SERPL-MCNC: 4.4 G/DL (ref 3.5–5.2)
ALP SERPL-CCNC: 65 U/L (ref 35–104)
ALT SERPL-CCNC: 30 U/L (ref 9–52)
ANION GAP SERPL CALCULATED.3IONS-SCNC: 14 MMOL/L (ref 7–19)
AST SERPL-CCNC: 49 U/L (ref 14–36)
BILIRUB SERPL-MCNC: <0.2 MG/DL (ref 0.2–1.3)
BUN SERPL-MCNC: 12 MG/DL (ref 7–17)
CALCIUM SERPL-MCNC: 9.5 MG/DL (ref 8.4–10.2)
CHLORIDE SERPL-SCNC: 104 MMOL/L (ref 98–111)
CO2 SERPL-SCNC: 22 MMOL/L (ref 22–29)
CREAT SERPL-MCNC: 0.9 MG/DL (ref 0.5–1)
ERYTHROCYTE [DISTWIDTH] IN BLOOD BY AUTOMATED COUNT: 11.9 % (ref 11.7–14.4)
GLOBULIN: 3.3 G/DL
GLUCOSE SERPL-MCNC: 108 MG/DL (ref 74–106)
HCT VFR BLD AUTO: 35 % (ref 34.1–44.9)
HGB BLD-MCNC: 11.7 G/DL (ref 11.2–15.7)
LYMPHOCYTES # BLD: 1.95 K/UL (ref 1.18–3.74)
LYMPHOCYTES NFR BLD: 47.4 % (ref 19.3–53.1)
MCH RBC QN AUTO: 36.2 PG (ref 25.6–32.2)
MCHC RBC AUTO-ENTMCNC: 33.4 G/DL (ref 32.3–35.5)
MCV RBC AUTO: 108.4 FL (ref 79.4–94.8)
MONOCYTES # BLD: 0.22 K/UL (ref 0.24–0.82)
MONOCYTES NFR BLD: 5.4 % (ref 4.7–12.5)
NEUTROPHILS # BLD: 1.82 K/UL (ref 1.56–6.13)
NEUTS SEG NFR BLD: 44.3 % (ref 34–71.1)
PLATELET # BLD AUTO: 161 K/UL (ref 182–369)
PMV BLD AUTO: 9.5 FL (ref 7.4–10.4)
POTASSIUM SERPL-SCNC: 3.8 MMOL/L (ref 3.5–5.1)
PROT SERPL-MCNC: 7.7 G/DL (ref 6.3–8.2)
RBC # BLD AUTO: 3.23 M/UL (ref 3.93–5.22)
SODIUM SERPL-SCNC: 140 MMOL/L (ref 137–145)
TSH SERPL DL<=0.005 MIU/L-ACNC: 168.1 UIU/ML (ref 0.27–4.2)
WBC # BLD AUTO: 4.11 K/UL (ref 3.98–10.04)

## 2023-07-20 PROCEDURE — 3078F DIAST BP <80 MM HG: CPT | Performed by: INTERNAL MEDICINE

## 2023-07-20 PROCEDURE — 85025 COMPLETE CBC W/AUTO DIFF WBC: CPT

## 2023-07-20 PROCEDURE — 2580000003 HC RX 258: Performed by: INTERNAL MEDICINE

## 2023-07-20 PROCEDURE — 99214 OFFICE O/P EST MOD 30 MIN: CPT | Performed by: INTERNAL MEDICINE

## 2023-07-20 PROCEDURE — 36415 COLL VENOUS BLD VENIPUNCTURE: CPT

## 2023-07-20 PROCEDURE — 80053 COMPREHEN METABOLIC PANEL: CPT

## 2023-07-20 PROCEDURE — 96413 CHEMO IV INFUSION 1 HR: CPT

## 2023-07-20 PROCEDURE — 6360000002 HC RX W HCPCS: Performed by: INTERNAL MEDICINE

## 2023-07-20 PROCEDURE — 3074F SYST BP LT 130 MM HG: CPT | Performed by: INTERNAL MEDICINE

## 2023-07-20 RX ORDER — HEPARIN SODIUM (PORCINE) LOCK FLUSH IV SOLN 100 UNIT/ML 100 UNIT/ML
500 SOLUTION INTRAVENOUS PRN
Status: CANCELLED | OUTPATIENT
Start: 2023-07-20

## 2023-07-20 RX ORDER — SODIUM CHLORIDE 0.9 % (FLUSH) 0.9 %
5-40 SYRINGE (ML) INJECTION PRN
Status: DISCONTINUED | OUTPATIENT
Start: 2023-07-20 | End: 2023-07-21 | Stop reason: HOSPADM

## 2023-07-20 RX ORDER — HEPARIN 100 UNIT/ML
500 SYRINGE INTRAVENOUS PRN
Status: DISCONTINUED | OUTPATIENT
Start: 2023-07-20 | End: 2023-07-21 | Stop reason: HOSPADM

## 2023-07-20 RX ORDER — ACETAMINOPHEN 325 MG/1
650 TABLET ORAL
Status: CANCELLED | OUTPATIENT
Start: 2023-07-20

## 2023-07-20 RX ORDER — FAMOTIDINE 10 MG/ML
20 INJECTION, SOLUTION INTRAVENOUS
Status: CANCELLED | OUTPATIENT
Start: 2023-07-20

## 2023-07-20 RX ORDER — ONDANSETRON 2 MG/ML
8 INJECTION INTRAMUSCULAR; INTRAVENOUS
Status: CANCELLED | OUTPATIENT
Start: 2023-07-20

## 2023-07-20 RX ORDER — SODIUM CHLORIDE 9 MG/ML
INJECTION, SOLUTION INTRAVENOUS CONTINUOUS
Status: CANCELLED | OUTPATIENT
Start: 2023-07-20

## 2023-07-20 RX ORDER — MEPERIDINE HYDROCHLORIDE 50 MG/ML
12.5 INJECTION INTRAMUSCULAR; INTRAVENOUS; SUBCUTANEOUS PRN
Status: CANCELLED | OUTPATIENT
Start: 2023-07-20

## 2023-07-20 RX ORDER — SODIUM CHLORIDE 9 MG/ML
5-250 INJECTION, SOLUTION INTRAVENOUS PRN
Status: CANCELLED | OUTPATIENT
Start: 2023-07-20

## 2023-07-20 RX ORDER — SODIUM CHLORIDE 0.9 % (FLUSH) 0.9 %
5-40 SYRINGE (ML) INJECTION PRN
Status: CANCELLED | OUTPATIENT
Start: 2023-07-20

## 2023-07-20 RX ORDER — DIPHENHYDRAMINE HYDROCHLORIDE 50 MG/ML
50 INJECTION INTRAMUSCULAR; INTRAVENOUS
Status: CANCELLED | OUTPATIENT
Start: 2023-07-20

## 2023-07-20 RX ORDER — ALBUTEROL SULFATE 90 UG/1
4 AEROSOL, METERED RESPIRATORY (INHALATION) PRN
Status: CANCELLED | OUTPATIENT
Start: 2023-07-20

## 2023-07-20 RX ORDER — EPINEPHRINE 1 MG/ML
0.3 INJECTION, SOLUTION, CONCENTRATE INTRAVENOUS PRN
Status: CANCELLED | OUTPATIENT
Start: 2023-07-20

## 2023-07-20 RX ADMIN — HEPARIN 500 UNITS: 100 SYRINGE at 13:26

## 2023-07-20 RX ADMIN — SODIUM CHLORIDE 400 MG: 9 INJECTION, SOLUTION INTRAVENOUS at 12:55

## 2023-07-20 RX ADMIN — SODIUM CHLORIDE, PRESERVATIVE FREE 10 ML: 5 INJECTION INTRAVENOUS at 13:26

## 2023-07-20 NOTE — PROGRESS NOTES
types were placed in this encounter. Rayne was seen today for breast cancer. Diagnoses and all orders for this visit:    Triple negative breast cancer ([address])  -     Cancel: Amb External Referral To Radiation Oncology    Care plan discussed with patient    Encounter for antineoplastic immunotherapy    Fatigue due to treatment    Drug-induced hypothyroidism      Invasive ductal carcinoma, left breast, Nov 2022, Grade 3, ER 0.2%, NV 0%, HER-2 0/negative, Ki67 83%, Stage IIIB  -Patient received preoperative chemotherapy, neoadjuvant with dose dense AC x4 cycles followed by 6 cycles of carboplatin and weekly Taxol.  -Patient underwent lumpectomy/sentinel lymph node biopsy-complete pathologic response  -I have recommended patient completion of adjuvant chemotherapy with carboplatin and weekly Taxol x6 cycles. Discussed with Dr. Jacobo Spears.  -She is to continue adjuvant Keytruda. Drug-induced hypothyroidism-secondary to [facility]  Lab Results   Component Value Date    .100 (H) 07/20/2023    TSHFT4 1.62 08/03/2021   -Start levothyroxine 100 mcg daily.  -Continue to monitor. Colonic tubular adenoma-  3/15/23 Upper EGD/Colonoscopy by : Approximately 1.8 to 2 cm in diameter pedunculated distal sigmoid colon polyp was removed by hot snare polypectomy technique after injection of the pedicle and the base of the polyp and the nearby wall of the colon submucosally with dilute epinephrine in sterile [place] ink and normal saline to decrease the risk of post polypectomy bleeding as well as to michelle the area for future reference. Otherwise, the mucosa appeared normal throughout the entire examined colon and the examined terminal ileum. NO larger polyps or masses or strictures or colitis. Internal hemorrhoids-Grade 1 visible, the mucosa appeared normal throughout the entire examined colon Retroflexion in the rectum was otherwise normal and revealed no further abnormalities.  PATH: Small intestine,

## 2023-07-20 NOTE — PROGRESS NOTES
Lab Results   Component Value Date    WBC 4.11 07/20/2023    HGB 11.7 07/20/2023    HCT 35.0 07/20/2023    .4 (H) 07/20/2023     (L) 07/20/2023     Lab Results   Component Value Date    NEUTROABS 1.82 07/20/2023

## 2023-07-21 ENCOUNTER — PATIENT MESSAGE (OUTPATIENT)
Dept: HEMATOLOGY | Age: 48
End: 2023-07-21

## 2023-07-21 DIAGNOSIS — E03.2 DRUG-INDUCED HYPOTHYROIDISM: Primary | ICD-10-CM

## 2023-07-21 RX ORDER — LEVOTHYROXINE SODIUM 0.1 MG/1
100 TABLET ORAL DAILY
Qty: 30 TABLET | Refills: 5 | Status: SHIPPED | OUTPATIENT
Start: 2023-07-21

## 2023-07-24 NOTE — TELEPHONE ENCOUNTER
From: Taina Gallegos  To: Dr. Ch Sake: 7/21/2023 11:40 AM CDT  Subject: Questions regarding chemo    I hate to bother u but I am having trouble explaining things to my . He is unsure of the need and I've tried explaining it but I'm not sure I completely understand the correct way to explain it to him. I was wondering if perhaps Juju or Dr Rose Groves could call at the end of the day and help me with this. My  doesn't arrive home until after 4 so there is no rush before then. I know this is a weird request so my apologies but I want to make sure that the process I'm going thru is understood to capacity.  Thank u all for everything!!

## 2023-07-28 ENCOUNTER — HOSPITAL ENCOUNTER (OUTPATIENT)
Dept: INFUSION THERAPY | Age: 48
Discharge: HOME | End: 2023-07-28
Payer: COMMERCIAL

## 2023-07-28 VITALS
TEMPERATURE: 97.7 F | BODY MASS INDEX: 19.91 KG/M2 | HEART RATE: 66 BPM | DIASTOLIC BLOOD PRESSURE: 56 MMHG | RESPIRATION RATE: 18 BRPM | HEIGHT: 69 IN | SYSTOLIC BLOOD PRESSURE: 122 MMHG | WEIGHT: 134.4 LBS | OXYGEN SATURATION: 99 %

## 2023-07-28 DIAGNOSIS — C50.812 MALIGNANT NEOPLASM OF OVERLAPPING SITES OF LEFT BREAST (HCC): Primary | ICD-10-CM

## 2023-07-28 DIAGNOSIS — C50.919 TRIPLE NEGATIVE BREAST CANCER (HCC): ICD-10-CM

## 2023-07-28 LAB
ALBUMIN SERPL-MCNC: 4.6 G/DL (ref 3.5–5.2)
ALP SERPL-CCNC: 66 U/L (ref 35–104)
ALT SERPL-CCNC: 19 U/L (ref 9–52)
ANION GAP SERPL CALCULATED.3IONS-SCNC: 14 MMOL/L (ref 7–19)
AST SERPL-CCNC: 32 U/L (ref 14–36)
BASOPHILS # BLD: 0.04 K/UL (ref 0.01–0.08)
BASOPHILS NFR BLD: 0.8 % (ref 0.1–1.2)
BILIRUB SERPL-MCNC: 0.4 MG/DL (ref 0.2–1.3)
BUN SERPL-MCNC: 15 MG/DL (ref 7–17)
CALCIUM SERPL-MCNC: 9.7 MG/DL (ref 8.4–10.2)
CHLORIDE SERPL-SCNC: 105 MMOL/L (ref 98–111)
CO2 SERPL-SCNC: 22 MMOL/L (ref 22–29)
CREAT SERPL-MCNC: 0.9 MG/DL (ref 0.5–1)
EOSINOPHIL # BLD: 0.04 K/UL (ref 0.04–0.54)
EOSINOPHIL NFR BLD: 0.8 % (ref 0.7–7)
ERYTHROCYTE [DISTWIDTH] IN BLOOD BY AUTOMATED COUNT: 11.8 % (ref 11.7–14.4)
GLOBULIN: 3.3 G/DL
GLUCOSE SERPL-MCNC: 126 MG/DL (ref 74–106)
HCT VFR BLD AUTO: 34.4 % (ref 34.1–44.9)
HGB BLD-MCNC: 11.6 G/DL (ref 11.2–15.7)
LYMPHOCYTES # BLD: 1.86 K/UL (ref 1.18–3.74)
LYMPHOCYTES NFR BLD: 36.7 % (ref 19.3–53.1)
MCH RBC QN AUTO: 35.9 PG (ref 25.6–32.2)
MCHC RBC AUTO-ENTMCNC: 33.7 G/DL (ref 32.3–35.5)
MCV RBC AUTO: 106.5 FL (ref 79.4–94.8)
MONOCYTES # BLD: 0.18 K/UL (ref 0.24–0.82)
MONOCYTES NFR BLD: 3.6 % (ref 4.7–12.5)
NEUTROPHILS # BLD: 2.94 K/UL (ref 1.56–6.13)
NEUTS SEG NFR BLD: 57.9 % (ref 34–71.1)
PLATELET # BLD AUTO: 206 K/UL (ref 182–369)
PMV BLD AUTO: 9 FL (ref 7.4–10.4)
POTASSIUM SERPL-SCNC: 3.5 MMOL/L (ref 3.5–5.1)
PROT SERPL-MCNC: 7.9 G/DL (ref 6.3–8.2)
RBC # BLD AUTO: 3.23 M/UL (ref 3.93–5.22)
SODIUM SERPL-SCNC: 141 MMOL/L (ref 137–145)
WBC # BLD AUTO: 5.07 K/UL (ref 3.98–10.04)

## 2023-07-28 PROCEDURE — 80053 COMPREHEN METABOLIC PANEL: CPT

## 2023-07-28 PROCEDURE — 96375 TX/PRO/DX INJ NEW DRUG ADDON: CPT

## 2023-07-28 PROCEDURE — 6360000002 HC RX W HCPCS: Performed by: INTERNAL MEDICINE

## 2023-07-28 PROCEDURE — 85025 COMPLETE CBC W/AUTO DIFF WBC: CPT

## 2023-07-28 PROCEDURE — 96367 TX/PROPH/DG ADDL SEQ IV INF: CPT

## 2023-07-28 PROCEDURE — 96417 CHEMO IV INFUS EACH ADDL SEQ: CPT

## 2023-07-28 PROCEDURE — 36415 COLL VENOUS BLD VENIPUNCTURE: CPT

## 2023-07-28 PROCEDURE — 96413 CHEMO IV INFUSION 1 HR: CPT

## 2023-07-28 PROCEDURE — 2500000003 HC RX 250 WO HCPCS: Performed by: INTERNAL MEDICINE

## 2023-07-28 PROCEDURE — 2580000003 HC RX 258: Performed by: INTERNAL MEDICINE

## 2023-07-28 RX ORDER — LORAZEPAM 2 MG/ML
0.5 INJECTION INTRAMUSCULAR
OUTPATIENT
Start: 2023-08-11

## 2023-07-28 RX ORDER — SODIUM CHLORIDE 0.9 % (FLUSH) 0.9 %
5-40 SYRINGE (ML) INJECTION PRN
OUTPATIENT
Start: 2023-08-04

## 2023-07-28 RX ORDER — SODIUM CHLORIDE 0.9 % (FLUSH) 0.9 %
5-40 SYRINGE (ML) INJECTION PRN
Status: DISCONTINUED | OUTPATIENT
Start: 2023-07-28 | End: 2023-07-29 | Stop reason: HOSPADM

## 2023-07-28 RX ORDER — SODIUM CHLORIDE 9 MG/ML
5-250 INJECTION, SOLUTION INTRAVENOUS PRN
OUTPATIENT
Start: 2023-08-11

## 2023-07-28 RX ORDER — EPINEPHRINE 1 MG/ML
0.3 INJECTION, SOLUTION, CONCENTRATE INTRAVENOUS PRN
OUTPATIENT
Start: 2023-08-04

## 2023-07-28 RX ORDER — FAMOTIDINE 10 MG/ML
20 INJECTION, SOLUTION INTRAVENOUS ONCE
OUTPATIENT
Start: 2023-08-11 | End: 2023-08-11

## 2023-07-28 RX ORDER — PROCHLORPERAZINE EDISYLATE 5 MG/ML
10 INJECTION INTRAMUSCULAR; INTRAVENOUS
OUTPATIENT
Start: 2023-08-11

## 2023-07-28 RX ORDER — FAMOTIDINE 10 MG/ML
20 INJECTION, SOLUTION INTRAVENOUS
OUTPATIENT
Start: 2023-08-11

## 2023-07-28 RX ORDER — SODIUM CHLORIDE 9 MG/ML
5-250 INJECTION, SOLUTION INTRAVENOUS PRN
OUTPATIENT
Start: 2023-08-04

## 2023-07-28 RX ORDER — PALONOSETRON 0.05 MG/ML
0.25 INJECTION, SOLUTION INTRAVENOUS ONCE
Status: CANCELLED | OUTPATIENT
Start: 2023-07-28 | End: 2023-07-28

## 2023-07-28 RX ORDER — HEPARIN SODIUM (PORCINE) LOCK FLUSH IV SOLN 100 UNIT/ML 100 UNIT/ML
500 SOLUTION INTRAVENOUS PRN
OUTPATIENT
Start: 2023-08-11

## 2023-07-28 RX ORDER — SODIUM CHLORIDE 9 MG/ML
INJECTION, SOLUTION INTRAVENOUS CONTINUOUS
OUTPATIENT
Start: 2023-08-11

## 2023-07-28 RX ORDER — ALBUTEROL SULFATE 90 UG/1
4 AEROSOL, METERED RESPIRATORY (INHALATION) PRN
OUTPATIENT
Start: 2023-08-04

## 2023-07-28 RX ORDER — MEPERIDINE HYDROCHLORIDE 50 MG/ML
12.5 INJECTION INTRAMUSCULAR; INTRAVENOUS; SUBCUTANEOUS PRN
OUTPATIENT
Start: 2023-08-04

## 2023-07-28 RX ORDER — FAMOTIDINE 10 MG/ML
20 INJECTION, SOLUTION INTRAVENOUS ONCE
Status: CANCELLED | OUTPATIENT
Start: 2023-07-28 | End: 2023-07-28

## 2023-07-28 RX ORDER — DIPHENHYDRAMINE HYDROCHLORIDE 50 MG/ML
50 INJECTION INTRAMUSCULAR; INTRAVENOUS
Status: CANCELLED | OUTPATIENT
Start: 2023-07-28

## 2023-07-28 RX ORDER — FAMOTIDINE 10 MG/ML
20 INJECTION, SOLUTION INTRAVENOUS ONCE
OUTPATIENT
Start: 2023-08-04 | End: 2023-08-04

## 2023-07-28 RX ORDER — MEPERIDINE HYDROCHLORIDE 50 MG/ML
12.5 INJECTION INTRAMUSCULAR; INTRAVENOUS; SUBCUTANEOUS PRN
OUTPATIENT
Start: 2023-08-11

## 2023-07-28 RX ORDER — ONDANSETRON 2 MG/ML
8 INJECTION INTRAMUSCULAR; INTRAVENOUS
Status: CANCELLED | OUTPATIENT
Start: 2023-07-28

## 2023-07-28 RX ORDER — SODIUM CHLORIDE 0.9 % (FLUSH) 0.9 %
5-40 SYRINGE (ML) INJECTION PRN
OUTPATIENT
Start: 2023-08-11

## 2023-07-28 RX ORDER — MEPERIDINE HYDROCHLORIDE 50 MG/ML
12.5 INJECTION INTRAMUSCULAR; INTRAVENOUS; SUBCUTANEOUS PRN
Status: CANCELLED | OUTPATIENT
Start: 2023-07-28

## 2023-07-28 RX ORDER — SODIUM CHLORIDE 9 MG/ML
INJECTION, SOLUTION INTRAVENOUS CONTINUOUS
OUTPATIENT
Start: 2023-08-04

## 2023-07-28 RX ORDER — FAMOTIDINE 10 MG/ML
20 INJECTION, SOLUTION INTRAVENOUS
OUTPATIENT
Start: 2023-08-04

## 2023-07-28 RX ORDER — ACETAMINOPHEN 325 MG/1
650 TABLET ORAL
Status: CANCELLED | OUTPATIENT
Start: 2023-07-28

## 2023-07-28 RX ORDER — ONDANSETRON 2 MG/ML
8 INJECTION INTRAMUSCULAR; INTRAVENOUS
OUTPATIENT
Start: 2023-08-11

## 2023-07-28 RX ORDER — ACETAMINOPHEN 325 MG/1
650 TABLET ORAL
OUTPATIENT
Start: 2023-08-11

## 2023-07-28 RX ORDER — PALONOSETRON 0.05 MG/ML
0.25 INJECTION, SOLUTION INTRAVENOUS ONCE
Status: COMPLETED | OUTPATIENT
Start: 2023-07-28 | End: 2023-07-28

## 2023-07-28 RX ORDER — SODIUM CHLORIDE 0.9 % (FLUSH) 0.9 %
5-40 SYRINGE (ML) INJECTION PRN
Status: CANCELLED | OUTPATIENT
Start: 2023-07-28

## 2023-07-28 RX ORDER — SODIUM CHLORIDE 9 MG/ML
5-250 INJECTION, SOLUTION INTRAVENOUS PRN
Status: DISCONTINUED | OUTPATIENT
Start: 2023-07-28 | End: 2023-07-29 | Stop reason: HOSPADM

## 2023-07-28 RX ORDER — HEPARIN 100 UNIT/ML
500 SYRINGE INTRAVENOUS PRN
Status: DISCONTINUED | OUTPATIENT
Start: 2023-07-28 | End: 2023-07-29 | Stop reason: HOSPADM

## 2023-07-28 RX ORDER — ONDANSETRON 2 MG/ML
8 INJECTION INTRAMUSCULAR; INTRAVENOUS
OUTPATIENT
Start: 2023-08-04

## 2023-07-28 RX ORDER — SODIUM CHLORIDE 9 MG/ML
5-250 INJECTION, SOLUTION INTRAVENOUS PRN
Status: CANCELLED | OUTPATIENT
Start: 2023-07-28

## 2023-07-28 RX ORDER — DIPHENHYDRAMINE HYDROCHLORIDE 50 MG/ML
50 INJECTION INTRAMUSCULAR; INTRAVENOUS
OUTPATIENT
Start: 2023-08-11

## 2023-07-28 RX ORDER — DIPHENHYDRAMINE HYDROCHLORIDE 50 MG/ML
50 INJECTION INTRAMUSCULAR; INTRAVENOUS
OUTPATIENT
Start: 2023-08-04

## 2023-07-28 RX ORDER — EPINEPHRINE 1 MG/ML
0.3 INJECTION, SOLUTION, CONCENTRATE INTRAVENOUS PRN
OUTPATIENT
Start: 2023-08-11

## 2023-07-28 RX ORDER — PROCHLORPERAZINE EDISYLATE 5 MG/ML
10 INJECTION INTRAMUSCULAR; INTRAVENOUS
OUTPATIENT
Start: 2023-08-04

## 2023-07-28 RX ORDER — DIPHENHYDRAMINE HYDROCHLORIDE 50 MG/ML
25 INJECTION INTRAMUSCULAR; INTRAVENOUS ONCE
OUTPATIENT
Start: 2023-08-11 | End: 2023-08-11

## 2023-07-28 RX ORDER — ALBUTEROL SULFATE 90 UG/1
4 AEROSOL, METERED RESPIRATORY (INHALATION) PRN
OUTPATIENT
Start: 2023-08-11

## 2023-07-28 RX ORDER — FAMOTIDINE 10 MG/ML
20 INJECTION, SOLUTION INTRAVENOUS ONCE
Status: COMPLETED | OUTPATIENT
Start: 2023-07-28 | End: 2023-07-28

## 2023-07-28 RX ORDER — ACETAMINOPHEN 325 MG/1
650 TABLET ORAL
OUTPATIENT
Start: 2023-08-04

## 2023-07-28 RX ORDER — DIPHENHYDRAMINE HYDROCHLORIDE 50 MG/ML
25 INJECTION INTRAMUSCULAR; INTRAVENOUS ONCE
Status: COMPLETED | OUTPATIENT
Start: 2023-07-28 | End: 2023-07-28

## 2023-07-28 RX ORDER — HEPARIN SODIUM (PORCINE) LOCK FLUSH IV SOLN 100 UNIT/ML 100 UNIT/ML
500 SOLUTION INTRAVENOUS PRN
OUTPATIENT
Start: 2023-08-04

## 2023-07-28 RX ORDER — SODIUM CHLORIDE 9 MG/ML
INJECTION, SOLUTION INTRAVENOUS CONTINUOUS
Status: CANCELLED | OUTPATIENT
Start: 2023-07-28

## 2023-07-28 RX ORDER — HEPARIN SODIUM (PORCINE) LOCK FLUSH IV SOLN 100 UNIT/ML 100 UNIT/ML
500 SOLUTION INTRAVENOUS PRN
Status: CANCELLED | OUTPATIENT
Start: 2023-07-28

## 2023-07-28 RX ORDER — EPINEPHRINE 1 MG/ML
0.3 INJECTION, SOLUTION, CONCENTRATE INTRAVENOUS PRN
Status: CANCELLED | OUTPATIENT
Start: 2023-07-28

## 2023-07-28 RX ORDER — FAMOTIDINE 10 MG/ML
20 INJECTION, SOLUTION INTRAVENOUS
Status: CANCELLED | OUTPATIENT
Start: 2023-07-28

## 2023-07-28 RX ORDER — DIPHENHYDRAMINE HYDROCHLORIDE 50 MG/ML
25 INJECTION INTRAMUSCULAR; INTRAVENOUS ONCE
Status: CANCELLED | OUTPATIENT
Start: 2023-07-28 | End: 2023-07-28

## 2023-07-28 RX ORDER — LORAZEPAM 2 MG/ML
0.5 INJECTION INTRAMUSCULAR
OUTPATIENT
Start: 2023-08-04

## 2023-07-28 RX ORDER — DIPHENHYDRAMINE HYDROCHLORIDE 50 MG/ML
25 INJECTION INTRAMUSCULAR; INTRAVENOUS ONCE
OUTPATIENT
Start: 2023-08-04 | End: 2023-08-04

## 2023-07-28 RX ORDER — ALBUTEROL SULFATE 90 UG/1
4 AEROSOL, METERED RESPIRATORY (INHALATION) PRN
Status: CANCELLED | OUTPATIENT
Start: 2023-07-28

## 2023-07-28 RX ADMIN — SODIUM CHLORIDE, PRESERVATIVE FREE 10 ML: 5 INJECTION INTRAVENOUS at 14:20

## 2023-07-28 RX ADMIN — PALONOSETRON 0.25 MG: 0.05 INJECTION, SOLUTION INTRAVENOUS at 11:58

## 2023-07-28 RX ADMIN — SODIUM CHLORIDE 250 ML/HR: 9 INJECTION, SOLUTION INTRAVENOUS at 11:55

## 2023-07-28 RX ADMIN — HEPARIN 500 UNITS: 100 SYRINGE at 14:20

## 2023-07-28 RX ADMIN — DIPHENHYDRAMINE HYDROCHLORIDE 25 MG: 50 INJECTION, SOLUTION INTRAMUSCULAR; INTRAVENOUS at 11:58

## 2023-07-28 RX ADMIN — DEXAMETHASONE SODIUM PHOSPHATE: 10 INJECTION, SOLUTION INTRAMUSCULAR; INTRAVENOUS at 11:59

## 2023-07-28 RX ADMIN — FAMOTIDINE 20 MG: 10 INJECTION INTRAVENOUS at 11:58

## 2023-07-28 RX ADMIN — FOSAPREPITANT 150 MG: 150 INJECTION, POWDER, LYOPHILIZED, FOR SOLUTION INTRAVENOUS at 12:07

## 2023-07-28 RX ADMIN — PACLITAXEL 90 MG: 6 INJECTION, SOLUTION INTRAVENOUS at 12:38

## 2023-07-28 RX ADMIN — CARBOPLATIN 196 MG: 10 INJECTION, SOLUTION INTRAVENOUS at 13:43

## 2023-07-28 NOTE — PROGRESS NOTES
Lab Results   Component Value Date    WBC 4.11 07/20/2023    HGB 11.7 07/20/2023    HCT 35.0 07/20/2023    .4 (H) 07/20/2023     (L) 07/20/2023     Lab Results   Component Value Date    NEUTROABS 1.82 07/20/2023     Lab Results   Component Value Date     07/20/2023    K 3.8 07/20/2023     07/20/2023    CO2 22 07/20/2023    BUN 12 07/20/2023    CREATININE 0.9 07/20/2023    GLUCOSE 108 (H) 07/20/2023    CALCIUM 9.5 07/20/2023    PROT 7.7 07/20/2023    LABALBU 4.4 07/20/2023    BILITOT <0.2 07/20/2023    ALKPHOS 65 07/20/2023    AST 49 (H) 07/20/2023    ALT 30 07/20/2023    LABGLOM >60 07/20/2023    GFRAA >59 08/03/2021    GLOB 3.3 07/20/2023

## 2023-08-04 ENCOUNTER — HOSPITAL ENCOUNTER (OUTPATIENT)
Dept: INFUSION THERAPY | Age: 48
Discharge: HOME | End: 2023-08-04
Payer: COMMERCIAL

## 2023-08-04 VITALS
RESPIRATION RATE: 16 BRPM | BODY MASS INDEX: 20.08 KG/M2 | TEMPERATURE: 97.9 F | HEART RATE: 64 BPM | WEIGHT: 135.6 LBS | DIASTOLIC BLOOD PRESSURE: 61 MMHG | HEIGHT: 69 IN | SYSTOLIC BLOOD PRESSURE: 129 MMHG | OXYGEN SATURATION: 100 %

## 2023-08-04 DIAGNOSIS — C50.812 MALIGNANT NEOPLASM OF OVERLAPPING SITES OF LEFT BREAST (HCC): Primary | ICD-10-CM

## 2023-08-04 DIAGNOSIS — D70.1 CHEMOTHERAPY-INDUCED NEUTROPENIA (HCC): ICD-10-CM

## 2023-08-04 DIAGNOSIS — Z45.2 ENCOUNTER FOR CENTRAL LINE CARE: ICD-10-CM

## 2023-08-04 DIAGNOSIS — T45.1X5A CHEMOTHERAPY-INDUCED NEUTROPENIA (HCC): ICD-10-CM

## 2023-08-04 LAB
ALBUMIN SERPL-MCNC: 4.6 G/DL (ref 3.5–5.2)
ALP SERPL-CCNC: 64 U/L (ref 35–104)
ALT SERPL-CCNC: 16 U/L (ref 9–52)
ANION GAP SERPL CALCULATED.3IONS-SCNC: 13 MMOL/L (ref 7–19)
AST SERPL-CCNC: 23 U/L (ref 14–36)
BILIRUB SERPL-MCNC: 0.3 MG/DL (ref 0.2–1.3)
BUN SERPL-MCNC: 11 MG/DL (ref 7–17)
CALCIUM SERPL-MCNC: 9.5 MG/DL (ref 8.4–10.2)
CHLORIDE SERPL-SCNC: 104 MMOL/L (ref 98–111)
CO2 SERPL-SCNC: 24 MMOL/L (ref 22–29)
CREAT SERPL-MCNC: 0.9 MG/DL (ref 0.5–1)
ERYTHROCYTE [DISTWIDTH] IN BLOOD BY AUTOMATED COUNT: 11.5 % (ref 11.7–14.4)
GLOBULIN: 3.3 G/DL
GLUCOSE SERPL-MCNC: 76 MG/DL (ref 74–106)
HCT VFR BLD AUTO: 32.8 % (ref 34.1–44.9)
HGB BLD-MCNC: 11.1 G/DL (ref 11.2–15.7)
LYMPHOCYTES # BLD: 2 K/UL (ref 1.18–3.74)
LYMPHOCYTES NFR BLD: 62 % (ref 19.3–53.1)
MCH RBC QN AUTO: 35.8 PG (ref 25.6–32.2)
MCHC RBC AUTO-ENTMCNC: 33.8 G/DL (ref 32.3–35.5)
MCV RBC AUTO: 105.8 FL (ref 79.4–94.8)
MONOCYTES # BLD: 0.1 K/UL (ref 0.24–0.82)
MONOCYTES NFR BLD: 3.3 % (ref 4.7–12.5)
NEUTROPHILS # BLD: 1.2 K/UL (ref 1.56–6.13)
NEUTS SEG NFR BLD: 34.7 % (ref 34–71.1)
PLATELET # BLD AUTO: 181 K/UL (ref 182–369)
PMV BLD AUTO: 8.3 FL (ref 7.4–10.4)
POTASSIUM SERPL-SCNC: 3.8 MMOL/L (ref 3.5–5.1)
PROT SERPL-MCNC: 7.9 G/DL (ref 6.3–8.2)
RBC # BLD AUTO: 3.1 M/UL (ref 3.93–5.22)
SODIUM SERPL-SCNC: 141 MMOL/L (ref 137–145)
WBC # BLD AUTO: 3.3 K/UL (ref 3.98–10.04)

## 2023-08-04 PROCEDURE — 96372 THER/PROPH/DIAG INJ SC/IM: CPT

## 2023-08-04 PROCEDURE — 2580000003 HC RX 258: Performed by: INTERNAL MEDICINE

## 2023-08-04 PROCEDURE — 85025 COMPLETE CBC W/AUTO DIFF WBC: CPT

## 2023-08-04 PROCEDURE — 80053 COMPREHEN METABOLIC PANEL: CPT

## 2023-08-04 PROCEDURE — 6360000002 HC RX W HCPCS: Performed by: INTERNAL MEDICINE

## 2023-08-04 PROCEDURE — 36415 COLL VENOUS BLD VENIPUNCTURE: CPT

## 2023-08-04 PROCEDURE — 96523 IRRIG DRUG DELIVERY DEVICE: CPT

## 2023-08-04 RX ORDER — FAMOTIDINE 10 MG/ML
20 INJECTION, SOLUTION INTRAVENOUS
Status: CANCELLED | OUTPATIENT
Start: 2023-08-04

## 2023-08-04 RX ORDER — ALBUTEROL SULFATE 90 UG/1
4 AEROSOL, METERED RESPIRATORY (INHALATION) PRN
Status: CANCELLED | OUTPATIENT
Start: 2023-08-04

## 2023-08-04 RX ORDER — SODIUM CHLORIDE 9 MG/ML
25 INJECTION, SOLUTION INTRAVENOUS PRN
OUTPATIENT
Start: 2023-08-04

## 2023-08-04 RX ORDER — SODIUM CHLORIDE 0.9 % (FLUSH) 0.9 %
5-40 SYRINGE (ML) INJECTION PRN
OUTPATIENT
Start: 2023-08-04

## 2023-08-04 RX ORDER — ACETAMINOPHEN 325 MG/1
650 TABLET ORAL
Status: CANCELLED | OUTPATIENT
Start: 2023-08-04

## 2023-08-04 RX ORDER — HEPARIN 100 UNIT/ML
500 SYRINGE INTRAVENOUS PRN
OUTPATIENT
Start: 2023-08-04

## 2023-08-04 RX ORDER — SODIUM CHLORIDE 0.9 % (FLUSH) 0.9 %
5-40 SYRINGE (ML) INJECTION PRN
Status: DISCONTINUED | OUTPATIENT
Start: 2023-08-04 | End: 2023-08-05 | Stop reason: HOSPADM

## 2023-08-04 RX ORDER — EPINEPHRINE 1 MG/ML
0.3 INJECTION, SOLUTION, CONCENTRATE INTRAVENOUS PRN
Status: CANCELLED | OUTPATIENT
Start: 2023-08-04

## 2023-08-04 RX ORDER — SODIUM CHLORIDE 9 MG/ML
INJECTION, SOLUTION INTRAVENOUS CONTINUOUS
Status: CANCELLED | OUTPATIENT
Start: 2023-08-04

## 2023-08-04 RX ORDER — ONDANSETRON 2 MG/ML
8 INJECTION INTRAMUSCULAR; INTRAVENOUS
Status: CANCELLED | OUTPATIENT
Start: 2023-08-04

## 2023-08-04 RX ORDER — HEPARIN 100 UNIT/ML
500 SYRINGE INTRAVENOUS PRN
Status: DISCONTINUED | OUTPATIENT
Start: 2023-08-04 | End: 2023-08-05 | Stop reason: HOSPADM

## 2023-08-04 RX ORDER — DIPHENHYDRAMINE HYDROCHLORIDE 50 MG/ML
50 INJECTION INTRAMUSCULAR; INTRAVENOUS
Status: CANCELLED | OUTPATIENT
Start: 2023-08-04

## 2023-08-04 RX ADMIN — HEPARIN 500 UNITS: 100 SYRINGE at 15:37

## 2023-08-04 RX ADMIN — FILGRASTIM-SNDZ 480 MCG: 480 INJECTION, SOLUTION INTRAVENOUS; SUBCUTANEOUS at 15:37

## 2023-08-04 RX ADMIN — SODIUM CHLORIDE, PRESERVATIVE FREE 10 ML: 5 INJECTION INTRAVENOUS at 15:37

## 2023-08-04 NOTE — PROGRESS NOTES
Pt is here for C1D8 Taxol/Carbo. ANC today is 1.2. Dr. Kenroy Worrell desires to hold treatment today and give her Nivestym 480mcg daily x 3 and reassess in 1 week for chemo. Appts given and pt d/c.

## 2023-08-07 ENCOUNTER — HOSPITAL ENCOUNTER (OUTPATIENT)
Dept: INFUSION THERAPY | Age: 48
Discharge: HOME | End: 2023-08-07
Payer: COMMERCIAL

## 2023-08-07 DIAGNOSIS — T45.1X5A CHEMOTHERAPY-INDUCED NEUTROPENIA (HCC): Primary | ICD-10-CM

## 2023-08-07 DIAGNOSIS — D70.1 CHEMOTHERAPY-INDUCED NEUTROPENIA (HCC): Primary | ICD-10-CM

## 2023-08-07 PROCEDURE — 6360000002 HC RX W HCPCS: Performed by: INTERNAL MEDICINE

## 2023-08-07 PROCEDURE — 96372 THER/PROPH/DIAG INJ SC/IM: CPT

## 2023-08-07 RX ORDER — SODIUM CHLORIDE 9 MG/ML
INJECTION, SOLUTION INTRAVENOUS CONTINUOUS
OUTPATIENT
Start: 2023-08-07

## 2023-08-07 RX ORDER — FAMOTIDINE 10 MG/ML
20 INJECTION, SOLUTION INTRAVENOUS
OUTPATIENT
Start: 2023-08-07

## 2023-08-07 RX ORDER — ONDANSETRON 2 MG/ML
8 INJECTION INTRAMUSCULAR; INTRAVENOUS
OUTPATIENT
Start: 2023-08-07

## 2023-08-07 RX ORDER — EPINEPHRINE 1 MG/ML
0.3 INJECTION, SOLUTION, CONCENTRATE INTRAVENOUS PRN
OUTPATIENT
Start: 2023-08-07

## 2023-08-07 RX ORDER — ACETAMINOPHEN 325 MG/1
650 TABLET ORAL
OUTPATIENT
Start: 2023-08-07

## 2023-08-07 RX ORDER — ALBUTEROL SULFATE 90 UG/1
4 AEROSOL, METERED RESPIRATORY (INHALATION) PRN
OUTPATIENT
Start: 2023-08-07

## 2023-08-07 RX ORDER — DIPHENHYDRAMINE HYDROCHLORIDE 50 MG/ML
50 INJECTION INTRAMUSCULAR; INTRAVENOUS
OUTPATIENT
Start: 2023-08-07

## 2023-08-07 RX ADMIN — FILGRASTIM-SNDZ 480 MCG: 480 INJECTION, SOLUTION INTRAVENOUS; SUBCUTANEOUS at 10:51

## 2023-08-08 ENCOUNTER — HOSPITAL ENCOUNTER (OUTPATIENT)
Dept: INFUSION THERAPY | Age: 48
Discharge: HOME | End: 2023-08-08
Payer: COMMERCIAL

## 2023-08-08 DIAGNOSIS — D70.1 CHEMOTHERAPY-INDUCED NEUTROPENIA (HCC): Primary | ICD-10-CM

## 2023-08-08 DIAGNOSIS — T45.1X5A CHEMOTHERAPY-INDUCED NEUTROPENIA (HCC): Primary | ICD-10-CM

## 2023-08-08 PROCEDURE — 96372 THER/PROPH/DIAG INJ SC/IM: CPT

## 2023-08-08 PROCEDURE — 6360000002 HC RX W HCPCS: Performed by: INTERNAL MEDICINE

## 2023-08-08 RX ORDER — EPINEPHRINE 1 MG/ML
0.3 INJECTION, SOLUTION, CONCENTRATE INTRAVENOUS PRN
OUTPATIENT
Start: 2023-08-08

## 2023-08-08 RX ORDER — DIPHENHYDRAMINE HYDROCHLORIDE 50 MG/ML
50 INJECTION INTRAMUSCULAR; INTRAVENOUS
OUTPATIENT
Start: 2023-08-08

## 2023-08-08 RX ORDER — FAMOTIDINE 10 MG/ML
20 INJECTION, SOLUTION INTRAVENOUS
OUTPATIENT
Start: 2023-08-08

## 2023-08-08 RX ORDER — ALBUTEROL SULFATE 90 UG/1
4 AEROSOL, METERED RESPIRATORY (INHALATION) PRN
OUTPATIENT
Start: 2023-08-08

## 2023-08-08 RX ORDER — ONDANSETRON 2 MG/ML
8 INJECTION INTRAMUSCULAR; INTRAVENOUS
OUTPATIENT
Start: 2023-08-08

## 2023-08-08 RX ORDER — SODIUM CHLORIDE 9 MG/ML
INJECTION, SOLUTION INTRAVENOUS CONTINUOUS
OUTPATIENT
Start: 2023-08-08

## 2023-08-08 RX ORDER — ACETAMINOPHEN 325 MG/1
650 TABLET ORAL
OUTPATIENT
Start: 2023-08-08

## 2023-08-08 RX ADMIN — FILGRASTIM-SNDZ 480 MCG: 480 INJECTION, SOLUTION INTRAVENOUS; SUBCUTANEOUS at 11:15

## 2023-08-10 NOTE — PROGRESS NOTES
thickness. RV is normal in size with normal systolic function. Normal left atrial size. Normal right atrial size. Aortic valve is trileaflet with normal leaflet mobility. No significant stenosis or regurgitation. Mitral valve is structurally normal with normal leaflet mobility. No significant stenosis or regurgitation. No significant tricuspid regurgitation. No significant pericardial effusion. 12/29/22-Initiated Pembrolizumab every 6 weeks x1 year with Adriamycin, Cyclophosphamide + GCSF every 2 weeks x4 cycles followed by weekly Taxol, Carbo x12 cycles  12/20/22 [facility] 79 Genetic Panel: Negative  1/26/23 FL Esophagram Grade 3 esophageal reflux yes  2/15/23 US Breast Limited Left Ultrasound the left breast at 3:00 there is a density seen 4 cm from the lesion superficial measuring 1.75 x 1.25 x 0.6 8/3/1972. There is a biopsy clip seen in the medial aspect of this lesion. Previously measures 1.71 x 1.77 x 0.947 cm. The lesion has decreased in size as compared to prior studyHypoechoic solid lesion in left breast at 3:00 with biopsy marker which has decreased in size as described above. Final Assessment: ACR: BI-RADS : Known biopsy- proven malignancy   7/14/23 Left partial mastectomy with negative SNLB by Dr. José Miguel Cali/[facility]   7/14/23 Surgical Pathology: Left breast, lumpectomy: No malignancy identified. Cystic area lined by histiocytes, chronic inflammation, and fibroconnective tissue consistent with organizing fat necrosis. Benign ducts, fibrocystic change, and terminal lobular units. Unremarkable epidermis. Left breast additional medial margin no malignancy identified. The surgical margins are free of tumor. Left breast, additional lateral margin: No malignancy identified. The surgical margins are free of tumor. Left breast, additional deep margin: No malignancy identified. The surgical margins are free of tumor.  Left axilla, sentinel lymph nodes: 3 benign lymph

## 2023-08-11 ENCOUNTER — HOSPITAL ENCOUNTER (OUTPATIENT)
Dept: INFUSION THERAPY | Age: 48
Discharge: HOME | End: 2023-08-11
Payer: COMMERCIAL

## 2023-08-11 ENCOUNTER — OFFICE VISIT (OUTPATIENT)
Dept: HEMATOLOGY | Age: 48
End: 2023-08-11
Payer: COMMERCIAL

## 2023-08-11 VITALS
DIASTOLIC BLOOD PRESSURE: 66 MMHG | OXYGEN SATURATION: 100 % | WEIGHT: 136.2 LBS | HEART RATE: 68 BPM | RESPIRATION RATE: 18 BRPM | TEMPERATURE: 97.9 F | SYSTOLIC BLOOD PRESSURE: 128 MMHG | BODY MASS INDEX: 20.17 KG/M2 | HEIGHT: 69 IN

## 2023-08-11 DIAGNOSIS — C50.812 MALIGNANT NEOPLASM OF OVERLAPPING SITES OF LEFT BREAST (HCC): Primary | ICD-10-CM

## 2023-08-11 DIAGNOSIS — D64.81 ANTINEOPLASTIC CHEMOTHERAPY INDUCED ANEMIA: ICD-10-CM

## 2023-08-11 DIAGNOSIS — E03.2 DRUG-INDUCED HYPOTHYROIDISM: ICD-10-CM

## 2023-08-11 DIAGNOSIS — Z71.89 CARE PLAN DISCUSSED WITH PATIENT: ICD-10-CM

## 2023-08-11 DIAGNOSIS — G62.0 CHEMOTHERAPY-INDUCED NEUROPATHY (HCC): ICD-10-CM

## 2023-08-11 DIAGNOSIS — R53.83 FATIGUE DUE TO TREATMENT: ICD-10-CM

## 2023-08-11 DIAGNOSIS — C50.919 TRIPLE NEGATIVE BREAST CANCER (HCC): ICD-10-CM

## 2023-08-11 DIAGNOSIS — T45.1X5A ANTINEOPLASTIC CHEMOTHERAPY INDUCED ANEMIA: ICD-10-CM

## 2023-08-11 DIAGNOSIS — Z51.11 CHEMOTHERAPY MANAGEMENT, ENCOUNTER FOR: ICD-10-CM

## 2023-08-11 DIAGNOSIS — T45.1X5A CHEMOTHERAPY-INDUCED NEUROPATHY (HCC): ICD-10-CM

## 2023-08-11 DIAGNOSIS — T45.1X5D ADVERSE EFFECT OF CHEMOTHERAPY, SUBSEQUENT ENCOUNTER: ICD-10-CM

## 2023-08-11 DIAGNOSIS — C50.919 TRIPLE NEGATIVE BREAST CANCER (HCC): Primary | ICD-10-CM

## 2023-08-11 LAB
ALBUMIN SERPL-MCNC: 4.2 G/DL (ref 3.5–5.2)
ALP SERPL-CCNC: 86 U/L (ref 35–104)
ALT SERPL-CCNC: 16 U/L (ref 9–52)
ANION GAP SERPL CALCULATED.3IONS-SCNC: 15 MMOL/L (ref 7–19)
AST SERPL-CCNC: 22 U/L (ref 14–36)
BILIRUB SERPL-MCNC: <0.2 MG/DL (ref 0.2–1.3)
BUN SERPL-MCNC: 7 MG/DL (ref 7–17)
CALCIUM SERPL-MCNC: 9.4 MG/DL (ref 8.4–10.2)
CHLORIDE SERPL-SCNC: 104 MMOL/L (ref 98–111)
CO2 SERPL-SCNC: 23 MMOL/L (ref 22–29)
CREAT SERPL-MCNC: 0.7 MG/DL (ref 0.5–1)
ERYTHROCYTE [DISTWIDTH] IN BLOOD BY AUTOMATED COUNT: 12.4 % (ref 11.7–14.4)
GLOBULIN: 3.3 G/DL
GLUCOSE SERPL-MCNC: 104 MG/DL (ref 74–106)
HCT VFR BLD AUTO: 32.8 % (ref 34.1–44.9)
HGB BLD-MCNC: 10.9 G/DL (ref 11.2–15.7)
LYMPHOCYTES # BLD: 2.01 K/UL (ref 1.18–3.74)
LYMPHOCYTES NFR BLD: 40.7 % (ref 19.3–53.1)
MCH RBC QN AUTO: 35.3 PG (ref 25.6–32.2)
MCHC RBC AUTO-ENTMCNC: 33.2 G/DL (ref 32.3–35.5)
MCV RBC AUTO: 106.1 FL (ref 79.4–94.8)
MONOCYTES # BLD: 0.43 K/UL (ref 0.24–0.82)
MONOCYTES NFR BLD: 8.7 % (ref 4.7–12.5)
NEUTROPHILS # BLD: 2.43 K/UL (ref 1.56–6.13)
NEUTS SEG NFR BLD: 49.2 % (ref 34–71.1)
PLATELET # BLD AUTO: 159 K/UL (ref 182–369)
PMV BLD AUTO: 9.5 FL (ref 7.4–10.4)
POTASSIUM SERPL-SCNC: 3.7 MMOL/L (ref 3.5–5.1)
PROT SERPL-MCNC: 7.5 G/DL (ref 6.3–8.2)
RBC # BLD AUTO: 3.09 M/UL (ref 3.93–5.22)
SODIUM SERPL-SCNC: 142 MMOL/L (ref 137–145)
TSH SERPL DL<=0.005 MIU/L-ACNC: 7.75 UIU/ML (ref 0.27–4.2)
WBC # BLD AUTO: 4.94 K/UL (ref 3.98–10.04)

## 2023-08-11 PROCEDURE — 96417 CHEMO IV INFUS EACH ADDL SEQ: CPT

## 2023-08-11 PROCEDURE — 80053 COMPREHEN METABOLIC PANEL: CPT

## 2023-08-11 PROCEDURE — 99214 OFFICE O/P EST MOD 30 MIN: CPT | Performed by: INTERNAL MEDICINE

## 2023-08-11 PROCEDURE — 2500000003 HC RX 250 WO HCPCS: Performed by: INTERNAL MEDICINE

## 2023-08-11 PROCEDURE — 96375 TX/PRO/DX INJ NEW DRUG ADDON: CPT

## 2023-08-11 PROCEDURE — 3078F DIAST BP <80 MM HG: CPT | Performed by: INTERNAL MEDICINE

## 2023-08-11 PROCEDURE — 2580000003 HC RX 258: Performed by: INTERNAL MEDICINE

## 2023-08-11 PROCEDURE — 85025 COMPLETE CBC W/AUTO DIFF WBC: CPT

## 2023-08-11 PROCEDURE — 96367 TX/PROPH/DG ADDL SEQ IV INF: CPT

## 2023-08-11 PROCEDURE — 6360000002 HC RX W HCPCS: Performed by: INTERNAL MEDICINE

## 2023-08-11 PROCEDURE — 3074F SYST BP LT 130 MM HG: CPT | Performed by: INTERNAL MEDICINE

## 2023-08-11 PROCEDURE — 96413 CHEMO IV INFUSION 1 HR: CPT

## 2023-08-11 PROCEDURE — 36415 COLL VENOUS BLD VENIPUNCTURE: CPT

## 2023-08-11 RX ORDER — HEPARIN 100 UNIT/ML
500 SYRINGE INTRAVENOUS PRN
Status: DISCONTINUED | OUTPATIENT
Start: 2023-08-11 | End: 2023-08-12 | Stop reason: HOSPADM

## 2023-08-11 RX ORDER — SODIUM CHLORIDE 9 MG/ML
5-250 INJECTION, SOLUTION INTRAVENOUS PRN
Status: DISCONTINUED | OUTPATIENT
Start: 2023-08-11 | End: 2023-08-12 | Stop reason: HOSPADM

## 2023-08-11 RX ORDER — FAMOTIDINE 10 MG/ML
20 INJECTION, SOLUTION INTRAVENOUS ONCE
Status: COMPLETED | OUTPATIENT
Start: 2023-08-11 | End: 2023-08-11

## 2023-08-11 RX ORDER — DIPHENHYDRAMINE HYDROCHLORIDE 50 MG/ML
25 INJECTION INTRAMUSCULAR; INTRAVENOUS ONCE
Status: COMPLETED | OUTPATIENT
Start: 2023-08-11 | End: 2023-08-11

## 2023-08-11 RX ORDER — SODIUM CHLORIDE 0.9 % (FLUSH) 0.9 %
5-40 SYRINGE (ML) INJECTION PRN
Status: DISCONTINUED | OUTPATIENT
Start: 2023-08-11 | End: 2023-08-12 | Stop reason: HOSPADM

## 2023-08-11 RX ORDER — PALONOSETRON 0.05 MG/ML
0.25 INJECTION, SOLUTION INTRAVENOUS ONCE
Status: COMPLETED | OUTPATIENT
Start: 2023-08-11 | End: 2023-08-11

## 2023-08-11 RX ADMIN — HEPARIN 500 UNITS: 100 SYRINGE at 14:18

## 2023-08-11 RX ADMIN — SODIUM CHLORIDE, PRESERVATIVE FREE 10 ML: 5 INJECTION INTRAVENOUS at 14:18

## 2023-08-11 RX ADMIN — FAMOTIDINE 20 MG: 10 INJECTION INTRAVENOUS at 12:06

## 2023-08-11 RX ADMIN — FOSAPREPITANT 150 MG: 150 INJECTION, POWDER, LYOPHILIZED, FOR SOLUTION INTRAVENOUS at 12:15

## 2023-08-11 RX ADMIN — PALONOSETRON HYDROCHLORIDE 0.25 MG: 0.25 INJECTION, SOLUTION INTRAVENOUS at 12:06

## 2023-08-11 RX ADMIN — PACLITAXEL 90 MG: 6 INJECTION, SOLUTION, CONCENTRATE INTRAVENOUS at 12:40

## 2023-08-11 RX ADMIN — CARBOPLATIN 236 MG: 10 INJECTION, SOLUTION INTRAVENOUS at 13:45

## 2023-08-11 RX ADMIN — SODIUM CHLORIDE 50 ML/HR: 9 INJECTION, SOLUTION INTRAVENOUS at 12:02

## 2023-08-11 RX ADMIN — DIPHENHYDRAMINE HYDROCHLORIDE 25 MG: 50 INJECTION, SOLUTION INTRAMUSCULAR; INTRAVENOUS at 12:06

## 2023-08-11 RX ADMIN — DEXAMETHASONE SODIUM PHOSPHATE: 10 INJECTION, SOLUTION INTRAMUSCULAR; INTRAVENOUS at 12:08

## 2023-08-17 NOTE — PROGRESS NOTES
HISTORY OF PRESENT ILLNESS:     Ms. Bertrand Jamil presents today for a one month post op following left partial mastectomy with left sentinel lymph node biopsy on 7/14/2023    She is recently status post ultrasound guided breast biopsy  on the left which revealed a 1.1 cm high grade invasive ductal carcinoma. ER negative. DC negative. Her2 negative. Ki67 is 83%. MammaPrint demonstrates a high risk basal-like phenotype    She has completed 4 cycles of dose dense AC and part of her CarboTaxol. Unfortunately her counts have not enabled her to finish her complete course. I discussed this with Dr. Leopoldo Dykes and we will proceed with surgery. She may get additional cycles of carbo and Taxol after surgery if indicated. Because of her young age and high risk tumor, she is going to complete her additional Ana and Taxol. She had a pathologic complete response      MRI-6/7/2023  FINDINGS:  Contrast is seen in the heart and great vessels on postcontrast  sequences consistent with an adequate bolus. There is moderate  background enhancement of the patient's heterogeneously dense  fibroglandular tissues. There is redemonstration of unchanged  bilateral retropectoral single-lumen appearing silicone implants. In  the upper outer quadrant of the left breast at the known side of  malignancy, the previous mass is no longer seen. T2 bright nonanatomic  biopsy clip is evident. No suspicious MR abnormality identified in  either breast. On current study there are no abnormal axillary or  internal mammary chain lymph nodes. The visualized extent of  extramammary findings no significant abnormality is seen. 1. Left breast, BI-RADS 6, biopsy-proven malignancy in the left breast  upper outer quadrant. MRI suggests response to therapy as the mass is  no longer seen by MRI. Biopsy clip is present. Recommend appropriate  clinical and surgical management. Overall assessment BI-RADS 6, biopsy proven malignancy.   Signed by Dr Cheryle Ewings

## 2023-08-18 ENCOUNTER — HOSPITAL ENCOUNTER (OUTPATIENT)
Dept: INFUSION THERAPY | Age: 48
Discharge: HOME | End: 2023-08-18
Payer: COMMERCIAL

## 2023-08-18 ENCOUNTER — CLINICAL DOCUMENTATION (OUTPATIENT)
Dept: HEMATOLOGY | Age: 48
End: 2023-08-18

## 2023-08-18 VITALS
SYSTOLIC BLOOD PRESSURE: 120 MMHG | DIASTOLIC BLOOD PRESSURE: 77 MMHG | OXYGEN SATURATION: 100 % | HEIGHT: 69 IN | HEART RATE: 72 BPM | RESPIRATION RATE: 18 BRPM | TEMPERATURE: 97.7 F | WEIGHT: 135.8 LBS | BODY MASS INDEX: 20.11 KG/M2

## 2023-08-18 DIAGNOSIS — R53.83 FATIGUE, UNSPECIFIED TYPE: Primary | ICD-10-CM

## 2023-08-18 DIAGNOSIS — C50.812 MALIGNANT NEOPLASM OF OVERLAPPING SITES OF LEFT BREAST (HCC): Primary | ICD-10-CM

## 2023-08-18 DIAGNOSIS — C50.919 TRIPLE NEGATIVE BREAST CANCER (HCC): ICD-10-CM

## 2023-08-18 DIAGNOSIS — R53.83 FATIGUE, UNSPECIFIED TYPE: ICD-10-CM

## 2023-08-18 LAB
ALBUMIN SERPL-MCNC: 4.1 G/DL (ref 3.5–5.2)
ALP SERPL-CCNC: 76 U/L (ref 35–104)
ALT SERPL-CCNC: 15 U/L (ref 9–52)
ANION GAP SERPL CALCULATED.3IONS-SCNC: 10 MMOL/L (ref 7–19)
AST SERPL-CCNC: 21 U/L (ref 14–36)
BILIRUB SERPL-MCNC: 0.3 MG/DL (ref 0.2–1.3)
BUN SERPL-MCNC: 11 MG/DL (ref 7–17)
CALCIUM SERPL-MCNC: 9.3 MG/DL (ref 8.4–10.2)
CHLORIDE SERPL-SCNC: 106 MMOL/L (ref 98–111)
CO2 SERPL-SCNC: 25 MMOL/L (ref 22–29)
CREAT SERPL-MCNC: 0.6 MG/DL (ref 0.5–1)
ERYTHROCYTE [DISTWIDTH] IN BLOOD BY AUTOMATED COUNT: 11.9 % (ref 11.7–14.4)
GLOBULIN: 3.1 G/DL
GLUCOSE SERPL-MCNC: 102 MG/DL (ref 74–106)
HCT VFR BLD AUTO: 29.5 % (ref 34.1–44.9)
HGB BLD-MCNC: 10.1 G/DL (ref 11.2–15.7)
LYMPHOCYTES # BLD: 1.62 K/UL (ref 1.18–3.74)
LYMPHOCYTES NFR BLD: 51.1 % (ref 19.3–53.1)
MCH RBC QN AUTO: 35.3 PG (ref 25.6–32.2)
MCHC RBC AUTO-ENTMCNC: 34.2 G/DL (ref 32.3–35.5)
MCV RBC AUTO: 103.1 FL (ref 79.4–94.8)
MONOCYTES # BLD: 0.16 K/UL (ref 0.24–0.82)
MONOCYTES NFR BLD: 5 % (ref 4.7–12.5)
NEUTROPHILS # BLD: 1.33 K/UL (ref 1.56–6.13)
NEUTS SEG NFR BLD: 42.1 % (ref 34–71.1)
PLATELET # BLD AUTO: 137 K/UL (ref 182–369)
PMV BLD AUTO: 9.4 FL (ref 7.4–10.4)
POTASSIUM SERPL-SCNC: 3.9 MMOL/L (ref 3.5–5.1)
PROT SERPL-MCNC: 7.2 G/DL (ref 6.3–8.2)
RBC # BLD AUTO: 2.86 M/UL (ref 3.93–5.22)
SODIUM SERPL-SCNC: 141 MMOL/L (ref 137–145)
TSH SERPL DL<=0.005 MIU/L-ACNC: 4.05 UIU/ML (ref 0.27–4.2)
WBC # BLD AUTO: 3.17 K/UL (ref 3.98–10.04)

## 2023-08-18 PROCEDURE — 2500000003 HC RX 250 WO HCPCS: Performed by: INTERNAL MEDICINE

## 2023-08-18 PROCEDURE — 2580000003 HC RX 258: Performed by: INTERNAL MEDICINE

## 2023-08-18 PROCEDURE — 36415 COLL VENOUS BLD VENIPUNCTURE: CPT

## 2023-08-18 PROCEDURE — 80053 COMPREHEN METABOLIC PANEL: CPT

## 2023-08-18 PROCEDURE — 96413 CHEMO IV INFUSION 1 HR: CPT

## 2023-08-18 PROCEDURE — 96367 TX/PROPH/DG ADDL SEQ IV INF: CPT

## 2023-08-18 PROCEDURE — 96417 CHEMO IV INFUS EACH ADDL SEQ: CPT

## 2023-08-18 PROCEDURE — 6360000002 HC RX W HCPCS: Performed by: INTERNAL MEDICINE

## 2023-08-18 PROCEDURE — 96375 TX/PRO/DX INJ NEW DRUG ADDON: CPT

## 2023-08-18 PROCEDURE — 85025 COMPLETE CBC W/AUTO DIFF WBC: CPT

## 2023-08-18 RX ORDER — HEPARIN 100 UNIT/ML
500 SYRINGE INTRAVENOUS PRN
Status: DISCONTINUED | OUTPATIENT
Start: 2023-08-18 | End: 2023-08-19 | Stop reason: HOSPADM

## 2023-08-18 RX ORDER — SODIUM CHLORIDE 9 MG/ML
5-250 INJECTION, SOLUTION INTRAVENOUS PRN
Status: DISCONTINUED | OUTPATIENT
Start: 2023-08-18 | End: 2023-08-19 | Stop reason: HOSPADM

## 2023-08-18 RX ORDER — SODIUM CHLORIDE 0.9 % (FLUSH) 0.9 %
5-40 SYRINGE (ML) INJECTION PRN
Status: DISCONTINUED | OUTPATIENT
Start: 2023-08-18 | End: 2023-08-19 | Stop reason: HOSPADM

## 2023-08-18 RX ORDER — PALONOSETRON 0.05 MG/ML
0.25 INJECTION, SOLUTION INTRAVENOUS ONCE
Status: COMPLETED | OUTPATIENT
Start: 2023-08-18 | End: 2023-08-18

## 2023-08-18 RX ORDER — DIPHENHYDRAMINE HYDROCHLORIDE 50 MG/ML
25 INJECTION INTRAMUSCULAR; INTRAVENOUS ONCE
Status: COMPLETED | OUTPATIENT
Start: 2023-08-18 | End: 2023-08-18

## 2023-08-18 RX ORDER — FAMOTIDINE 10 MG/ML
20 INJECTION, SOLUTION INTRAVENOUS ONCE
Status: COMPLETED | OUTPATIENT
Start: 2023-08-18 | End: 2023-08-18

## 2023-08-18 RX ADMIN — CARBOPLATIN 177.75 MG: 10 INJECTION, SOLUTION INTRAVENOUS at 14:27

## 2023-08-18 RX ADMIN — FOSAPREPITANT DIMEGLUMINE 150 MG: 150 INJECTION, POWDER, LYOPHILIZED, FOR SOLUTION INTRAVENOUS at 12:54

## 2023-08-18 RX ADMIN — SODIUM CHLORIDE 50 ML/HR: 9 INJECTION, SOLUTION INTRAVENOUS at 12:44

## 2023-08-18 RX ADMIN — SODIUM CHLORIDE, PRESERVATIVE FREE 10 ML: 5 INJECTION INTRAVENOUS at 14:58

## 2023-08-18 RX ADMIN — HEPARIN 500 UNITS: 100 SYRINGE at 14:58

## 2023-08-18 RX ADMIN — DIPHENHYDRAMINE HYDROCHLORIDE 25 MG: 50 INJECTION, SOLUTION INTRAMUSCULAR; INTRAVENOUS at 12:46

## 2023-08-18 RX ADMIN — DEXAMETHASONE SODIUM PHOSPHATE: 10 INJECTION, SOLUTION INTRAMUSCULAR; INTRAVENOUS at 12:46

## 2023-08-18 RX ADMIN — PACLITAXEL 72 MG: 6 INJECTION, SOLUTION, CONCENTRATE INTRAVENOUS at 13:22

## 2023-08-18 RX ADMIN — PALONOSETRON 0.25 MG: 0.05 INJECTION, SOLUTION INTRAVENOUS at 12:46

## 2023-08-18 RX ADMIN — FAMOTIDINE 20 MG: 10 INJECTION INTRAVENOUS at 12:46

## 2023-08-18 NOTE — PROGRESS NOTES
Pt here for treatment. WBC 3.17, ANC 1.33. Patient really wants treatment today. Discussed with Dr Dread Serrano who recommended to decrease Taxol, Carbo by 20 %. Plan updated.

## 2023-08-21 ENCOUNTER — OFFICE VISIT (OUTPATIENT)
Dept: SURGERY | Age: 48
End: 2023-08-21

## 2023-08-21 ENCOUNTER — HOSPITAL ENCOUNTER (OUTPATIENT)
Dept: INFUSION THERAPY | Age: 48
Discharge: HOME | End: 2023-08-21
Payer: COMMERCIAL

## 2023-08-21 VITALS — SYSTOLIC BLOOD PRESSURE: 104 MMHG | HEART RATE: 80 BPM | DIASTOLIC BLOOD PRESSURE: 70 MMHG

## 2023-08-21 DIAGNOSIS — Z98.890 STATUS POST LEFT BREAST LUMPECTOMY: ICD-10-CM

## 2023-08-21 DIAGNOSIS — C50.812 MALIGNANT NEOPLASM OF OVERLAPPING SITES OF LEFT BREAST (HCC): Primary | ICD-10-CM

## 2023-08-21 DIAGNOSIS — T45.1X5A CHEMOTHERAPY-INDUCED NEUTROPENIA (HCC): Primary | ICD-10-CM

## 2023-08-21 DIAGNOSIS — D70.1 CHEMOTHERAPY-INDUCED NEUTROPENIA (HCC): Primary | ICD-10-CM

## 2023-08-21 PROCEDURE — 99024 POSTOP FOLLOW-UP VISIT: CPT | Performed by: SURGERY

## 2023-08-21 PROCEDURE — 96372 THER/PROPH/DIAG INJ SC/IM: CPT

## 2023-08-21 PROCEDURE — 6360000002 HC RX W HCPCS: Performed by: INTERNAL MEDICINE

## 2023-08-21 RX ORDER — ONDANSETRON 2 MG/ML
8 INJECTION INTRAMUSCULAR; INTRAVENOUS
Status: CANCELLED | OUTPATIENT
Start: 2023-08-21

## 2023-08-21 RX ORDER — DIPHENHYDRAMINE HYDROCHLORIDE 50 MG/ML
50 INJECTION INTRAMUSCULAR; INTRAVENOUS
Status: CANCELLED | OUTPATIENT
Start: 2023-08-21

## 2023-08-21 RX ORDER — FAMOTIDINE 10 MG/ML
20 INJECTION, SOLUTION INTRAVENOUS
Status: CANCELLED | OUTPATIENT
Start: 2023-08-21

## 2023-08-21 RX ORDER — ACETAMINOPHEN 325 MG/1
650 TABLET ORAL
Status: CANCELLED | OUTPATIENT
Start: 2023-08-21

## 2023-08-21 RX ORDER — SODIUM CHLORIDE 9 MG/ML
INJECTION, SOLUTION INTRAVENOUS CONTINUOUS
Status: CANCELLED | OUTPATIENT
Start: 2023-08-21

## 2023-08-21 RX ORDER — EPINEPHRINE 1 MG/ML
0.3 INJECTION, SOLUTION, CONCENTRATE INTRAVENOUS PRN
Status: CANCELLED | OUTPATIENT
Start: 2023-08-21

## 2023-08-21 RX ORDER — ALBUTEROL SULFATE 90 UG/1
4 AEROSOL, METERED RESPIRATORY (INHALATION) PRN
Status: CANCELLED | OUTPATIENT
Start: 2023-08-21

## 2023-08-21 RX ADMIN — FILGRASTIM-SNDZ 480 MCG: 480 INJECTION, SOLUTION INTRAVENOUS; SUBCUTANEOUS at 15:02

## 2023-08-22 ENCOUNTER — HOSPITAL ENCOUNTER (OUTPATIENT)
Dept: INFUSION THERAPY | Age: 48
Discharge: HOME | End: 2023-08-22
Payer: COMMERCIAL

## 2023-08-22 ENCOUNTER — OFFICE VISIT (OUTPATIENT)
Dept: PRIMARY CARE CLINIC | Age: 48
End: 2023-08-22
Payer: COMMERCIAL

## 2023-08-22 VITALS
DIASTOLIC BLOOD PRESSURE: 65 MMHG | OXYGEN SATURATION: 98 % | SYSTOLIC BLOOD PRESSURE: 104 MMHG | BODY MASS INDEX: 19.94 KG/M2 | WEIGHT: 135 LBS | TEMPERATURE: 96.9 F | HEART RATE: 75 BPM

## 2023-08-22 DIAGNOSIS — T45.1X5A CHEMOTHERAPY-INDUCED NEUTROPENIA (HCC): Primary | ICD-10-CM

## 2023-08-22 DIAGNOSIS — D70.1 CHEMOTHERAPY-INDUCED NEUTROPENIA (HCC): Primary | ICD-10-CM

## 2023-08-22 DIAGNOSIS — Z00.00 ENCOUNTER FOR ANNUAL PHYSICAL EXAM: Primary | ICD-10-CM

## 2023-08-22 PROCEDURE — 99213 OFFICE O/P EST LOW 20 MIN: CPT | Performed by: NURSE PRACTITIONER

## 2023-08-22 PROCEDURE — 6360000002 HC RX W HCPCS: Performed by: INTERNAL MEDICINE

## 2023-08-22 PROCEDURE — 99396 PREV VISIT EST AGE 40-64: CPT | Performed by: NURSE PRACTITIONER

## 2023-08-22 PROCEDURE — 3078F DIAST BP <80 MM HG: CPT | Performed by: NURSE PRACTITIONER

## 2023-08-22 PROCEDURE — 3074F SYST BP LT 130 MM HG: CPT | Performed by: NURSE PRACTITIONER

## 2023-08-22 PROCEDURE — 96372 THER/PROPH/DIAG INJ SC/IM: CPT

## 2023-08-22 RX ORDER — DIPHENHYDRAMINE HYDROCHLORIDE 50 MG/ML
50 INJECTION INTRAMUSCULAR; INTRAVENOUS
OUTPATIENT
Start: 2023-08-22

## 2023-08-22 RX ORDER — ALBUTEROL SULFATE 90 UG/1
4 AEROSOL, METERED RESPIRATORY (INHALATION) PRN
OUTPATIENT
Start: 2023-08-22

## 2023-08-22 RX ORDER — SODIUM CHLORIDE 9 MG/ML
INJECTION, SOLUTION INTRAVENOUS CONTINUOUS
OUTPATIENT
Start: 2023-08-22

## 2023-08-22 RX ORDER — EPINEPHRINE 1 MG/ML
0.3 INJECTION, SOLUTION, CONCENTRATE INTRAVENOUS PRN
OUTPATIENT
Start: 2023-08-22

## 2023-08-22 RX ORDER — FAMOTIDINE 10 MG/ML
20 INJECTION, SOLUTION INTRAVENOUS
OUTPATIENT
Start: 2023-08-22

## 2023-08-22 RX ORDER — ACETAMINOPHEN 325 MG/1
650 TABLET ORAL
OUTPATIENT
Start: 2023-08-22

## 2023-08-22 RX ORDER — ONDANSETRON 2 MG/ML
8 INJECTION INTRAMUSCULAR; INTRAVENOUS
OUTPATIENT
Start: 2023-08-22

## 2023-08-22 RX ADMIN — FILGRASTIM-SNDZ 480 MCG: 480 INJECTION, SOLUTION INTRAVENOUS; SUBCUTANEOUS at 10:47

## 2023-08-22 NOTE — PROGRESS NOTES
[address] [facility] PRIMARY [facility]  [address] [facility],[phone]  [address] [address]  Dept: [phone]  Dept Fax: [phone]  Loc: [phone]    Flaco Davalos is a 46 y.o. female who presents today for her medical conditions/complaints as noted below. Flaco Davalos is c/o of 6 Month Follow-Up        HPI:     HPI   Chief Complaint   Patient presents with    6 Month Follow-Up     Patient presents today for annual physical.     She recently restarted chemo; she states she is tolerating well. She has only a few more treatments. She had lumpectomy 7/17/23; she has done well post-operatively. She was started on levothyroxine per oncology. TSH is in normal range now.      Past Medical History:   Diagnosis Date    Adverse effect of chemotherapy     Breast cancer ([address]) 11/2022    Triple negative breast cancer    Hypotension     Malignant neoplasm of overlapping sites of left breast ([address]) 11/22/2022    PVC (premature ventricular contraction)     Restless leg syndrome 07/16/2018    Uterine mass 06/17/2014    9x9 cm on CT scan      Past Surgical History:   Procedure Laterality Date    BREAST LUMPECTOMY Left 7/14/2023    LEFT LUMPECTOMY & SNB, PREOP & INTRAOP US GUIDED NEEDLE LOC, PEC BLOCK, BIOZORB, FLAPS & MARGINPROBE performed by Pura Dotson MD at [address] [facility]  04/2019    BREAST SURGERY Bilateral 9406    silicone Memory Gel    CHOLECYSTECTOMY  06/24/2014    COLONOSCOPY N/A 03/15/2023    Dr Talia Moore, mili ink tattooing placed in distal sigmoid colon, Bening TA (-)Dysplasia, int hem Gr 1, 3 year    HC INJECT OTHER PERPHRL NERV Right 08/03/2016    HIP FLUOROSCOPIC GUIDED CORTICOSTEROID INJECTION  performed by Astrid Burgess MD at [facility] NERV Right 04/21/2017    FLURO GUIDED HIP INJECTION performed by Tali Arciniega MD at [address] [facility], VAGINAL      PORT SURGERY Left 12/09/2022    PORT INSERTION WITH

## 2023-08-23 ENCOUNTER — HOSPITAL ENCOUNTER (OUTPATIENT)
Dept: INFUSION THERAPY | Age: 48
Discharge: HOME | End: 2023-08-23
Payer: COMMERCIAL

## 2023-08-23 DIAGNOSIS — T45.1X5A CHEMOTHERAPY-INDUCED NEUTROPENIA (HCC): Primary | ICD-10-CM

## 2023-08-23 DIAGNOSIS — D70.1 CHEMOTHERAPY-INDUCED NEUTROPENIA (HCC): Primary | ICD-10-CM

## 2023-08-23 PROCEDURE — 6360000002 HC RX W HCPCS: Performed by: INTERNAL MEDICINE

## 2023-08-23 PROCEDURE — 96372 THER/PROPH/DIAG INJ SC/IM: CPT

## 2023-08-23 RX ORDER — FAMOTIDINE 10 MG/ML
20 INJECTION, SOLUTION INTRAVENOUS
OUTPATIENT
Start: 2023-08-23

## 2023-08-23 RX ORDER — EPINEPHRINE 1 MG/ML
0.3 INJECTION, SOLUTION, CONCENTRATE INTRAVENOUS PRN
OUTPATIENT
Start: 2023-08-23

## 2023-08-23 RX ORDER — DIPHENHYDRAMINE HYDROCHLORIDE 50 MG/ML
50 INJECTION INTRAMUSCULAR; INTRAVENOUS
OUTPATIENT
Start: 2023-08-23

## 2023-08-23 RX ORDER — ONDANSETRON 2 MG/ML
8 INJECTION INTRAMUSCULAR; INTRAVENOUS
OUTPATIENT
Start: 2023-08-23

## 2023-08-23 RX ORDER — SODIUM CHLORIDE 9 MG/ML
INJECTION, SOLUTION INTRAVENOUS CONTINUOUS
OUTPATIENT
Start: 2023-08-23

## 2023-08-23 RX ORDER — ACETAMINOPHEN 325 MG/1
650 TABLET ORAL
OUTPATIENT
Start: 2023-08-23

## 2023-08-23 RX ORDER — ALBUTEROL SULFATE 90 UG/1
4 AEROSOL, METERED RESPIRATORY (INHALATION) PRN
OUTPATIENT
Start: 2023-08-23

## 2023-08-23 RX ADMIN — FILGRASTIM-SNDZ 480 MCG: 480 INJECTION, SOLUTION INTRAVENOUS; SUBCUTANEOUS at 09:24

## 2023-08-25 ENCOUNTER — HOSPITAL ENCOUNTER (OUTPATIENT)
Dept: INFUSION THERAPY | Age: 48
Discharge: HOME | End: 2023-08-25
Payer: COMMERCIAL

## 2023-08-25 VITALS
RESPIRATION RATE: 18 BRPM | HEART RATE: 70 BPM | HEIGHT: 69 IN | WEIGHT: 134.1 LBS | TEMPERATURE: 98.3 F | DIASTOLIC BLOOD PRESSURE: 65 MMHG | BODY MASS INDEX: 19.86 KG/M2 | OXYGEN SATURATION: 98 % | SYSTOLIC BLOOD PRESSURE: 96 MMHG

## 2023-08-25 DIAGNOSIS — C50.919 TRIPLE NEGATIVE BREAST CANCER (HCC): ICD-10-CM

## 2023-08-25 DIAGNOSIS — C50.812 MALIGNANT NEOPLASM OF OVERLAPPING SITES OF LEFT BREAST (HCC): Primary | ICD-10-CM

## 2023-08-25 LAB
ALBUMIN SERPL-MCNC: 4 G/DL (ref 3.5–5.2)
ALP SERPL-CCNC: 85 U/L (ref 35–104)
ALT SERPL-CCNC: 18 U/L (ref 9–52)
ANION GAP SERPL CALCULATED.3IONS-SCNC: 15 MMOL/L (ref 7–19)
AST SERPL-CCNC: 25 U/L (ref 14–36)
BASOPHILS # BLD: 0.03 K/UL (ref 0.01–0.08)
BASOPHILS NFR BLD: 0.5 % (ref 0.1–1.2)
BILIRUB SERPL-MCNC: <0.2 MG/DL (ref 0.2–1.3)
BUN SERPL-MCNC: 13 MG/DL (ref 7–17)
CALCIUM SERPL-MCNC: 9 MG/DL (ref 8.4–10.2)
CHLORIDE SERPL-SCNC: 105 MMOL/L (ref 98–111)
CO2 SERPL-SCNC: 21 MMOL/L (ref 22–29)
CREAT SERPL-MCNC: 0.7 MG/DL (ref 0.5–1)
EOSINOPHIL # BLD: 0.04 K/UL (ref 0.04–0.54)
EOSINOPHIL NFR BLD: 0.7 % (ref 0.7–7)
ERYTHROCYTE [DISTWIDTH] IN BLOOD BY AUTOMATED COUNT: 12.3 % (ref 11.7–14.4)
GLOBULIN: 3 G/DL
GLUCOSE SERPL-MCNC: 121 MG/DL (ref 74–106)
HCT VFR BLD AUTO: 28.1 % (ref 34.1–44.9)
HGB BLD-MCNC: 9.5 G/DL (ref 11.2–15.7)
LYMPHOCYTES # BLD: 2.01 K/UL (ref 1.18–3.74)
LYMPHOCYTES NFR BLD: 33.1 % (ref 19.3–53.1)
MCH RBC QN AUTO: 35.4 PG (ref 25.6–32.2)
MCHC RBC AUTO-ENTMCNC: 33.8 G/DL (ref 32.3–35.5)
MCV RBC AUTO: 104.9 FL (ref 79.4–94.8)
MONOCYTES # BLD: 0.57 K/UL (ref 0.24–0.82)
MONOCYTES NFR BLD: 9.4 % (ref 4.7–12.5)
NEUTROPHILS # BLD: 3.4 K/UL (ref 1.56–6.13)
NEUTS SEG NFR BLD: 56 % (ref 34–71.1)
PLATELET # BLD AUTO: 116 K/UL (ref 182–369)
PMV BLD AUTO: 10.2 FL (ref 7.4–10.4)
POTASSIUM SERPL-SCNC: 3.6 MMOL/L (ref 3.5–5.1)
PROT SERPL-MCNC: 7 G/DL (ref 6.3–8.2)
RBC # BLD AUTO: 2.68 M/UL (ref 3.93–5.22)
SODIUM SERPL-SCNC: 141 MMOL/L (ref 137–145)
WBC # BLD AUTO: 6.07 K/UL (ref 3.98–10.04)

## 2023-08-25 PROCEDURE — 96413 CHEMO IV INFUSION 1 HR: CPT

## 2023-08-25 PROCEDURE — 80053 COMPREHEN METABOLIC PANEL: CPT

## 2023-08-25 PROCEDURE — 2500000003 HC RX 250 WO HCPCS: Performed by: NURSE PRACTITIONER

## 2023-08-25 PROCEDURE — 6360000002 HC RX W HCPCS: Performed by: NURSE PRACTITIONER

## 2023-08-25 PROCEDURE — 96367 TX/PROPH/DG ADDL SEQ IV INF: CPT

## 2023-08-25 PROCEDURE — 96417 CHEMO IV INFUS EACH ADDL SEQ: CPT

## 2023-08-25 PROCEDURE — 2580000003 HC RX 258: Performed by: NURSE PRACTITIONER

## 2023-08-25 PROCEDURE — 96375 TX/PRO/DX INJ NEW DRUG ADDON: CPT

## 2023-08-25 PROCEDURE — 85025 COMPLETE CBC W/AUTO DIFF WBC: CPT

## 2023-08-25 RX ORDER — SODIUM CHLORIDE 9 MG/ML
5-250 INJECTION, SOLUTION INTRAVENOUS PRN
Status: CANCELLED | OUTPATIENT
Start: 2023-08-25

## 2023-08-25 RX ORDER — ALBUTEROL SULFATE 90 UG/1
4 AEROSOL, METERED RESPIRATORY (INHALATION) PRN
OUTPATIENT
Start: 2023-09-01

## 2023-08-25 RX ORDER — FAMOTIDINE 10 MG/ML
20 INJECTION, SOLUTION INTRAVENOUS
Status: CANCELLED | OUTPATIENT
Start: 2023-08-25

## 2023-08-25 RX ORDER — FAMOTIDINE 10 MG/ML
20 INJECTION, SOLUTION INTRAVENOUS
OUTPATIENT
Start: 2023-09-08

## 2023-08-25 RX ORDER — SODIUM CHLORIDE 0.9 % (FLUSH) 0.9 %
5-40 SYRINGE (ML) INJECTION PRN
Status: CANCELLED | OUTPATIENT
Start: 2023-08-25

## 2023-08-25 RX ORDER — ONDANSETRON 2 MG/ML
8 INJECTION INTRAMUSCULAR; INTRAVENOUS
OUTPATIENT
Start: 2023-09-08

## 2023-08-25 RX ORDER — DIPHENHYDRAMINE HYDROCHLORIDE 50 MG/ML
50 INJECTION INTRAMUSCULAR; INTRAVENOUS
OUTPATIENT
Start: 2023-09-01

## 2023-08-25 RX ORDER — EPINEPHRINE 1 MG/ML
0.3 INJECTION, SOLUTION, CONCENTRATE INTRAVENOUS PRN
OUTPATIENT
Start: 2023-09-08

## 2023-08-25 RX ORDER — SODIUM CHLORIDE 9 MG/ML
5-250 INJECTION, SOLUTION INTRAVENOUS PRN
OUTPATIENT
Start: 2023-09-01

## 2023-08-25 RX ORDER — LORAZEPAM 2 MG/ML
0.5 INJECTION INTRAMUSCULAR
OUTPATIENT
Start: 2023-09-08

## 2023-08-25 RX ORDER — LORAZEPAM 2 MG/ML
0.5 INJECTION INTRAMUSCULAR
OUTPATIENT
Start: 2023-09-01

## 2023-08-25 RX ORDER — HEPARIN SODIUM (PORCINE) LOCK FLUSH IV SOLN 100 UNIT/ML 100 UNIT/ML
500 SOLUTION INTRAVENOUS PRN
Status: CANCELLED | OUTPATIENT
Start: 2023-08-25

## 2023-08-25 RX ORDER — ACETAMINOPHEN 325 MG/1
650 TABLET ORAL
OUTPATIENT
Start: 2023-09-08

## 2023-08-25 RX ORDER — ALBUTEROL SULFATE 90 UG/1
4 AEROSOL, METERED RESPIRATORY (INHALATION) PRN
OUTPATIENT
Start: 2023-09-08

## 2023-08-25 RX ORDER — SODIUM CHLORIDE 9 MG/ML
5-250 INJECTION, SOLUTION INTRAVENOUS PRN
OUTPATIENT
Start: 2023-09-08

## 2023-08-25 RX ORDER — ACETAMINOPHEN 325 MG/1
650 TABLET ORAL
OUTPATIENT
Start: 2023-09-01

## 2023-08-25 RX ORDER — ACETAMINOPHEN 325 MG/1
650 TABLET ORAL
Status: CANCELLED | OUTPATIENT
Start: 2023-08-25

## 2023-08-25 RX ORDER — SODIUM CHLORIDE 9 MG/ML
INJECTION, SOLUTION INTRAVENOUS CONTINUOUS
OUTPATIENT
Start: 2023-09-08

## 2023-08-25 RX ORDER — PROCHLORPERAZINE EDISYLATE 5 MG/ML
10 INJECTION INTRAMUSCULAR; INTRAVENOUS
OUTPATIENT
Start: 2023-09-01

## 2023-08-25 RX ORDER — FAMOTIDINE 10 MG/ML
20 INJECTION, SOLUTION INTRAVENOUS ONCE
OUTPATIENT
Start: 2023-09-01 | End: 2023-09-01

## 2023-08-25 RX ORDER — DIPHENHYDRAMINE HYDROCHLORIDE 50 MG/ML
25 INJECTION INTRAMUSCULAR; INTRAVENOUS ONCE
Status: COMPLETED | OUTPATIENT
Start: 2023-08-25 | End: 2023-08-25

## 2023-08-25 RX ORDER — MEPERIDINE HYDROCHLORIDE 50 MG/ML
12.5 INJECTION INTRAMUSCULAR; INTRAVENOUS; SUBCUTANEOUS PRN
OUTPATIENT
Start: 2023-09-08

## 2023-08-25 RX ORDER — SODIUM CHLORIDE 0.9 % (FLUSH) 0.9 %
5-40 SYRINGE (ML) INJECTION PRN
Status: DISCONTINUED | OUTPATIENT
Start: 2023-08-25 | End: 2023-08-26 | Stop reason: HOSPADM

## 2023-08-25 RX ORDER — SODIUM CHLORIDE 0.9 % (FLUSH) 0.9 %
5-40 SYRINGE (ML) INJECTION PRN
OUTPATIENT
Start: 2023-09-01

## 2023-08-25 RX ORDER — HEPARIN 100 UNIT/ML
500 SYRINGE INTRAVENOUS PRN
Status: DISCONTINUED | OUTPATIENT
Start: 2023-08-25 | End: 2023-08-26 | Stop reason: HOSPADM

## 2023-08-25 RX ORDER — ONDANSETRON 2 MG/ML
8 INJECTION INTRAMUSCULAR; INTRAVENOUS
Status: CANCELLED | OUTPATIENT
Start: 2023-08-25

## 2023-08-25 RX ORDER — DIPHENHYDRAMINE HYDROCHLORIDE 50 MG/ML
50 INJECTION INTRAMUSCULAR; INTRAVENOUS
OUTPATIENT
Start: 2023-09-08

## 2023-08-25 RX ORDER — MEPERIDINE HYDROCHLORIDE 50 MG/ML
12.5 INJECTION INTRAMUSCULAR; INTRAVENOUS; SUBCUTANEOUS PRN
Status: CANCELLED | OUTPATIENT
Start: 2023-08-25

## 2023-08-25 RX ORDER — HEPARIN SODIUM (PORCINE) LOCK FLUSH IV SOLN 100 UNIT/ML 100 UNIT/ML
500 SOLUTION INTRAVENOUS PRN
OUTPATIENT
Start: 2023-09-08

## 2023-08-25 RX ORDER — PALONOSETRON 0.05 MG/ML
0.25 INJECTION, SOLUTION INTRAVENOUS ONCE
Status: COMPLETED | OUTPATIENT
Start: 2023-08-25 | End: 2023-08-25

## 2023-08-25 RX ORDER — PROCHLORPERAZINE EDISYLATE 5 MG/ML
10 INJECTION INTRAMUSCULAR; INTRAVENOUS
OUTPATIENT
Start: 2023-09-08

## 2023-08-25 RX ORDER — MEPERIDINE HYDROCHLORIDE 50 MG/ML
12.5 INJECTION INTRAMUSCULAR; INTRAVENOUS; SUBCUTANEOUS PRN
OUTPATIENT
Start: 2023-09-01

## 2023-08-25 RX ORDER — FAMOTIDINE 10 MG/ML
20 INJECTION, SOLUTION INTRAVENOUS ONCE
OUTPATIENT
Start: 2023-09-08 | End: 2023-09-08

## 2023-08-25 RX ORDER — ONDANSETRON 2 MG/ML
8 INJECTION INTRAMUSCULAR; INTRAVENOUS
OUTPATIENT
Start: 2023-09-01

## 2023-08-25 RX ORDER — EPINEPHRINE 1 MG/ML
0.3 INJECTION, SOLUTION, CONCENTRATE INTRAVENOUS PRN
Status: CANCELLED | OUTPATIENT
Start: 2023-08-25

## 2023-08-25 RX ORDER — EPINEPHRINE 1 MG/ML
0.3 INJECTION, SOLUTION, CONCENTRATE INTRAVENOUS PRN
OUTPATIENT
Start: 2023-09-01

## 2023-08-25 RX ORDER — DIPHENHYDRAMINE HYDROCHLORIDE 50 MG/ML
25 INJECTION INTRAMUSCULAR; INTRAVENOUS ONCE
OUTPATIENT
Start: 2023-09-08 | End: 2023-09-08

## 2023-08-25 RX ORDER — SODIUM CHLORIDE 9 MG/ML
INJECTION, SOLUTION INTRAVENOUS CONTINUOUS
Status: CANCELLED | OUTPATIENT
Start: 2023-08-25

## 2023-08-25 RX ORDER — DIPHENHYDRAMINE HYDROCHLORIDE 50 MG/ML
50 INJECTION INTRAMUSCULAR; INTRAVENOUS
Status: CANCELLED | OUTPATIENT
Start: 2023-08-25

## 2023-08-25 RX ORDER — FAMOTIDINE 10 MG/ML
20 INJECTION, SOLUTION INTRAVENOUS ONCE
Status: COMPLETED | OUTPATIENT
Start: 2023-08-25 | End: 2023-08-25

## 2023-08-25 RX ORDER — SODIUM CHLORIDE 9 MG/ML
INJECTION, SOLUTION INTRAVENOUS CONTINUOUS
OUTPATIENT
Start: 2023-09-01

## 2023-08-25 RX ORDER — SODIUM CHLORIDE 0.9 % (FLUSH) 0.9 %
5-40 SYRINGE (ML) INJECTION PRN
OUTPATIENT
Start: 2023-09-08

## 2023-08-25 RX ORDER — DIPHENHYDRAMINE HYDROCHLORIDE 50 MG/ML
25 INJECTION INTRAMUSCULAR; INTRAVENOUS ONCE
OUTPATIENT
Start: 2023-09-01 | End: 2023-09-01

## 2023-08-25 RX ORDER — ALBUTEROL SULFATE 90 UG/1
4 AEROSOL, METERED RESPIRATORY (INHALATION) PRN
Status: CANCELLED | OUTPATIENT
Start: 2023-08-25

## 2023-08-25 RX ORDER — FAMOTIDINE 10 MG/ML
20 INJECTION, SOLUTION INTRAVENOUS
OUTPATIENT
Start: 2023-09-01

## 2023-08-25 RX ORDER — HEPARIN SODIUM (PORCINE) LOCK FLUSH IV SOLN 100 UNIT/ML 100 UNIT/ML
500 SOLUTION INTRAVENOUS PRN
OUTPATIENT
Start: 2023-09-01

## 2023-08-25 RX ADMIN — PALONOSETRON 0.25 MG: 0.05 INJECTION, SOLUTION INTRAVENOUS at 11:50

## 2023-08-25 RX ADMIN — FAMOTIDINE 20 MG: 10 INJECTION INTRAVENOUS at 11:50

## 2023-08-25 RX ADMIN — DIPHENHYDRAMINE HYDROCHLORIDE 25 MG: 50 INJECTION INTRAMUSCULAR; INTRAVENOUS at 11:50

## 2023-08-25 RX ADMIN — PACLITAXEL 72 MG: 6 INJECTION, SOLUTION, CONCENTRATE INTRAVENOUS at 12:32

## 2023-08-25 RX ADMIN — DEXAMETHASONE SODIUM PHOSPHATE: 10 INJECTION, SOLUTION INTRAMUSCULAR; INTRAVENOUS at 11:49

## 2023-08-25 RX ADMIN — HEPARIN 500 UNITS: 100 SYRINGE at 14:10

## 2023-08-25 RX ADMIN — FOSAPREPITANT 150 MG: 150 INJECTION, POWDER, LYOPHILIZED, FOR SOLUTION INTRAVENOUS at 12:03

## 2023-08-25 RX ADMIN — SODIUM CHLORIDE, PRESERVATIVE FREE 10 ML: 5 INJECTION INTRAVENOUS at 14:10

## 2023-08-25 RX ADMIN — CARBOPLATIN 176 MG: 600 INJECTION, SOLUTION INTRAVENOUS at 13:36

## 2023-09-01 ENCOUNTER — HOSPITAL ENCOUNTER (OUTPATIENT)
Dept: INFUSION THERAPY | Age: 48
Discharge: HOME | End: 2023-09-01
Payer: COMMERCIAL

## 2023-09-01 VITALS
OXYGEN SATURATION: 100 % | DIASTOLIC BLOOD PRESSURE: 58 MMHG | TEMPERATURE: 99.3 F | RESPIRATION RATE: 18 BRPM | SYSTOLIC BLOOD PRESSURE: 92 MMHG | HEIGHT: 69 IN | HEART RATE: 74 BPM | WEIGHT: 135.7 LBS | BODY MASS INDEX: 20.1 KG/M2

## 2023-09-01 DIAGNOSIS — C50.812 MALIGNANT NEOPLASM OF OVERLAPPING SITES OF LEFT BREAST (HCC): Primary | ICD-10-CM

## 2023-09-01 DIAGNOSIS — C50.919 TRIPLE NEGATIVE BREAST CANCER (HCC): ICD-10-CM

## 2023-09-01 LAB
ALBUMIN SERPL-MCNC: 4.1 G/DL (ref 3.5–5.2)
ALP SERPL-CCNC: 73 U/L (ref 35–104)
ALT SERPL-CCNC: 16 U/L (ref 9–52)
ANION GAP SERPL CALCULATED.3IONS-SCNC: 11 MMOL/L (ref 7–19)
AST SERPL-CCNC: 24 U/L (ref 14–36)
BILIRUB SERPL-MCNC: 0.4 MG/DL (ref 0.2–1.3)
BUN SERPL-MCNC: 9 MG/DL (ref 7–17)
CALCIUM SERPL-MCNC: 9.1 MG/DL (ref 8.4–10.2)
CHLORIDE SERPL-SCNC: 105 MMOL/L (ref 98–111)
CO2 SERPL-SCNC: 23 MMOL/L (ref 22–29)
CREAT SERPL-MCNC: 0.7 MG/DL (ref 0.5–1)
ERYTHROCYTE [DISTWIDTH] IN BLOOD BY AUTOMATED COUNT: 12.8 % (ref 11.7–14.4)
GLOBULIN: 3 G/DL
GLUCOSE SERPL-MCNC: 130 MG/DL (ref 74–106)
HCT VFR BLD AUTO: 26.4 % (ref 34.1–44.9)
HGB BLD-MCNC: 9.1 G/DL (ref 11.2–15.7)
LYMPHOCYTES # BLD: 1.31 K/UL (ref 1.18–3.74)
LYMPHOCYTES NFR BLD: 32.4 % (ref 19.3–53.1)
MCH RBC QN AUTO: 36 PG (ref 25.6–32.2)
MCHC RBC AUTO-ENTMCNC: 34.5 G/DL (ref 32.3–35.5)
MCV RBC AUTO: 104.3 FL (ref 79.4–94.8)
MONOCYTES # BLD: 0.13 K/UL (ref 0.24–0.82)
MONOCYTES NFR BLD: 3.2 % (ref 4.7–12.5)
NEUTROPHILS # BLD: 2.53 K/UL (ref 1.56–6.13)
NEUTS SEG NFR BLD: 62.7 % (ref 34–71.1)
PLATELET # BLD AUTO: 86 K/UL (ref 182–369)
PMV BLD AUTO: 9.8 FL (ref 7.4–10.4)
POTASSIUM SERPL-SCNC: 3.6 MMOL/L (ref 3.5–5.1)
PROT SERPL-MCNC: 7 G/DL (ref 6.3–8.2)
RBC # BLD AUTO: 2.53 M/UL (ref 3.93–5.22)
SODIUM SERPL-SCNC: 139 MMOL/L (ref 137–145)
WBC # BLD AUTO: 4.04 K/UL (ref 3.98–10.04)

## 2023-09-01 PROCEDURE — 85025 COMPLETE CBC W/AUTO DIFF WBC: CPT

## 2023-09-01 PROCEDURE — 96375 TX/PRO/DX INJ NEW DRUG ADDON: CPT

## 2023-09-01 PROCEDURE — 6360000002 HC RX W HCPCS: Performed by: NURSE PRACTITIONER

## 2023-09-01 PROCEDURE — 2580000003 HC RX 258: Performed by: NURSE PRACTITIONER

## 2023-09-01 PROCEDURE — 2500000003 HC RX 250 WO HCPCS: Performed by: NURSE PRACTITIONER

## 2023-09-01 PROCEDURE — 96367 TX/PROPH/DG ADDL SEQ IV INF: CPT

## 2023-09-01 PROCEDURE — 6360000002 HC RX W HCPCS: Performed by: INTERNAL MEDICINE

## 2023-09-01 PROCEDURE — 96413 CHEMO IV INFUSION 1 HR: CPT

## 2023-09-01 PROCEDURE — 2580000003 HC RX 258: Performed by: INTERNAL MEDICINE

## 2023-09-01 PROCEDURE — 80053 COMPREHEN METABOLIC PANEL: CPT

## 2023-09-01 RX ORDER — FAMOTIDINE 10 MG/ML
20 INJECTION, SOLUTION INTRAVENOUS ONCE
Status: COMPLETED | OUTPATIENT
Start: 2023-09-01 | End: 2023-09-01

## 2023-09-01 RX ORDER — HEPARIN 100 UNIT/ML
500 SYRINGE INTRAVENOUS PRN
Status: DISCONTINUED | OUTPATIENT
Start: 2023-09-01 | End: 2023-09-02 | Stop reason: HOSPADM

## 2023-09-01 RX ORDER — SODIUM CHLORIDE 0.9 % (FLUSH) 0.9 %
5-40 SYRINGE (ML) INJECTION PRN
Status: DISCONTINUED | OUTPATIENT
Start: 2023-09-01 | End: 2023-09-02 | Stop reason: HOSPADM

## 2023-09-01 RX ORDER — DIPHENHYDRAMINE HYDROCHLORIDE 50 MG/ML
25 INJECTION INTRAMUSCULAR; INTRAVENOUS ONCE
Status: COMPLETED | OUTPATIENT
Start: 2023-09-01 | End: 2023-09-01

## 2023-09-01 RX ORDER — PALONOSETRON 0.05 MG/ML
0.25 INJECTION, SOLUTION INTRAVENOUS ONCE
Status: COMPLETED | OUTPATIENT
Start: 2023-09-01 | End: 2023-09-01

## 2023-09-01 RX ORDER — SODIUM CHLORIDE 9 MG/ML
5-250 INJECTION, SOLUTION INTRAVENOUS PRN
Status: DISCONTINUED | OUTPATIENT
Start: 2023-09-01 | End: 2023-09-02 | Stop reason: HOSPADM

## 2023-09-01 RX ADMIN — PALONOSETRON 0.25 MG: 0.05 INJECTION, SOLUTION INTRAVENOUS at 12:27

## 2023-09-01 RX ADMIN — DIPHENHYDRAMINE HYDROCHLORIDE 25 MG: 50 INJECTION INTRAMUSCULAR; INTRAVENOUS at 12:27

## 2023-09-01 RX ADMIN — SODIUM CHLORIDE, PRESERVATIVE FREE 10 ML: 5 INJECTION INTRAVENOUS at 14:20

## 2023-09-01 RX ADMIN — DEXAMETHASONE SODIUM PHOSPHATE: 10 INJECTION, SOLUTION INTRAMUSCULAR; INTRAVENOUS at 12:28

## 2023-09-01 RX ADMIN — PACLITAXEL 72 MG: 6 INJECTION, SOLUTION, CONCENTRATE INTRAVENOUS at 13:12

## 2023-09-01 RX ADMIN — HEPARIN 500 UNITS: 100 SYRINGE at 14:20

## 2023-09-01 RX ADMIN — FOSAPREPITANT 150 MG: 150 INJECTION, POWDER, LYOPHILIZED, FOR SOLUTION INTRAVENOUS at 12:44

## 2023-09-01 RX ADMIN — FAMOTIDINE 20 MG: 10 INJECTION INTRAVENOUS at 12:28

## 2023-09-01 NOTE — PROGRESS NOTES
Lab Results   Component Value Date    WBC 4.04 09/01/2023    HGB 9.1 (L) 09/01/2023    HCT 26.4 (LL) 09/01/2023    .3 (H) 09/01/2023    PLT 86 (L) 09/01/2023     Lab Results   Component Value Date    NEUTROABS 2.53 09/01/2023     Lab Results   Component Value Date     09/01/2023    K 3.6 09/01/2023     09/01/2023    CO2 23 09/01/2023    BUN 9 09/01/2023    CREATININE 0.7 09/01/2023    GLUCOSE 130 (H) 09/01/2023    CALCIUM 9.1 09/01/2023    PROT 7.0 09/01/2023    LABALBU 4.1 09/01/2023    BILITOT 0.4 09/01/2023    ALKPHOS 73 09/01/2023    AST 24 09/01/2023    ALT 16 09/01/2023    LABGLOM >60 09/01/2023    GFRAA >59 08/03/2021    GLOB 3.0 09/01/2023

## 2023-09-06 NOTE — PROGRESS NOTES
negative for cytologic evidence of malignancy. ER 0.2%, AZ 0%, HER-2 0/negative, Ki67 83%. MammaPrint: Basal type/high risk. 12/2/22 MRI bilateral breast: Left breast, BI-RADS 6, biopsy-proven malignant mass in the left breast upper outer quadrant. MRI measurement is estimated as 24 x 20 x 20 mm in length by with by height. This is noted to be larger than that seen on previous ultrasounds, and this may be due to some associated surrounding nonmass enhancement which is immediately contiguous with mass margins. The mass is noted to be immediately contiguous, with no intervening fat plane between the posterior margin of the mass and the implant/implant capsule. Axillary lymph nodes appear within normal by MRI. No abnormal internal mammary chain lymph nodes are seen. No obvious extramammary significant findings seen. Recommend appropriate clinical, medical oncology and surgical management. Overall assessment BI-RADS 6, biopsy-proven malignancy. 12/14/22 Patient education given to patient for chemotherapy. Treatment consent signed. 12/14/2022-she was first seen by me. Essentially, clinical T2 N1 Mx triple negative breast cancer I reviewed imaging, pathology. Discussed treatment recommendations, side effects, logistics with the patient at length. I recommend neoadjuvant chemotherapy with dose dense Adriamycin and cyclophosphamide followed by carboplatin/Taxol. 1 year of Keytruda. Awaiting CT C/A/P and bone scan. Order 2D echo today. 12/15/22 CT Chest W Contrast No evidence for metastatic disease within the chest. Lobulated 1.2 x 2.4 cm left anterior breast lesion. Correlate with prior mammograms and breast history. Mild emphysema. Asymmetric mildly prominent left axillary lymph node measuring 7 mm. Attention on follow-up exams. 12/15/22 CT Abd/Pelvis W IV Contrast (oral) No evidence of metastatic disease within the abdomen or pelvis.   12/15/22 NM Bone Scan There is no evidence of osseous metastatic

## 2023-09-08 ENCOUNTER — HOSPITAL ENCOUNTER (OUTPATIENT)
Dept: INFUSION THERAPY | Age: 48
Discharge: HOME | End: 2023-09-08
Payer: COMMERCIAL

## 2023-09-08 ENCOUNTER — OFFICE VISIT (OUTPATIENT)
Dept: HEMATOLOGY | Age: 48
End: 2023-09-08
Payer: COMMERCIAL

## 2023-09-08 VITALS
RESPIRATION RATE: 18 BRPM | BODY MASS INDEX: 20.44 KG/M2 | WEIGHT: 138 LBS | OXYGEN SATURATION: 100 % | DIASTOLIC BLOOD PRESSURE: 66 MMHG | HEART RATE: 71 BPM | TEMPERATURE: 98.9 F | SYSTOLIC BLOOD PRESSURE: 98 MMHG | HEIGHT: 69 IN

## 2023-09-08 DIAGNOSIS — T45.1X5A CHEMOTHERAPY-INDUCED NEUROPATHY (HCC): ICD-10-CM

## 2023-09-08 DIAGNOSIS — Z51.12 ENCOUNTER FOR ANTINEOPLASTIC IMMUNOTHERAPY: ICD-10-CM

## 2023-09-08 DIAGNOSIS — D70.1 CHEMOTHERAPY INDUCED NEUTROPENIA (HCC): ICD-10-CM

## 2023-09-08 DIAGNOSIS — Z51.11 CHEMOTHERAPY MANAGEMENT, ENCOUNTER FOR: ICD-10-CM

## 2023-09-08 DIAGNOSIS — C50.919 TRIPLE NEGATIVE BREAST CANCER (HCC): ICD-10-CM

## 2023-09-08 DIAGNOSIS — D70.1 CHEMOTHERAPY-INDUCED NEUTROPENIA (HCC): ICD-10-CM

## 2023-09-08 DIAGNOSIS — Z87.891 HISTORY OF SMOKING 30 OR MORE PACK YEARS: ICD-10-CM

## 2023-09-08 DIAGNOSIS — T45.1X5A ANTINEOPLASTIC CHEMOTHERAPY INDUCED ANEMIA: ICD-10-CM

## 2023-09-08 DIAGNOSIS — E03.2 DRUG-INDUCED HYPOTHYROIDISM: ICD-10-CM

## 2023-09-08 DIAGNOSIS — T45.1X5A PANCYTOPENIA DUE TO ANTINEOPLASTIC CHEMOTHERAPY (HCC): ICD-10-CM

## 2023-09-08 DIAGNOSIS — T45.1X5D ADVERSE EFFECT OF CHEMOTHERAPY, SUBSEQUENT ENCOUNTER: ICD-10-CM

## 2023-09-08 DIAGNOSIS — R53.83 FATIGUE DUE TO TREATMENT: ICD-10-CM

## 2023-09-08 DIAGNOSIS — D64.81 ANTINEOPLASTIC CHEMOTHERAPY INDUCED ANEMIA: ICD-10-CM

## 2023-09-08 DIAGNOSIS — T45.1X5A CHEMOTHERAPY-INDUCED NEUTROPENIA (HCC): ICD-10-CM

## 2023-09-08 DIAGNOSIS — C50.919 TRIPLE NEGATIVE BREAST CANCER (HCC): Primary | ICD-10-CM

## 2023-09-08 DIAGNOSIS — Z71.89 CARE PLAN DISCUSSED WITH PATIENT: ICD-10-CM

## 2023-09-08 DIAGNOSIS — D61.810 PANCYTOPENIA DUE TO ANTINEOPLASTIC CHEMOTHERAPY (HCC): ICD-10-CM

## 2023-09-08 DIAGNOSIS — G62.0 CHEMOTHERAPY-INDUCED NEUROPATHY (HCC): ICD-10-CM

## 2023-09-08 DIAGNOSIS — T45.1X5A CHEMOTHERAPY INDUCED NEUTROPENIA (HCC): ICD-10-CM

## 2023-09-08 LAB
ALBUMIN SERPL-MCNC: 4.1 G/DL (ref 3.5–5.2)
ALP SERPL-CCNC: 75 U/L (ref 35–104)
ALT SERPL-CCNC: 13 U/L (ref 9–52)
ANION GAP SERPL CALCULATED.3IONS-SCNC: 12 MMOL/L (ref 7–19)
AST SERPL-CCNC: 22 U/L (ref 14–36)
BILIRUB SERPL-MCNC: 0.4 MG/DL (ref 0.2–1.3)
BUN SERPL-MCNC: 10 MG/DL (ref 7–17)
CALCIUM SERPL-MCNC: 9.4 MG/DL (ref 8.4–10.2)
CHLORIDE SERPL-SCNC: 107 MMOL/L (ref 98–111)
CO2 SERPL-SCNC: 24 MMOL/L (ref 22–29)
CORTIS PM SERPL-MCNC: 8.8 UG/DL (ref 2.3–11.9)
CREAT SERPL-MCNC: 0.8 MG/DL (ref 0.5–1)
ERYTHROCYTE [DISTWIDTH] IN BLOOD BY AUTOMATED COUNT: 13.3 % (ref 11.7–14.4)
GLOBULIN: 3 G/DL
GLUCOSE SERPL-MCNC: 108 MG/DL (ref 74–106)
HCT VFR BLD AUTO: 26.2 % (ref 34.1–44.9)
HGB BLD-MCNC: 9 G/DL (ref 11.2–15.7)
LYMPHOCYTES # BLD: 1.38 K/UL (ref 1.18–3.74)
LYMPHOCYTES NFR BLD: 57.5 % (ref 19.3–53.1)
MCH RBC QN AUTO: 35.7 PG (ref 25.6–32.2)
MCHC RBC AUTO-ENTMCNC: 34.4 G/DL (ref 32.3–35.5)
MCV RBC AUTO: 104 FL (ref 79.4–94.8)
MONOCYTES # BLD: 0.18 K/UL (ref 0.24–0.82)
MONOCYTES NFR BLD: 7.5 % (ref 4.7–12.5)
NEUTROPHILS # BLD: 0.76 K/UL (ref 1.56–6.13)
NEUTS SEG NFR BLD: 31.7 % (ref 34–71.1)
PLATELET # BLD AUTO: 54 K/UL (ref 182–369)
PMV BLD AUTO: 9.8 FL (ref 7.4–10.4)
POTASSIUM SERPL-SCNC: 3.9 MMOL/L (ref 3.5–5.1)
PROT SERPL-MCNC: 7.2 G/DL (ref 6.3–8.2)
RBC # BLD AUTO: 2.52 M/UL (ref 3.93–5.22)
SODIUM SERPL-SCNC: 143 MMOL/L (ref 137–145)
T4 FREE SERPL-MCNC: 1.44 NG/DL (ref 0.93–1.7)
TSH SERPL DL<=0.005 MIU/L-ACNC: 1.6 UIU/ML (ref 0.27–4.2)
WBC # BLD AUTO: 2.4 K/UL (ref 3.98–10.04)

## 2023-09-08 PROCEDURE — 96372 THER/PROPH/DIAG INJ SC/IM: CPT

## 2023-09-08 PROCEDURE — 99214 OFFICE O/P EST MOD 30 MIN: CPT | Performed by: INTERNAL MEDICINE

## 2023-09-08 PROCEDURE — 3078F DIAST BP <80 MM HG: CPT | Performed by: INTERNAL MEDICINE

## 2023-09-08 PROCEDURE — 6360000002 HC RX W HCPCS: Performed by: INTERNAL MEDICINE

## 2023-09-08 PROCEDURE — 36415 COLL VENOUS BLD VENIPUNCTURE: CPT

## 2023-09-08 PROCEDURE — 6360000002 HC RX W HCPCS: Performed by: NURSE PRACTITIONER

## 2023-09-08 PROCEDURE — 85025 COMPLETE CBC W/AUTO DIFF WBC: CPT

## 2023-09-08 PROCEDURE — 80053 COMPREHEN METABOLIC PANEL: CPT

## 2023-09-08 PROCEDURE — 96413 CHEMO IV INFUSION 1 HR: CPT

## 2023-09-08 PROCEDURE — 2580000003 HC RX 258: Performed by: NURSE PRACTITIONER

## 2023-09-08 PROCEDURE — 3074F SYST BP LT 130 MM HG: CPT | Performed by: INTERNAL MEDICINE

## 2023-09-08 RX ORDER — ONDANSETRON 2 MG/ML
8 INJECTION INTRAMUSCULAR; INTRAVENOUS
Status: CANCELLED | OUTPATIENT
Start: 2023-09-08

## 2023-09-08 RX ORDER — FAMOTIDINE 10 MG/ML
20 INJECTION, SOLUTION INTRAVENOUS
Status: CANCELLED | OUTPATIENT
Start: 2023-09-08

## 2023-09-08 RX ORDER — EPINEPHRINE 1 MG/ML
0.3 INJECTION, SOLUTION, CONCENTRATE INTRAVENOUS PRN
Status: CANCELLED | OUTPATIENT
Start: 2023-09-08

## 2023-09-08 RX ORDER — HEPARIN 100 UNIT/ML
500 SYRINGE INTRAVENOUS PRN
Status: DISCONTINUED | OUTPATIENT
Start: 2023-09-08 | End: 2023-09-09 | Stop reason: HOSPADM

## 2023-09-08 RX ORDER — ACETAMINOPHEN 325 MG/1
650 TABLET ORAL
Status: CANCELLED | OUTPATIENT
Start: 2023-09-08

## 2023-09-08 RX ORDER — ALBUTEROL SULFATE 90 UG/1
4 AEROSOL, METERED RESPIRATORY (INHALATION) PRN
Status: CANCELLED | OUTPATIENT
Start: 2023-09-08

## 2023-09-08 RX ORDER — DIPHENHYDRAMINE HYDROCHLORIDE 50 MG/ML
50 INJECTION INTRAMUSCULAR; INTRAVENOUS
Status: CANCELLED | OUTPATIENT
Start: 2023-09-08

## 2023-09-08 RX ORDER — SODIUM CHLORIDE 9 MG/ML
INJECTION, SOLUTION INTRAVENOUS CONTINUOUS
Status: CANCELLED | OUTPATIENT
Start: 2023-09-08

## 2023-09-08 RX ADMIN — FILGRASTIM-SNDZ 480 MCG: 480 INJECTION, SOLUTION INTRAVENOUS; SUBCUTANEOUS at 14:37

## 2023-09-08 RX ADMIN — HEPARIN 500 UNITS: 100 SYRINGE at 15:06

## 2023-09-08 RX ADMIN — SODIUM CHLORIDE 400 MG: 9 INJECTION, SOLUTION INTRAVENOUS at 14:36

## 2023-09-11 ENCOUNTER — HOSPITAL ENCOUNTER (OUTPATIENT)
Dept: INFUSION THERAPY | Age: 48
Discharge: HOME | End: 2023-09-11
Payer: COMMERCIAL

## 2023-09-11 DIAGNOSIS — T45.1X5A CHEMOTHERAPY-INDUCED NEUTROPENIA (HCC): Primary | ICD-10-CM

## 2023-09-11 DIAGNOSIS — D70.1 CHEMOTHERAPY-INDUCED NEUTROPENIA (HCC): Primary | ICD-10-CM

## 2023-09-11 PROCEDURE — 6360000002 HC RX W HCPCS: Performed by: INTERNAL MEDICINE

## 2023-09-11 PROCEDURE — 96372 THER/PROPH/DIAG INJ SC/IM: CPT

## 2023-09-11 RX ORDER — ACETAMINOPHEN 325 MG/1
650 TABLET ORAL
OUTPATIENT
Start: 2023-09-11

## 2023-09-11 RX ORDER — ALBUTEROL SULFATE 90 UG/1
4 AEROSOL, METERED RESPIRATORY (INHALATION) PRN
OUTPATIENT
Start: 2023-09-11

## 2023-09-11 RX ORDER — FAMOTIDINE 10 MG/ML
20 INJECTION, SOLUTION INTRAVENOUS
OUTPATIENT
Start: 2023-09-11

## 2023-09-11 RX ORDER — SODIUM CHLORIDE 9 MG/ML
INJECTION, SOLUTION INTRAVENOUS CONTINUOUS
OUTPATIENT
Start: 2023-09-11

## 2023-09-11 RX ORDER — EPINEPHRINE 1 MG/ML
0.3 INJECTION, SOLUTION, CONCENTRATE INTRAVENOUS PRN
OUTPATIENT
Start: 2023-09-11

## 2023-09-11 RX ORDER — DIPHENHYDRAMINE HYDROCHLORIDE 50 MG/ML
50 INJECTION INTRAMUSCULAR; INTRAVENOUS
OUTPATIENT
Start: 2023-09-11

## 2023-09-11 RX ORDER — ONDANSETRON 2 MG/ML
8 INJECTION INTRAMUSCULAR; INTRAVENOUS
OUTPATIENT
Start: 2023-09-11

## 2023-09-11 RX ADMIN — FILGRASTIM-SNDZ 480 MCG: 480 INJECTION, SOLUTION INTRAVENOUS; SUBCUTANEOUS at 13:08

## 2023-09-12 ENCOUNTER — HOSPITAL ENCOUNTER (OUTPATIENT)
Dept: INFUSION THERAPY | Age: 48
Discharge: HOME | End: 2023-09-12
Payer: COMMERCIAL

## 2023-09-12 DIAGNOSIS — D70.1 CHEMOTHERAPY-INDUCED NEUTROPENIA (HCC): Primary | ICD-10-CM

## 2023-09-12 DIAGNOSIS — T45.1X5A CHEMOTHERAPY-INDUCED NEUTROPENIA (HCC): Primary | ICD-10-CM

## 2023-09-12 PROCEDURE — 6360000002 HC RX W HCPCS: Performed by: INTERNAL MEDICINE

## 2023-09-12 PROCEDURE — 96372 THER/PROPH/DIAG INJ SC/IM: CPT

## 2023-09-12 RX ORDER — ACETAMINOPHEN 325 MG/1
650 TABLET ORAL
OUTPATIENT
Start: 2023-09-12

## 2023-09-12 RX ORDER — EPINEPHRINE 1 MG/ML
0.3 INJECTION, SOLUTION, CONCENTRATE INTRAVENOUS PRN
OUTPATIENT
Start: 2023-09-12

## 2023-09-12 RX ORDER — DIPHENHYDRAMINE HYDROCHLORIDE 50 MG/ML
50 INJECTION INTRAMUSCULAR; INTRAVENOUS
OUTPATIENT
Start: 2023-09-12

## 2023-09-12 RX ORDER — SODIUM CHLORIDE 9 MG/ML
INJECTION, SOLUTION INTRAVENOUS CONTINUOUS
OUTPATIENT
Start: 2023-09-12

## 2023-09-12 RX ORDER — ONDANSETRON 2 MG/ML
8 INJECTION INTRAMUSCULAR; INTRAVENOUS
OUTPATIENT
Start: 2023-09-12

## 2023-09-12 RX ORDER — FAMOTIDINE 10 MG/ML
20 INJECTION, SOLUTION INTRAVENOUS
OUTPATIENT
Start: 2023-09-12

## 2023-09-12 RX ORDER — ALBUTEROL SULFATE 90 UG/1
4 AEROSOL, METERED RESPIRATORY (INHALATION) PRN
OUTPATIENT
Start: 2023-09-12

## 2023-09-12 RX ADMIN — FILGRASTIM-SNDZ 480 MCG: 480 INJECTION, SOLUTION INTRAVENOUS; SUBCUTANEOUS at 10:58

## 2023-09-12 ASSESSMENT — ENCOUNTER SYMPTOMS
PHOTOPHOBIA: 0
VOICE CHANGE: 0
SHORTNESS OF BREATH: 0
VOMITING: 0
COLOR CHANGE: 0
BACK PAIN: 0
COUGH: 0
RHINORRHEA: 0
NAUSEA: 0

## 2023-09-14 ENCOUNTER — TELEPHONE (OUTPATIENT)
Dept: RADIATION ONCOLOGY | Facility: HOSPITAL | Age: 48
End: 2023-09-14
Payer: COMMERCIAL

## 2023-09-18 PROBLEM — C50.919 TRIPLE NEGATIVE BREAST CANCER: Status: ACTIVE | Noted: 2022-12-09

## 2023-09-18 PROBLEM — Z17.421 TRIPLE NEGATIVE BREAST CANCER: Status: ACTIVE | Noted: 2022-12-09

## 2023-09-18 RX ORDER — ROPINIROLE 1 MG/1
1 TABLET, FILM COATED ORAL NIGHTLY
Qty: 90 TABLET | Refills: 1 | Status: SHIPPED | OUTPATIENT
Start: 2023-09-18

## 2023-09-18 NOTE — TELEPHONE ENCOUNTER
Sirena Good called requesting a refill of the below medication which has been pended for you:     Requested Prescriptions     Pending Prescriptions Disp Refills    rOPINIRole (REQUIP) 1 MG tablet [Pharmacy Med Name: ROPINIROLE 0.5MG TABLETS] 90 tablet 0     Sig: Take 1 tablet by mouth nightly       Last Appointment Date: 8/22/2023  Next Appointment Date: 2/22/2024    Allergies   Allergen Reactions    Codeine Itching    Percocet [Oxycodone-Acetaminophen] Itching    Toradol [Ketorolac Tromethamine] Itching

## 2023-09-19 ENCOUNTER — HOSPITAL ENCOUNTER (OUTPATIENT)
Dept: RADIATION ONCOLOGY | Facility: HOSPITAL | Age: 48
Setting detail: RADIATION/ONCOLOGY SERIES
End: 2023-09-19
Payer: COMMERCIAL

## 2023-09-19 PROBLEM — Z98.890 S/P LYMPH NODE BIOPSY: Status: ACTIVE | Noted: 2023-09-19

## 2023-09-19 PROBLEM — Z98.890 S/P LUMPECTOMY, LEFT BREAST: Status: ACTIVE | Noted: 2023-09-19

## 2023-09-20 NOTE — PROGRESS NOTES
[facility]      Israel Gomez, APRN  _______________________________________________  [facility]  Department of Radiation Oncology  [address] [place] [id number]  Office: [phone]  Fax: [phone]    DATE:  09/21/2023  PATIENT: Polina Francis 1975                         MEDICAL RECORD #:  [id number]                                                       REASON FOR VISIT  No chief complaint on file.    is a very pleasant female that has been referred to our office to discuss radiotherapy considerations for breast carcinoma.     History of Present Illness:  *** summary     11/02/2022 - Bilateral Diagnostic Mammogram due to palpable left breast mass:  Left breast, BI-RADS 4B, intermediate concern for malignancy involving the palpable 1.7 cm complex cystic mass in the upper outer quadrant.   Recommendation is for ultrasound-guided percutaneous core needle biopsy for diagnosis in order to exclude malignancy.   1.7 cm axillary lymph node with borderline thickened cortical elements.   Recommendation is for percutaneous needle biopsy versus fine-needle aspiration (depending on performing physician preference) in order to exclude malignancy.     11/21/2022 - Left breast and left lymph node biopsy:  Breast, left breast needle core biopsies at 3 o'clock position:   Invasive ductal carcinoma, no special type, grade 3.   Invasive carcinoma measures 1.1 cm in greatest linear dimension and is present in multiple cores.   Lymph node, left axillary lymph node fine-needle aspiration, smears and ThinPrep:   Small round lymphocytes present, negative for cytologic evidence of malignancy.     12/02/2022 - MRI Bilateral Breasts:  Left breast, BI-RADS 6, biopsy-proven malignant mass in the left breast upper outer quadrant. Mass measures larger on MRI, possibly due to some surrounding involved nonmass enhancement surrounding the mass. The mass is noted to  be immediately contiguous, with no intervening fat plane between the posterior margin of the mass and the implant/implant capsule.   Recommend appropriate clinical, medical oncology and surgical management.   Overall assessment BI-RADS 6, biopsy-proven malignancy.     12/15/2022 - CT Abdomen/Pelvis with contrast:  No evidence of metastatic disease within the abdomen or pelvis.     12/15/2022 - CT Chest with contrast:  No evidence for metastatic disease within the chest.   Lobulated 1.2 x 2.4 cm left anterior breast lesion. Correlate with prior mammograms and breast history.   Mild emphysema.   Asymmetric mildly prominent left axillary lymph node measuring 7 mm.     12/15/2022 - Bone Scan:  There is no evidence of osseous metastatic disease.     12/20/2022 - Genetic Testing  [facility]:  Negative for 81/81 genes    12/29/2022 - Chemotherapy course:  Pembrolizumab/Adriamycin/Cyclophosphamide every 2 weeks x 4, followed by weekly Taxol/Carbo x 12    02/15/2023 - Left Breast Ultrasound:  Hypoechoic solid lesion in left breast at 3:00 with biopsy marker which has decreased in size as described above.   Final assessment: ACR: BI-RADS -6:   Known biopsy-proven malignancy.   Management: Surgical excision when clinically appropriate. Likelihood of cancer: N/A     02/23/2023 - 05/18/2023 - Chemotherapy course:  Discontinued weekly Taxol/Carbo after 6 cycles d/t neutropenia    06/06/2023 - Left Diagnostic Mammogram:  Left breast, BI-RADS 6, biopsy-proven malignant mass at 3:00. On current examination this mass measures 13 x 9 x 6 mm. At initial presentation in November 2022 this mass measured 17 x 16 x 9 mm. This imaging suggest slight to partial response to chemotherapy. Recommend appropriate clinical and surgical management.   Current ultrasound demonstrates no abnormal axillary lymph nodes.   Recommend appropriate clinical and surgical management.   Overall assessment, BI-RADS 6, biopsy-proven malignancy.      06/06/2023 - MRI Bilateral Breasts with and without contrast:  Left breast, BI-RADS 6, biopsy-proven malignancy in the left breast upper outer quadrant. MRI suggests response to therapy as the mass is no longer seen by MRI. Biopsy clip is present. Recommend appropriate clinical and surgical management.   Overall assessment BI-RADS 6, biopsy proven malignancy.     07/14/2023 - Left breast lumpectomy and sentinel lymph node biopsy per :  Left breast, lumpectomy:   No malignancy identified.   Cystic area lined by histiocytes, chronic inflammation, and fibroconnective tissue consistent with organizing fat necrosis.   Benign ducts, fibrocystic change, and terminal lobular units.   Unremarkable epidermis.  Left breast additional medial margin:  No malignancy identified.   The surgical margins are free of tumor.   Left breast, additional lateral margin:   No malignancy identified.   The surgical margins are free of tumor.   Left breast, additional deep margin:   No malignancy identified.   The surgical margins are free of tumor.   Left axilla, sentinel lymph nodes:   3 benign lymph nodes.     07/21/2023 - 09/08/2023 - Chemotherapy course:  Weekly Taxol/Carbo x 6, continue Pembrolizumab every 6 weeks  Taxol dc'd due to persistent neutropenia    08/21/2023 - Appointment with :  IMPRESSION:  Doing well s/p left partial mastectomy with left sentinel lymph node biopsy   PLAN:   I will see her back for a physical exam in 3 months     09/08/2023 - Appointment with Dr. Blackburn:  Invasive ductal carcinoma, left breast, Nov 2022, Grade 3, ER 0.2%, ND 0%, HER-2 0/negative, Ki67 83%, Stage IIIB  -Patient received preoperative chemotherapy, neoadjuvant with dose dense AC x4 cycles followed by 6 cycles of carboplatin and weekly Taxol.  -Patient underwent lumpectomy/sentinel lymph node biopsy-complete pathologic response  -I have recommended patient completion of adjuvant chemotherapy with carboplatin and weekly  Taxol x6 cycles. Discussed with Dr. Sung Monk. She is here today for cycle 11 out of 12.  -Cycle #12 carboplatin Taxol will be discontinued.  -She is to continue adjuvant Keytruda.  -We will refer to radiation oncology today to Dr. Adolfo Tony for adjuvant radiation  PLAN:  RTC with MD in treatment room 12 weeks  CBC, CMP, TSH T4 Free Cortisol today for toxicity assessment  CBC 2 weeks  Discontinue remaining weekly Taxol Carbo today  C#6/9 Pembrolizumab 400 mg every 6 weeks-next due 9/8/23  GCSF x3 days  Refer to Dr Tony/[facility] for evaluation for adjuvant radiation  Continue Zofran and Phenergan as needed  Continue Emla cream to port as needed  Recommend CT low dose chest yearly due to smoking history, after 12/15/23  Continue follow-up with Dr Sung Monk/[facility], 8/21/23  Continue follow up with Dr. Mcdaniel/[facility]-10/31/23  Continue Levothyroxine 100 mcg p.o. daily  Follow Up:  Return in about 12 weeks (around 12/1/2023) for Treatment & see  in [place] RM Keytruda.  Keytruda 6 weeks  CBC 2 weeks  GCSF Mon & Tues  CT low dose chest 12/15/23 or later    History obtained from  PATIENT and CHART    PAST MEDICAL HISTORY  No past medical history on file.     PAST SURGICAL HISTORY  No past surgical history on file.     FAMILY HISTORY  family history is not on file.    SOCIAL HISTORY       ALLERGIES  Patient has no allergy information on record.     MEDICATIONS  No current outpatient medications on file.    The following portions of the patient's history were reviewed and updated as appropriate: allergies, current medications, past family history, past medical history, past social history, past surgical history and problem list.    Current outpatient and discharge medications have been reconciled for the patient.  Reviewed by: Vero Quesada LPN    REVIEW OF SYSTEMS  Review of Systems    PHYSICAL EXAM  VITAL SIGNS: There were no vitals filed for this visit.    Physical  Exam    ECOG: {[facility] Onc ECOG Status:16524}    Clinical Quality Measures  -Pain Documented by Standardized Tool, FPS Polina Francis reports a pain score of .  Given her pain assessment as noted, treatment options were discussed and the following options were decided upon as a follow-up plan to address the patient's pain: {[facility] PAIN ASSESSMENT FOLLOW-UP PLAN:68354}.      Body Mass Index Screening and Follow-Up Plan  There is no height or weight on file to calculate BMI.  {BMI Follow up (Optional):30392}    Tobacco Use: Screening and Cessation Intervention  Social History    Tobacco Use      Smoking status: Not on file      Smokeless tobacco: Not on file    {Tob Follow up (Optional):34701}    Advanced Care Planning   Advance Care Planning  ACP discussion was held with the patient during this visit. Patient does not have an advance directive, information provided.     ASSESSMENT AND PLAN  1. Triple negative breast cancer    2. S/P lumpectomy, left breast    3. S/P lymph node biopsy      No orders of the defined types were placed in this encounter.    RECOMMENDATIONS:   Polina Francis was diagnosed ***    We discussed the patients goals and plans of care. I have seen, examined and reviewed this patient's medication list, appropriate labs and imaging studies as well as other physician notes.  I have extensively reviewed the risks, benefits and alternatives.  Risks of radiation therapy includes but is not limited to skin redness, dry, tender, or itchy sunburn-type skin irritation of the targeted area, which may range from mild to intense, skin, breast heaviness, redness, bruised appearance or discoloration of the skin, cumulative general fatigue and the risk of progression of disease in spite of therapy with either local or systemic failure.     We have discussed the role of radiation therapy with this diagnosis as well as the indications and rationale of adjuvant radiation therapy according to the NCCN  Guidelines. She has verbalized understanding of our conversation and agrees to the treatment recommendations for postoperative radiation therapy to the left breast {and involved nodes. (Optional):21637} following Lumpectomy. I anticipate a dose of {sgbreastdosin}. We will simulate treatment fields to begin the treatment planning, final dose to be determined.    Discussed post surgery and radiation risk for lymphedema, referral will be placed to lymphedema clinic for initial evaluation/education, she will continue ongoing management per primary care physician and other specialists. Thank you for allowing me to assist in her care.     There are no Patient Instructions on file for this visit.    {Time Spent (Optional):18256}  Vreo Quesada LPN  2023

## 2023-09-21 ENCOUNTER — APPOINTMENT (OUTPATIENT)
Dept: RADIATION ONCOLOGY | Facility: HOSPITAL | Age: 48
End: 2023-09-21
Payer: COMMERCIAL

## 2023-09-21 DIAGNOSIS — C50.919 TRIPLE NEGATIVE BREAST CANCER: Primary | ICD-10-CM

## 2023-09-21 DIAGNOSIS — Z98.890 S/P LYMPH NODE BIOPSY: ICD-10-CM

## 2023-09-21 DIAGNOSIS — Z98.890 S/P LUMPECTOMY, LEFT BREAST: ICD-10-CM

## 2023-09-22 ENCOUNTER — HOSPITAL ENCOUNTER (OUTPATIENT)
Dept: INFUSION THERAPY | Age: 48
Discharge: HOME | End: 2023-09-22
Payer: COMMERCIAL

## 2023-09-22 DIAGNOSIS — C50.812 MALIGNANT NEOPLASM OF OVERLAPPING SITES OF LEFT BREAST (HCC): ICD-10-CM

## 2023-09-22 LAB
BASOPHILS # BLD: 0.04 K/UL (ref 0.01–0.08)
BASOPHILS NFR BLD: 1.3 % (ref 0.1–1.2)
EOSINOPHIL # BLD: 0.02 K/UL (ref 0.04–0.54)
EOSINOPHIL NFR BLD: 0.6 % (ref 0.7–7)
ERYTHROCYTE [DISTWIDTH] IN BLOOD BY AUTOMATED COUNT: 16 % (ref 11.7–14.4)
HCT VFR BLD AUTO: 32 % (ref 34.1–44.9)
HGB BLD-MCNC: 11.2 G/DL (ref 11.2–15.7)
LYMPHOCYTES # BLD: 1.49 K/UL (ref 1.18–3.74)
LYMPHOCYTES NFR BLD: 47.6 % (ref 19.3–53.1)
MCH RBC QN AUTO: 36.7 PG (ref 25.6–32.2)
MCHC RBC AUTO-ENTMCNC: 35 G/DL (ref 32.3–35.5)
MCV RBC AUTO: 104.9 FL (ref 79.4–94.8)
MONOCYTES # BLD: 0.27 K/UL (ref 0.24–0.82)
MONOCYTES NFR BLD: 8.6 % (ref 4.7–12.5)
NEUTROPHILS # BLD: 1.29 K/UL (ref 1.56–6.13)
NEUTS SEG NFR BLD: 41.3 % (ref 34–71.1)
PLATELET # BLD AUTO: 113 K/UL (ref 182–369)
PMV BLD AUTO: 10.3 FL (ref 7.4–10.4)
RBC # BLD AUTO: 3.05 M/UL (ref 3.93–5.22)
WBC # BLD AUTO: 3.13 K/UL (ref 3.98–10.04)

## 2023-09-22 PROCEDURE — 85025 COMPLETE CBC W/AUTO DIFF WBC: CPT

## 2023-09-22 PROCEDURE — 36415 COLL VENOUS BLD VENIPUNCTURE: CPT

## 2023-09-23 PROBLEM — Z87.891 FORMER SMOKER: Status: ACTIVE | Noted: 2023-09-23

## 2023-09-24 NOTE — PROGRESS NOTES
[facility] MD      Israel Gomez APRN  _______________________________________________  [facility]  Department of Radiation Oncology  [address] [place] [id number]  Office: [phone]  Fax: [phone]    DATE:  09/25/2023  PATIENT: Polina Francis 1975                         MEDICAL RECORD #:  [id number]                                                       REASON FOR VISIT  Chief Complaint   Patient presents with    Breast Cancer     Left     is a very pleasant female that has been referred to our office to discuss radiotherapy considerations for breast carcinoma.  She reports having healed well from her breast surgery and completing her chemotherapy approximately 2 weeks ago.  She notes mild fatigue but denies other complaints.    History of Present Illness:    11/02/2022 - Bilateral Diagnostic Mammogram due to palpable left breast mass:  Left breast, BI-RADS 4B, intermediate concern for malignancy involving the palpable 1.7 cm complex cystic mass in the upper outer quadrant.   Recommendation is for ultrasound-guided percutaneous core needle biopsy for diagnosis in order to exclude malignancy.   1.7 cm axillary lymph node with borderline thickened cortical elements.   Recommendation is for percutaneous needle biopsy versus fine-needle aspiration (depending on performing physician preference) in order to exclude malignancy.     11/21/2022 - Left breast and left lymph node biopsy:  Breast, left breast needle core biopsies at 3 o'clock position:   Invasive ductal carcinoma, no special type, grade 3.   Invasive carcinoma measures 1.1 cm in greatest linear dimension and is present in multiple cores.   Lymph node, left axillary lymph node fine-needle aspiration, smears and ThinPrep:   Small round lymphocytes present, negative for cytologic evidence of malignancy.     12/02/2022 - MRI Bilateral Breasts:  Left breast, BI-RADS 6,  biopsy-proven malignant mass in the left breast upper outer quadrant. Mass measures 24 x 20 mm, larger on MRI, possibly due to some surrounding involved nonmass enhancement surrounding the mass. The mass is noted to be immediately contiguous, with no intervening fat plane between the posterior margin of the mass and the implant/implant capsule.  No abnormal axillary or internal mammary nodes noted.  Recommend appropriate clinical, medical oncology and surgical management.   Overall assessment BI-RADS 6, biopsy-proven malignancy.     12/15/2022 - CT Abdomen/Pelvis with contrast:  No evidence of metastatic disease within the abdomen or pelvis.     12/15/2022 - CT Chest with contrast:  No evidence for metastatic disease within the chest.   Lobulated 1.2 x 2.4 cm left anterior breast lesion. Correlate with prior mammograms and breast history.   Mild emphysema.   Asymmetric mildly prominent left axillary lymph node measuring 7 mm.     12/15/2022 - Bone Scan:  There is no evidence of osseous metastatic disease.     12/20/2022 - Genetic Testing  [facility]:  Negative for 81/81 genes    12/29/2022 - Chemotherapy course:  Pembrolizumab/Adriamycin/Cyclophosphamide every 2 weeks x 4, followed by weekly Taxol/Carbo x 12    02/15/2023 - Left Breast Ultrasound:  Hypoechoic solid lesion in left breast at 3:00 with biopsy marker which has decreased in size as described above.   Final assessment: ACR: BI-RADS -6:   Known biopsy-proven malignancy.   Management: Surgical excision when clinically appropriate. Likelihood of cancer: N/A     02/23/2023 - 05/18/2023 - Chemotherapy course:  Discontinued weekly Taxol/Carbo after 6 cycles d/t neutropenia    06/06/2023 - Left Diagnostic Mammogram:  Left breast, BI-RADS 6, biopsy-proven malignant mass at 3:00. On current examination this mass measures 13 x 9 x 6 mm. At initial presentation in November 2022 this mass measured 17 x 16 x 9 mm. This imaging suggest slight to partial  response to chemotherapy. Recommend appropriate clinical and surgical management.   Current ultrasound demonstrates no abnormal axillary lymph nodes.   Recommend appropriate clinical and surgical management.   Overall assessment, BI-RADS 6, biopsy-proven malignancy.     06/06/2023 - MRI Bilateral Breasts with and without contrast:  Left breast, BI-RADS 6, biopsy-proven malignancy in the left breast upper outer quadrant. MRI suggests response to therapy as the mass is no longer seen by MRI. Biopsy clip is present. Recommend appropriate clinical and surgical management.   Overall assessment BI-RADS 6, biopsy proven malignancy.     07/14/2023 - Left breast lumpectomy and sentinel lymph node biopsy per :  Left breast, lumpectomy:   No malignancy identified.   Cystic area lined by histiocytes, chronic inflammation, and fibroconnective tissue consistent with organizing fat necrosis.   Benign ducts, fibrocystic change, and terminal lobular units.   Unremarkable epidermis.  Left breast additional medial margin:  No malignancy identified.   The surgical margins are free of tumor.   Left breast, additional lateral margin:   No malignancy identified.   The surgical margins are free of tumor.   Left breast, additional deep margin:   No malignancy identified.   The surgical margins are free of tumor.   Left axilla, sentinel lymph nodes:   3 benign lymph nodes.     07/21/2023 - 09/08/2023 - Chemotherapy course:  Weekly Taxol/Carbo x 6, continue Pembrolizumab every 6 weeks  Taxol dc'd due to persistent neutropenia    08/21/2023 - Appointment with :  IMPRESSION:  Doing well s/p left partial mastectomy with left sentinel lymph node biopsy   PLAN:   I will see her back for a physical exam in 3 months     09/08/2023 - Appointment with Dr. Blackburn:  Invasive ductal carcinoma, left breast, Nov 2022, Grade 3, ER 0.2%, OR 0%, HER-2 0/negative, Ki67 83%, Stage IIIB  -Patient received preoperative chemotherapy,  neoadjuvant with dose dense AC x4 cycles followed by 6 cycles of carboplatin and weekly Taxol.  -Patient underwent lumpectomy/sentinel lymph node biopsy-complete pathologic response  -I have recommended patient completion of adjuvant chemotherapy with carboplatin and weekly Taxol x6 cycles. Discussed with Dr. Sung Monk. She is here today for cycle 11 out of 12.  -Cycle #12 carboplatin Taxol will be discontinued.  -She is to continue adjuvant Keytruda.  -We will refer to radiation oncology today to Dr. Adolfo Tony for adjuvant radiation  PLAN:  RTC with MD in treatment room 12 weeks  CBC, CMP, TSH T4 Free Cortisol today for toxicity assessment  CBC 2 weeks  Discontinue remaining weekly Taxol Carbo today  C#6/9 Pembrolizumab 400 mg every 6 weeks-next due 9/8/23  GCSF x3 days  Refer to Dr Tony/[facility] for evaluation for adjuvant radiation  Continue Zofran and Phenergan as needed  Continue Emla cream to port as needed  Recommend CT low dose chest yearly due to smoking history, after 12/15/23  Continue follow-up with Dr Sung Monk/[facility], 8/21/23  Continue follow up with Dr. Mcdaniel/[facility]-10/31/23  Continue Levothyroxine 100 mcg p.o. daily  Follow Up:  Return in about 12 weeks (around 12/1/2023) for Treatment & see  in [place] RM Keytruda.  Keytruda 6 weeks  CBC 2 weeks  GCSF Mon & Tues  CT low dose chest 12/15/23 or later    History obtained from  PATIENT and CHART    PAST MEDICAL HISTORY  Past Medical History:   Diagnosis Date    Breast cancer     GERD (gastroesophageal reflux disease)         PAST SURGICAL HISTORY  Past Surgical History:   Procedure Laterality Date    BREAST AUGMENTATION      x3    BREAST BIOPSY Left 11/21/2022    BREAST LUMPECTOMY WITH SENTINEL NODE BIOPSY Left 07/14/2023    Dr. Monk    GALLBLADDER SURGERY      HYSTERECTOMY      PORTACATH PLACEMENT      TONSILLECTOMY      TOTAL HIP ARTHROPLASTY Right       PAST ONCOLOGIC HISTORY: Patient denies  previous history of malignancies, chemotherapy or radiation therapy.  She denies knowledge of vascular/connective tissue disorders or active rheumatologic disease.    FAMILY HISTORY  family history is not on file.    SOCIAL HISTORY  Social History     Tobacco Use    Smoking status: Every Day     Types: Cigarettes       ALLERGIES  Codeine, Ketorolac tromethamine, and Oxycodone-acetaminophen     MEDICATIONS    Current Outpatient Medications:     atenolol (TENORMIN) 25 MG tablet, Take 1 tablet by mouth Daily., Disp: , Rfl:     levothyroxine (SYNTHROID, LEVOTHROID) 100 MCG tablet, Take 1 tablet by mouth Daily., Disp: , Rfl:     montelukast (SINGULAIR) 10 MG tablet, Take 1 tablet by mouth Daily., Disp: , Rfl:     naloxone (NARCAN) 4 MG/0.1ML nasal spray, 1 spray into the nostril(s) as directed by provider As Needed., Disp: , Rfl:     omeprazole (priLOSEC) 20 MG capsule, Take 1 capsule by mouth Daily., Disp: , Rfl:     ondansetron (ZOFRAN) 4 MG tablet, Take 1 tablet by mouth Every 6 (Six) Hours., Disp: , Rfl:     rOPINIRole (REQUIP) 0.5 MG tablet, Take 1 tablet by mouth every night at bedtime., Disp: , Rfl:     sertraline (ZOLOFT) 50 MG tablet, Take 1 tablet by mouth Daily., Disp: , Rfl:     vitamin D (ERGOCALCIFEROL) 1.25 MG (70074 UT) capsule capsule, Take 1 capsule by mouth 1 (One) Time Per Week., Disp: , Rfl:     The following portions of the patient's history were reviewed and updated as appropriate: allergies, current medications, past family history, past medical history, past social history, past surgical history and problem list.    Current outpatient and discharge medications have been reconciled for the patient.  Reviewed by: Satnam Griffith MD    REVIEW OF SYSTEMS  Review of Systems   Constitutional: Negative.    HENT: Negative.     Eyes: Negative.    Respiratory: Negative.     Cardiovascular: Negative.    Gastrointestinal: Negative.    Endocrine: Negative.    Genitourinary: Negative.    Musculoskeletal:  "Negative.    Skin: Negative.    Allergic/Immunologic: Negative.    Neurological: Negative.    Hematological: Negative.    Psychiatric/Behavioral: Negative.       PHYSICAL EXAM  VITAL SIGNS:   Vitals:    09/26/23 1355   BP: 111/59   Pulse: 65   SpO2: 96%  Comment: room air   Weight: 61.8 kg (136 lb 3.2 oz)   Height: 175.3 cm (69\")   PainSc: 0-No pain       Physical Exam  Constitutional:       Appearance: Normal appearance. She is normal weight.   HENT:      Head: Normocephalic and atraumatic.      Nose: Nose normal.      Mouth/Throat:      Mouth: Mucous membranes are moist.      Pharynx: Oropharynx is clear.   Eyes:      Extraocular Movements: Extraocular movements intact.      Conjunctiva/sclera: Conjunctivae normal.      Pupils: Pupils are equal, round, and reactive to light.   Cardiovascular:      Rate and Rhythm: Normal rate.      Pulses: Normal pulses.   Pulmonary:      Effort: Pulmonary effort is normal.   Chest:          Comments: Bilateral breast implants present.  Surgical incisions are well-healed.  Bilaterally, there were no suspicious skin lesions, skin retractions, nodules, masses, or bilateral supra cleft/axillary lymphadenopathy noted.  Abdominal:      General: Abdomen is flat.      Palpations: Abdomen is soft.   Musculoskeletal:         General: Normal range of motion.      Cervical back: Normal range of motion and neck supple.   Skin:     General: Skin is warm.   Neurological:      General: No focal deficit present.      Mental Status: She is alert and oriented to person, place, and time. Mental status is at baseline.   Psychiatric:         Mood and Affect: Mood normal.         Behavior: Behavior normal.         Thought Content: Thought content normal.       ECOG: (0) Fully active, able to carry on all predisease performance without restriction    Clinical Quality Measures  -Pain Documented by Standardized Tool, FPS Polina Francis reports a pain score of 0.  Given her pain assessment as noted, " treatment options were discussed and the following options were decided upon as a follow-up plan to address the patient's pain:  No pain,no plan given .  Pain Medications               sertraline (ZOLOFT) 50 MG tablet Take 1 tablet by mouth Daily.            Body Mass Index Screening and Follow-Up Plan  Body mass index is 20.11 kg/m².  BMI is within normal parameters. No other follow-up for BMI required.    Tobacco Use: Screening and Cessation Intervention  Social History    Tobacco Use      Smoking status: Every Day        Types: Cigarettes      Smokeless tobacco: Not on file    Advanced Care Planning   Advance Care Planning  ACP discussion was held with the patient during this visit. Patient does not have an advance directive, information provided.     ASSESSMENT AND PLAN  1. Triple negative breast cancer    2. S/P lumpectomy, left breast    3. S/P lymph node biopsy    4. Former smoker      Orders Placed This Encounter   Procedures    Ambulatory Referral to ONC Social Work     RECOMMENDATIONS:   Polina Francis was diagnosed with left breast triple negative infiltrating ductal carcinoma, grade 3, stage IIA (cT2 pN0 M0) following work-up for a palpable breast abnormality.  She underwent an incomplete course of neoadjuvant chemotherapy consisting of pembrolizumab and dose dense Adriamycin/Cytoxan x4 cycles followed by 6 cycles of CarboTaxol--> left breast lumpectomy and sentinel lymph node biopsy that did not reveal evidence of residual disease, ypT0 (sn)pN0.  Postsurgery, she received another 5 cycles of carbo/Taxol completed on 09/08/2023 and now continues on Keytruda.    We discussed the patients goals and plans of care. I have seen, examined and reviewed this patient's medication list, appropriate labs and imaging studies as well as other physician notes.  NCCN guidelines recommendations were discussed, advocating for adjuvant breast radiotherapy in the setting of breast conservation cancer treatment.  I have  extensively reviewed the risks, benefits and alternatives.  Risks of radiation therapy includes but is not limited to skin redness, dry, tender, or itchy sunburn-type skin irritation of the targeted area, which may range from mild to intense, skin, breast heaviness, lymphedema, redness, bruised appearance or discoloration of the skin, cumulative general fatigue and the risk of progression of disease in spite of therapy with either local or systemic failure.  Small risk of radiation related secondary malignancies was also discussed as well as risk for capsular contraction in the setting of her intact breast implants.    She has verbalized understanding of our conversation and agrees to the treatment recommendations for postoperative radiation therapy to the left breast following Lumpectomy. I anticipate a dose of 4005 cGy to be given over 15 daily fractions (Monday through Friday). We will simulate treatment fields to begin the treatment planning, final dose to be determined.  We anticipate starting her radiotherapy until the week of October 2 to allow for additional chemo washout period.    Discussed post surgery and radiation risk for lymphedema, she has already been seen at the lymphedema clinic, she will continue ongoing management per primary care physician and other specialists. Thank you for allowing me to assist in her care.     There are no Patient Instructions on file for this visit.    Time Spent: I spent 56 minutes caring for Polina on this date of service. This time includes time spent by me in the following activities: preparing for the visit, reviewing tests, obtaining and/or reviewing a separately obtained history, performing a medically appropriate examination and/or evaluation, counseling and educating the patient/family/caregiver, ordering medications, tests, or procedures, referring and communicating with other health care professionals, and documenting information in the medical record.   [facility]  MD Nacho  09/25/2023

## 2023-09-26 ENCOUNTER — APPOINTMENT (OUTPATIENT)
Dept: RADIATION ONCOLOGY | Facility: HOSPITAL | Age: 48
End: 2023-09-26
Payer: COMMERCIAL

## 2023-09-26 ENCOUNTER — CONSULT (OUTPATIENT)
Dept: RADIATION ONCOLOGY | Facility: HOSPITAL | Age: 48
End: 2023-09-26
Payer: COMMERCIAL

## 2023-09-26 VITALS
HEIGHT: 69 IN | DIASTOLIC BLOOD PRESSURE: 59 MMHG | WEIGHT: 136.2 LBS | HEART RATE: 65 BPM | BODY MASS INDEX: 20.17 KG/M2 | SYSTOLIC BLOOD PRESSURE: 111 MMHG | OXYGEN SATURATION: 96 %

## 2023-09-26 DIAGNOSIS — Z98.890 S/P LUMPECTOMY, LEFT BREAST: ICD-10-CM

## 2023-09-26 DIAGNOSIS — Z98.890 S/P LYMPH NODE BIOPSY: ICD-10-CM

## 2023-09-26 DIAGNOSIS — Z87.891 FORMER SMOKER: ICD-10-CM

## 2023-09-26 DIAGNOSIS — C50.919 TRIPLE NEGATIVE BREAST CANCER: Primary | ICD-10-CM

## 2023-09-26 PROCEDURE — 77290 THER RAD SIMULAJ FIELD CPLX: CPT | Performed by: RADIOLOGY

## 2023-09-26 PROCEDURE — 77333 RADIATION TREATMENT AID(S): CPT | Performed by: RADIOLOGY

## 2023-09-26 PROCEDURE — G0463 HOSPITAL OUTPT CLINIC VISIT: HCPCS | Performed by: RADIOLOGY

## 2023-09-26 PROCEDURE — 77334 RADIATION TREATMENT AID(S): CPT | Performed by: RADIOLOGY

## 2023-09-26 RX ORDER — LEVOTHYROXINE SODIUM 0.1 MG/1
1 TABLET ORAL DAILY
COMMUNITY
Start: 2023-07-21

## 2023-09-26 RX ORDER — OMEPRAZOLE 20 MG/1
20 CAPSULE, DELAYED RELEASE ORAL DAILY
COMMUNITY

## 2023-09-26 RX ORDER — ONDANSETRON 4 MG/1
1 TABLET, FILM COATED ORAL EVERY 6 HOURS
COMMUNITY
Start: 2023-08-01

## 2023-09-26 RX ORDER — NALOXONE HYDROCHLORIDE 4 MG/.1ML
1 SPRAY NASAL AS NEEDED
COMMUNITY
Start: 2023-07-14

## 2023-09-26 RX ORDER — MONTELUKAST SODIUM 10 MG/1
10 TABLET ORAL DAILY
COMMUNITY

## 2023-09-26 RX ORDER — ERGOCALCIFEROL 1.25 MG/1
50000 CAPSULE ORAL WEEKLY
COMMUNITY

## 2023-09-26 RX ORDER — ROPINIROLE 0.5 MG/1
0.5 TABLET, FILM COATED ORAL
COMMUNITY
Start: 2023-05-31

## 2023-09-26 RX ORDER — ATENOLOL 25 MG/1
25 TABLET ORAL DAILY
COMMUNITY
Start: 2023-09-07

## 2023-09-27 PROCEDURE — 77300 RADIATION THERAPY DOSE PLAN: CPT | Performed by: RADIOLOGY

## 2023-09-27 PROCEDURE — 77295 3-D RADIOTHERAPY PLAN: CPT | Performed by: RADIOLOGY

## 2023-09-27 PROCEDURE — 77334 RADIATION TREATMENT AID(S): CPT | Performed by: RADIOLOGY

## 2023-10-02 ENCOUNTER — HOSPITAL ENCOUNTER (OUTPATIENT)
Dept: RADIATION ONCOLOGY | Facility: HOSPITAL | Age: 48
Setting detail: RADIATION/ONCOLOGY SERIES
End: 2023-10-02
Payer: COMMERCIAL

## 2023-10-02 DIAGNOSIS — C50.812 MALIGNANT NEOPLASM OF OVERLAPPING SITES OF LEFT BREAST (HCC): Primary | ICD-10-CM

## 2023-10-03 ENCOUNTER — DOCUMENTATION (OUTPATIENT)
Dept: RADIATION ONCOLOGY | Facility: HOSPITAL | Age: 48
End: 2023-10-03
Payer: COMMERCIAL

## 2023-10-03 ENCOUNTER — HOSPITAL ENCOUNTER (OUTPATIENT)
Dept: RADIATION ONCOLOGY | Facility: HOSPITAL | Age: 48
Setting detail: RADIATION/ONCOLOGY SERIES
Discharge: HOME | End: 2023-10-03
Payer: COMMERCIAL

## 2023-10-03 LAB
RAD ONC ARIA COURSE ID: NORMAL
RAD ONC ARIA COURSE LAST TREATMENT DATE: NORMAL
RAD ONC ARIA COURSE START DATE: NORMAL
RAD ONC ARIA COURSE TREATMENT ELAPSED DAYS: 0
RAD ONC ARIA FIRST TREATMENT DATE: NORMAL
RAD ONC ARIA PLAN FRACTIONS TREATED TO DATE: 1
RAD ONC ARIA PLAN ID: NORMAL
RAD ONC ARIA PLAN PRESCRIBED DOSE PER FRACTION: 2.67 GY
RAD ONC ARIA PLAN PRIMARY REFERENCE POINT: NORMAL
RAD ONC ARIA PLAN TOTAL FRACTIONS PRESCRIBED: 15
RAD ONC ARIA PLAN TOTAL PRESCRIBED DOSE: 4005 CGY
RAD ONC ARIA REFERENCE POINT DOSAGE GIVEN TO DATE: 2.67 GY
RAD ONC ARIA REFERENCE POINT DOSAGE GIVEN TO DATE: 2.67 GY
RAD ONC ARIA REFERENCE POINT ID: NORMAL
RAD ONC ARIA REFERENCE POINT ID: NORMAL
RAD ONC ARIA REFERENCE POINT SESSION DOSAGE GIVEN: 2.67 GY
RAD ONC ARIA REFERENCE POINT SESSION DOSAGE GIVEN: 2.67 GY

## 2023-10-03 PROCEDURE — 77412 RADIATION TX DELIVERY LVL 3: CPT | Performed by: RADIOLOGY

## 2023-10-03 PROCEDURE — 77417 THER RADIOLOGY PORT IMAGE(S): CPT | Performed by: RADIOLOGY

## 2023-10-03 NOTE — PROGRESS NOTES
LIOR met with Mrs. Francis due to her distress score from her radiation consultation on 9-26-23. She is here to start radiation treatment for Malignant neoplasm of overlapping sites of left breast. LIOR introduced self and explained role and source of support. She is 48 years old and lives with her spouse and her son is currently staying with them. Currently she does not have any transportation concerns. Her emotional support system includes her oldest daughter and her spouse. Mrs. Francis states her distress score was an 8 due to little bit of everything going on right now. Her son got into an accident in April, and she is helping to care for him, and she is also helping to care for her brother. Her  is the only one working so she does have financial concerns. LIOR did provide her with a [employer] Application, and she will review it and if she qualifies for it this writer will assist with the application. LIOR did also explain assistance through the griddig. She does take sertraline and has been on it for about a year and she does not see a counselor. LIOR did speak to her regarding support groups. If she is feeling stressed, she will craft. LIOR will remain avialbale.

## 2023-10-04 ENCOUNTER — HOSPITAL ENCOUNTER (OUTPATIENT)
Dept: RADIATION ONCOLOGY | Facility: HOSPITAL | Age: 48
Setting detail: RADIATION/ONCOLOGY SERIES
Discharge: HOME | End: 2023-10-04
Payer: COMMERCIAL

## 2023-10-04 LAB
RAD ONC ARIA COURSE ID: NORMAL
RAD ONC ARIA COURSE LAST TREATMENT DATE: NORMAL
RAD ONC ARIA COURSE START DATE: NORMAL
RAD ONC ARIA COURSE TREATMENT ELAPSED DAYS: 1
RAD ONC ARIA FIRST TREATMENT DATE: NORMAL
RAD ONC ARIA PLAN FRACTIONS TREATED TO DATE: 2
RAD ONC ARIA PLAN ID: NORMAL
RAD ONC ARIA PLAN PRESCRIBED DOSE PER FRACTION: 2.67 GY
RAD ONC ARIA PLAN PRIMARY REFERENCE POINT: NORMAL
RAD ONC ARIA PLAN TOTAL FRACTIONS PRESCRIBED: 15
RAD ONC ARIA PLAN TOTAL PRESCRIBED DOSE: 4005 CGY
RAD ONC ARIA REFERENCE POINT DOSAGE GIVEN TO DATE: 5.34 GY
RAD ONC ARIA REFERENCE POINT DOSAGE GIVEN TO DATE: 5.34 GY
RAD ONC ARIA REFERENCE POINT ID: NORMAL
RAD ONC ARIA REFERENCE POINT ID: NORMAL
RAD ONC ARIA REFERENCE POINT SESSION DOSAGE GIVEN: 2.67 GY
RAD ONC ARIA REFERENCE POINT SESSION DOSAGE GIVEN: 2.67 GY

## 2023-10-04 PROCEDURE — 77412 RADIATION TX DELIVERY LVL 3: CPT | Performed by: RADIOLOGY

## 2023-10-05 ENCOUNTER — HOSPITAL ENCOUNTER (OUTPATIENT)
Dept: RADIATION ONCOLOGY | Facility: HOSPITAL | Age: 48
Setting detail: RADIATION/ONCOLOGY SERIES
Discharge: HOME | End: 2023-10-05
Payer: COMMERCIAL

## 2023-10-05 LAB
RAD ONC ARIA COURSE ID: NORMAL
RAD ONC ARIA COURSE LAST TREATMENT DATE: NORMAL
RAD ONC ARIA COURSE START DATE: NORMAL
RAD ONC ARIA COURSE TREATMENT ELAPSED DAYS: 2
RAD ONC ARIA FIRST TREATMENT DATE: NORMAL
RAD ONC ARIA PLAN FRACTIONS TREATED TO DATE: 3
RAD ONC ARIA PLAN ID: NORMAL
RAD ONC ARIA PLAN PRESCRIBED DOSE PER FRACTION: 2.67 GY
RAD ONC ARIA PLAN PRIMARY REFERENCE POINT: NORMAL
RAD ONC ARIA PLAN TOTAL FRACTIONS PRESCRIBED: 15
RAD ONC ARIA PLAN TOTAL PRESCRIBED DOSE: 4005 CGY
RAD ONC ARIA REFERENCE POINT DOSAGE GIVEN TO DATE: 8.01 GY
RAD ONC ARIA REFERENCE POINT DOSAGE GIVEN TO DATE: 8.01 GY
RAD ONC ARIA REFERENCE POINT ID: NORMAL
RAD ONC ARIA REFERENCE POINT ID: NORMAL
RAD ONC ARIA REFERENCE POINT SESSION DOSAGE GIVEN: 2.67 GY
RAD ONC ARIA REFERENCE POINT SESSION DOSAGE GIVEN: 2.67 GY

## 2023-10-05 PROCEDURE — 77336 RADIATION PHYSICS CONSULT: CPT | Performed by: RADIOLOGY

## 2023-10-05 PROCEDURE — 77412 RADIATION TX DELIVERY LVL 3: CPT | Performed by: RADIOLOGY

## 2023-10-06 ENCOUNTER — HOSPITAL ENCOUNTER (OUTPATIENT)
Dept: RADIATION ONCOLOGY | Facility: HOSPITAL | Age: 48
Discharge: HOME | End: 2023-10-06
Payer: COMMERCIAL

## 2023-10-06 LAB
RAD ONC ARIA COURSE ID: NORMAL
RAD ONC ARIA COURSE LAST TREATMENT DATE: NORMAL
RAD ONC ARIA COURSE START DATE: NORMAL
RAD ONC ARIA COURSE TREATMENT ELAPSED DAYS: 3
RAD ONC ARIA FIRST TREATMENT DATE: NORMAL
RAD ONC ARIA PLAN FRACTIONS TREATED TO DATE: 4
RAD ONC ARIA PLAN ID: NORMAL
RAD ONC ARIA PLAN PRESCRIBED DOSE PER FRACTION: 2.67 GY
RAD ONC ARIA PLAN PRIMARY REFERENCE POINT: NORMAL
RAD ONC ARIA PLAN TOTAL FRACTIONS PRESCRIBED: 15
RAD ONC ARIA PLAN TOTAL PRESCRIBED DOSE: 4005 CGY
RAD ONC ARIA REFERENCE POINT DOSAGE GIVEN TO DATE: 10.68 GY
RAD ONC ARIA REFERENCE POINT DOSAGE GIVEN TO DATE: 10.68 GY
RAD ONC ARIA REFERENCE POINT ID: NORMAL
RAD ONC ARIA REFERENCE POINT ID: NORMAL
RAD ONC ARIA REFERENCE POINT SESSION DOSAGE GIVEN: 2.67 GY
RAD ONC ARIA REFERENCE POINT SESSION DOSAGE GIVEN: 2.67 GY

## 2023-10-06 PROCEDURE — 77412 RADIATION TX DELIVERY LVL 3: CPT | Performed by: RADIOLOGY

## 2023-10-09 ENCOUNTER — HOSPITAL ENCOUNTER (OUTPATIENT)
Dept: RADIATION ONCOLOGY | Facility: HOSPITAL | Age: 48
Discharge: HOME | End: 2023-10-09
Payer: COMMERCIAL

## 2023-10-09 LAB
RAD ONC ARIA COURSE ID: NORMAL
RAD ONC ARIA COURSE LAST TREATMENT DATE: NORMAL
RAD ONC ARIA COURSE START DATE: NORMAL
RAD ONC ARIA COURSE TREATMENT ELAPSED DAYS: 6
RAD ONC ARIA FIRST TREATMENT DATE: NORMAL
RAD ONC ARIA PLAN FRACTIONS TREATED TO DATE: 5
RAD ONC ARIA PLAN ID: NORMAL
RAD ONC ARIA PLAN PRESCRIBED DOSE PER FRACTION: 2.67 GY
RAD ONC ARIA PLAN PRIMARY REFERENCE POINT: NORMAL
RAD ONC ARIA PLAN TOTAL FRACTIONS PRESCRIBED: 15
RAD ONC ARIA PLAN TOTAL PRESCRIBED DOSE: 4005 CGY
RAD ONC ARIA REFERENCE POINT DOSAGE GIVEN TO DATE: 13.35 GY
RAD ONC ARIA REFERENCE POINT DOSAGE GIVEN TO DATE: 13.35 GY
RAD ONC ARIA REFERENCE POINT ID: NORMAL
RAD ONC ARIA REFERENCE POINT ID: NORMAL
RAD ONC ARIA REFERENCE POINT SESSION DOSAGE GIVEN: 2.67 GY
RAD ONC ARIA REFERENCE POINT SESSION DOSAGE GIVEN: 2.67 GY

## 2023-10-09 PROCEDURE — 77412 RADIATION TX DELIVERY LVL 3: CPT | Performed by: RADIOLOGY

## 2023-10-10 ENCOUNTER — HOSPITAL ENCOUNTER (OUTPATIENT)
Dept: RADIATION ONCOLOGY | Facility: HOSPITAL | Age: 48
Setting detail: RADIATION/ONCOLOGY SERIES
Discharge: HOME | End: 2023-10-10
Payer: COMMERCIAL

## 2023-10-10 LAB
RAD ONC ARIA COURSE ID: NORMAL
RAD ONC ARIA COURSE LAST TREATMENT DATE: NORMAL
RAD ONC ARIA COURSE START DATE: NORMAL
RAD ONC ARIA COURSE TREATMENT ELAPSED DAYS: 7
RAD ONC ARIA FIRST TREATMENT DATE: NORMAL
RAD ONC ARIA PLAN FRACTIONS TREATED TO DATE: 6
RAD ONC ARIA PLAN ID: NORMAL
RAD ONC ARIA PLAN PRESCRIBED DOSE PER FRACTION: 2.67 GY
RAD ONC ARIA PLAN PRIMARY REFERENCE POINT: NORMAL
RAD ONC ARIA PLAN TOTAL FRACTIONS PRESCRIBED: 15
RAD ONC ARIA PLAN TOTAL PRESCRIBED DOSE: 4005 CGY
RAD ONC ARIA REFERENCE POINT DOSAGE GIVEN TO DATE: 16.02 GY
RAD ONC ARIA REFERENCE POINT DOSAGE GIVEN TO DATE: 16.02 GY
RAD ONC ARIA REFERENCE POINT ID: NORMAL
RAD ONC ARIA REFERENCE POINT ID: NORMAL
RAD ONC ARIA REFERENCE POINT SESSION DOSAGE GIVEN: 2.67 GY
RAD ONC ARIA REFERENCE POINT SESSION DOSAGE GIVEN: 2.67 GY

## 2023-10-10 PROCEDURE — 77412 RADIATION TX DELIVERY LVL 3: CPT | Performed by: RADIOLOGY

## 2023-10-11 ENCOUNTER — HOSPITAL ENCOUNTER (OUTPATIENT)
Dept: OCCUPATIONAL THERAPY | Age: 48
Setting detail: THERAPIES SERIES
Discharge: HOME | End: 2023-10-11
Payer: COMMERCIAL

## 2023-10-11 ENCOUNTER — HOSPITAL ENCOUNTER (OUTPATIENT)
Dept: RADIATION ONCOLOGY | Facility: HOSPITAL | Age: 48
Setting detail: RADIATION/ONCOLOGY SERIES
Discharge: HOME | End: 2023-10-11
Payer: COMMERCIAL

## 2023-10-11 LAB
RAD ONC ARIA COURSE ID: NORMAL
RAD ONC ARIA COURSE LAST TREATMENT DATE: NORMAL
RAD ONC ARIA COURSE START DATE: NORMAL
RAD ONC ARIA COURSE TREATMENT ELAPSED DAYS: 8
RAD ONC ARIA FIRST TREATMENT DATE: NORMAL
RAD ONC ARIA PLAN FRACTIONS TREATED TO DATE: 7
RAD ONC ARIA PLAN ID: NORMAL
RAD ONC ARIA PLAN PRESCRIBED DOSE PER FRACTION: 2.67 GY
RAD ONC ARIA PLAN PRIMARY REFERENCE POINT: NORMAL
RAD ONC ARIA PLAN TOTAL FRACTIONS PRESCRIBED: 15
RAD ONC ARIA PLAN TOTAL PRESCRIBED DOSE: 4005 CGY
RAD ONC ARIA REFERENCE POINT DOSAGE GIVEN TO DATE: 18.69 GY
RAD ONC ARIA REFERENCE POINT DOSAGE GIVEN TO DATE: 18.69 GY
RAD ONC ARIA REFERENCE POINT ID: NORMAL
RAD ONC ARIA REFERENCE POINT ID: NORMAL
RAD ONC ARIA REFERENCE POINT SESSION DOSAGE GIVEN: 2.67 GY
RAD ONC ARIA REFERENCE POINT SESSION DOSAGE GIVEN: 2.67 GY

## 2023-10-11 PROCEDURE — 77412 RADIATION TX DELIVERY LVL 3: CPT | Performed by: RADIOLOGY

## 2023-10-11 PROCEDURE — 77417 THER RADIOLOGY PORT IMAGE(S): CPT | Performed by: RADIOLOGY

## 2023-10-11 PROCEDURE — 93702 BIS XTRACELL FLUID ANALYSIS: CPT

## 2023-10-11 PROCEDURE — 97165 OT EVAL LOW COMPLEX 30 MIN: CPT

## 2023-10-11 NOTE — PROGRESS NOTES
Occupational Therapy: Initial Evaluation   Patient: Aris Zhou (52 y.o. female)   Examination Date:   Plan of Care Certification Period: 10/11/2023 to  10/11/2023      :  1975  MRN: [id number]  CSN: [id number]   Insurance: Payor: Katiana Franco / Plan: [facility] PPO / Product Type: *No Product type* /   Insurance ID: [id number] - ([address] [place] [address]) Secondary Insurance (if applicable):     Insurance Information: BCBS   Referring Physician: MD Debora Cm MD   PCP: Gita Covarrubias APRN Visits to Date/Visits Approved:   /      No Show/Cancelled Appts:    /       Medical Diagnosis: Malignant neoplasm of overlapping sites of left female breast [C50.812] C50.812  No data recorded     [address]   Patient assessed for rehabilitation services?: Yes  Self reported health status[de-identified] Excellent    Medical History: Chart Reviewed: Yes   Past Medical History:   Diagnosis Date    Adverse effect of chemotherapy     Breast cancer ([address]) 2022    Triple negative breast cancer    Hypotension     Malignant neoplasm of overlapping sites of left breast ([address]) 2022    PVC (premature ventricular contraction)     Restless leg syndrome 2018    Uterine mass 2014    9x9 cm on CT scan     Surgical History:   Past Surgical History:   Procedure Laterality Date    BREAST LUMPECTOMY Left 2023    LEFT LUMPECTOMY & SNB, PREOP & INTRAOP US GUIDED NEEDLE LOC, PEC BLOCK, BIOZORB, FLAPS & [place] performed by Chirag Gimenez MD at [facility]  2019    BREAST SURGERY Bilateral 2813    silicone Memory Gel    CHOLECYSTECTOMY  2014    COLONOSCOPY N/A 03/15/2023    Dr Verna Juarez, mili ink tattooing placed in distal sigmoid colon, Bening TA (-)Dysplasia, int hem Gr 1, 3 year    HC INJECT OTHER PERPHRL NERV Right 2016    HIP FLUOROSCOPIC GUIDED CORTICOSTEROID INJECTION  [facility] NERV Right

## 2023-10-12 ENCOUNTER — HOSPITAL ENCOUNTER (OUTPATIENT)
Dept: RADIATION ONCOLOGY | Facility: HOSPITAL | Age: 48
Setting detail: RADIATION/ONCOLOGY SERIES
Discharge: HOME | End: 2023-10-12
Payer: COMMERCIAL

## 2023-10-12 LAB
RAD ONC ARIA COURSE ID: NORMAL
RAD ONC ARIA COURSE LAST TREATMENT DATE: NORMAL
RAD ONC ARIA COURSE START DATE: NORMAL
RAD ONC ARIA COURSE TREATMENT ELAPSED DAYS: 9
RAD ONC ARIA FIRST TREATMENT DATE: NORMAL
RAD ONC ARIA PLAN FRACTIONS TREATED TO DATE: 8
RAD ONC ARIA PLAN ID: NORMAL
RAD ONC ARIA PLAN PRESCRIBED DOSE PER FRACTION: 2.67 GY
RAD ONC ARIA PLAN PRIMARY REFERENCE POINT: NORMAL
RAD ONC ARIA PLAN TOTAL FRACTIONS PRESCRIBED: 15
RAD ONC ARIA PLAN TOTAL PRESCRIBED DOSE: 4005 CGY
RAD ONC ARIA REFERENCE POINT DOSAGE GIVEN TO DATE: 21.36 GY
RAD ONC ARIA REFERENCE POINT DOSAGE GIVEN TO DATE: 21.36 GY
RAD ONC ARIA REFERENCE POINT ID: NORMAL
RAD ONC ARIA REFERENCE POINT ID: NORMAL
RAD ONC ARIA REFERENCE POINT SESSION DOSAGE GIVEN: 2.67 GY
RAD ONC ARIA REFERENCE POINT SESSION DOSAGE GIVEN: 2.67 GY

## 2023-10-12 PROCEDURE — 77412 RADIATION TX DELIVERY LVL 3: CPT | Performed by: RADIOLOGY

## 2023-10-13 ENCOUNTER — HOSPITAL ENCOUNTER (OUTPATIENT)
Dept: RADIATION ONCOLOGY | Facility: HOSPITAL | Age: 48
Discharge: HOME | End: 2023-10-13
Payer: COMMERCIAL

## 2023-10-13 LAB
RAD ONC ARIA COURSE ID: NORMAL
RAD ONC ARIA COURSE LAST TREATMENT DATE: NORMAL
RAD ONC ARIA COURSE START DATE: NORMAL
RAD ONC ARIA COURSE TREATMENT ELAPSED DAYS: 10
RAD ONC ARIA FIRST TREATMENT DATE: NORMAL
RAD ONC ARIA PLAN FRACTIONS TREATED TO DATE: 9
RAD ONC ARIA PLAN ID: NORMAL
RAD ONC ARIA PLAN PRESCRIBED DOSE PER FRACTION: 2.67 GY
RAD ONC ARIA PLAN PRIMARY REFERENCE POINT: NORMAL
RAD ONC ARIA PLAN TOTAL FRACTIONS PRESCRIBED: 15
RAD ONC ARIA PLAN TOTAL PRESCRIBED DOSE: 4005 CGY
RAD ONC ARIA REFERENCE POINT DOSAGE GIVEN TO DATE: 24.03 GY
RAD ONC ARIA REFERENCE POINT DOSAGE GIVEN TO DATE: 24.03 GY
RAD ONC ARIA REFERENCE POINT ID: NORMAL
RAD ONC ARIA REFERENCE POINT ID: NORMAL
RAD ONC ARIA REFERENCE POINT SESSION DOSAGE GIVEN: 2.67 GY
RAD ONC ARIA REFERENCE POINT SESSION DOSAGE GIVEN: 2.67 GY

## 2023-10-13 PROCEDURE — 77336 RADIATION PHYSICS CONSULT: CPT | Performed by: RADIOLOGY

## 2023-10-13 PROCEDURE — 77412 RADIATION TX DELIVERY LVL 3: CPT | Performed by: RADIOLOGY

## 2023-10-16 ENCOUNTER — HOSPITAL ENCOUNTER (OUTPATIENT)
Dept: RADIATION ONCOLOGY | Facility: HOSPITAL | Age: 48
Discharge: HOME | End: 2023-10-16
Payer: COMMERCIAL

## 2023-10-16 LAB
RAD ONC ARIA COURSE ID: NORMAL
RAD ONC ARIA COURSE LAST TREATMENT DATE: NORMAL
RAD ONC ARIA COURSE START DATE: NORMAL
RAD ONC ARIA COURSE TREATMENT ELAPSED DAYS: 13
RAD ONC ARIA FIRST TREATMENT DATE: NORMAL
RAD ONC ARIA PLAN FRACTIONS TREATED TO DATE: 10
RAD ONC ARIA PLAN ID: NORMAL
RAD ONC ARIA PLAN PRESCRIBED DOSE PER FRACTION: 2.67 GY
RAD ONC ARIA PLAN PRIMARY REFERENCE POINT: NORMAL
RAD ONC ARIA PLAN TOTAL FRACTIONS PRESCRIBED: 15
RAD ONC ARIA PLAN TOTAL PRESCRIBED DOSE: 4005 CGY
RAD ONC ARIA REFERENCE POINT DOSAGE GIVEN TO DATE: 26.7 GY
RAD ONC ARIA REFERENCE POINT DOSAGE GIVEN TO DATE: 26.7 GY
RAD ONC ARIA REFERENCE POINT ID: NORMAL
RAD ONC ARIA REFERENCE POINT ID: NORMAL
RAD ONC ARIA REFERENCE POINT SESSION DOSAGE GIVEN: 2.67 GY
RAD ONC ARIA REFERENCE POINT SESSION DOSAGE GIVEN: 2.67 GY

## 2023-10-16 PROCEDURE — 77412 RADIATION TX DELIVERY LVL 3: CPT | Performed by: RADIOLOGY

## 2023-10-17 ENCOUNTER — HOSPITAL ENCOUNTER (OUTPATIENT)
Dept: RADIATION ONCOLOGY | Facility: HOSPITAL | Age: 48
Setting detail: RADIATION/ONCOLOGY SERIES
Discharge: HOME | End: 2023-10-17
Payer: COMMERCIAL

## 2023-10-17 LAB
RAD ONC ARIA COURSE ID: NORMAL
RAD ONC ARIA COURSE LAST TREATMENT DATE: NORMAL
RAD ONC ARIA COURSE START DATE: NORMAL
RAD ONC ARIA COURSE TREATMENT ELAPSED DAYS: 14
RAD ONC ARIA FIRST TREATMENT DATE: NORMAL
RAD ONC ARIA PLAN FRACTIONS TREATED TO DATE: 11
RAD ONC ARIA PLAN ID: NORMAL
RAD ONC ARIA PLAN PRESCRIBED DOSE PER FRACTION: 2.67 GY
RAD ONC ARIA PLAN PRIMARY REFERENCE POINT: NORMAL
RAD ONC ARIA PLAN TOTAL FRACTIONS PRESCRIBED: 15
RAD ONC ARIA PLAN TOTAL PRESCRIBED DOSE: 4005 CGY
RAD ONC ARIA REFERENCE POINT DOSAGE GIVEN TO DATE: 29.37 GY
RAD ONC ARIA REFERENCE POINT DOSAGE GIVEN TO DATE: 29.37 GY
RAD ONC ARIA REFERENCE POINT ID: NORMAL
RAD ONC ARIA REFERENCE POINT ID: NORMAL
RAD ONC ARIA REFERENCE POINT SESSION DOSAGE GIVEN: 2.67 GY
RAD ONC ARIA REFERENCE POINT SESSION DOSAGE GIVEN: 2.67 GY

## 2023-10-17 PROCEDURE — 77412 RADIATION TX DELIVERY LVL 3: CPT | Performed by: RADIOLOGY

## 2023-10-18 ENCOUNTER — HOSPITAL ENCOUNTER (OUTPATIENT)
Dept: RADIATION ONCOLOGY | Facility: HOSPITAL | Age: 48
Setting detail: RADIATION/ONCOLOGY SERIES
Discharge: HOME | End: 2023-10-18
Payer: COMMERCIAL

## 2023-10-18 LAB
RAD ONC ARIA COURSE ID: NORMAL
RAD ONC ARIA COURSE LAST TREATMENT DATE: NORMAL
RAD ONC ARIA COURSE START DATE: NORMAL
RAD ONC ARIA COURSE TREATMENT ELAPSED DAYS: 15
RAD ONC ARIA FIRST TREATMENT DATE: NORMAL
RAD ONC ARIA PLAN FRACTIONS TREATED TO DATE: 12
RAD ONC ARIA PLAN ID: NORMAL
RAD ONC ARIA PLAN PRESCRIBED DOSE PER FRACTION: 2.67 GY
RAD ONC ARIA PLAN PRIMARY REFERENCE POINT: NORMAL
RAD ONC ARIA PLAN TOTAL FRACTIONS PRESCRIBED: 15
RAD ONC ARIA PLAN TOTAL PRESCRIBED DOSE: 4005 CGY
RAD ONC ARIA REFERENCE POINT DOSAGE GIVEN TO DATE: 32.04 GY
RAD ONC ARIA REFERENCE POINT DOSAGE GIVEN TO DATE: 32.04 GY
RAD ONC ARIA REFERENCE POINT ID: NORMAL
RAD ONC ARIA REFERENCE POINT ID: NORMAL
RAD ONC ARIA REFERENCE POINT SESSION DOSAGE GIVEN: 2.67 GY
RAD ONC ARIA REFERENCE POINT SESSION DOSAGE GIVEN: 2.67 GY

## 2023-10-18 PROCEDURE — 77412 RADIATION TX DELIVERY LVL 3: CPT | Performed by: RADIOLOGY

## 2023-10-18 PROCEDURE — 77417 THER RADIOLOGY PORT IMAGE(S): CPT | Performed by: RADIOLOGY

## 2023-10-19 ENCOUNTER — HOSPITAL ENCOUNTER (OUTPATIENT)
Dept: RADIATION ONCOLOGY | Facility: HOSPITAL | Age: 48
Setting detail: RADIATION/ONCOLOGY SERIES
Discharge: HOME | End: 2023-10-19
Payer: COMMERCIAL

## 2023-10-19 LAB
RAD ONC ARIA COURSE ID: NORMAL
RAD ONC ARIA COURSE LAST TREATMENT DATE: NORMAL
RAD ONC ARIA COURSE START DATE: NORMAL
RAD ONC ARIA COURSE TREATMENT ELAPSED DAYS: 16
RAD ONC ARIA FIRST TREATMENT DATE: NORMAL
RAD ONC ARIA PLAN FRACTIONS TREATED TO DATE: 13
RAD ONC ARIA PLAN ID: NORMAL
RAD ONC ARIA PLAN PRESCRIBED DOSE PER FRACTION: 2.67 GY
RAD ONC ARIA PLAN PRIMARY REFERENCE POINT: NORMAL
RAD ONC ARIA PLAN TOTAL FRACTIONS PRESCRIBED: 15
RAD ONC ARIA PLAN TOTAL PRESCRIBED DOSE: 4005 CGY
RAD ONC ARIA REFERENCE POINT DOSAGE GIVEN TO DATE: 34.71 GY
RAD ONC ARIA REFERENCE POINT DOSAGE GIVEN TO DATE: 34.71 GY
RAD ONC ARIA REFERENCE POINT ID: NORMAL
RAD ONC ARIA REFERENCE POINT ID: NORMAL
RAD ONC ARIA REFERENCE POINT SESSION DOSAGE GIVEN: 2.67 GY
RAD ONC ARIA REFERENCE POINT SESSION DOSAGE GIVEN: 2.67 GY

## 2023-10-19 PROCEDURE — 77336 RADIATION PHYSICS CONSULT: CPT | Performed by: RADIOLOGY

## 2023-10-19 PROCEDURE — 77412 RADIATION TX DELIVERY LVL 3: CPT | Performed by: RADIOLOGY

## 2023-10-21 ENCOUNTER — OFFICE VISIT (OUTPATIENT)
Age: 48
End: 2023-10-21

## 2023-10-21 VITALS
TEMPERATURE: 99.1 F | HEART RATE: 74 BPM | WEIGHT: 134 LBS | RESPIRATION RATE: 18 BRPM | DIASTOLIC BLOOD PRESSURE: 60 MMHG | BODY MASS INDEX: 19.79 KG/M2 | SYSTOLIC BLOOD PRESSURE: 102 MMHG | OXYGEN SATURATION: 97 %

## 2023-10-21 DIAGNOSIS — R50.9 FEVER, UNSPECIFIED FEVER CAUSE: ICD-10-CM

## 2023-10-21 DIAGNOSIS — R53.83 FATIGUE, UNSPECIFIED TYPE: ICD-10-CM

## 2023-10-21 DIAGNOSIS — N42.1 CONGESTION AND HEMORRHAGE OF PROSTATE: ICD-10-CM

## 2023-10-21 DIAGNOSIS — R05.9 COUGH, UNSPECIFIED TYPE: ICD-10-CM

## 2023-10-21 DIAGNOSIS — J06.9 ACUTE UPPER RESPIRATORY INFECTION: Primary | ICD-10-CM

## 2023-10-21 LAB
INFLUENZA A ANTIBODY: NORMAL
INFLUENZA B ANTIBODY: NORMAL
S PYO AG THROAT QL: NORMAL

## 2023-10-21 RX ORDER — DOXYCYCLINE HYCLATE 100 MG
100 TABLET ORAL 2 TIMES DAILY
Qty: 20 TABLET | Refills: 0 | Status: SHIPPED | OUTPATIENT
Start: 2023-10-21 | End: 2023-10-31

## 2023-10-21 RX ORDER — DEXTROMETHORPHAN HYDROBROMIDE AND PROMETHAZINE HYDROCHLORIDE 15; 6.25 MG/5ML; MG/5ML
5 SYRUP ORAL 4 TIMES DAILY PRN
Qty: 180 ML | Refills: 0 | Status: SHIPPED | OUTPATIENT
Start: 2023-10-21 | End: 2023-10-28

## 2023-10-21 RX ORDER — DEXAMETHASONE SODIUM PHOSPHATE 10 MG/ML
10 INJECTION INTRAMUSCULAR; INTRAVENOUS ONCE
Status: COMPLETED | OUTPATIENT
Start: 2023-10-21 | End: 2023-10-21

## 2023-10-21 RX ADMIN — DEXAMETHASONE SODIUM PHOSPHATE 10 MG: 10 INJECTION INTRAMUSCULAR; INTRAVENOUS at 10:23

## 2023-10-21 ASSESSMENT — ENCOUNTER SYMPTOMS
VOMITING: 0
NAUSEA: 0
VOICE CHANGE: 0
COLOR CHANGE: 0
BACK PAIN: 0
SHORTNESS OF BREATH: 0
PHOTOPHOBIA: 0
COUGH: 1
RHINORRHEA: 0

## 2023-10-23 ENCOUNTER — HOSPITAL ENCOUNTER (OUTPATIENT)
Dept: RADIATION ONCOLOGY | Facility: HOSPITAL | Age: 48
Setting detail: RADIATION/ONCOLOGY SERIES
Discharge: HOME | End: 2023-10-23
Payer: COMMERCIAL

## 2023-10-23 LAB
RAD ONC ARIA COURSE ID: NORMAL
RAD ONC ARIA COURSE LAST TREATMENT DATE: NORMAL
RAD ONC ARIA COURSE START DATE: NORMAL
RAD ONC ARIA COURSE TREATMENT ELAPSED DAYS: 20
RAD ONC ARIA FIRST TREATMENT DATE: NORMAL
RAD ONC ARIA PLAN FRACTIONS TREATED TO DATE: 14
RAD ONC ARIA PLAN ID: NORMAL
RAD ONC ARIA PLAN PRESCRIBED DOSE PER FRACTION: 2.67 GY
RAD ONC ARIA PLAN PRIMARY REFERENCE POINT: NORMAL
RAD ONC ARIA PLAN TOTAL FRACTIONS PRESCRIBED: 15
RAD ONC ARIA PLAN TOTAL PRESCRIBED DOSE: 4005 CGY
RAD ONC ARIA REFERENCE POINT DOSAGE GIVEN TO DATE: 37.38 GY
RAD ONC ARIA REFERENCE POINT DOSAGE GIVEN TO DATE: 37.39 GY
RAD ONC ARIA REFERENCE POINT ID: NORMAL
RAD ONC ARIA REFERENCE POINT ID: NORMAL
RAD ONC ARIA REFERENCE POINT SESSION DOSAGE GIVEN: 2.67 GY
RAD ONC ARIA REFERENCE POINT SESSION DOSAGE GIVEN: 2.67 GY

## 2023-10-23 PROCEDURE — 77412 RADIATION TX DELIVERY LVL 3: CPT | Performed by: RADIOLOGY

## 2023-10-24 ENCOUNTER — HOSPITAL ENCOUNTER (OUTPATIENT)
Dept: RADIATION ONCOLOGY | Facility: HOSPITAL | Age: 48
Setting detail: RADIATION/ONCOLOGY SERIES
Discharge: HOME | End: 2023-10-24
Payer: COMMERCIAL

## 2023-10-24 LAB
RAD ONC ARIA COURSE ID: NORMAL
RAD ONC ARIA COURSE LAST TREATMENT DATE: NORMAL
RAD ONC ARIA COURSE START DATE: NORMAL
RAD ONC ARIA COURSE TREATMENT ELAPSED DAYS: 21
RAD ONC ARIA FIRST TREATMENT DATE: NORMAL
RAD ONC ARIA PLAN FRACTIONS TREATED TO DATE: 15
RAD ONC ARIA PLAN ID: NORMAL
RAD ONC ARIA PLAN PRESCRIBED DOSE PER FRACTION: 2.67 GY
RAD ONC ARIA PLAN PRIMARY REFERENCE POINT: NORMAL
RAD ONC ARIA PLAN TOTAL FRACTIONS PRESCRIBED: 15
RAD ONC ARIA PLAN TOTAL PRESCRIBED DOSE: 4005 CGY
RAD ONC ARIA REFERENCE POINT DOSAGE GIVEN TO DATE: 40.05 GY
RAD ONC ARIA REFERENCE POINT DOSAGE GIVEN TO DATE: 40.06 GY
RAD ONC ARIA REFERENCE POINT ID: NORMAL
RAD ONC ARIA REFERENCE POINT ID: NORMAL
RAD ONC ARIA REFERENCE POINT SESSION DOSAGE GIVEN: 2.67 GY
RAD ONC ARIA REFERENCE POINT SESSION DOSAGE GIVEN: 2.67 GY

## 2023-10-24 PROCEDURE — 77412 RADIATION TX DELIVERY LVL 3: CPT | Performed by: RADIOLOGY

## 2023-10-25 NOTE — TELEPHONE ENCOUNTER
Donnell Man called requesting a refill of the below medication which has been pended for you:     Requested Prescriptions     Pending Prescriptions Disp Refills    valACYclovir (VALTREX) 1 g tablet 30 tablet 0     Sig: TAKE 1 TABLET BY MOUTH EVERY DAY AS NEEDED FOR FEVER BLISTER       Last Appointment Date: 8/22/2023  Next Appointment Date: 2/22/2024    Allergies   Allergen Reactions    Codeine Itching    Percocet [Oxycodone-Acetaminophen] Itching    Toradol [Ketorolac Tromethamine] Itching

## 2023-10-26 ENCOUNTER — HOSPITAL ENCOUNTER (OUTPATIENT)
Dept: INFUSION THERAPY | Age: 48
Discharge: HOME | End: 2023-10-26
Payer: COMMERCIAL

## 2023-10-26 VITALS
SYSTOLIC BLOOD PRESSURE: 132 MMHG | TEMPERATURE: 97.9 F | RESPIRATION RATE: 18 BRPM | OXYGEN SATURATION: 97 % | HEART RATE: 74 BPM | BODY MASS INDEX: 19.65 KG/M2 | WEIGHT: 132.7 LBS | DIASTOLIC BLOOD PRESSURE: 74 MMHG | HEIGHT: 69 IN

## 2023-10-26 DIAGNOSIS — C50.812 MALIGNANT NEOPLASM OF OVERLAPPING SITES OF LEFT BREAST (HCC): Primary | ICD-10-CM

## 2023-10-26 DIAGNOSIS — C50.919 TRIPLE NEGATIVE BREAST CANCER (HCC): ICD-10-CM

## 2023-10-26 DIAGNOSIS — C50.812 MALIGNANT NEOPLASM OF OVERLAPPING SITES OF LEFT BREAST (HCC): ICD-10-CM

## 2023-10-26 DIAGNOSIS — R53.83 FATIGUE, UNSPECIFIED TYPE: Primary | ICD-10-CM

## 2023-10-26 DIAGNOSIS — R53.83 FATIGUE, UNSPECIFIED TYPE: ICD-10-CM

## 2023-10-26 LAB
ALBUMIN SERPL-MCNC: 4 G/DL (ref 3.5–5.2)
ALP SERPL-CCNC: 92 U/L (ref 35–104)
ALT SERPL-CCNC: 14 U/L (ref 9–52)
ANION GAP SERPL CALCULATED.3IONS-SCNC: 10 MMOL/L (ref 7–19)
AST SERPL-CCNC: 33 U/L (ref 14–36)
BASOPHILS # BLD: 0.03 K/UL (ref 0.01–0.08)
BASOPHILS NFR BLD: 0.5 % (ref 0.1–1.2)
BILIRUB SERPL-MCNC: 0.5 MG/DL (ref 0.2–1.3)
BUN SERPL-MCNC: 10 MG/DL (ref 7–17)
CALCIUM SERPL-MCNC: 9.9 MG/DL (ref 8.4–10.2)
CHLORIDE SERPL-SCNC: 103 MMOL/L (ref 98–111)
CO2 SERPL-SCNC: 25 MMOL/L (ref 22–29)
CREAT SERPL-MCNC: 0.8 MG/DL (ref 0.5–1)
EOSINOPHIL # BLD: 0.08 K/UL (ref 0.04–0.54)
EOSINOPHIL NFR BLD: 1.3 % (ref 0.7–7)
ERYTHROCYTE [DISTWIDTH] IN BLOOD BY AUTOMATED COUNT: 13 % (ref 11.7–14.4)
GLOBULIN: 3.3 G/DL
GLUCOSE SERPL-MCNC: 168 MG/DL (ref 74–106)
HCT VFR BLD AUTO: 34 % (ref 34.1–44.9)
HGB BLD-MCNC: 11.5 G/DL (ref 11.2–15.7)
LYMPHOCYTES # BLD: 1.96 K/UL (ref 1.18–3.74)
LYMPHOCYTES NFR BLD: 32.6 % (ref 19.3–53.1)
MCH RBC QN AUTO: 36.2 PG (ref 25.6–32.2)
MCHC RBC AUTO-ENTMCNC: 33.8 G/DL (ref 32.3–35.5)
MCV RBC AUTO: 106.9 FL (ref 79.4–94.8)
MONOCYTES # BLD: 0.32 K/UL (ref 0.24–0.82)
MONOCYTES NFR BLD: 5.3 % (ref 4.7–12.5)
NEUTROPHILS # BLD: 3.61 K/UL (ref 1.56–6.13)
NEUTS SEG NFR BLD: 60.1 % (ref 34–71.1)
PLATELET # BLD AUTO: 175 K/UL (ref 182–369)
PMV BLD AUTO: 8.9 FL (ref 7.4–10.4)
POTASSIUM SERPL-SCNC: 3.5 MMOL/L (ref 3.5–5.1)
PROT SERPL-MCNC: 7.3 G/DL (ref 6.3–8.2)
RBC # BLD AUTO: 3.18 M/UL (ref 3.93–5.22)
SODIUM SERPL-SCNC: 138 MMOL/L (ref 137–145)
T4 FREE SERPL-MCNC: 1.6 NG/DL (ref 0.93–1.7)
TSH SERPL DL<=0.005 MIU/L-ACNC: 0.69 UIU/ML (ref 0.27–4.2)
WBC # BLD AUTO: 6.01 K/UL (ref 3.98–10.04)

## 2023-10-26 PROCEDURE — 2580000003 HC RX 258: Performed by: INTERNAL MEDICINE

## 2023-10-26 PROCEDURE — 96413 CHEMO IV INFUSION 1 HR: CPT

## 2023-10-26 PROCEDURE — 80053 COMPREHEN METABOLIC PANEL: CPT

## 2023-10-26 PROCEDURE — 6360000002 HC RX W HCPCS: Performed by: INTERNAL MEDICINE

## 2023-10-26 PROCEDURE — 85025 COMPLETE CBC W/AUTO DIFF WBC: CPT

## 2023-10-26 PROCEDURE — 36415 COLL VENOUS BLD VENIPUNCTURE: CPT

## 2023-10-26 RX ORDER — SODIUM CHLORIDE 9 MG/ML
5-250 INJECTION, SOLUTION INTRAVENOUS PRN
Status: CANCELLED | OUTPATIENT
Start: 2023-10-26

## 2023-10-26 RX ORDER — SODIUM CHLORIDE 0.9 % (FLUSH) 0.9 %
5-40 SYRINGE (ML) INJECTION PRN
Status: DISCONTINUED | OUTPATIENT
Start: 2023-10-26 | End: 2023-10-27 | Stop reason: HOSPADM

## 2023-10-26 RX ORDER — HEPARIN 100 UNIT/ML
500 SYRINGE INTRAVENOUS PRN
Status: DISCONTINUED | OUTPATIENT
Start: 2023-10-26 | End: 2023-10-27 | Stop reason: HOSPADM

## 2023-10-26 RX ORDER — SODIUM CHLORIDE 9 MG/ML
5-250 INJECTION, SOLUTION INTRAVENOUS PRN
Status: DISCONTINUED | OUTPATIENT
Start: 2023-10-26 | End: 2023-10-27 | Stop reason: HOSPADM

## 2023-10-26 RX ORDER — SODIUM CHLORIDE 0.9 % (FLUSH) 0.9 %
5-40 SYRINGE (ML) INJECTION PRN
Status: CANCELLED | OUTPATIENT
Start: 2023-10-26

## 2023-10-26 RX ORDER — HEPARIN SODIUM (PORCINE) LOCK FLUSH IV SOLN 100 UNIT/ML 100 UNIT/ML
500 SOLUTION INTRAVENOUS PRN
Status: CANCELLED | OUTPATIENT
Start: 2023-10-26

## 2023-10-26 RX ADMIN — HEPARIN 500 UNITS: 100 SYRINGE at 16:08

## 2023-10-26 RX ADMIN — SODIUM CHLORIDE 400 MG: 9 INJECTION, SOLUTION INTRAVENOUS at 15:36

## 2023-10-26 RX ADMIN — SODIUM CHLORIDE, PRESERVATIVE FREE 10 ML: 5 INJECTION INTRAVENOUS at 16:08

## 2023-10-26 NOTE — PROGRESS NOTES
Lab Results   Component Value Date    WBC 6.01 10/26/2023    HGB 11.5 10/26/2023    HCT 34.0 (L) 10/26/2023    .9 (H) 10/26/2023     (L) 10/26/2023     Lab Results   Component Value Date    NEUTROABS 3.61 10/26/2023     Lab Results   Component Value Date     09/08/2023    K 3.9 09/08/2023     09/08/2023    CO2 24 09/08/2023    BUN 10 09/08/2023    CREATININE 0.8 09/08/2023    GLUCOSE 108 (H) 09/08/2023    CALCIUM 9.4 09/08/2023    PROT 7.2 09/08/2023    LABALBU 4.1 09/08/2023    BILITOT 0.4 09/08/2023    ALKPHOS 75 09/08/2023    AST 22 09/08/2023    ALT 13 09/08/2023    LABGLOM >60 09/08/2023    GFRAA >59 08/03/2021    GLOB 3.0 09/08/2023

## 2023-10-30 RX ORDER — VALACYCLOVIR HYDROCHLORIDE 1 G/1
TABLET, FILM COATED ORAL
Qty: 30 TABLET | Refills: 0 | Status: SHIPPED | OUTPATIENT
Start: 2023-10-30

## 2023-10-31 ENCOUNTER — OFFICE VISIT (OUTPATIENT)
Dept: CARDIOLOGY CLINIC | Age: 48
End: 2023-10-31
Payer: COMMERCIAL

## 2023-10-31 ENCOUNTER — HOSPITAL ENCOUNTER (OUTPATIENT)
Dept: GENERAL RADIOLOGY | Age: 48
Discharge: HOME | End: 2023-10-31
Payer: COMMERCIAL

## 2023-10-31 VITALS
BODY MASS INDEX: 19.4 KG/M2 | SYSTOLIC BLOOD PRESSURE: 102 MMHG | HEIGHT: 69 IN | DIASTOLIC BLOOD PRESSURE: 58 MMHG | WEIGHT: 131 LBS | HEART RATE: 58 BPM

## 2023-10-31 DIAGNOSIS — Z92.21 S/P CHEMOTHERAPY, TIME SINCE 4-12 WEEKS: ICD-10-CM

## 2023-10-31 DIAGNOSIS — R05.9 COUGH, UNSPECIFIED TYPE: ICD-10-CM

## 2023-10-31 DIAGNOSIS — R05.9 COUGH, UNSPECIFIED TYPE: Primary | ICD-10-CM

## 2023-10-31 PROCEDURE — 71046 X-RAY EXAM CHEST 2 VIEWS: CPT

## 2023-10-31 PROCEDURE — 99214 OFFICE O/P EST MOD 30 MIN: CPT | Performed by: INTERNAL MEDICINE

## 2023-10-31 PROCEDURE — 3078F DIAST BP <80 MM HG: CPT | Performed by: INTERNAL MEDICINE

## 2023-10-31 PROCEDURE — 3074F SYST BP LT 130 MM HG: CPT | Performed by: INTERNAL MEDICINE

## 2023-10-31 ASSESSMENT — ENCOUNTER SYMPTOMS
ABDOMINAL DISTENTION: 0
SHORTNESS OF BREATH: 0
DIARRHEA: 0
COUGH: 1
CHEST TIGHTNESS: 0
CONSTIPATION: 0
WHEEZING: 0
EYE PAIN: 0
APNEA: 0
NAUSEA: 0
FACIAL SWELLING: 0
EYE REDNESS: 0
BLOOD IN STOOL: 0
ABDOMINAL PAIN: 0
EYE DISCHARGE: 0
VOMITING: 0
SORE THROAT: 0

## 2023-10-31 NOTE — PROGRESS NOTES
toxic-appearing or diaphoretic. HENT:      Head: Normocephalic and atraumatic. Eyes:      General: No scleral icterus. Right eye: No discharge. Left eye: No discharge. Conjunctiva/sclera: Conjunctivae normal.   Neck:      Vascular: No carotid bruit. Cardiovascular:      Rate and Rhythm: Normal rate and regular rhythm. No extrasystoles are present. Chest Wall: PMI is not displaced. No thrill. Heart sounds: S1 normal and S2 normal. No murmur heard. No friction rub. No gallop. Pulmonary:      Effort: Pulmonary effort is normal. No tachypnea or respiratory distress. Breath sounds: Normal breath sounds. No stridor. No wheezing, rhonchi or rales. Chest:      Chest wall: No tenderness. Abdominal:      General: Bowel sounds are normal. There is no distension. Palpations: Abdomen is soft. There is no mass. Tenderness: There is no abdominal tenderness. There is no guarding. Musculoskeletal:         General: No swelling. Cervical back: Normal range of motion and neck supple. No rigidity. Right lower leg: No edema. Left lower leg: No edema. Skin:     General: Skin is warm and dry. Coloration: Skin is not jaundiced. Findings: No erythema or rash. Neurological:      General: No focal deficit present. Mental Status: She is alert and oriented to person, place, and time. Mental status is at baseline. Psychiatric:         Mood and Affect: Mood normal.         Behavior: Behavior normal.         Thought Content: Thought content normal.           Labs:   CBC: No results found for: \"CBC\"   BMP: No results found for:  \"BMP\"    BNP: No results found for: \"BNPINT\"   PT/INR:   Protime   Date Value Ref Range Status   04/19/2018 13.3 12.0 - 14.6 sec Final     INR   Date Value Ref Range Status   04/19/2018 1.02 0.88 - 1.18 Final     Comment:     INR  < or = 1.3  Normal  INR = 2.0 - 3.0  Therapeutic  INR = 2.5 - 3.5  Therapeutic for patients with

## 2023-11-20 NOTE — PROGRESS NOTES
HISTORY OF PRESENT ILLNESS:     Ms. Radha Nunez presents today for a 3-month post op following left partial mastectomy with left sentinel lymph node biopsy on 7/14/2023. This is following neoadjuvant chemotherapy with AC, CarboTaxol and Keytruda. She had a pathologic complete response    She is recently status post ultrasound guided breast biopsy  on the left which revealed a 1.1 cm high grade invasive ductal carcinoma. ER negative. WA negative. Her2 negative. Ki67 is 83%. MammaPrint demonstrates a high risk basal-like phenotype    She has completed 4 cycles of dose dense AC and part of her CarboTaxol. Unfortunately her counts have not enabled her to finish her complete course. I discussed this with Dr. Karla Perez and we will proceed with surgery. She may get additional cycles of carbo and Taxol after surgery if indicated. Because of her young age and high risk tumor, she is going to complete her additional Ana and Taxol. She had a pathologic complete response      MRI-6/7/2023  FINDINGS:  Contrast is seen in the heart and great vessels on postcontrast  sequences consistent with an adequate bolus. There is moderate  background enhancement of the patient's heterogeneously dense  fibroglandular tissues. There is redemonstration of unchanged  bilateral retropectoral single-lumen appearing silicone implants. In  the upper outer quadrant of the left breast at the known side of  malignancy, the previous mass is no longer seen. T2 bright nonanatomic  biopsy clip is evident. No suspicious MR abnormality identified in  either breast. On current study there are no abnormal axillary or  internal mammary chain lymph nodes. The visualized extent of  extramammary findings no significant abnormality is seen. 1. Left breast, BI-RADS 6, biopsy-proven malignancy in the left breast  upper outer quadrant. MRI suggests response to therapy as the mass is  no longer seen by MRI. Biopsy clip is present.  Recommend

## 2023-11-22 ENCOUNTER — OFFICE VISIT (OUTPATIENT)
Dept: SURGERY | Age: 48
End: 2023-11-22
Payer: COMMERCIAL

## 2023-11-22 VITALS — DIASTOLIC BLOOD PRESSURE: 64 MMHG | SYSTOLIC BLOOD PRESSURE: 110 MMHG

## 2023-11-22 DIAGNOSIS — Z98.890 STATUS POST LEFT BREAST LUMPECTOMY: ICD-10-CM

## 2023-11-22 DIAGNOSIS — C50.919 TRIPLE NEGATIVE BREAST CANCER (HCC): ICD-10-CM

## 2023-11-22 DIAGNOSIS — Z85.3 PERSONAL HISTORY OF BREAST CANCER: Primary | ICD-10-CM

## 2023-11-22 PROCEDURE — 99213 OFFICE O/P EST LOW 20 MIN: CPT | Performed by: SURGERY

## 2023-11-22 PROCEDURE — 3078F DIAST BP <80 MM HG: CPT | Performed by: SURGERY

## 2023-11-22 PROCEDURE — 3074F SYST BP LT 130 MM HG: CPT | Performed by: SURGERY

## 2023-11-26 DIAGNOSIS — F41.9 ANXIETY: ICD-10-CM

## 2023-11-26 DIAGNOSIS — E03.2 DRUG-INDUCED HYPOTHYROIDISM: ICD-10-CM

## 2023-11-27 RX ORDER — LEVOTHYROXINE SODIUM 0.1 MG/1
100 TABLET ORAL DAILY
Qty: 30 TABLET | Refills: 5 | Status: SHIPPED | OUTPATIENT
Start: 2023-11-27

## 2023-11-27 NOTE — TELEPHONE ENCOUNTER
Rayne Yuri called to request a refill on her medication.       Last office visit : 8/22/2023   Next office visit : 2/22/2024     Requested Prescriptions     Pending Prescriptions Disp Refills    sertraline (ZOLOFT) 50 MG tablet [Pharmacy Med Name: SERTRALINE 50MG TABLETS] 30 tablet 3     Sig: TAKE 1 TABLET BY MOUTH DAILY            Blanca Mejia LPN

## 2023-11-30 ENCOUNTER — OFFICE VISIT (OUTPATIENT)
Dept: PRIMARY CARE CLINIC | Age: 48
End: 2023-11-30
Payer: COMMERCIAL

## 2023-11-30 VITALS
TEMPERATURE: 97.2 F | SYSTOLIC BLOOD PRESSURE: 100 MMHG | WEIGHT: 135 LBS | HEART RATE: 69 BPM | OXYGEN SATURATION: 97 % | BODY MASS INDEX: 19.94 KG/M2 | DIASTOLIC BLOOD PRESSURE: 60 MMHG

## 2023-11-30 DIAGNOSIS — Z78.0 POST-MENOPAUSAL: Primary | ICD-10-CM

## 2023-11-30 DIAGNOSIS — Z78.0 POST-MENOPAUSAL: ICD-10-CM

## 2023-11-30 DIAGNOSIS — M25.50 ARTHRALGIA, UNSPECIFIED JOINT: ICD-10-CM

## 2023-11-30 LAB
ALBUMIN SERPL-MCNC: 4.2 G/DL (ref 3.5–5.2)
ALP SERPL-CCNC: 79 U/L (ref 35–104)
ALT SERPL-CCNC: 7 U/L (ref 5–33)
ANION GAP SERPL CALCULATED.3IONS-SCNC: 8 MMOL/L (ref 7–19)
AST SERPL-CCNC: 15 U/L (ref 5–32)
BASOPHILS # BLD: 0 K/UL (ref 0–0.2)
BASOPHILS NFR BLD: 0.5 % (ref 0–1)
BILIRUB SERPL-MCNC: <0.2 MG/DL (ref 0.2–1.2)
BUN SERPL-MCNC: 9 MG/DL (ref 6–20)
CALCIUM SERPL-MCNC: 9.9 MG/DL (ref 8.6–10)
CHLORIDE SERPL-SCNC: 102 MMOL/L (ref 98–111)
CO2 SERPL-SCNC: 29 MMOL/L (ref 22–29)
CREAT SERPL-MCNC: 0.7 MG/DL (ref 0.5–0.9)
CRP SERPL HS-MCNC: <0.3 MG/DL (ref 0–0.5)
EOSINOPHIL # BLD: 0 K/UL (ref 0–0.6)
EOSINOPHIL NFR BLD: 0.9 % (ref 0–5)
ERYTHROCYTE [DISTWIDTH] IN BLOOD BY AUTOMATED COUNT: 11.7 % (ref 11.5–14.5)
ERYTHROCYTE [SEDIMENTATION RATE] IN BLOOD BY WESTERGREN METHOD: 23 MM/HR (ref 0–25)
GLUCOSE SERPL-MCNC: 82 MG/DL (ref 74–109)
HCT VFR BLD AUTO: 36.4 % (ref 37–47)
HGB BLD-MCNC: 12.2 G/DL (ref 12–16)
IMM GRANULOCYTES # BLD: 0 K/UL
LYMPHOCYTES # BLD: 1.9 K/UL (ref 1.1–4.5)
LYMPHOCYTES NFR BLD: 43.4 % (ref 20–40)
MCH RBC QN AUTO: 36.5 PG (ref 27–31)
MCHC RBC AUTO-ENTMCNC: 33.5 G/DL (ref 33–37)
MCV RBC AUTO: 109 FL (ref 81–99)
MONOCYTES # BLD: 0.3 K/UL (ref 0–0.9)
MONOCYTES NFR BLD: 7 % (ref 0–10)
NEUTROPHILS # BLD: 2 K/UL (ref 1.5–7.5)
NEUTS SEG NFR BLD: 48 % (ref 50–65)
PLATELET # BLD AUTO: 144 K/UL (ref 130–400)
PMV BLD AUTO: 9.7 FL (ref 9.4–12.3)
POTASSIUM SERPL-SCNC: 3.9 MMOL/L (ref 3.5–5)
PROT SERPL-MCNC: 6.9 G/DL (ref 6.6–8.7)
RBC # BLD AUTO: 3.34 M/UL (ref 4.2–5.4)
RHEUMATOID FACT SER NEPH-ACNC: <10 IU/ML
SODIUM SERPL-SCNC: 139 MMOL/L (ref 136–145)
WBC # BLD AUTO: 4.3 K/UL (ref 4.8–10.8)

## 2023-11-30 PROCEDURE — 3074F SYST BP LT 130 MM HG: CPT | Performed by: NURSE PRACTITIONER

## 2023-11-30 PROCEDURE — 3078F DIAST BP <80 MM HG: CPT | Performed by: NURSE PRACTITIONER

## 2023-11-30 PROCEDURE — 99214 OFFICE O/P EST MOD 30 MIN: CPT | Performed by: NURSE PRACTITIONER

## 2023-11-30 RX ORDER — MELOXICAM 15 MG/1
15 TABLET ORAL DAILY
Qty: 30 TABLET | Refills: 0 | Status: SHIPPED | OUTPATIENT
Start: 2023-11-30

## 2023-11-30 RX ORDER — PREDNISONE 20 MG/1
TABLET ORAL
Qty: 24 TABLET | Refills: 0 | Status: SHIPPED | OUTPATIENT
Start: 2023-11-30

## 2023-11-30 ASSESSMENT — ENCOUNTER SYMPTOMS
VOMITING: 0
BACK PAIN: 0
NAUSEA: 0
VOICE CHANGE: 0
COUGH: 0
SHORTNESS OF BREATH: 0
PHOTOPHOBIA: 0
COLOR CHANGE: 0
RHINORRHEA: 0

## 2023-11-30 NOTE — PROGRESS NOTES
tablet     Refill:  0    predniSONE (DELTASONE) 20 MG tablet     Sig: 3 tablets po for 5 days, 2 tablets po for 3 days, 1 tablet po for 3 days. Dispense:  24 tablet     Refill:  0         ORDERS:  Orders Placed This Encounter   Procedures    DEXA BONE DENSITY 2 SITES    RAKESH Screen with Reflex    CBC with Auto Differential    Comprehensive Metabolic Panel    C-Reactive Protein    Rheumatoid Factor    Sedimentation Rate       Follow-up:  No follow-ups on file. PATIENT INSTRUCTIONS:  Patient Instructions   Prednisone taper as directed. Begin meloxicam 7.5 mg daily. Bone density test- will call with appointment. Labs in [address]. Follow-up as scheduled. Electronically signed by WHITNEY Wilkerson on 11/30/2023 at 3:53 PM    EMR Dragon/transcription disclaimer:  Much of thisencounter note is electronic transcription/translation of spoken language to printed texts. The electronic translation of spoken language may be erroneous, or at times, nonsensical words or phrases may be inadvertentlytranscribed.   Although I have reviewed the note for such errors, some may still exist.

## 2023-12-02 LAB — NUCLEAR IGG SER QL IA: NORMAL

## 2023-12-04 ENCOUNTER — TELEPHONE (OUTPATIENT)
Dept: PRIMARY CARE CLINIC | Age: 48
End: 2023-12-04

## 2023-12-04 NOTE — TELEPHONE ENCOUNTER
----- Message from WHITNEY Ortiz sent at 12/4/2023 11:44 AM CST -----  Please inform patient of normal lab results. Thank you!

## 2023-12-06 NOTE — PROGRESS NOTES
FOR FEVER BLISTER 30 tablet 0    rOPINIRole (REQUIP) 1 MG tablet Take 1 tablet by mouth nightly 90 tablet 1    vitamin D (ERGOCALCIFEROL) 1.25 MG (88804 UT) CAPS capsule TAKE 1 CAPSULE BY MOUTH 1 TIME A WEEK (Patient taking differently: Take 1 capsule by mouth once a week) 12 capsule 1    atenolol (TENORMIN) 25 MG tablet TAKE 1 TABLET BY MOUTH IN THE MORNING AND AT BEDTIME 180 tablet 2    Magic Mouthwash (MIRACLE MOUTHWASH) Swish and spit 10 mLs 4 times daily as needed for Irritation 480 mL 3    montelukast (SINGULAIR) 10 MG tablet Take 1 tablet by mouth nightly      ondansetron (ZOFRAN) 4 MG tablet Take 1 tablet by mouth every 6 hours as needed for Nausea or Vomiting 30 tablet 5    promethazine (PHENERGAN) 25 MG tablet Take 0.5 tablets by mouth every 6 hours as needed for Nausea 30 tablet 5    lidocaine-prilocaine (EMLA) 2.5-2.5 % cream Apply topically as needed. 30 g 5    omeprazole (PRILOSEC) 20 MG delayed release capsule Take 1 capsule by mouth 2 times daily (before meals) (Patient taking differently: Take 1 capsule by mouth nightly) 60 capsule 5     No current facility-administered medications for this visit. Facility-Administered Medications Ordered in Other Visits   Medication Dose Route Frequency Provider Last Rate Last Admin    sodium chloride flush 0.9 % injection 5-40 mL  5-40 mL IntraVENous PRN Ari Rodgers MD   10 mL at 12/07/23 1439    heparin (PF) 100 UNIT/ML injection 500 Units  500 Units IntraCATHeter PRN Janet JARQUIN MD   500 Units at 12/07/23 1440     Allergies:    Allergies   Allergen Reactions    Codeine Itching    Percocet [Oxycodone-Acetaminophen] Itching    Toradol [Ketorolac Tromethamine] Itching     Subjective     REVIEW OF SYSTEMS:   CONSTITUTIONAL: no fever, no night sweats,  fatigue;  HEENT: no blurring of vision, no double vision, no hearing difficulty, no tinnitus, no ulceration, no dysplasia, no epistaxis;   LUNGS: no cough, no hemoptysis, no wheeze,  no shortness

## 2023-12-07 ENCOUNTER — HOSPITAL ENCOUNTER (OUTPATIENT)
Dept: INFUSION THERAPY | Age: 48
Discharge: HOME | End: 2023-12-07
Payer: COMMERCIAL

## 2023-12-07 ENCOUNTER — OFFICE VISIT (OUTPATIENT)
Dept: HEMATOLOGY | Age: 48
End: 2023-12-07
Payer: COMMERCIAL

## 2023-12-07 VITALS
BODY MASS INDEX: 20.62 KG/M2 | TEMPERATURE: 97.6 F | HEIGHT: 69 IN | SYSTOLIC BLOOD PRESSURE: 126 MMHG | WEIGHT: 139.2 LBS | DIASTOLIC BLOOD PRESSURE: 67 MMHG | HEART RATE: 60 BPM

## 2023-12-07 DIAGNOSIS — T45.1X5A ANTINEOPLASTIC CHEMOTHERAPY INDUCED ANEMIA: ICD-10-CM

## 2023-12-07 DIAGNOSIS — G89.18 JOINT PAIN FOLLOWING CHEMOTHERAPY: ICD-10-CM

## 2023-12-07 DIAGNOSIS — C50.812 MALIGNANT NEOPLASM OF OVERLAPPING SITES OF LEFT BREAST (HCC): ICD-10-CM

## 2023-12-07 DIAGNOSIS — R53.83 FATIGUE, UNSPECIFIED TYPE: ICD-10-CM

## 2023-12-07 DIAGNOSIS — C50.919 TRIPLE NEGATIVE BREAST CANCER (HCC): Primary | ICD-10-CM

## 2023-12-07 DIAGNOSIS — D64.81 ANTINEOPLASTIC CHEMOTHERAPY INDUCED ANEMIA: ICD-10-CM

## 2023-12-07 DIAGNOSIS — E03.2 DRUG-INDUCED HYPOTHYROIDISM: ICD-10-CM

## 2023-12-07 DIAGNOSIS — G62.0 CHEMOTHERAPY-INDUCED NEUROPATHY (HCC): ICD-10-CM

## 2023-12-07 DIAGNOSIS — R94.6 HYPERTHYROIDISM DETERMINED BY THYROID FUNCTION TEST: ICD-10-CM

## 2023-12-07 DIAGNOSIS — Z71.89 CARE PLAN DISCUSSED WITH PATIENT: ICD-10-CM

## 2023-12-07 DIAGNOSIS — Z51.12 ENCOUNTER FOR ANTINEOPLASTIC IMMUNOTHERAPY: ICD-10-CM

## 2023-12-07 DIAGNOSIS — R53.83 FATIGUE DUE TO TREATMENT: ICD-10-CM

## 2023-12-07 DIAGNOSIS — T45.1X5A CHEMOTHERAPY-INDUCED NEUROPATHY (HCC): ICD-10-CM

## 2023-12-07 DIAGNOSIS — T43.205A ADVERSE EFFECT OF ANTIDEPRESSANT DRUG, INITIAL ENCOUNTER: ICD-10-CM

## 2023-12-07 DIAGNOSIS — E05.90 HYPERTHYROIDISM DETERMINED BY THYROID FUNCTION TEST: ICD-10-CM

## 2023-12-07 DIAGNOSIS — M25.50 JOINT PAIN FOLLOWING CHEMOTHERAPY: ICD-10-CM

## 2023-12-07 DIAGNOSIS — I49.3 PVC (PREMATURE VENTRICULAR CONTRACTION): Chronic | ICD-10-CM

## 2023-12-07 PROBLEM — Z92.3 HISTORY OF RADIATION THERAPY: Status: ACTIVE | Noted: 2023-12-07

## 2023-12-07 LAB
ALBUMIN SERPL-MCNC: 3.7 G/DL (ref 3.5–5.2)
ALP SERPL-CCNC: 67 U/L (ref 35–104)
ALT SERPL-CCNC: 17 U/L (ref 9–52)
ANION GAP SERPL CALCULATED.3IONS-SCNC: 11 MMOL/L (ref 7–19)
AST SERPL-CCNC: 21 U/L (ref 14–36)
BASOPHILS # BLD: 0.01 K/UL (ref 0.01–0.08)
BASOPHILS NFR BLD: 0.2 % (ref 0.1–1.2)
BILIRUB SERPL-MCNC: <0.2 MG/DL (ref 0.2–1.3)
BUN SERPL-MCNC: 14 MG/DL (ref 7–17)
CALCIUM SERPL-MCNC: 9.4 MG/DL (ref 8.4–10.2)
CHLORIDE SERPL-SCNC: 102 MMOL/L (ref 98–111)
CO2 SERPL-SCNC: 24 MMOL/L (ref 22–29)
CORTIS PM SERPL-MCNC: 2.9 UG/DL (ref 2.3–11.9)
CREAT SERPL-MCNC: 0.8 MG/DL (ref 0.5–1)
EOSINOPHIL # BLD: 0 K/UL (ref 0.04–0.54)
EOSINOPHIL NFR BLD: 0 % (ref 0.7–7)
ERYTHROCYTE [DISTWIDTH] IN BLOOD BY AUTOMATED COUNT: 11.4 % (ref 11.7–14.4)
GLOBULIN: 2.9 G/DL
GLUCOSE SERPL-MCNC: 155 MG/DL (ref 74–106)
HCT VFR BLD AUTO: 31.6 % (ref 34.1–44.9)
HGB BLD-MCNC: 10.7 G/DL (ref 11.2–15.7)
LYMPHOCYTES # BLD: 0.85 K/UL (ref 1.18–3.74)
LYMPHOCYTES NFR BLD: 15.3 % (ref 19.3–53.1)
MCH RBC QN AUTO: 35.9 PG (ref 25.6–32.2)
MCHC RBC AUTO-ENTMCNC: 33.9 G/DL (ref 32.3–35.5)
MCV RBC AUTO: 106 FL (ref 79.4–94.8)
MONOCYTES # BLD: 0.1 K/UL (ref 0.24–0.82)
MONOCYTES NFR BLD: 1.8 % (ref 4.7–12.5)
NEUTROPHILS # BLD: 4.56 K/UL (ref 1.56–6.13)
NEUTS SEG NFR BLD: 82.3 % (ref 34–71.1)
PLATELET # BLD AUTO: 159 K/UL (ref 182–369)
PMV BLD AUTO: 9.1 FL (ref 7.4–10.4)
POTASSIUM SERPL-SCNC: 3.6 MMOL/L (ref 3.5–5.1)
PROT SERPL-MCNC: 6.6 G/DL (ref 6.3–8.2)
RBC # BLD AUTO: 2.98 M/UL (ref 3.93–5.22)
SODIUM SERPL-SCNC: 137 MMOL/L (ref 137–145)
T4 FREE SERPL-MCNC: 1.73 NG/DL (ref 0.93–1.7)
TSH SERPL DL<=0.005 MIU/L-ACNC: 0.04 UIU/ML (ref 0.35–5.5)
WBC # BLD AUTO: 5.54 K/UL (ref 3.98–10.04)

## 2023-12-07 PROCEDURE — 6360000002 HC RX W HCPCS: Performed by: INTERNAL MEDICINE

## 2023-12-07 PROCEDURE — 2580000003 HC RX 258: Performed by: INTERNAL MEDICINE

## 2023-12-07 PROCEDURE — 80053 COMPREHEN METABOLIC PANEL: CPT

## 2023-12-07 PROCEDURE — 36415 COLL VENOUS BLD VENIPUNCTURE: CPT

## 2023-12-07 PROCEDURE — 85025 COMPLETE CBC W/AUTO DIFF WBC: CPT

## 2023-12-07 PROCEDURE — 3078F DIAST BP <80 MM HG: CPT | Performed by: INTERNAL MEDICINE

## 2023-12-07 PROCEDURE — 3074F SYST BP LT 130 MM HG: CPT | Performed by: INTERNAL MEDICINE

## 2023-12-07 PROCEDURE — 96413 CHEMO IV INFUSION 1 HR: CPT

## 2023-12-07 RX ORDER — HEPARIN 100 UNIT/ML
500 SYRINGE INTRAVENOUS PRN
Status: DISCONTINUED | OUTPATIENT
Start: 2023-12-07 | End: 2023-12-08 | Stop reason: HOSPADM

## 2023-12-07 RX ORDER — SODIUM CHLORIDE 0.9 % (FLUSH) 0.9 %
5-40 SYRINGE (ML) INJECTION PRN
Status: CANCELLED | OUTPATIENT
Start: 2023-12-07

## 2023-12-07 RX ORDER — HEPARIN SODIUM (PORCINE) LOCK FLUSH IV SOLN 100 UNIT/ML 100 UNIT/ML
500 SOLUTION INTRAVENOUS PRN
Status: CANCELLED | OUTPATIENT
Start: 2023-12-07

## 2023-12-07 RX ORDER — DULOXETIN HYDROCHLORIDE 30 MG/1
CAPSULE, DELAYED RELEASE ORAL
Qty: 7 CAPSULE | Refills: 0 | Status: SHIPPED | OUTPATIENT
Start: 2023-12-07 | End: 2023-12-14

## 2023-12-07 RX ORDER — SODIUM CHLORIDE 9 MG/ML
5-250 INJECTION, SOLUTION INTRAVENOUS PRN
Status: CANCELLED | OUTPATIENT
Start: 2023-12-07

## 2023-12-07 RX ORDER — DULOXETIN HYDROCHLORIDE 60 MG/1
60 CAPSULE, DELAYED RELEASE ORAL DAILY
Qty: 30 CAPSULE | Refills: 5 | Status: SHIPPED | OUTPATIENT
Start: 2023-12-15

## 2023-12-07 RX ORDER — SODIUM CHLORIDE 0.9 % (FLUSH) 0.9 %
5-40 SYRINGE (ML) INJECTION PRN
Status: DISCONTINUED | OUTPATIENT
Start: 2023-12-07 | End: 2023-12-08 | Stop reason: HOSPADM

## 2023-12-07 RX ADMIN — SODIUM CHLORIDE 400 MG: 9 INJECTION, SOLUTION INTRAVENOUS at 14:08

## 2023-12-07 RX ADMIN — HEPARIN 500 UNITS: 100 SYRINGE at 14:40

## 2023-12-07 RX ADMIN — SODIUM CHLORIDE, PRESERVATIVE FREE 10 ML: 5 INJECTION INTRAVENOUS at 14:39

## 2023-12-07 NOTE — PROGRESS NOTES
Lab Results   Component Value Date    WBC 5.54 12/07/2023    HGB 10.7 (L) 12/07/2023    HCT 31.6 (L) 12/07/2023    .0 (H) 12/07/2023     (L) 12/07/2023     Lab Results   Component Value Date    NEUTROABS 4.56 12/07/2023     Lab Results   Component Value Date     11/30/2023    K 3.9 11/30/2023     11/30/2023    CO2 29 11/30/2023    BUN 9 11/30/2023    CREATININE 0.7 11/30/2023    GLUCOSE 82 11/30/2023    CALCIUM 9.9 11/30/2023    PROT 6.9 11/30/2023    LABALBU 4.2 11/30/2023    BILITOT <0.2 11/30/2023    ALKPHOS 79 11/30/2023    AST 15 11/30/2023    ALT 7 11/30/2023    LABGLOM >60 11/30/2023    GFRAA >59 08/03/2021    GLOB 3.3 10/26/2023

## 2023-12-08 DIAGNOSIS — E55.9 VITAMIN D DEFICIENCY: ICD-10-CM

## 2023-12-08 RX ORDER — ERGOCALCIFEROL 1.25 MG/1
CAPSULE ORAL
Qty: 12 CAPSULE | Refills: 1 | OUTPATIENT
Start: 2023-12-08

## 2023-12-08 NOTE — TELEPHONE ENCOUNTER
Kelvin Zuñiga called requesting a refill of the below medication which has been pended for you:     Requested Prescriptions     Pending Prescriptions Disp Refills    vitamin D (ERGOCALCIFEROL) 1.25 MG (94874 UT) CAPS capsule 12 capsule 1     Sig: Take 1 capsule by mouth Once a week at 5 PM       Last Appointment Date: 11/30/2023  Next Appointment Date: 2/22/2024    Allergies   Allergen Reactions    Codeine Itching    Percocet [Oxycodone-Acetaminophen] Itching    Toradol [Ketorolac Tromethamine] Itching

## 2023-12-08 NOTE — TELEPHONE ENCOUNTER
Rayne called requesting a refill of the below medication which has been pended for you:     Requested Prescriptions     Pending Prescriptions Disp Refills    atenolol (TENORMIN) 25 MG tablet [Pharmacy Med Name: ATENOLOL 25MG TABLETS] 180 tablet 2     Sig: TAKE 1 TABLET BY MOUTH IN THE MORNING AND AT BEDTIME       Last Appointment Date: 11/30/2023  Next Appointment Date: 12/8/2023    Allergies   Allergen Reactions    Codeine Itching    Percocet [Oxycodone-Acetaminophen] Itching    Toradol [Ketorolac Tromethamine] Itching

## 2023-12-11 RX ORDER — ERGOCALCIFEROL 1.25 MG/1
50000 CAPSULE ORAL WEEKLY
Qty: 12 CAPSULE | Refills: 1 | Status: SHIPPED | OUTPATIENT
Start: 2023-12-11

## 2023-12-11 RX ORDER — ATENOLOL 25 MG/1
25 TABLET ORAL 2 TIMES DAILY
Qty: 180 TABLET | Refills: 2 | Status: SHIPPED | OUTPATIENT
Start: 2023-12-11

## 2024-01-04 NOTE — TELEPHONE ENCOUNTER
Rayne called requesting a refill of the below medication which has been pended for you:     Requested Prescriptions     Pending Prescriptions Disp Refills    meloxicam (MOBIC) 15 MG tablet [Pharmacy Med Name: MELOXICAM 15MG TABLETS] 30 tablet 0     Sig: TAKE 1 TABLET BY MOUTH DAILY       Last Appointment Date: 11/30/2023  Next Appointment Date: 2/22/2024    Allergies   Allergen Reactions    Codeine Itching    Percocet [Oxycodone-Acetaminophen] Itching    Toradol [Ketorolac Tromethamine] Itching

## 2024-01-05 DIAGNOSIS — C50.812 MALIGNANT NEOPLASM OF OVERLAPPING SITES OF LEFT BREAST (HCC): Primary | ICD-10-CM

## 2024-01-05 DIAGNOSIS — Z98.890 STATUS POST LEFT BREAST LUMPECTOMY: ICD-10-CM

## 2024-01-08 RX ORDER — MELOXICAM 15 MG/1
15 TABLET ORAL DAILY
Qty: 90 TABLET | Refills: 3 | Status: SHIPPED | OUTPATIENT
Start: 2024-01-08

## 2024-01-11 ENCOUNTER — HOSPITAL ENCOUNTER (OUTPATIENT)
Dept: OCCUPATIONAL THERAPY | Age: 49
Setting detail: THERAPIES SERIES
Discharge: HOME | End: 2024-01-11
Payer: COMMERCIAL

## 2024-01-11 PROCEDURE — 97165 OT EVAL LOW COMPLEX 30 MIN: CPT

## 2024-01-11 PROCEDURE — 93702 BIS XTRACELL FLUID ANALYSIS: CPT

## 2024-01-11 NOTE — PROGRESS NOTES
Occupational Therapy: Initial Evaluation   Patient: Rayne Welch (48 y.o. female)   Examination Date: 2024  Plan of Care Certification Period: 2024 to  2024      :  1975  MRN: [id number]  CSN: [id number]   Insurance: Payor: BCBS / Plan: BCBS [place] PPO / Product Type: *No Product type* /   Insurance ID: [id number] - ([place]) Secondary Insurance (if applicable):    Insurance Information: [facility]   Referring Physician: Kj Cali MD Daniel Howard, MD   PCP: Amber Sam APRN Visits to Date/Visits Approved:   /      No Show/Cancelled Appts:    /       Medical Diagnosis: Malignant neoplasm of overlapping sites of left female breast [C50.812]  Other specified postprocedural states [Z98.890] C50.812  No data recorded     PERTINENT MEDICAL HISTORY   Patient assessed for rehabilitation services?: Yes  Self reported health status:: Excellent    Medical History: Chart Reviewed: Yes   Past Medical History:   Diagnosis Date    Adverse effect of chemotherapy     Breast cancer (HCC) 2022    Triple negative breast cancer    Hypotension     Malignant neoplasm of overlapping sites of left breast (HCC) 2022    PVC (premature ventricular contraction)     Restless leg syndrome 2018    Uterine mass 2014    9x9 cm on CT scan     Surgical History:   Past Surgical History:   Procedure Laterality Date    BREAST LUMPECTOMY Left 2023    LEFT LUMPECTOMY & SNB, PREOP & INTRAOP US GUIDED NEEDLE LOC, PEC BLOCK, BIOZORB, FLAPS & MARGINPROBE performed by Kj Cali MD at [facility] OR    BREAST RECONSTRUCTION  2019    BREAST SURGERY Bilateral 2010    silicone Memory Gel    CHOLECYSTECTOMY  2014    COLONOSCOPY N/A 03/15/2023    Dr Schaffer, mili ink tattooing placed in distal sigmoid colon, Bening TA (-)Dysplasia, int hem Gr 1, 3 year    HC INJECT OTHER PERPHRL NERV Right 2016    HIP FLUOROSCOPIC GUIDED CORTICOSTEROID INJECTION  performed by Roque Morrell MD at [facility] OR

## 2024-01-16 ENCOUNTER — TELEPHONE (OUTPATIENT)
Dept: HEMATOLOGY | Age: 49
End: 2024-01-16

## 2024-01-16 NOTE — PROGRESS NOTES
Approximately 1.8 to 2 cm in diameter pedunculated distal sigmoid colon polyp was removed by hot snare polypectomy technique after injection of the pedicle and the base of the polyp and the nearby wall of the colon submucosally with dilute epinephrine in sterile Dana ink and normal saline to decrease the risk of post polypectomy bleeding as well as to michelle the area for future reference. Otherwise, the mucosa appeared normal throughout the entire examined colon and the examined terminal ileum. NO larger polyps or masses or strictures or colitis.Internal hemorrhoids-Grade 1 visible, the mucosa appeared normal throughout the entire examined colon Retroflexion in the rectum was otherwise normal and revealed no further abnormalities. PATH: Small intestine, random duodenal biopsies: Benign duodenal mucosa with patchy mild chronic nonspecific inflammation, negative for evidence of sprue. Esophagus, biopsies: Benign esophageal mucosa with very focal changes consistent with reflux esophagitis, negative for evidence of eosinophilic esophagitis. Colon, sigmoid polypectomy: Tubular adenoma, negative for evidence of high-grade dysplasia. Recommended repeat in 1-3 years for surveillance.    Lung cancer screening-  She is a candidate for lung cancer screening.    She is 48 years old.    She has been a smoker for at least 20 years-recommend lung cancer screening  -repeat CT low dose Dec 2023    Residual peripheral neuropathy-improving    Arthalgia  -Trial of Cymbalta 30 mg p.o. daily, then 60 mg p.o. daily    Cancer related fatigue-encouraged to continue exercise    Antidepressant discontinuation-  - Cymbalta 30 mg p.o. daily in 2 weeks and then after 1 week 60 mg p.o. daily    At risk thyroid disorder  Lab Results   Component Value Date    TSH 0.691 10/26/2023    TSHFT4 0.04 (L) 12/07/2023   -Likely drug-induced hyperthyroidism.  This will be likely transient.  Continue to monitor.    PLAN:  RTC with MD in 4 months  CBC, CMP,

## 2024-01-23 ENCOUNTER — HOSPITAL ENCOUNTER (OUTPATIENT)
Dept: INFUSION THERAPY | Age: 49
Discharge: HOME | End: 2024-01-23
Payer: COMMERCIAL

## 2024-01-23 ENCOUNTER — TRANSCRIBE ORDERS (OUTPATIENT)
Dept: ADMINISTRATIVE | Facility: HOSPITAL | Age: 49
End: 2024-01-23
Payer: COMMERCIAL

## 2024-01-23 ENCOUNTER — OFFICE VISIT (OUTPATIENT)
Dept: HEMATOLOGY | Age: 49
End: 2024-01-23
Payer: COMMERCIAL

## 2024-01-23 VITALS
DIASTOLIC BLOOD PRESSURE: 50 MMHG | HEIGHT: 69 IN | OXYGEN SATURATION: 100 % | SYSTOLIC BLOOD PRESSURE: 116 MMHG | RESPIRATION RATE: 18 BRPM | BODY MASS INDEX: 20.53 KG/M2 | HEART RATE: 65 BPM | TEMPERATURE: 97.6 F | WEIGHT: 138.6 LBS

## 2024-01-23 DIAGNOSIS — Z72.0 TOBACCO ABUSE: ICD-10-CM

## 2024-01-23 DIAGNOSIS — E03.2 DRUG-INDUCED HYPOTHYROIDISM: ICD-10-CM

## 2024-01-23 DIAGNOSIS — Z71.89 CARE PLAN DISCUSSED WITH PATIENT: Primary | ICD-10-CM

## 2024-01-23 DIAGNOSIS — T45.1X5A CHEMOTHERAPY-INDUCED NEUROPATHY (HCC): ICD-10-CM

## 2024-01-23 DIAGNOSIS — Z72.0 TOBACCO ABUSE: Primary | ICD-10-CM

## 2024-01-23 DIAGNOSIS — C50.812 MALIGNANT NEOPLASM OF OVERLAPPING SITES OF LEFT BREAST (HCC): ICD-10-CM

## 2024-01-23 DIAGNOSIS — F17.210 SMOKES LESS THAN 1/2 PACK A DAY WITH GREATER THAN 30 PACK YEAR HISTORY: ICD-10-CM

## 2024-01-23 DIAGNOSIS — Z12.2 ENCOUNTER FOR SCREENING FOR LUNG CANCER: ICD-10-CM

## 2024-01-23 DIAGNOSIS — G62.0 CHEMOTHERAPY-INDUCED NEUROPATHY (HCC): ICD-10-CM

## 2024-01-23 DIAGNOSIS — R53.83 FATIGUE, UNSPECIFIED TYPE: Primary | ICD-10-CM

## 2024-01-23 DIAGNOSIS — C50.919 TRIPLE NEGATIVE BREAST CANCER (HCC): ICD-10-CM

## 2024-01-23 DIAGNOSIS — F17.210 SMOKES LESS THAN 1 PACK A DAY WITH GREATER THAN 30 PACK YEAR HISTORY: ICD-10-CM

## 2024-01-23 DIAGNOSIS — R53.83 FATIGUE, UNSPECIFIED TYPE: ICD-10-CM

## 2024-01-23 DIAGNOSIS — C50.812 MALIGNANT NEOPLASM OF OVERLAPPING SITES OF LEFT BREAST (HCC): Primary | ICD-10-CM

## 2024-01-23 LAB
ALBUMIN SERPL-MCNC: 4.2 G/DL (ref 3.5–5.2)
ALP SERPL-CCNC: 73 U/L (ref 35–104)
ALT SERPL-CCNC: 12 U/L (ref 9–52)
ANION GAP SERPL CALCULATED.3IONS-SCNC: 13 MMOL/L (ref 7–19)
AST SERPL-CCNC: 24 U/L (ref 14–36)
BASOPHILS # BLD: 0.03 K/UL (ref 0.01–0.08)
BASOPHILS NFR BLD: 0.6 % (ref 0.1–1.2)
BILIRUB SERPL-MCNC: 0.3 MG/DL (ref 0.2–1.3)
BUN SERPL-MCNC: 11 MG/DL (ref 7–17)
CALCIUM SERPL-MCNC: 9 MG/DL (ref 8.4–10.2)
CHLORIDE SERPL-SCNC: 102 MMOL/L (ref 98–111)
CO2 SERPL-SCNC: 23 MMOL/L (ref 22–29)
CORTIS PM SERPL-MCNC: 8 UG/DL (ref 2.3–11.9)
CREAT SERPL-MCNC: 0.9 MG/DL (ref 0.5–1)
EOSINOPHIL # BLD: 0.05 K/UL (ref 0.04–0.54)
EOSINOPHIL NFR BLD: 1.1 % (ref 0.7–7)
ERYTHROCYTE [DISTWIDTH] IN BLOOD BY AUTOMATED COUNT: 12.1 % (ref 11.7–14.4)
GLOBULIN: 2.8 G/DL
GLUCOSE SERPL-MCNC: 115 MG/DL (ref 74–106)
HCT VFR BLD AUTO: 34.7 % (ref 34.1–44.9)
HGB BLD-MCNC: 11.6 G/DL (ref 11.2–15.7)
LYMPHOCYTES # BLD: 2.15 K/UL (ref 1.18–3.74)
LYMPHOCYTES NFR BLD: 46 % (ref 19.3–53.1)
MCH RBC QN AUTO: 34.6 PG (ref 25.6–32.2)
MCHC RBC AUTO-ENTMCNC: 33.4 G/DL (ref 32.3–35.5)
MCV RBC AUTO: 103.6 FL (ref 79.4–94.8)
MONOCYTES # BLD: 0.32 K/UL (ref 0.24–0.82)
MONOCYTES NFR BLD: 6.9 % (ref 4.7–12.5)
NEUTROPHILS # BLD: 2.11 K/UL (ref 1.56–6.13)
NEUTS SEG NFR BLD: 45.2 % (ref 34–71.1)
PLATELET # BLD AUTO: 160 K/UL (ref 182–369)
PMV BLD AUTO: 8.7 FL (ref 7.4–10.4)
POTASSIUM SERPL-SCNC: 3.7 MMOL/L (ref 3.5–5.1)
PROT SERPL-MCNC: 6.9 G/DL (ref 6.3–8.2)
RBC # BLD AUTO: 3.35 M/UL (ref 3.93–5.22)
SODIUM SERPL-SCNC: 138 MMOL/L (ref 137–145)
T4 FREE SERPL-MCNC: 1.39 NG/DL (ref 0.93–1.7)
TSH SERPL DL<=0.005 MIU/L-ACNC: 0.15 UIU/ML (ref 0.27–4.2)
WBC # BLD AUTO: 4.67 K/UL (ref 3.98–10.04)

## 2024-01-23 PROCEDURE — 99214 OFFICE O/P EST MOD 30 MIN: CPT | Performed by: INTERNAL MEDICINE

## 2024-01-23 PROCEDURE — 6360000002 HC RX W HCPCS: Performed by: INTERNAL MEDICINE

## 2024-01-23 PROCEDURE — 3074F SYST BP LT 130 MM HG: CPT | Performed by: INTERNAL MEDICINE

## 2024-01-23 PROCEDURE — 36415 COLL VENOUS BLD VENIPUNCTURE: CPT

## 2024-01-23 PROCEDURE — 3078F DIAST BP <80 MM HG: CPT | Performed by: INTERNAL MEDICINE

## 2024-01-23 PROCEDURE — 80053 COMPREHEN METABOLIC PANEL: CPT

## 2024-01-23 PROCEDURE — 2580000003 HC RX 258: Performed by: INTERNAL MEDICINE

## 2024-01-23 PROCEDURE — 85025 COMPLETE CBC W/AUTO DIFF WBC: CPT

## 2024-01-23 PROCEDURE — 96413 CHEMO IV INFUSION 1 HR: CPT

## 2024-01-23 RX ORDER — SODIUM CHLORIDE 9 MG/ML
5-250 INJECTION, SOLUTION INTRAVENOUS PRN
Status: CANCELLED | OUTPATIENT
Start: 2024-01-23

## 2024-01-23 RX ORDER — SODIUM CHLORIDE 0.9 % (FLUSH) 0.9 %
5-40 SYRINGE (ML) INJECTION PRN
Status: DISCONTINUED | OUTPATIENT
Start: 2024-01-23 | End: 2024-01-24 | Stop reason: HOSPADM

## 2024-01-23 RX ORDER — HEPARIN SODIUM (PORCINE) LOCK FLUSH IV SOLN 100 UNIT/ML 100 UNIT/ML
500 SOLUTION INTRAVENOUS PRN
Status: CANCELLED | OUTPATIENT
Start: 2024-01-23

## 2024-01-23 RX ORDER — HEPARIN 100 UNIT/ML
500 SYRINGE INTRAVENOUS PRN
Status: DISCONTINUED | OUTPATIENT
Start: 2024-01-23 | End: 2024-01-24 | Stop reason: HOSPADM

## 2024-01-23 RX ORDER — SODIUM CHLORIDE 0.9 % (FLUSH) 0.9 %
5-40 SYRINGE (ML) INJECTION PRN
Status: CANCELLED | OUTPATIENT
Start: 2024-01-23

## 2024-01-23 RX ADMIN — SODIUM CHLORIDE, PRESERVATIVE FREE 10 ML: 5 INJECTION INTRAVENOUS at 15:23

## 2024-01-23 RX ADMIN — SODIUM CHLORIDE 400 MG: 9 INJECTION, SOLUTION INTRAVENOUS at 15:20

## 2024-01-23 RX ADMIN — HEPARIN 500 UNITS: 100 SYRINGE at 15:23

## 2024-01-29 NOTE — PROGRESS NOTES
HISTORY OF PRESENT ILLNESS:     Ms. Welch presents today for a 3-month post op following left partial mastectomy with left sentinel lymph node biopsy on 7/14/2023.  This is following neoadjuvant chemotherapy with AC, CarboTaxol and Keytruda.  She had a pathologic complete response    She is recently status post ultrasound guided breast biopsy  on the left which revealed a 1.1 cm high grade invasive ductal carcinoma. ER negative. MA negative. Her2 negative. Ki67 is 83%.     MammaPrint demonstrates a high risk basal-like phenotype    She has completed 4 cycles of dose dense AC and part of her CarboTaxol.  Unfortunately her counts have not enabled her to finish her complete course.  I discussed this with Dr. Henley and we will proceed with surgery.  She may get additional cycles of carbo and Taxol after surgery if indicated.  Because of her young age and high risk tumor, she is going to complete her additional carbo and Taxol.    She had a pathologic complete response      MRI-6/7/2023  FINDINGS:  Contrast is seen in the heart and great vessels on postcontrast  sequences consistent with an adequate bolus. There is moderate  background enhancement of the patient's heterogeneously dense  fibroglandular tissues. There is redemonstration of unchanged  bilateral retropectoral single-lumen appearing silicone implants. In  the upper outer quadrant of the left breast at the known side of  malignancy, the previous mass is no longer seen. T2 bright nonanatomic  biopsy clip is evident. No suspicious MR abnormality identified in  either breast. On current study there are no abnormal axillary or  internal mammary chain lymph nodes. The visualized extent of  extramammary findings no significant abnormality is seen.    1. Left breast, BI-RADS 6, biopsy-proven malignancy in the left breast  upper outer quadrant. MRI suggests response to therapy as the mass is  no longer seen by MRI. Biopsy clip is present. Recommend

## 2024-01-30 ENCOUNTER — HOSPITAL ENCOUNTER (OUTPATIENT)
Dept: CT IMAGING | Facility: HOSPITAL | Age: 49
Discharge: HOME | End: 2024-01-30
Payer: COMMERCIAL

## 2024-02-08 ENCOUNTER — HOSPITAL ENCOUNTER (OUTPATIENT)
Dept: RADIATION ONCOLOGY | Facility: HOSPITAL | Age: 49
Setting detail: RADIATION/ONCOLOGY SERIES
End: 2024-02-08
Payer: COMMERCIAL

## 2024-02-09 ENCOUNTER — OFFICE VISIT (OUTPATIENT)
Dept: RADIATION ONCOLOGY | Facility: HOSPITAL | Age: 49
End: 2024-02-09
Payer: COMMERCIAL

## 2024-02-09 VITALS
OXYGEN SATURATION: 100 % | SYSTOLIC BLOOD PRESSURE: 101 MMHG | DIASTOLIC BLOOD PRESSURE: 58 MMHG | HEIGHT: 69 IN | BODY MASS INDEX: 20.51 KG/M2 | WEIGHT: 138.5 LBS | HEART RATE: 61 BPM

## 2024-02-09 DIAGNOSIS — Z98.890 S/P LYMPH NODE BIOPSY: ICD-10-CM

## 2024-02-09 DIAGNOSIS — C50.919 TRIPLE NEGATIVE BREAST CANCER: Primary | ICD-10-CM

## 2024-02-09 DIAGNOSIS — F17.200 CURRENT EVERY DAY SMOKER: ICD-10-CM

## 2024-02-09 DIAGNOSIS — Z98.890 S/P LUMPECTOMY, LEFT BREAST: ICD-10-CM

## 2024-02-09 DIAGNOSIS — Z92.3 HISTORY OF RADIATION THERAPY: ICD-10-CM

## 2024-02-09 PROCEDURE — G0463 HOSPITAL OUTPT CLINIC VISIT: HCPCS | Performed by: RADIOLOGY

## 2024-02-09 RX ORDER — DULOXETIN HYDROCHLORIDE 60 MG/1
60 CAPSULE, DELAYED RELEASE ORAL DAILY
COMMUNITY
Start: 2023-12-15

## 2024-02-15 ENCOUNTER — HOSPITAL ENCOUNTER (OUTPATIENT)
Dept: WOMENS IMAGING | Age: 49
Discharge: HOME | End: 2024-02-15
Payer: COMMERCIAL

## 2024-02-15 DIAGNOSIS — Z85.3 PERSONAL HISTORY OF BREAST CANCER: ICD-10-CM

## 2024-02-15 DIAGNOSIS — R92.8 ABNORMAL MAMMOGRAM: ICD-10-CM

## 2024-02-15 PROCEDURE — G0279 TOMOSYNTHESIS, MAMMO: HCPCS

## 2024-02-15 PROCEDURE — 76642 ULTRASOUND BREAST LIMITED: CPT

## 2024-02-22 ENCOUNTER — OFFICE VISIT (OUTPATIENT)
Dept: SURGERY | Age: 49
End: 2024-02-22
Payer: COMMERCIAL

## 2024-02-22 ENCOUNTER — OFFICE VISIT (OUTPATIENT)
Dept: PRIMARY CARE CLINIC | Age: 49
End: 2024-02-22
Payer: COMMERCIAL

## 2024-02-22 VITALS — WEIGHT: 136 LBS | HEART RATE: 76 BPM | HEIGHT: 69 IN | BODY MASS INDEX: 20.14 KG/M2

## 2024-02-22 VITALS
TEMPERATURE: 97.3 F | BODY MASS INDEX: 20.14 KG/M2 | HEART RATE: 65 BPM | HEIGHT: 69 IN | OXYGEN SATURATION: 97 % | DIASTOLIC BLOOD PRESSURE: 80 MMHG | SYSTOLIC BLOOD PRESSURE: 120 MMHG | WEIGHT: 136 LBS

## 2024-02-22 DIAGNOSIS — K21.9 GASTROESOPHAGEAL REFLUX DISEASE WITHOUT ESOPHAGITIS: ICD-10-CM

## 2024-02-22 DIAGNOSIS — Z98.890 STATUS POST LEFT BREAST LUMPECTOMY: ICD-10-CM

## 2024-02-22 DIAGNOSIS — C50.812 MALIGNANT NEOPLASM OF OVERLAPPING SITES OF LEFT BREAST (HCC): ICD-10-CM

## 2024-02-22 DIAGNOSIS — R92.8 ABNORMAL MAMMOGRAM: Primary | ICD-10-CM

## 2024-02-22 DIAGNOSIS — I10 ESSENTIAL HYPERTENSION: Primary | ICD-10-CM

## 2024-02-22 DIAGNOSIS — G25.81 RESTLESS LEG SYNDROME: ICD-10-CM

## 2024-02-22 DIAGNOSIS — F41.9 ANXIETY: ICD-10-CM

## 2024-02-22 PROCEDURE — 3074F SYST BP LT 130 MM HG: CPT | Performed by: NURSE PRACTITIONER

## 2024-02-22 PROCEDURE — 99213 OFFICE O/P EST LOW 20 MIN: CPT | Performed by: SURGERY

## 2024-02-22 PROCEDURE — 99214 OFFICE O/P EST MOD 30 MIN: CPT | Performed by: NURSE PRACTITIONER

## 2024-02-22 PROCEDURE — 3079F DIAST BP 80-89 MM HG: CPT | Performed by: NURSE PRACTITIONER

## 2024-02-22 SDOH — ECONOMIC STABILITY: FOOD INSECURITY: WITHIN THE PAST 12 MONTHS, YOU WORRIED THAT YOUR FOOD WOULD RUN OUT BEFORE YOU GOT MONEY TO BUY MORE.: NEVER TRUE

## 2024-02-22 SDOH — ECONOMIC STABILITY: FOOD INSECURITY: WITHIN THE PAST 12 MONTHS, THE FOOD YOU BOUGHT JUST DIDN'T LAST AND YOU DIDN'T HAVE MONEY TO GET MORE.: NEVER TRUE

## 2024-02-22 SDOH — ECONOMIC STABILITY: INCOME INSECURITY: HOW HARD IS IT FOR YOU TO PAY FOR THE VERY BASICS LIKE FOOD, HOUSING, MEDICAL CARE, AND HEATING?: NOT HARD AT ALL

## 2024-02-22 ASSESSMENT — ENCOUNTER SYMPTOMS
PHOTOPHOBIA: 0
SHORTNESS OF BREATH: 0
VOICE CHANGE: 0
NAUSEA: 0
VOMITING: 0
COLOR CHANGE: 0
RHINORRHEA: 0
COUGH: 0
BACK PAIN: 0

## 2024-02-22 ASSESSMENT — PATIENT HEALTH QUESTIONNAIRE - PHQ9
2. FEELING DOWN, DEPRESSED OR HOPELESS: 0
1. LITTLE INTEREST OR PLEASURE IN DOING THINGS: 0
SUM OF ALL RESPONSES TO PHQ QUESTIONS 1-9: 0
SUM OF ALL RESPONSES TO PHQ9 QUESTIONS 1 & 2: 0

## 2024-02-22 NOTE — PROGRESS NOTES
[facility]  [address]  [address]  [place] [address]  Dept: [phone]  Dept Fax: [phone]  Loc: [phone]    Rayne Welch is a 48 y.o. female who presents today for her medical conditions/complaints as noted below.  Rayne Welch is c/o of Follow-up        HPI:     HPI   Chief Complaint   Patient presents with    Follow-up     Patient presents today for follow-up hypothyroidism, RLS, arthritis. She reports she is doing well. Blood pressure has been well-controlled. Denies chest pain or SOB.    She is followed by Dr Henley for breast cancer; she had recent mammogram. She has completed chemo and radiation. There was a probable benign right breast mass; she has appointment with Dr Cali today.     Past Medical History:   Diagnosis Date    Adverse effect of chemotherapy     Breast cancer (HCC) 11/2022    Triple negative breast cancer    Hypotension     Malignant neoplasm of overlapping sites of left breast (HCC) 11/22/2022    PVC (premature ventricular contraction)     Restless leg syndrome 07/16/2018    Uterine mass 06/17/2014    9x9 cm on CT scan      Past Surgical History:   Procedure Laterality Date    BREAST LUMPECTOMY Left 7/14/2023    LEFT LUMPECTOMY & SNB, PREOP & INTRAOP US GUIDED NEEDLE LOC, PEC BLOCK, BIOZORB, FLAPS & MARGINPROBE performed by Kj Cali MD at [facility] OR    BREAST RECONSTRUCTION  04/2019    BREAST SURGERY Bilateral 2010    silicone Memory Gel    CHOLECYSTECTOMY  06/24/2014    COLONOSCOPY N/A 03/15/2023    Dr Schaffer, mili ink tattooing placed in distal sigmoid colon, Bening TA (-)Dysplasia, int hem Gr 1, 3 year    HC INJECT OTHER PERPHRL NERV Right 08/03/2016    HIP FLUOROSCOPIC GUIDED CORTICOSTEROID INJECTION  performed by Roque Morrell MD at [facility] OR    HC INJECT OTHER PERPHRL NERV Right 04/21/2017    FLURO GUIDED HIP INJECTION performed by Murphy Mckenzie MD at [facility] OR    HIP SURGERY      HYSTERECTOMY, VAGINAL

## 2024-02-28 ENCOUNTER — HOSPITAL ENCOUNTER (OUTPATIENT)
Dept: WOMENS IMAGING | Age: 49
End: 2024-02-28
Payer: COMMERCIAL

## 2024-02-28 ENCOUNTER — HOSPITAL ENCOUNTER (OUTPATIENT)
Dept: WOMENS IMAGING | Age: 49
Discharge: HOME | End: 2024-02-28
Payer: COMMERCIAL

## 2024-02-28 DIAGNOSIS — R92.8 ABNORMAL MAMMOGRAM: ICD-10-CM

## 2024-02-28 PROCEDURE — 88305 TISSUE EXAM BY PATHOLOGIST: CPT

## 2024-02-28 PROCEDURE — 19083 BX BREAST 1ST LESION US IMAG: CPT

## 2024-02-29 ENCOUNTER — TELEPHONE (OUTPATIENT)
Dept: SURGERY | Age: 49
End: 2024-02-29

## 2024-03-18 PROBLEM — R92.8 ABNORMAL MAMMOGRAM: Status: ACTIVE | Noted: 2024-03-18

## 2024-03-18 RX ORDER — ROPINIROLE 1 MG/1
1 TABLET, FILM COATED ORAL NIGHTLY
Qty: 90 TABLET | Refills: 1 | Status: SHIPPED | OUTPATIENT
Start: 2024-03-18

## 2024-04-02 ENCOUNTER — OFFICE VISIT (OUTPATIENT)
Dept: CARDIOLOGY CLINIC | Age: 49
End: 2024-04-02
Payer: COMMERCIAL

## 2024-04-02 VITALS
OXYGEN SATURATION: 99 % | DIASTOLIC BLOOD PRESSURE: 68 MMHG | WEIGHT: 139 LBS | HEIGHT: 69 IN | BODY MASS INDEX: 20.59 KG/M2 | SYSTOLIC BLOOD PRESSURE: 120 MMHG | HEART RATE: 66 BPM

## 2024-04-02 DIAGNOSIS — R00.2 PALPITATIONS: ICD-10-CM

## 2024-04-02 DIAGNOSIS — I49.3 PVC (PREMATURE VENTRICULAR CONTRACTION): Primary | ICD-10-CM

## 2024-04-02 DIAGNOSIS — R07.89 OTHER CHEST PAIN: ICD-10-CM

## 2024-04-02 PROCEDURE — 3074F SYST BP LT 130 MM HG: CPT | Performed by: CLINICAL NURSE SPECIALIST

## 2024-04-02 PROCEDURE — 99214 OFFICE O/P EST MOD 30 MIN: CPT | Performed by: CLINICAL NURSE SPECIALIST

## 2024-04-02 PROCEDURE — 3078F DIAST BP <80 MM HG: CPT | Performed by: CLINICAL NURSE SPECIALIST

## 2024-04-02 NOTE — PROGRESS NOTES
MONITOR (ZIO)        Data:  BP Readings from Last 3 Encounters:   04/02/24 120/68   03/13/24 110/68   02/22/24 120/80    Pulse Readings from Last 3 Encounters:   04/02/24 66   02/22/24 76   02/22/24 65        Wt Readings from Last 3 Encounters:   04/02/24 63 kg (139 lb)   03/13/24 61.7 kg (136 lb)   02/22/24 61.7 kg (136 lb)       Palpitations and PVCs-  Longstanding history previously controlled on beta-blocker.  Has had episodes of heart pounding and racing.  Will have him wear a monitor for up to 2 weeks to assess for rate and rhythm to see if it is associated with any of her shortness of breath or chest discomfort      Chest pain-  Pressure in chest associated with elevated heart rate and blood pressure.  She is scheduled for 2D echo next week post chemotherapy and radiation for breast cancer  Will have her get a stress echo at the same time      Other things to consider would be thyroid medication adjustment.  Also is the right age for menopause.  Hysterectomy but still has ovaries.  Will reach out to primary care and see if any other labs need to be evaluated.  She is coming for her preop port removal workup on 4/11/2024     Reviewed PCP recent notes   Reviewed recent labs     States taking medications as prescribed    30 minutes were spent preparing, reviewing and seeing patient.  All questions answered    Plan    Will add stress echo to her 2D echo that she has on the eighth.    Will have her wear a rhythm*monitor assess for any arrhythmia  Maintain good blood pressure control-goal<130/80 at rest  Maintain good cholesterol control LDL goal<70 with arterial disease  If you are diabetic work to keep/obtain hemoglobin A1c< 7    Follow up as planned with Dr. Fearon   Call with any questions or concerns  Follow up with Amber Sam APRN for non cardiac problems  Report any new problems  Cardiovascular Fitness-Exercise as tolerated.  Strive for 30 minutes of exercise most days of the week.    Cardiac /

## 2024-04-03 DIAGNOSIS — R42 DIZZINESS: Primary | ICD-10-CM

## 2024-04-04 ENCOUNTER — TELEPHONE (OUTPATIENT)
Dept: PRIMARY CARE CLINIC | Age: 49
End: 2024-04-04

## 2024-04-04 DIAGNOSIS — Z98.890 STATUS POST LEFT BREAST LUMPECTOMY: ICD-10-CM

## 2024-04-04 DIAGNOSIS — C50.812 MALIGNANT NEOPLASM OF OVERLAPPING SITES OF LEFT BREAST (HCC): Primary | ICD-10-CM

## 2024-04-04 NOTE — TELEPHONE ENCOUNTER
Patient left  4/1/24 stating that she had an episode where she woke up and was feeling dizzy and lightheaded. Her  thought she was having signs of a stoke or heart attack and wanted her to have labs done.   I returned pt call and apologized for the delayed response in returning her call. I asked her how was she feeling now. She said she had seen her Cardiologist on Tues 4/2/24 and she has to wear an event monitor for 2 weeks.They have scheduled her to have a 2D echo and stress test done on the 8th. She said her cardiologist asks that her PCP order labs as well as cardiac enzymes to make sure everything is ok. I informed patient I will forward this message to Amber and make sure she sees it first thing Monday when she returns. I again apologized to the patient for the delayed call and she said it's fine.

## 2024-04-08 ENCOUNTER — HOSPITAL ENCOUNTER (OUTPATIENT)
Dept: NON INVASIVE DIAGNOSTICS | Age: 49
Discharge: HOME | End: 2024-04-08
Attending: INTERNAL MEDICINE
Payer: COMMERCIAL

## 2024-04-08 DIAGNOSIS — R07.89 OTHER CHEST PAIN: ICD-10-CM

## 2024-04-08 DIAGNOSIS — Z92.21 S/P CHEMOTHERAPY, TIME SINCE 4-12 WEEKS: ICD-10-CM

## 2024-04-08 DIAGNOSIS — R00.2 PALPITATIONS: ICD-10-CM

## 2024-04-08 DIAGNOSIS — I49.3 PVC (PREMATURE VENTRICULAR CONTRACTION): ICD-10-CM

## 2024-04-08 PROCEDURE — 93306 TTE W/DOPPLER COMPLETE: CPT

## 2024-04-08 PROCEDURE — 93356 MYOCRD STRAIN IMG SPCKL TRCK: CPT

## 2024-04-08 PROCEDURE — 93350 STRESS TTE ONLY: CPT

## 2024-04-09 ENCOUNTER — TELEPHONE (OUTPATIENT)
Dept: CARDIOLOGY CLINIC | Age: 49
End: 2024-04-09

## 2024-04-09 NOTE — TELEPHONE ENCOUNTER
----- Message from WHITNEY Martinez sent at 4/9/2024  8:35 AM CDT -----  Stress test look good.  No evidence of any blockage also had 2D echo

## 2024-04-09 NOTE — TELEPHONE ENCOUNTER
----- Message from Naseem Amos MD sent at 4/9/2024 12:43 PM CDT -----  Results of 2D echocardiogram as well as echo and stress test are reassuring.  Await results of carotid Doppler ultrasound for further evaluation of lightheaded/dizziness.  Continue current treatment plan

## 2024-04-09 NOTE — RESULT ENCOUNTER NOTE
Results of 2D echocardiogram as well as echo and stress test are reassuring.  Await results of carotid Doppler ultrasound for further evaluation of lightheaded/dizziness.  Continue current treatment plan

## 2024-04-11 ENCOUNTER — HOSPITAL ENCOUNTER (OUTPATIENT)
Dept: PREADMISSION TESTING | Age: 49
Discharge: HOME | End: 2024-04-15

## 2024-04-11 ENCOUNTER — HOSPITAL ENCOUNTER (OUTPATIENT)
Dept: OCCUPATIONAL THERAPY | Age: 49
Setting detail: THERAPIES SERIES
End: 2024-04-11
Payer: COMMERCIAL

## 2024-04-11 RX ORDER — ACETAMINOPHEN 325 MG/1
975 TABLET ORAL ONCE
Status: CANCELLED | OUTPATIENT
Start: 2024-04-11 | End: 2024-04-11

## 2024-04-11 RX ORDER — SODIUM CHLORIDE, SODIUM LACTATE, POTASSIUM CHLORIDE, CALCIUM CHLORIDE 600; 310; 30; 20 MG/100ML; MG/100ML; MG/100ML; MG/100ML
INJECTION, SOLUTION INTRAVENOUS CONTINUOUS
Status: CANCELLED | OUTPATIENT
Start: 2024-04-11

## 2024-04-11 RX ORDER — CELECOXIB 200 MG/1
200 CAPSULE ORAL ONCE
Status: CANCELLED | OUTPATIENT
Start: 2024-04-11 | End: 2024-04-11

## 2024-04-11 RX ORDER — GABAPENTIN 300 MG/1
300 CAPSULE ORAL ONCE
Status: CANCELLED | OUTPATIENT
Start: 2024-04-11 | End: 2024-04-11

## 2024-04-16 ENCOUNTER — ANESTHESIA EVENT (OUTPATIENT)
Dept: OPERATING ROOM | Age: 49
End: 2024-04-16
Payer: COMMERCIAL

## 2024-04-16 ENCOUNTER — ANESTHESIA (OUTPATIENT)
Dept: OPERATING ROOM | Age: 49
End: 2024-04-16
Payer: COMMERCIAL

## 2024-04-16 ENCOUNTER — HOSPITAL ENCOUNTER (OUTPATIENT)
Age: 49
Setting detail: OUTPATIENT SURGERY
Discharge: HOME | End: 2024-04-16
Attending: SURGERY | Admitting: SURGERY
Payer: COMMERCIAL

## 2024-04-16 VITALS
TEMPERATURE: 98.7 F | DIASTOLIC BLOOD PRESSURE: 60 MMHG | OXYGEN SATURATION: 97 % | WEIGHT: 139 LBS | HEART RATE: 56 BPM | BODY MASS INDEX: 20.59 KG/M2 | RESPIRATION RATE: 20 BRPM | SYSTOLIC BLOOD PRESSURE: 108 MMHG | HEIGHT: 69 IN

## 2024-04-16 PROCEDURE — 7100000000 HC PACU RECOVERY - FIRST 15 MIN: Performed by: SURGERY

## 2024-04-16 PROCEDURE — 2500000003 HC RX 250 WO HCPCS: Performed by: SURGERY

## 2024-04-16 PROCEDURE — 6360000002 HC RX W HCPCS: Performed by: ANESTHESIOLOGY

## 2024-04-16 PROCEDURE — 2709999900 HC NON-CHARGEABLE SUPPLY: Performed by: SURGERY

## 2024-04-16 PROCEDURE — 2500000003 HC RX 250 WO HCPCS: Performed by: NURSE ANESTHETIST, CERTIFIED REGISTERED

## 2024-04-16 PROCEDURE — 36590 REMOVAL TUNNELED CV CATH: CPT | Performed by: SURGERY

## 2024-04-16 PROCEDURE — 7100000010 HC PHASE II RECOVERY - FIRST 15 MIN: Performed by: SURGERY

## 2024-04-16 PROCEDURE — 3700000001 HC ADD 15 MINUTES (ANESTHESIA): Performed by: SURGERY

## 2024-04-16 PROCEDURE — A4217 STERILE WATER/SALINE, 500 ML: HCPCS | Performed by: SURGERY

## 2024-04-16 PROCEDURE — 3600000012 HC SURGERY LEVEL 2 ADDTL 15MIN: Performed by: SURGERY

## 2024-04-16 PROCEDURE — 2580000003 HC RX 258: Performed by: ANESTHESIOLOGY

## 2024-04-16 PROCEDURE — 7100000001 HC PACU RECOVERY - ADDTL 15 MIN: Performed by: SURGERY

## 2024-04-16 PROCEDURE — 2580000003 HC RX 258: Performed by: SURGERY

## 2024-04-16 PROCEDURE — 3600000002 HC SURGERY LEVEL 2 BASE: Performed by: SURGERY

## 2024-04-16 PROCEDURE — 6360000002 HC RX W HCPCS: Performed by: NURSE ANESTHETIST, CERTIFIED REGISTERED

## 2024-04-16 PROCEDURE — 3700000000 HC ANESTHESIA ATTENDED CARE: Performed by: SURGERY

## 2024-04-16 PROCEDURE — 6360000002 HC RX W HCPCS: Performed by: SURGERY

## 2024-04-16 PROCEDURE — 2500000003 HC RX 250 WO HCPCS: Performed by: ANESTHESIOLOGY

## 2024-04-16 PROCEDURE — 7100000011 HC PHASE II RECOVERY - ADDTL 15 MIN: Performed by: SURGERY

## 2024-04-16 PROCEDURE — 6370000000 HC RX 637 (ALT 250 FOR IP): Performed by: SURGERY

## 2024-04-16 RX ORDER — ONDANSETRON 2 MG/ML
4 INJECTION INTRAMUSCULAR; INTRAVENOUS
Status: DISCONTINUED | OUTPATIENT
Start: 2024-04-16 | End: 2024-04-16 | Stop reason: HOSPADM

## 2024-04-16 RX ORDER — HYDROMORPHONE HYDROCHLORIDE 1 MG/ML
0.25 INJECTION, SOLUTION INTRAMUSCULAR; INTRAVENOUS; SUBCUTANEOUS EVERY 5 MIN PRN
Status: DISCONTINUED | OUTPATIENT
Start: 2024-04-16 | End: 2024-04-16 | Stop reason: HOSPADM

## 2024-04-16 RX ORDER — ONDANSETRON 2 MG/ML
INJECTION INTRAMUSCULAR; INTRAVENOUS PRN
Status: DISCONTINUED | OUTPATIENT
Start: 2024-04-16 | End: 2024-04-16 | Stop reason: SDUPTHER

## 2024-04-16 RX ORDER — SODIUM CHLORIDE, SODIUM LACTATE, POTASSIUM CHLORIDE, CALCIUM CHLORIDE 600; 310; 30; 20 MG/100ML; MG/100ML; MG/100ML; MG/100ML
INJECTION, SOLUTION INTRAVENOUS CONTINUOUS
Status: DISCONTINUED | OUTPATIENT
Start: 2024-04-16 | End: 2024-04-16 | Stop reason: HOSPADM

## 2024-04-16 RX ORDER — SODIUM CHLORIDE 9 MG/ML
INJECTION, SOLUTION INTRAVENOUS PRN
Status: DISCONTINUED | OUTPATIENT
Start: 2024-04-16 | End: 2024-04-16 | Stop reason: HOSPADM

## 2024-04-16 RX ORDER — SODIUM CHLORIDE 0.9 % (FLUSH) 0.9 %
5-40 SYRINGE (ML) INJECTION EVERY 12 HOURS SCHEDULED
Status: DISCONTINUED | OUTPATIENT
Start: 2024-04-16 | End: 2024-04-16 | Stop reason: HOSPADM

## 2024-04-16 RX ORDER — LIDOCAINE HYDROCHLORIDE 20 MG/ML
INJECTION, SOLUTION EPIDURAL; INFILTRATION; INTRACAUDAL; PERINEURAL PRN
Status: DISCONTINUED | OUTPATIENT
Start: 2024-04-16 | End: 2024-04-16 | Stop reason: SDUPTHER

## 2024-04-16 RX ORDER — SODIUM CHLORIDE 0.9 % (FLUSH) 0.9 %
5-40 SYRINGE (ML) INJECTION PRN
Status: DISCONTINUED | OUTPATIENT
Start: 2024-04-16 | End: 2024-04-16 | Stop reason: HOSPADM

## 2024-04-16 RX ORDER — PROPOFOL 10 MG/ML
INJECTION, EMULSION INTRAVENOUS PRN
Status: DISCONTINUED | OUTPATIENT
Start: 2024-04-16 | End: 2024-04-16 | Stop reason: SDUPTHER

## 2024-04-16 RX ORDER — NALOXONE HYDROCHLORIDE 0.4 MG/ML
INJECTION, SOLUTION INTRAMUSCULAR; INTRAVENOUS; SUBCUTANEOUS PRN
Status: DISCONTINUED | OUTPATIENT
Start: 2024-04-16 | End: 2024-04-16 | Stop reason: HOSPADM

## 2024-04-16 RX ORDER — FENTANYL CITRATE 50 UG/ML
INJECTION, SOLUTION INTRAMUSCULAR; INTRAVENOUS PRN
Status: DISCONTINUED | OUTPATIENT
Start: 2024-04-16 | End: 2024-04-16 | Stop reason: SDUPTHER

## 2024-04-16 RX ORDER — DEXAMETHASONE SODIUM PHOSPHATE 4 MG/ML
4 INJECTION, SOLUTION INTRA-ARTICULAR; INTRALESIONAL; INTRAMUSCULAR; INTRAVENOUS; SOFT TISSUE ONCE
Status: COMPLETED | OUTPATIENT
Start: 2024-04-16 | End: 2024-04-16

## 2024-04-16 RX ORDER — ACETAMINOPHEN 325 MG/1
975 TABLET ORAL ONCE
Status: COMPLETED | OUTPATIENT
Start: 2024-04-16 | End: 2024-04-16

## 2024-04-16 RX ORDER — HYDROMORPHONE HYDROCHLORIDE 1 MG/ML
0.5 INJECTION, SOLUTION INTRAMUSCULAR; INTRAVENOUS; SUBCUTANEOUS EVERY 5 MIN PRN
Status: DISCONTINUED | OUTPATIENT
Start: 2024-04-16 | End: 2024-04-16 | Stop reason: HOSPADM

## 2024-04-16 RX ORDER — CELECOXIB 200 MG/1
200 CAPSULE ORAL ONCE
Status: COMPLETED | OUTPATIENT
Start: 2024-04-16 | End: 2024-04-16

## 2024-04-16 RX ADMIN — PROPOFOL 50 MG: 10 INJECTION, EMULSION INTRAVENOUS at 08:12

## 2024-04-16 RX ADMIN — ACETAMINOPHEN 975 MG: 325 TABLET ORAL at 07:00

## 2024-04-16 RX ADMIN — PROPOFOL 100 MCG/KG/MIN: 10 INJECTION, EMULSION INTRAVENOUS at 08:13

## 2024-04-16 RX ADMIN — DEXAMETHASONE SODIUM PHOSPHATE 4 MG: 4 INJECTION INTRA-ARTICULAR; INTRALESIONAL; INTRAMUSCULAR; INTRAVENOUS; SOFT TISSUE at 07:24

## 2024-04-16 RX ADMIN — FENTANYL CITRATE 50 MCG: 0.05 INJECTION, SOLUTION INTRAMUSCULAR; INTRAVENOUS at 08:26

## 2024-04-16 RX ADMIN — FENTANYL CITRATE 50 MCG: 0.05 INJECTION, SOLUTION INTRAMUSCULAR; INTRAVENOUS at 08:11

## 2024-04-16 RX ADMIN — SODIUM CHLORIDE, PRESERVATIVE FREE 20 MG: 5 INJECTION INTRAVENOUS at 07:24

## 2024-04-16 RX ADMIN — ONDANSETRON 4 MG: 2 INJECTION INTRAMUSCULAR; INTRAVENOUS at 08:30

## 2024-04-16 RX ADMIN — CELECOXIB 200 MG: 200 CAPSULE ORAL at 07:01

## 2024-04-16 RX ADMIN — LIDOCAINE HYDROCHLORIDE 100 MG: 20 INJECTION, SOLUTION EPIDURAL; INFILTRATION; INTRACAUDAL; PERINEURAL at 08:12

## 2024-04-16 RX ADMIN — SODIUM CHLORIDE, SODIUM LACTATE, POTASSIUM CHLORIDE, AND CALCIUM CHLORIDE: 600; 310; 30; 20 INJECTION, SOLUTION INTRAVENOUS at 08:08

## 2024-04-16 ASSESSMENT — LIFESTYLE VARIABLES: SMOKING_STATUS: 1

## 2024-04-16 ASSESSMENT — PAIN - FUNCTIONAL ASSESSMENT
PAIN_FUNCTIONAL_ASSESSMENT: NONE - DENIES PAIN
PAIN_FUNCTIONAL_ASSESSMENT: NONE - DENIES PAIN

## 2024-04-16 NOTE — OP NOTE
[facility]              [address] [place] [address]                            OPERATIVE REPORT      PATIENT NAME: MARLO COLUNGA                : 1975  MED REC NO: [id number]                          ROOM: [facility] OR Pool  ACCOUNT NO: [id number]                       ADMIT DATE: 2024  PROVIDER: Cm Cali MD      DATE OF PROCEDURE:  2024    SURGEON:  Cm Cali MD    PREOPERATIVE DIAGNOSIS:  Triple negative breast cancer, status post completion of chemotherapy.    POSTOPERATIVE DIAGNOSIS:  Triple negative breast cancer, status post completion of chemotherapy.    PROCEDURE:  Left subclavian single-lumen port removal.    ANESTHESIA:  Local with sedation.    INDICATIONS:  The patient is a 48-year-old who has completed her chemotherapy for triple negative breast cancer.  She agreed to have her port removed.  We discussed risks and benefits.  She understood and was agreeable.    DESCRIPTION OF PROCEDURE:  The patient was brought to the operating room, adequately sedated, and prepped and draped in sterile fashion.  Area around the port was anesthetized with I/6% Xylocaine.  The old incision was reopened.  We dissected down to the port and excised the port with its capsule.  We then identified the insertion site and placed a 3-0 Vicryl purse-string through the catheter.  We obtained the entire catheter and then tied down our purse-string.  We then irrigated copiously, obtained good hemostasis, closed with 3-0 Vicryl for the subcu and 4-0 Monocryl for the skin.  A Dermabond dressing was applied.    ESTIMATED BLOOD LOSS:  Minimal.    COMPLICATION:  None.    She tolerated procedure well.          CM CALI MD      D:  2024 09:49:21     T:  2024 10:24:04     [facility]  Job #:  [id number]     Doc#:  [id number]

## 2024-04-16 NOTE — ANESTHESIA PRE PROCEDURE
(36.1 °C)   TempSrc: Tympanic   SpO2: 100%   Weight: 63 kg (139 lb)   Height: 1.753 m (5' 9\")                                              BP Readings from Last 3 Encounters:   04/16/24 (!) 116/59   04/02/24 120/68   03/13/24 110/68       NPO Status: Time of last liquid consumption: 2230                        Time of last solid consumption: 2230                        Date of last liquid consumption: 04/15/24                        Date of last solid food consumption: 04/15/24    BMI:   Wt Readings from Last 3 Encounters:   04/16/24 63 kg (139 lb)   04/02/24 63 kg (139 lb)   03/13/24 61.7 kg (136 lb)     Body mass index is 20.53 kg/m².    CBC:   Lab Results   Component Value Date/Time    WBC 4.67 01/23/2024 02:51 PM    RBC 3.35 01/23/2024 02:51 PM    HGB 11.6 01/23/2024 02:51 PM    HCT 34.7 01/23/2024 02:51 PM    .6 01/23/2024 02:51 PM    RDW 12.1 01/23/2024 02:51 PM     01/23/2024 02:51 PM       CMP:   Lab Results   Component Value Date/Time     01/23/2024 03:09 PM    K 3.7 01/23/2024 03:09 PM     01/23/2024 03:09 PM    CO2 23 01/23/2024 03:09 PM    BUN 11 01/23/2024 03:09 PM    CREATININE 0.9 01/23/2024 03:09 PM    GFRAA >59 08/03/2021 12:06 PM    LABGLOM >60 01/23/2024 03:09 PM    GLUCOSE 115 01/23/2024 03:09 PM    PROT 6.9 01/23/2024 03:09 PM    CALCIUM 9.0 01/23/2024 03:09 PM    BILITOT 0.3 01/23/2024 03:09 PM    ALKPHOS 73 01/23/2024 03:09 PM    AST 24 01/23/2024 03:09 PM    ALT 12 01/23/2024 03:09 PM       POC Tests: No results for input(s): \"POCGLU\", \"POCNA\", \"POCK\", \"POCCL\", \"POCBUN\", \"POCHEMO\", \"POCHCT\" in the last 72 hours.    Coags:   Lab Results   Component Value Date/Time    PROTIME 13.3 04/19/2018 12:40 PM    INR 1.02 04/19/2018 12:40 PM    APTT 30.4 04/19/2018 12:40 PM       HCG (If Applicable):   Lab Results   Component Value Date    PREGTESTUR NEGATIVE (L) 06/19/2014        ABGs: No results found for: \"PHART\", \"PO2ART\", \"GQC6TRA\", \"SRX9MDI\", \"BEART\", \"W3HKIDMX\"     Type &

## 2024-04-16 NOTE — ANESTHESIA POSTPROCEDURE EVALUATION
Department of Anesthesiology  Postprocedure Note    Patient: Rayne Welch  MRN: [id number]  YOB: 1975  Date of evaluation: 4/16/2024    Procedure Summary       Date: 04/16/24 Room / Location: [address]    Anesthesia Start: 0808 Anesthesia Stop: 0858    Procedure: PORT REMOVAL Diagnosis:       Malignant neoplasm of female breast, unspecified estrogen receptor status, unspecified laterality, unspecified site of breast (HCC)      (Malignant neoplasm of female breast, unspecified estrogen receptor status, unspecified laterality, unspecified site of breast (HCC) [C50.919])    Surgeons: Kj Cali MD Responsible Provider: Mary Anne Hollins APRN - CRNA    Anesthesia Type: MAC ASA Status: 3            Anesthesia Type: No value filed.    Adolph Phase I: Adolph Score: 10    Adolph Phase II:      Anesthesia Post Evaluation    Patient location during evaluation: PACU  Patient participation: complete - patient participated  Level of consciousness: awake and awake and alert  Pain score: 0  Airway patency: patent  Nausea & Vomiting: no nausea and no vomiting  Cardiovascular status: hemodynamically stable and blood pressure returned to baseline  Respiratory status: acceptable and room air  Hydration status: stable  Comments: BP (!) 122/56   Pulse 55   Temp 97.4 °F (36.3 °C) (Temporal)   Resp 11   Ht 1.753 m (5' 9\")   Wt 63 kg (139 lb)   LMP 06/08/2014   SpO2 95%   BMI 20.53 kg/m²     Pain management: adequate    No notable events documented.

## 2024-04-16 NOTE — H&P
HISTORY OF PRESENT ILLNESS:     Ms. Welch presents today for a 3-month post op following left partial mastectomy with left sentinel lymph node biopsy on 7/14/2023.  This is following neoadjuvant chemotherapy with AC, CarboTaxol and Keytruda.  She had a pathologic complete response     She is recently status post ultrasound guided breast biopsy  on the left which revealed a 1.1 cm high grade invasive ductal carcinoma. ER negative. NM negative. Her2 negative. Ki67 is 83%.     MammaPrint demonstrates a high risk basal-like phenotype     She has completed 4 cycles of dose dense AC and part of her CarboTaxol.  Unfortunately her counts have not enabled her to finish her complete course.  I discussed this with Dr. Henley and we will proceed with surgery.  She may get additional cycles of carbo and Taxol after surgery if indicated.  Because of her young age and high risk tumor, she is going to complete her additional carbo and Taxol.     She had a pathologic complete response        MRI-6/7/2023  FINDINGS:  Contrast is seen in the heart and great vessels on postcontrast  sequences consistent with an adequate bolus. There is moderate  background enhancement of the patient's heterogeneously dense  fibroglandular tissues. There is redemonstration of unchanged  bilateral retropectoral single-lumen appearing silicone implants. In  the upper outer quadrant of the left breast at the known side of  malignancy, the previous mass is no longer seen. T2 bright nonanatomic  biopsy clip is evident. No suspicious MR abnormality identified in  either breast. On current study there are no abnormal axillary or  internal mammary chain lymph nodes. The visualized extent of  extramammary findings no significant abnormality is seen.     1. Left breast, BI-RADS 6, biopsy-proven malignancy in the left breast  upper outer quadrant. MRI suggests response to therapy as the mass is  no longer seen by MRI. Biopsy clip is present. Recommend

## 2024-04-16 NOTE — BRIEF OP NOTE
Brief Postoperative Note      DATE OF PROCEDURE: 4/16/2024     SURGEON: Kj Cali MD    PREOPERATIVE DIAGNOSIS:  Malignant neoplasm of female breast, unspecified estrogen receptor status, unspecified laterality, unspecified site of breast (HCC) [C50.919]    POSTOPERATIVE DIAGNOSIS: Same     OPERATION: Procedure(s):  PORT REMOVAL    ANESTHESIA: Monitor Anesthesia Care    ESTIMATED BLOOD LOSS: Minimal    COMPLICATIONS: None.     SPECIMENS: * No specimens in log *    DRAINS: None    The patient tolerated the procedure well.    Electronically signed by Kj Cali MD  on 4/16/2024 at 8:46 AM

## 2024-04-16 NOTE — DISCHARGE INSTRUCTIONS
Dr Del Valle Discharge Instructions for Port    1.  You may shower or bathe as desired 48 hours after surgery.    2.  If a small area of the wound separates or becomes red and inflamed, call the office to make an appointment.  Clean the wound with peroxide 3 to 4 times a day.    3.  Call the office if you have any further questions during the nurse's hours, Monday thru Friday 9 am to 4 pm.  In case of emergency the answering service will take your call.

## 2024-04-17 DIAGNOSIS — R00.2 PALPITATIONS: Primary | ICD-10-CM

## 2024-04-19 ENCOUNTER — HOSPITAL ENCOUNTER (OUTPATIENT)
Dept: OCCUPATIONAL THERAPY | Age: 49
Setting detail: THERAPIES SERIES
Discharge: HOME | End: 2024-04-19

## 2024-04-19 NOTE — PROGRESS NOTES
Right  Reference for Starting Point: 11 cm measuring proximally from most distal point on 3rd digit; first measurement begins near MCPs, then every 5 cm after  0 (Starting Measurement): 19.4  (+ 4 cm): 20.5  (+8 cm): 15  (+12 cm): 15.6  (+16 cm): 18  (+20 cm): 21.3  (+24 cm): 23.5  (+28 cm): 24.3  (+32 cm): 23.4  (+36 cm): 24.5  (+40 cm): 25.5  (+44 cm): 25.8  Total Girth - Left UE: 256.8 Right UE Circumferential Measurements (cm)   Patient Position: Seated  Dominent Hand: Right  Reference for Starting Point: 11 cm measuring proximally from most distal point on 3rd digit; first measurement begins near MCPs, then every 5 cm after  0 (Starting Measurement): 18  (+ 4 cm): 20  (+8 cm): 15  (+12 cm): 16.4  (+16 cm): 18.5  (+20 cm): 22.2  (+24 cm): 24.3  (+28 cm): 23.5  (+32 cm): 24  (+36 cm): 25.4  (+40 cm): 25.8  (+44 cm): 15.6  Total Girth - Right UE: 248.7   Circumferential Limb Measurements Summary  Involved Limb : L UE  Uninvolved Limb : R UE  Total Girth - Involved Limb (cm): 256.8  Total Girth - Uninvolved Limb (cm): 248.7  Girth Difference (cm): 8.1  Percentage Difference (%): 3.26  Patient just had port removed this week.     ASSESSMENT       Impression: Assessment: Patient participated in so evaluation for lymphedema monitoring program this date s/p left breast lumpectomy on 7/14/2023. L-dex score -0.5 this date (within normal range) and BUE circumference measurement's taken (WNL). Patient educated on programs purpose/benefits, anatomy/physiology of lymphedema, and signs/symptoms of lymphedema. Educated patient on LUE precautions of no needlesticks and BP readings being performed on effected UE. Patient verbalizes good understanding. Patient does not reports any s/s of lymphedema in LUE or trunk. All patient questions answered and at this time patient has completed monitoring program. No further OT services indicated.  L-Dex® Analysis for Lymphedema    The patient had a 3 month f/u SOZO measurement which I

## 2024-04-30 ENCOUNTER — OFFICE VISIT (OUTPATIENT)
Dept: CARDIOLOGY CLINIC | Age: 49
End: 2024-04-30
Payer: COMMERCIAL

## 2024-04-30 VITALS
SYSTOLIC BLOOD PRESSURE: 108 MMHG | OXYGEN SATURATION: 97 % | BODY MASS INDEX: 21.03 KG/M2 | WEIGHT: 142 LBS | HEART RATE: 62 BPM | DIASTOLIC BLOOD PRESSURE: 80 MMHG | HEIGHT: 69 IN

## 2024-04-30 DIAGNOSIS — I49.3 PVC (PREMATURE VENTRICULAR CONTRACTION): Primary | Chronic | ICD-10-CM

## 2024-04-30 DIAGNOSIS — R07.89 OTHER CHEST PAIN: ICD-10-CM

## 2024-04-30 PROCEDURE — 99214 OFFICE O/P EST MOD 30 MIN: CPT | Performed by: INTERNAL MEDICINE

## 2024-04-30 PROCEDURE — 3079F DIAST BP 80-89 MM HG: CPT | Performed by: INTERNAL MEDICINE

## 2024-04-30 PROCEDURE — 3074F SYST BP LT 130 MM HG: CPT | Performed by: INTERNAL MEDICINE

## 2024-04-30 ASSESSMENT — ENCOUNTER SYMPTOMS
FACIAL SWELLING: 0
EYE PAIN: 0
EYE DISCHARGE: 0
SORE THROAT: 0
ABDOMINAL PAIN: 0
ABDOMINAL DISTENTION: 0
EYE REDNESS: 0
SHORTNESS OF BREATH: 0
VOMITING: 0
NAUSEA: 0
CHEST TIGHTNESS: 0
CONSTIPATION: 0
COUGH: 0
WHEEZING: 0
BLOOD IN STOOL: 0
APNEA: 0
DIARRHEA: 0

## 2024-04-30 NOTE — PROGRESS NOTES
Cardiology Office Visit Note  [address] [facility] [address] [place] [address]  Phone: [phone]  Fax: [phone]                            Date:  4/30/2024  Patient: Rayne Welch  Age:  48 y.o., 1975    Referral: No ref. provider found    REASON FOR VISIT:  6 Month Follow-Up (Recent 2D echo, Stress echo, She has had a recent IRhythm. I will call IRhythm and check status. )         PROBLEM LIST:    Patient Active Problem List    Diagnosis Date Noted    Triple negative breast cancer (HCC) 12/09/2022     Priority: Medium    Malignant neoplasm of overlapping sites of left breast (HCC) 11/22/2022     Priority: Medium    Abnormal mammogram 03/18/2024    Status post left breast lumpectomy with SNB-7/14/2023 07/19/2023    Encounter for central line care 06/01/2023    Chemotherapy induced neutropenia (HCC) 03/30/2023    Overactive bladder 07/16/2018    Restless leg syndrome 07/16/2018    Insomnia 07/16/2018    Breast pain 03/12/2018    Diffuse cystic mastopathy 03/12/2018    History of breast augmentation 03/12/2018    Gastroesophageal reflux disease without esophagitis 08/28/2017    PVC (premature ventricular contraction) 07/25/2017    Essential hypertension 06/21/2017    Anxiety 06/21/2017    Tobacco abuse 06/21/2017    Primary osteoarthritis of right hip 04/21/2017    S/P laparoscopic cholecystectomy 07/10/2014    Uterine mass 06/18/2014         PRESENTATION: Rayne Welch is a 48 y.o. year old female is seen today for an elective cardiology follow-up appointment.  Patient's past medical history notable for symptomatic PVCs, dyslipidemia and cigarette smoking.  She also has chronic hypothyroidism on supplemental therapy.  Breast cancer diagnosis was noted status post completion of chemotherapy and radiation therapy with excellent results.     At her office visit in October she had been complaining of persistent cough and shortness of breath.  Given history of previous chest radiation and chemotherapy

## 2024-05-07 ENCOUNTER — HOSPITAL ENCOUNTER (OUTPATIENT)
Dept: CT IMAGING | Age: 49
Discharge: HOME | End: 2024-05-07
Payer: COMMERCIAL

## 2024-05-07 DIAGNOSIS — F17.210 SMOKES LESS THAN 1 PACK A DAY WITH GREATER THAN 30 PACK YEAR HISTORY: ICD-10-CM

## 2024-05-07 DIAGNOSIS — Z72.0 TOBACCO ABUSE: ICD-10-CM

## 2024-05-07 PROCEDURE — 71271 CT THORAX LUNG CANCER SCR C-: CPT

## 2024-05-20 ENCOUNTER — TELEPHONE (OUTPATIENT)
Dept: HEMATOLOGY | Age: 49
End: 2024-05-20

## 2024-05-20 NOTE — TELEPHONE ENCOUNTER
I called and reminded of their appt on 05/23/24. Patient is aware to come at time of appt and not lab appt time. Patient confirmed appt date and time.

## 2024-05-21 DIAGNOSIS — E55.9 VITAMIN D DEFICIENCY: ICD-10-CM

## 2024-05-21 DIAGNOSIS — C50.919 TRIPLE NEGATIVE BREAST CANCER (HCC): Primary | ICD-10-CM

## 2024-05-21 DIAGNOSIS — R53.83 FATIGUE DUE TO TREATMENT: ICD-10-CM

## 2024-05-21 NOTE — PROGRESS NOTES
07/16/2018    Thyroid disease     Uterine mass 06/17/2014    9x9 cm on CT scan     Past Surgical History:    Past Surgical History:   Procedure Laterality Date    BREAST LUMPECTOMY Left 7/14/2023    LEFT LUMPECTOMY & SNB, PREOP & INTRAOP US GUIDED NEEDLE LOC, PEC BLOCK, BIOZORB, FLAPS & MARGINPROBE performed by Kj Cali MD at [facility] OR    BREAST RECONSTRUCTION  04/2019    BREAST SURGERY Bilateral 2010    silicone Memory Gel    CHOLECYSTECTOMY  06/24/2014    COLONOSCOPY N/A 03/15/2023    Dr Schaffer, mili ink tattooing placed in distal sigmoid colon, Bening TA (-)Dysplasia, int hem Gr 1, 3 year    HC INJECT OTHER PERPHRL NERV Right 08/03/2016    HIP FLUOROSCOPIC GUIDED CORTICOSTEROID INJECTION  performed by Roque Morrell MD at [facility] OR    HC INJECT OTHER PERPHRL NERV Right 04/21/2017    FLURO GUIDED HIP INJECTION performed by Murphy Mckenzie MD at [facility] OR    HIP SURGERY      HYSTERECTOMY, VAGINAL      PORT SURGERY Left 12/09/2022    PORT INSERTION WITH FLUOROSCOPY performed by Kj Cali MD at [facility] OR    PORT SURGERY N/A 4/16/2024    PORT REMOVAL performed by Kj Cali MD at [facility] OR    TONSILLECTOMY  1979    TUBAL LIGATION  1994    UPPER GASTROINTESTINAL ENDOSCOPY N/A 03/15/2023    DAGOBERTO Coppola 52 fr dil, (+)GERD, (-)Sprue    UPPER GASTROINTESTINAL ENDOSCOPY  03/15/2023    Carlos Alberto Coppola 52 fr bougie dilation    US BREAST BIOPSY W LOC DEVICE 1ST LESION LEFT Left 11/21/2022    US BREAST NEEDLE BIOPSY LEFT LPS GENERAL SURGERY    US BREAST BIOPSY W LOC DEVICE 1ST LESION RIGHT Right 2/28/2024    US BREAST BIOPSY W LOC DEVICE 1ST LESION RIGHT 2/28/2024 [facility]    US GUIDED NEEDLE LOC OF LEFT BREAST Left 7/14/2023    US GUIDED NEEDLE LOC OF LEFT BREAST 7/14/2023 Kj Cali MD [facility] ULTRASOUND     Social History:    Marital status:   Smoking status: Currently; 1/2 pack daily for 34 years  ETOH status: No  Resides: [place], [place]    Family History:   Family

## 2024-05-22 ENCOUNTER — TELEPHONE (OUTPATIENT)
Dept: SURGERY | Age: 49
End: 2024-05-22

## 2024-05-22 DIAGNOSIS — E55.9 VITAMIN D DEFICIENCY: ICD-10-CM

## 2024-05-22 RX ORDER — ERGOCALCIFEROL 1.25 MG/1
50000 CAPSULE ORAL WEEKLY
Qty: 12 CAPSULE | Refills: 1 | OUTPATIENT
Start: 2024-05-22

## 2024-05-22 RX ORDER — ERGOCALCIFEROL 1.25 MG/1
CAPSULE ORAL
Qty: 12 CAPSULE | Refills: 1 | Status: SHIPPED | OUTPATIENT
Start: 2024-05-22

## 2024-05-22 NOTE — TELEPHONE ENCOUNTER
Rayne Welch called to request a refill on her medication.      Last office visit : 2/22/2024   Next office visit : 8/22/2024     Requested Prescriptions     Pending Prescriptions Disp Refills    vitamin D (ERGOCALCIFEROL) 1.25 MG (30018 UT) CAPS capsule [Pharmacy Med Name: VITAMIN D2 50,000IU (ERGO) CAP RX] 12 capsule 1     Sig: TAKE 1 CAPSULE BY MOUTH 1 TIME A WEEK AT 5 PM            Анна Brown

## 2024-05-23 ENCOUNTER — OFFICE VISIT (OUTPATIENT)
Dept: HEMATOLOGY | Age: 49
End: 2024-05-23

## 2024-05-23 ENCOUNTER — TELEPHONE (OUTPATIENT)
Dept: HEMATOLOGY | Age: 49
End: 2024-05-23

## 2024-05-23 ENCOUNTER — HOSPITAL ENCOUNTER (OUTPATIENT)
Dept: INFUSION THERAPY | Age: 49
Discharge: HOME | End: 2024-05-23
Payer: COMMERCIAL

## 2024-05-23 ENCOUNTER — OFFICE VISIT (OUTPATIENT)
Dept: HEMATOLOGY | Age: 49
End: 2024-05-23
Payer: COMMERCIAL

## 2024-05-23 VITALS
SYSTOLIC BLOOD PRESSURE: 110 MMHG | DIASTOLIC BLOOD PRESSURE: 70 MMHG | HEART RATE: 62 BPM | WEIGHT: 140 LBS | BODY MASS INDEX: 20.73 KG/M2 | HEIGHT: 69 IN | OXYGEN SATURATION: 97 % | TEMPERATURE: 97.8 F

## 2024-05-23 DIAGNOSIS — E03.2 DRUG-INDUCED HYPOTHYROIDISM: ICD-10-CM

## 2024-05-23 DIAGNOSIS — C50.919 TRIPLE NEGATIVE BREAST CANCER (HCC): ICD-10-CM

## 2024-05-23 DIAGNOSIS — G47.62 NOCTURNAL LEG CRAMPS: Primary | ICD-10-CM

## 2024-05-23 DIAGNOSIS — R53.83 FATIGUE DUE TO TREATMENT: ICD-10-CM

## 2024-05-23 DIAGNOSIS — I73.9 PERIPHERAL VASCULAR DISEASE WITH PAIN AT REST (HCC): ICD-10-CM

## 2024-05-23 DIAGNOSIS — Z85.3 ENCOUNTER FOR FOLLOW-UP SURVEILLANCE OF BREAST CANCER: ICD-10-CM

## 2024-05-23 DIAGNOSIS — T45.1X5A CHEMOTHERAPY-INDUCED NEUROPATHY (HCC): ICD-10-CM

## 2024-05-23 DIAGNOSIS — Z08 ENCOUNTER FOR FOLLOW-UP SURVEILLANCE OF BREAST CANCER: ICD-10-CM

## 2024-05-23 DIAGNOSIS — G62.0 CHEMOTHERAPY-INDUCED NEUROPATHY (HCC): ICD-10-CM

## 2024-05-23 DIAGNOSIS — C80.1 CANCER (HCC): Primary | ICD-10-CM

## 2024-05-23 DIAGNOSIS — G47.62 NOCTURNAL LEG CRAMPS: ICD-10-CM

## 2024-05-23 DIAGNOSIS — Z98.890 STATUS POST LEFT BREAST LUMPECTOMY: Primary | ICD-10-CM

## 2024-05-23 LAB
ALBUMIN SERPL-MCNC: 4.4 G/DL (ref 3.5–5.2)
ALP SERPL-CCNC: 74 U/L (ref 35–104)
ALT SERPL-CCNC: 12 U/L (ref 9–52)
ANION GAP SERPL CALCULATED.3IONS-SCNC: 11 MMOL/L (ref 7–19)
AST SERPL-CCNC: 24 U/L (ref 14–36)
BASOPHILS # BLD: 0.02 K/UL (ref 0.01–0.08)
BASOPHILS NFR BLD: 0.4 % (ref 0.1–1.2)
BILIRUB SERPL-MCNC: 0.5 MG/DL (ref 0.2–1.3)
BUN SERPL-MCNC: 14 MG/DL (ref 7–17)
CALCIUM SERPL-MCNC: 9.7 MG/DL (ref 8.4–10.2)
CHLORIDE SERPL-SCNC: 100 MMOL/L (ref 98–111)
CO2 SERPL-SCNC: 26 MMOL/L (ref 22–29)
CORTIS AM PEAK SERPL-MCNC: 9 UG/DL (ref 6.2–19.4)
CREAT SERPL-MCNC: 0.9 MG/DL (ref 0.5–1)
EOSINOPHIL # BLD: 0.03 K/UL (ref 0.04–0.54)
EOSINOPHIL NFR BLD: 0.7 % (ref 0.7–7)
ERYTHROCYTE [DISTWIDTH] IN BLOOD BY AUTOMATED COUNT: 11.6 % (ref 11.7–14.4)
GLOBULIN: 2.6 G/DL
GLUCOSE SERPL-MCNC: 121 MG/DL (ref 74–106)
HCT VFR BLD AUTO: 35.2 % (ref 34.1–44.9)
HGB BLD-MCNC: 12 G/DL (ref 11.2–15.7)
LYMPHOCYTES # BLD: 1.99 K/UL (ref 1.18–3.74)
LYMPHOCYTES NFR BLD: 43.8 % (ref 19.3–53.1)
MAGNESIUM SERPL-MCNC: 1.8 MG/DL (ref 1.6–2.3)
MCH RBC QN AUTO: 34.8 PG (ref 25.6–32.2)
MCHC RBC AUTO-ENTMCNC: 34.1 G/DL (ref 32.3–35.5)
MCV RBC AUTO: 102 FL (ref 79.4–94.8)
MONOCYTES # BLD: 0.25 K/UL (ref 0.24–0.82)
MONOCYTES NFR BLD: 5.5 % (ref 4.7–12.5)
NEUTROPHILS # BLD: 2.25 K/UL (ref 1.56–6.13)
NEUTS SEG NFR BLD: 49.6 % (ref 34–71.1)
PHOSPHATE SERPL-MCNC: 4.3 MG/DL (ref 2.5–4.5)
PLATELET # BLD AUTO: 173 K/UL (ref 182–369)
PMV BLD AUTO: 9 FL (ref 7.4–10.4)
POTASSIUM SERPL-SCNC: 4 MMOL/L (ref 3.5–5.1)
PROT SERPL-MCNC: 7 G/DL (ref 6.3–8.2)
RBC # BLD AUTO: 3.45 M/UL (ref 3.93–5.22)
SODIUM SERPL-SCNC: 137 MMOL/L (ref 137–145)
T4 FREE SERPL-MCNC: 1.54 NG/DL (ref 0.93–1.7)
TSH SERPL DL<=0.005 MIU/L-ACNC: 0.43 UIU/ML (ref 0.27–4.2)
WBC # BLD AUTO: 4.54 K/UL (ref 3.98–10.04)

## 2024-05-23 PROCEDURE — 80053 COMPREHEN METABOLIC PANEL: CPT

## 2024-05-23 PROCEDURE — 99212 OFFICE O/P EST SF 10 MIN: CPT

## 2024-05-23 PROCEDURE — 84100 ASSAY OF PHOSPHORUS: CPT

## 2024-05-23 PROCEDURE — 85025 COMPLETE CBC W/AUTO DIFF WBC: CPT

## 2024-05-23 PROCEDURE — 3078F DIAST BP <80 MM HG: CPT | Performed by: INTERNAL MEDICINE

## 2024-05-23 PROCEDURE — 99214 OFFICE O/P EST MOD 30 MIN: CPT | Performed by: INTERNAL MEDICINE

## 2024-05-23 PROCEDURE — 83735 ASSAY OF MAGNESIUM: CPT

## 2024-05-23 PROCEDURE — 36415 COLL VENOUS BLD VENIPUNCTURE: CPT

## 2024-05-23 PROCEDURE — G2211 COMPLEX E/M VISIT ADD ON: HCPCS | Performed by: INTERNAL MEDICINE

## 2024-05-23 PROCEDURE — 3074F SYST BP LT 130 MM HG: CPT | Performed by: INTERNAL MEDICINE

## 2024-05-23 PROCEDURE — NBSRV NON-BILLABLE SERVICE: Performed by: INTERNAL MEDICINE

## 2024-05-23 RX ORDER — MONTELUKAST SODIUM 10 MG/1
10 TABLET ORAL NIGHTLY
Qty: 90 TABLET | Refills: 3 | Status: SHIPPED | OUTPATIENT
Start: 2024-05-23

## 2024-05-23 NOTE — TELEPHONE ENCOUNTER
Called and spoke with patient about her lab levels at this time. Patients labs all within normal limits. Patient is to continue taking synthroid 100 mcg daily. Patient verbalized understanding at this time. Electronically signed by Isela Quintanilla RN on 5/23/2024 at 3:27 PM

## 2024-05-23 NOTE — PROGRESS NOTES
[facility]  [address] [place] [address] [address], [place] [address]  Phone: [phone]  Fax: [phone]          HARPER Gutierrez 48 y.o. White (non-) None     Diagnosis, staging, date of diagnosis:   Invasive ductal carcinoma, left breast, Nov 2022  Grade 3  Stage IIIB, clinical T2 N1 M0-> [id number](sn)  ER 0.2%, DC 0%, HER-2 0/negative, Ki67 83%  MammaPrint: Basal type/high risk  Germline Willem 81 gene genetic panel: Negative  Drug-induced hypothyroidism-secondary to Keytruda, July 2023  Current Treatment: Patient completed treatment.   Current Outpatient Medications   Medication Sig Dispense Refill    vitamin D (ERGOCALCIFEROL) 1.25 MG (27009 UT) CAPS capsule TAKE 1 CAPSULE BY MOUTH 1 TIME A WEEK AT 5 PM 12 capsule 1    rOPINIRole (REQUIP) 1 MG tablet TAKE 1 TABLET BY MOUTH EVERY NIGHT 90 tablet 1    meloxicam (MOBIC) 15 MG tablet TAKE 1 TABLET BY MOUTH DAILY (Patient taking differently: Take 1 tablet by mouth nightly) 90 tablet 3    atenolol (TENORMIN) 25 MG tablet TAKE 1 TABLET BY MOUTH IN THE MORNING AND AT BEDTIME (Patient taking differently: Take 1 tablet by mouth at bedtime Takes 1 a day) 180 tablet 2    DULoxetine (CYMBALTA) 60 MG extended release capsule Take 1 capsule by mouth daily START after taking Duloxetine 30mg x1 week. (Patient taking differently: Take 1 capsule by mouth nightly) 30 capsule 5    levothyroxine (SYNTHROID) 100 MCG tablet TAKE 1 TABLET BY MOUTH DAILY (Patient taking differently: Take 1 tablet by mouth nightly) 30 tablet 5    valACYclovir (VALTREX) 1 g tablet TAKE 1 TABLET BY MOUTH EVERY DAY AS NEEDED FOR FEVER BLISTER 30 tablet 0    montelukast (SINGULAIR) 10 MG tablet Take 1 tablet by mouth nightly      ondansetron (ZOFRAN) 4 MG tablet Take 1 tablet by mouth every 6 hours as needed for Nausea or Vomiting 30 tablet 5    promethazine (PHENERGAN) 25 MG tablet Take 0.5 tablets by mouth every 6 hours as

## 2024-06-03 ENCOUNTER — CLINICAL DOCUMENTATION (OUTPATIENT)
Facility: HOSPITAL | Age: 49
End: 2024-06-03

## 2024-06-03 NOTE — PROGRESS NOTES
I spoke with pt today about getting her reenrolled into the [employer] Copay program for them to cover her out of pocket expense for her treatment she received on 1/23/24. She is coming into the office on 6/4/24 to sign copay forms to reenroll.

## 2024-06-14 RX ORDER — DOXYCYCLINE HYCLATE 100 MG
100 TABLET ORAL 2 TIMES DAILY
Qty: 20 TABLET | Refills: 0 | Status: SHIPPED | OUTPATIENT
Start: 2024-06-14 | End: 2024-06-24

## 2024-06-20 DIAGNOSIS — I49.3 PVC (PREMATURE VENTRICULAR CONTRACTION): Chronic | ICD-10-CM

## 2024-06-24 NOTE — TELEPHONE ENCOUNTER
Rayne Welch called to request a refill on her medication.      Last office visit : 2/22/2024   Next office visit : 8/22/2024     Requested Prescriptions     Pending Prescriptions Disp Refills    atenolol (TENORMIN) 25 MG tablet [Pharmacy Med Name: ATENOLOL 25MG TABLETS] 180 tablet 2     Sig: TAKE 1 TABLET BY MOUTH IN THE MORNING AND AT BEDTIME            Anita Bailon MA

## 2024-06-25 ENCOUNTER — OFFICE VISIT (OUTPATIENT)
Dept: VASCULAR SURGERY | Age: 49
End: 2024-06-25
Payer: COMMERCIAL

## 2024-06-25 VITALS
SYSTOLIC BLOOD PRESSURE: 104 MMHG | DIASTOLIC BLOOD PRESSURE: 50 MMHG | TEMPERATURE: 97.3 F | HEART RATE: 62 BPM | OXYGEN SATURATION: 97 %

## 2024-06-25 DIAGNOSIS — I73.9 CLAUDICATION (HCC): Primary | ICD-10-CM

## 2024-06-25 PROCEDURE — 99203 OFFICE O/P NEW LOW 30 MIN: CPT | Performed by: NURSE PRACTITIONER

## 2024-06-25 PROCEDURE — 3074F SYST BP LT 130 MM HG: CPT | Performed by: NURSE PRACTITIONER

## 2024-06-25 PROCEDURE — 3078F DIAST BP <80 MM HG: CPT | Performed by: NURSE PRACTITIONER

## 2024-06-25 NOTE — PROGRESS NOTES
37.5 years (18.7 ttl pk-yrs)     Types: Cigarettes     Start date: 1987    Smokeless tobacco: Never   Substance Use Topics    Alcohol use: Not Currently     Comment: occ         ROS  Eyes - no sudden vision change or amaurosis.  Respiratory - no significant shortness of breath,  Cardiovascular - no chest pain or syncope.  No  significant leg swelling.  Has atypical claudication.  Musculoskeletal - no gait disturbance  Skin -no new wound.  Neurologic -  No speech difficulty or lateralizing weakness.  All other review of systems are negative.    Physical Exam    BP (!) 104/50 (Site: Right Upper Arm, Position: Sitting, Cuff Size: Medium Adult)   Pulse 62   Temp 97.3 °F (36.3 °C)   LMP 06/08/2014   SpO2 97%       Neck- carotid pulses 2+ to palpation with no bruit  Cardiovascular - Regular rate and rhythm.    Pulmonary - effort appears normal.  No respiratory distress.    Lungs - Breath sounds normal. No wheezes or rales.    Extremities -  Radial and brachial pulses are 2+ to palpation bilaterally.  Right femoral pulse: present 2+; Right popliteal pulse: absent Right DP: absent; Right PT absent; Left femoral pulse: present 2+; Left popliteal pulse: absent; Left DP: absent; Left PT: absent No cyanosis, clubbing, or significant edema.  No signs atheroembolic event.  Neurologic - alert and oriented X 3.  Physiologic.   Face symmetric.  Skin - warm, dry, and intact.  no  wound  Psychiatric - mood, affect, and behavior appear normal.  Judgment and thought processes appear normal.    Risk factors for atherosclerosis of all vascular beds have been reviewed with the patient including:  Family history, tobacco abuse in all forms, elevated cholesterol, hyperlipidemia, and diabetes.        Reviewed on this visit: speciality care notes from hem/onc        ASSESSMENT/PLAN:  1. Claudication (HCC)  -     Vascular lower extremity arterial segmental pressures w PPG; Future        Strongly encourage start/continue statin therapy -

## 2024-06-26 DIAGNOSIS — G89.18 JOINT PAIN FOLLOWING CHEMOTHERAPY: ICD-10-CM

## 2024-06-26 DIAGNOSIS — M25.50 JOINT PAIN FOLLOWING CHEMOTHERAPY: ICD-10-CM

## 2024-06-26 RX ORDER — ATENOLOL 25 MG/1
25 TABLET ORAL 2 TIMES DAILY
Qty: 180 TABLET | Refills: 2 | Status: SHIPPED | OUTPATIENT
Start: 2024-06-26

## 2024-06-26 RX ORDER — DULOXETIN HYDROCHLORIDE 60 MG/1
60 CAPSULE, DELAYED RELEASE ORAL NIGHTLY
Qty: 30 CAPSULE | Refills: 0 | Status: SHIPPED | OUTPATIENT
Start: 2024-06-26

## 2024-07-31 DIAGNOSIS — M25.50 JOINT PAIN FOLLOWING CHEMOTHERAPY: ICD-10-CM

## 2024-07-31 DIAGNOSIS — G89.18 JOINT PAIN FOLLOWING CHEMOTHERAPY: ICD-10-CM

## 2024-07-31 RX ORDER — DULOXETIN HYDROCHLORIDE 60 MG/1
60 CAPSULE, DELAYED RELEASE ORAL NIGHTLY
Qty: 30 CAPSULE | Refills: 0 | OUTPATIENT
Start: 2024-07-31

## 2024-07-31 RX ORDER — DULOXETIN HYDROCHLORIDE 60 MG/1
60 CAPSULE, DELAYED RELEASE ORAL NIGHTLY
Qty: 30 CAPSULE | Refills: 0 | Status: SHIPPED | OUTPATIENT
Start: 2024-07-31

## 2024-08-03 DIAGNOSIS — E03.2 DRUG-INDUCED HYPOTHYROIDISM: ICD-10-CM

## 2024-08-05 RX ORDER — LEVOTHYROXINE SODIUM 0.1 MG/1
100 TABLET ORAL DAILY
Qty: 30 TABLET | Refills: 5 | Status: SHIPPED | OUTPATIENT
Start: 2024-08-05

## 2024-08-14 ENCOUNTER — HOSPITAL ENCOUNTER (OUTPATIENT)
Dept: WOMENS IMAGING | Age: 49
Discharge: HOME | End: 2024-08-14
Payer: COMMERCIAL

## 2024-08-14 VITALS — WEIGHT: 137 LBS | BODY MASS INDEX: 20.23 KG/M2

## 2024-08-14 DIAGNOSIS — Z98.890 STATUS POST LEFT BREAST LUMPECTOMY: ICD-10-CM

## 2024-08-14 DIAGNOSIS — R92.8 ABNORMAL MAMMOGRAM: ICD-10-CM

## 2024-08-14 PROCEDURE — 77066 DX MAMMO INCL CAD BI: CPT

## 2024-08-19 NOTE — PATIENT INSTRUCTIONS
Prednisone taper as directed. Begin meloxicam 7.5 mg daily. Bone density test- will call with appointment. Labs in [address]. Follow-up as scheduled. Pt here for for stitches to face from laceration. Pt family reports inability to see plastic surgery and was sent here for it

## 2024-08-19 NOTE — PROGRESS NOTES
Rayne Welch comes today for her follow-up breast exam.  She has had no new breast complaints.  She reports no new palpable masses.  There is no skin or nipple changes.  There is no nipple discharge.  She has no appreciable evidence of supraclavicular or axillary adenopathy.      Of note, she is s/p left breast lumpectomy and sentinel node biopsy as performed Dr. Cali in July 2023.     Patient Active Problem List    Diagnosis Date Noted    Triple negative breast cancer (HCC) 12/09/2022    Malignant neoplasm of overlapping sites of left breast (HCC) 11/22/2022    Abnormal mammogram 03/18/2024    Status post left breast lumpectomy with SNB-7/14/2023 07/19/2023    Encounter for central line care 06/01/2023    Chemotherapy induced neutropenia (HCC) 03/30/2023    Overactive bladder 07/16/2018    Restless leg syndrome 07/16/2018    Insomnia 07/16/2018    Breast pain 03/12/2018    Diffuse cystic mastopathy 03/12/2018    History of breast augmentation 03/12/2018    Gastroesophageal reflux disease without esophagitis 08/28/2017    PVC (premature ventricular contraction) 07/25/2017    Essential hypertension 06/21/2017    Anxiety 06/21/2017    Tobacco abuse 06/21/2017    Primary osteoarthritis of right hip 04/21/2017    S/P laparoscopic cholecystectomy 07/10/2014    Uterine mass 06/18/2014       Current Outpatient Medications   Medication Sig Dispense Refill    doxycycline hyclate (VIBRA-TABS) 100 MG tablet Take 1 tablet by mouth 2 times daily for 10 days 20 tablet 0    methylPREDNISolone (MEDROL DOSEPACK) 4 MG tablet Take by mouth. 1 kit 0    Vilazodone HCl (VIIBRYD STARTER PACK) 10 & 20 MG KIT As directed 1 kit 0    levothyroxine (SYNTHROID) 100 MCG tablet TAKE 1 TABLET BY MOUTH DAILY 30 tablet 5    DULoxetine (CYMBALTA) 60 MG extended release capsule TAKE 1 CAPSULE BY MOUTH EVERY NIGHT 30 capsule 0    atenolol (TENORMIN) 25 MG tablet TAKE 1 TABLET BY MOUTH IN THE MORNING AND AT BEDTIME 180 tablet 2    montelukast  in distal sigmoid colon, Bening TA (-)Dysplasia, int hem Gr 1, 3 year    HC INJECT OTHER PERPHRL NERV Right 08/03/2016    HIP FLUOROSCOPIC GUIDED CORTICOSTEROID INJECTION  performed by Roque Morrell MD at [facility] OR    HC INJECT OTHER PERPHRL NERV Right 04/21/2017    FLURO GUIDED HIP INJECTION performed by Murphy Mckenzie MD at [facility] OR    HIP SURGERY      HYSTERECTOMY, VAGINAL      PORT SURGERY Left 12/09/2022    PORT INSERTION WITH FLUOROSCOPY performed by Kj Cali MD at [facility] OR    PORT SURGERY N/A 4/16/2024    PORT REMOVAL performed by Kj Cali MD at [facility] OR    TONSILLECTOMY  1979    TUBAL LIGATION  1994    UPPER GASTROINTESTINAL ENDOSCOPY N/A 03/15/2023    DAGOBERTO Coppola 52 fr dil, (+)GERD, (-)Sprue    UPPER GASTROINTESTINAL ENDOSCOPY  03/15/2023    Carlos Alberto Coppola 52 fr bougie dilation    US BREAST BIOPSY W LOC DEVICE 1ST LESION LEFT Left 11/21/2022    US BREAST NEEDLE BIOPSY LEFT LPS GENERAL SURGERY    US BREAST BIOPSY W LOC DEVICE 1ST LESION RIGHT Right 2/28/2024    US BREAST BIOPSY W LOC DEVICE 1ST LESION RIGHT 2/28/2024 [facility]    US GUIDED NEEDLE LOC OF LEFT BREAST Left 7/14/2023    US GUIDED NEEDLE LOC OF LEFT BREAST 7/14/2023 Kj Cali MD [facility] ULTRASOUND       Family History   Problem Relation Age of Onset    Cancer Mother         Melanoma    Hypertension Mother     Cancer Brother         synovial cell sarcoma    Cancer Maternal Grandfather         lung    Other Daughter         gallbladder disease    Colon Cancer Neg Hx     Colon Polyps Neg Hx        Social History     Tobacco Use    Smoking status: Every Day     Current packs/day: 0.50     Average packs/day: 0.5 packs/day for 37.7 years (18.8 ttl pk-yrs)     Types: Cigarettes     Start date: 1987    Smokeless tobacco: Never   Substance Use Topics    Alcohol use: Not Currently     Comment: occ          ROS  review of system reviewed and positive for the above all other systems noted to be

## 2024-08-21 ENCOUNTER — OFFICE VISIT (OUTPATIENT)
Dept: SURGERY | Age: 49
End: 2024-08-21
Payer: COMMERCIAL

## 2024-08-21 VITALS — OXYGEN SATURATION: 100 % | HEIGHT: 69 IN | HEART RATE: 74 BPM | BODY MASS INDEX: 20.29 KG/M2 | WEIGHT: 137 LBS

## 2024-08-21 DIAGNOSIS — Z85.3 PERSONAL HISTORY OF BREAST CANCER: Primary | ICD-10-CM

## 2024-08-21 PROCEDURE — 99213 OFFICE O/P EST LOW 20 MIN: CPT | Performed by: PHYSICIAN ASSISTANT

## 2024-08-22 ENCOUNTER — OFFICE VISIT (OUTPATIENT)
Dept: PRIMARY CARE CLINIC | Age: 49
End: 2024-08-22
Payer: COMMERCIAL

## 2024-08-22 VITALS
BODY MASS INDEX: 20.23 KG/M2 | SYSTOLIC BLOOD PRESSURE: 110 MMHG | HEART RATE: 74 BPM | TEMPERATURE: 97.7 F | DIASTOLIC BLOOD PRESSURE: 58 MMHG | WEIGHT: 136.6 LBS | HEIGHT: 69 IN | OXYGEN SATURATION: 99 %

## 2024-08-22 DIAGNOSIS — J06.9 ACUTE UPPER RESPIRATORY INFECTION: ICD-10-CM

## 2024-08-22 DIAGNOSIS — G25.81 RESTLESS LEG SYNDROME: ICD-10-CM

## 2024-08-22 DIAGNOSIS — F41.9 ANXIETY: ICD-10-CM

## 2024-08-22 DIAGNOSIS — F33.1 MODERATE EPISODE OF RECURRENT MAJOR DEPRESSIVE DISORDER (HCC): Primary | ICD-10-CM

## 2024-08-22 DIAGNOSIS — Z00.00 ENCOUNTER FOR ANNUAL PHYSICAL EXAM: ICD-10-CM

## 2024-08-22 PROCEDURE — 99213 OFFICE O/P EST LOW 20 MIN: CPT | Performed by: NURSE PRACTITIONER

## 2024-08-22 PROCEDURE — 3074F SYST BP LT 130 MM HG: CPT | Performed by: NURSE PRACTITIONER

## 2024-08-22 PROCEDURE — 99396 PREV VISIT EST AGE 40-64: CPT | Performed by: NURSE PRACTITIONER

## 2024-08-22 PROCEDURE — 3078F DIAST BP <80 MM HG: CPT | Performed by: NURSE PRACTITIONER

## 2024-08-22 RX ORDER — DOXYCYCLINE HYCLATE 100 MG
100 TABLET ORAL 2 TIMES DAILY
Qty: 20 TABLET | Refills: 0 | Status: SHIPPED | OUTPATIENT
Start: 2024-08-22 | End: 2024-09-01

## 2024-08-22 RX ORDER — VILAZODONE HYDROCHLORIDE 10 MG-20MG
KIT ORAL
Qty: 1 KIT | Refills: 0 | Status: SHIPPED | OUTPATIENT
Start: 2024-08-22

## 2024-08-22 RX ORDER — VILAZODONE HYDROCHLORIDE 20 MG/1
20 TABLET ORAL DAILY
Qty: 21 TABLET | Refills: 0 | Status: CANCELLED | OUTPATIENT
Start: 2024-08-22

## 2024-08-22 RX ORDER — METHYLPREDNISOLONE 4 MG/1
TABLET ORAL
Qty: 1 KIT | Refills: 0 | Status: SHIPPED | OUTPATIENT
Start: 2024-08-22

## 2024-08-22 ASSESSMENT — ENCOUNTER SYMPTOMS
VOICE CHANGE: 0
COUGH: 0
BACK PAIN: 0
COLOR CHANGE: 0
NAUSEA: 0
PHOTOPHOBIA: 0
VOMITING: 0
RHINORRHEA: 0
SHORTNESS OF BREATH: 0

## 2024-08-22 NOTE — PATIENT INSTRUCTIONS
Doxycycline as directed.   Medrol dose pack as directed.   Continue mucinex.   Follow-up in 1 month.   Begin Viibryd- 10 mg x 1 week then increase to 20 mg daily.

## 2024-08-22 NOTE — PROGRESS NOTES
[facility]  [address]  [address]  [place] [address]  Dept: [phone]  Dept Fax: [phone]  Loc: [phone]    Rayne Welch is a 49 y.o. female who presents today for her medical conditions/complaints as noted below.  Rayne Welch is c/o of 6 Month Follow-Up (Pt in office for 6 month follow up - complains of cough and congestion from being sick about a month ago) and Discuss Medications (Wanting to discuss starting back on something to help with nerves)      HPI:     HPI   Chief Complaint   Patient presents with    6 Month Follow-Up     Pt in office for 6 month follow up - complains of cough and congestion from being sick about a month ago    Discuss Medications     Wanting to discuss starting back on something to help with nerves     Patient presents today for annual physical.     She complains cough and congestion for the last 2 weeks. It has improved some but cough has been persistent. She has productive cough. She has taken mucinex, pseudofed and OTC cold medication. Mucous has been yellow.     She has history of breast cancer; she had mammogram and follow-up with surgery yesterday. Scans clear. She will have repeat scans in 6 months.     She complains of worsening depression/anxiety. She has done well since completing chemo but states her nerves are struggling. She is having marital difficulty.       Past Medical History:   Diagnosis Date    Adverse effect of chemotherapy     Arthritis     Breast cancer (HCC) 11/2022    Triple negative breast cancer    Hypotension     Malignant neoplasm of overlapping sites of left breast (HCC) 11/22/2022    PVC (premature ventricular contraction)     Restless leg syndrome 07/16/2018    Thyroid disease     Uterine mass 06/17/2014    9x9 cm on CT scan      Past Surgical History:   Procedure Laterality Date    BREAST LUMPECTOMY Left 7/14/2023    LEFT LUMPECTOMY & SNB, PREOP & INTRAOP US GUIDED NEEDLE LOC, PEC BLOCK,

## 2024-08-28 ENCOUNTER — CLINICAL DOCUMENTATION (OUTPATIENT)
Facility: HOSPITAL | Age: 49
End: 2024-08-28

## 2024-08-28 NOTE — PROGRESS NOTES
I called the pt today to update her on her copay claim for her 1/23/24 infusion. With her approval into the copay program being later in the year [employer] won't backdate to cover the DOS so they won't pay on the infusion. I told the patient we can still apply for [employer] assistance if that is something she wanted to go with. She said she would like to try. I gave instructions on what proof of income I would need and have her sign the application to submit to [employer]. I will meet with pt on 9/25 on her follow up with Dr. Henley to receive POI and submit application to [employer].

## 2024-09-04 DIAGNOSIS — G89.18 JOINT PAIN FOLLOWING CHEMOTHERAPY: ICD-10-CM

## 2024-09-04 DIAGNOSIS — M25.50 JOINT PAIN FOLLOWING CHEMOTHERAPY: ICD-10-CM

## 2024-09-04 RX ORDER — DULOXETIN HYDROCHLORIDE 60 MG/1
60 CAPSULE, DELAYED RELEASE ORAL NIGHTLY
Qty: 30 CAPSULE | Refills: 0 | Status: SHIPPED | OUTPATIENT
Start: 2024-09-04

## 2024-09-20 ENCOUNTER — TELEPHONE (OUTPATIENT)
Dept: HEMATOLOGY | Age: 49
End: 2024-09-20

## 2024-09-24 ENCOUNTER — HOSPITAL ENCOUNTER (OUTPATIENT)
Dept: GENERAL RADIOLOGY | Age: 49
Discharge: HOME | End: 2024-09-24
Payer: COMMERCIAL

## 2024-09-24 ENCOUNTER — OFFICE VISIT (OUTPATIENT)
Dept: PRIMARY CARE CLINIC | Age: 49
End: 2024-09-24
Payer: COMMERCIAL

## 2024-09-24 VITALS
WEIGHT: 137.6 LBS | BODY MASS INDEX: 20.38 KG/M2 | OXYGEN SATURATION: 100 % | SYSTOLIC BLOOD PRESSURE: 104 MMHG | TEMPERATURE: 97.2 F | HEART RATE: 68 BPM | DIASTOLIC BLOOD PRESSURE: 68 MMHG | HEIGHT: 69 IN

## 2024-09-24 DIAGNOSIS — M25.521 RIGHT ELBOW PAIN: ICD-10-CM

## 2024-09-24 DIAGNOSIS — F41.9 ANXIETY: ICD-10-CM

## 2024-09-24 DIAGNOSIS — F33.1 MODERATE EPISODE OF RECURRENT MAJOR DEPRESSIVE DISORDER (HCC): Primary | ICD-10-CM

## 2024-09-24 PROCEDURE — 73080 X-RAY EXAM OF ELBOW: CPT

## 2024-09-24 PROCEDURE — 3078F DIAST BP <80 MM HG: CPT | Performed by: NURSE PRACTITIONER

## 2024-09-24 PROCEDURE — 3074F SYST BP LT 130 MM HG: CPT | Performed by: NURSE PRACTITIONER

## 2024-09-24 PROCEDURE — 99214 OFFICE O/P EST MOD 30 MIN: CPT | Performed by: NURSE PRACTITIONER

## 2024-09-24 RX ORDER — VILAZODONE HYDROCHLORIDE 20 MG/1
20 TABLET ORAL DAILY
Qty: 30 TABLET | Refills: 5 | Status: SHIPPED | OUTPATIENT
Start: 2024-09-24 | End: 2024-09-26

## 2024-09-24 ASSESSMENT — ENCOUNTER SYMPTOMS
VOICE CHANGE: 0
RHINORRHEA: 0
NAUSEA: 0
PHOTOPHOBIA: 0
VOMITING: 0
COLOR CHANGE: 0
COUGH: 0
SHORTNESS OF BREATH: 0
BACK PAIN: 0

## 2024-09-25 ENCOUNTER — HOSPITAL ENCOUNTER (OUTPATIENT)
Dept: INFUSION THERAPY | Age: 49
Discharge: HOME | End: 2024-09-25
Payer: COMMERCIAL

## 2024-09-25 ENCOUNTER — OFFICE VISIT (OUTPATIENT)
Dept: HEMATOLOGY | Age: 49
End: 2024-09-25
Payer: COMMERCIAL

## 2024-09-25 VITALS
HEIGHT: 69 IN | SYSTOLIC BLOOD PRESSURE: 122 MMHG | BODY MASS INDEX: 20.6 KG/M2 | DIASTOLIC BLOOD PRESSURE: 82 MMHG | OXYGEN SATURATION: 97 % | WEIGHT: 139.1 LBS | TEMPERATURE: 97.8 F | HEART RATE: 70 BPM

## 2024-09-25 DIAGNOSIS — Z08 ENCOUNTER FOR FOLLOW-UP SURVEILLANCE OF BREAST CANCER: Primary | ICD-10-CM

## 2024-09-25 DIAGNOSIS — Z00.00 HEALTHCARE MAINTENANCE: ICD-10-CM

## 2024-09-25 DIAGNOSIS — Z85.3 ENCOUNTER FOR FOLLOW-UP SURVEILLANCE OF BREAST CANCER: Primary | ICD-10-CM

## 2024-09-25 DIAGNOSIS — E03.2 DRUG-INDUCED HYPOTHYROIDISM: ICD-10-CM

## 2024-09-25 DIAGNOSIS — G89.29 CHRONIC HIP PAIN, BILATERAL: ICD-10-CM

## 2024-09-25 DIAGNOSIS — M25.551 CHRONIC HIP PAIN, BILATERAL: ICD-10-CM

## 2024-09-25 DIAGNOSIS — N95.1 HOT FLASHES, MENOPAUSAL: ICD-10-CM

## 2024-09-25 DIAGNOSIS — R53.1 WEAKNESS: ICD-10-CM

## 2024-09-25 DIAGNOSIS — M25.521 ARTHRALGIA OF BOTH ELBOWS: ICD-10-CM

## 2024-09-25 DIAGNOSIS — Z71.89 CARE PLAN DISCUSSED WITH PATIENT: ICD-10-CM

## 2024-09-25 DIAGNOSIS — M25.552 CHRONIC HIP PAIN, BILATERAL: ICD-10-CM

## 2024-09-25 DIAGNOSIS — M25.50 POLYARTHRALGIA: ICD-10-CM

## 2024-09-25 DIAGNOSIS — M25.522 ARTHRALGIA OF BOTH ELBOWS: ICD-10-CM

## 2024-09-25 DIAGNOSIS — R53.83 OTHER FATIGUE: ICD-10-CM

## 2024-09-25 PROCEDURE — 99214 OFFICE O/P EST MOD 30 MIN: CPT | Performed by: INTERNAL MEDICINE

## 2024-09-25 PROCEDURE — 99212 OFFICE O/P EST SF 10 MIN: CPT

## 2024-09-25 PROCEDURE — 3079F DIAST BP 80-89 MM HG: CPT | Performed by: INTERNAL MEDICINE

## 2024-09-25 PROCEDURE — 3074F SYST BP LT 130 MM HG: CPT | Performed by: INTERNAL MEDICINE

## 2024-09-25 PROCEDURE — G2211 COMPLEX E/M VISIT ADD ON: HCPCS | Performed by: INTERNAL MEDICINE

## 2024-09-25 RX ORDER — DULOXETIN HYDROCHLORIDE 30 MG/1
30 CAPSULE, DELAYED RELEASE ORAL DAILY
Qty: 30 CAPSULE | Refills: 3 | Status: SHIPPED | OUTPATIENT
Start: 2024-09-25

## 2024-09-26 RX ORDER — VILAZODONE HYDROCHLORIDE 20 MG/1
20 TABLET ORAL DAILY
Qty: 90 TABLET | Refills: 1 | Status: SHIPPED | OUTPATIENT
Start: 2024-09-26

## 2024-10-01 ENCOUNTER — PATIENT MESSAGE (OUTPATIENT)
Dept: PRIMARY CARE CLINIC | Age: 49
End: 2024-10-01

## 2024-10-02 ENCOUNTER — CLINICAL DOCUMENTATION (OUTPATIENT)
Facility: HOSPITAL | Age: 49
End: 2024-10-02

## 2024-10-02 NOTE — PROGRESS NOTES
I called the pt today to see about update on HFA if she still wanted to pursue assistance option. She stated that she is busy right planning a wedding for her son, but once she was done with that she would be free. I told her I could follow up on 10/14 and she said that would be great. I will give her a call on 10/14 to follow up on HFA.

## 2024-10-04 DIAGNOSIS — G89.18 JOINT PAIN FOLLOWING CHEMOTHERAPY: ICD-10-CM

## 2024-10-04 DIAGNOSIS — M25.50 JOINT PAIN FOLLOWING CHEMOTHERAPY: ICD-10-CM

## 2024-10-04 RX ORDER — DULOXETIN HYDROCHLORIDE 60 MG/1
60 CAPSULE, DELAYED RELEASE ORAL NIGHTLY
Qty: 30 CAPSULE | Refills: 0 | OUTPATIENT
Start: 2024-10-04

## 2024-10-18 DIAGNOSIS — G89.18 JOINT PAIN FOLLOWING CHEMOTHERAPY: ICD-10-CM

## 2024-10-18 DIAGNOSIS — M25.50 JOINT PAIN FOLLOWING CHEMOTHERAPY: ICD-10-CM

## 2024-10-18 RX ORDER — ROPINIROLE 1 MG/1
1 TABLET, FILM COATED ORAL NIGHTLY
Qty: 90 TABLET | Refills: 1 | OUTPATIENT
Start: 2024-10-18

## 2024-10-18 RX ORDER — DULOXETIN HYDROCHLORIDE 60 MG/1
60 CAPSULE, DELAYED RELEASE ORAL NIGHTLY
Qty: 30 CAPSULE | Refills: 0 | Status: SHIPPED | OUTPATIENT
Start: 2024-10-18

## 2024-10-18 RX ORDER — DULOXETIN HYDROCHLORIDE 60 MG/1
60 CAPSULE, DELAYED RELEASE ORAL NIGHTLY
Qty: 30 CAPSULE | Refills: 0 | Status: CANCELLED | OUTPATIENT
Start: 2024-10-18

## 2024-10-18 NOTE — TELEPHONE ENCOUNTER
Rayne Welch called to request a refill on her medication.      Last office visit : 9/24/2024   Next office visit : 3/24/2025     Requested Prescriptions     Pending Prescriptions Disp Refills    rOPINIRole (REQUIP) 1 MG tablet 90 tablet 1     Sig: Take 1 tablet by mouth nightly            Mynor García MA

## 2024-10-21 RX ORDER — ROPINIROLE 1 MG/1
1 TABLET, FILM COATED ORAL NIGHTLY
Qty: 90 TABLET | Refills: 1 | Status: SHIPPED | OUTPATIENT
Start: 2024-10-21

## 2024-10-21 RX ORDER — ROPINIROLE 1 MG/1
1 TABLET, FILM COATED ORAL NIGHTLY
Qty: 90 TABLET | Refills: 1 | OUTPATIENT
Start: 2024-10-21

## 2024-11-21 DIAGNOSIS — M25.50 JOINT PAIN FOLLOWING CHEMOTHERAPY: ICD-10-CM

## 2024-11-21 DIAGNOSIS — G89.18 JOINT PAIN FOLLOWING CHEMOTHERAPY: ICD-10-CM

## 2024-11-22 RX ORDER — ROPINIROLE 1 MG/1
1 TABLET, FILM COATED ORAL NIGHTLY
Qty: 90 TABLET | Refills: 1 | Status: SHIPPED | OUTPATIENT
Start: 2024-11-22

## 2024-11-22 RX ORDER — DULOXETIN HYDROCHLORIDE 60 MG/1
60 CAPSULE, DELAYED RELEASE ORAL NIGHTLY
Qty: 30 CAPSULE | Refills: 0 | Status: SHIPPED | OUTPATIENT
Start: 2024-11-22

## 2025-01-02 RX ORDER — MELOXICAM 15 MG/1
15 TABLET ORAL DAILY
Qty: 90 TABLET | Refills: 3 | Status: SHIPPED | OUTPATIENT
Start: 2025-01-02

## 2025-01-29 NOTE — PROGRESS NOTES
HISTORY OF PRESENT ILLNESS:     Ms. Welch presents today for exam following left partial mastectomy with left sentinel lymph node biopsy on 7/14/2023.  This is following neoadjuvant chemotherapy with AC, CarboTaxol and Keytruda.  She had a pathologic complete response    She is recently status post ultrasound guided breast biopsy  on the left which revealed a 1.1 cm high grade invasive ductal carcinoma. ER negative. CT negative. Her2 negative. Ki67 is 83%.     MammaPrint demonstrates a high risk basal-like phenotype    She has completed 4 cycles of dose dense AC and part of her CarboTaxol.  Unfortunately her counts have not enabled her to finish her complete course.  I discussed this with Dr. Henley and we will proceed with surgery.  She may get additional cycles of carbo and Taxol after surgery if indicated.  Because of her young age and high risk tumor, she is going to complete her additional carbo and Taxol.    She had a pathologic complete response    Bilateral Mammogram-8/14/2024  FINDINGS:  The breast tissue is heterogeneously dense which can obscure small masses..     There are expected post-surgical changes in the left breast, including architectural distortion and increased parenchymal density. There is a stable asymmetry in the central posterior right breast, which was previously biopsied with benign pathology.    Bilateral silicone breast implants are present.  450 cc     IMPRESSION:  1.  No mammographic evidence of malignancy.     BI-RADS 2:  Benign.     RECOMMENDATION: Diagnostic mammogram Bilateral 12 months  ______________________________________   Electronically signed by: JOSE GUADALUPE VALDEZ M.D.    MRI-6/7/2023  FINDINGS:  Contrast is seen in the heart and great vessels on postcontrast  sequences consistent with an adequate bolus. There is moderate  background enhancement of the patient's heterogeneously dense  fibroglandular tissues. There is redemonstration of unchanged  bilateral retropectoral

## 2025-02-02 DIAGNOSIS — G89.18 JOINT PAIN FOLLOWING CHEMOTHERAPY: ICD-10-CM

## 2025-02-02 DIAGNOSIS — M25.50 JOINT PAIN FOLLOWING CHEMOTHERAPY: ICD-10-CM

## 2025-02-03 RX ORDER — DULOXETIN HYDROCHLORIDE 60 MG/1
60 CAPSULE, DELAYED RELEASE ORAL NIGHTLY
Qty: 30 CAPSULE | Refills: 0 | Status: SHIPPED | OUTPATIENT
Start: 2025-02-03

## 2025-02-05 NOTE — PROGRESS NOTES
[facility]  Radiation Oncology Clinic   Adolfo Momin MD, FACR  Israel FRAGA  _______________________________________________  [facility]  Department of Radiation Oncology  [address] [place] [id number]  Office: [phone]  Fax: [phone]    DATE: 02/07/2025  PATIENT: Polina Francis  1975                         MEDICAL RECORD #: [id number]    1. History of left breast cancer    2. S/P lumpectomy, left breast    3. S/P lymph node biopsy    4. History of radiation therapy    5. Current every day smoker                                           REASON FOR VISIT:    Chief Complaint   Patient presents with    Breast Cancer     Polina Francis is a very pleasant 49 y.o. patient that has completed radiation therapy for carcinoma of the breast and returns to the clinic today for routine follow up exam. Reports fatigue and tenderness in the left breast. Denies appetite change, unexpected weight change, nausea/vomiting, diarrhea, light-headedness, weakness, and headaches. She continues to follow  and Crow Faith PA-C.    HISTORY OF PRESENT ILLNESS  Diagnosed with left breast triple negative infiltrating ductal carcinoma, grade 3, stage IIA (cT2 pN0 M0). She completed 4005 cGy in 15 fractions to the left breast on 10/24/2023.     11/02/2022 - Bilateral Diagnostic Mammogram due to palpable left breast mass:  Left breast, BI-RADS 4B, intermediate concern for malignancy involving the palpable 1.7 cm complex cystic mass in the upper outer quadrant.   Recommendation is for ultrasound-guided percutaneous core needle biopsy for diagnosis in order to exclude malignancy.   1.7 cm axillary lymph node with borderline thickened cortical elements.   Recommendation is for percutaneous needle biopsy versus fine-needle aspiration (depending on performing physician preference) in order to exclude malignancy.     11/21/2022 - Left breast and left  lymph node biopsy:  Breast, left breast needle core biopsies at 3 o'clock position:   Invasive ductal carcinoma, no special type, grade 3.   Invasive carcinoma measures 1.1 cm in greatest linear dimension and is present in multiple cores.   Lymph node, left axillary lymph node fine-needle aspiration, smears and ThinPrep:   Small round lymphocytes present, negative for cytologic evidence of malignancy.     12/02/2022 - MRI Bilateral Breasts:  Left breast, BI-RADS 6, biopsy-proven malignant mass in the left breast upper outer quadrant. Mass measures larger on MRI, possibly due to some surrounding involved nonmass enhancement surrounding the mass. The mass is noted to be immediately contiguous, with no intervening fat plane between the posterior margin of the mass and the implant/implant capsule.   Recommend appropriate clinical, medical oncology and surgical management.   Overall assessment BI-RADS 6, biopsy-proven malignancy.     12/15/2022 - CT Abdomen/Pelvis with contrast:  No evidence of metastatic disease within the abdomen or pelvis.     12/15/2022 - CT Chest with contrast:  No evidence for metastatic disease within the chest.   Lobulated 1.2 x 2.4 cm left anterior breast lesion. Correlate with prior mammograms and breast history.   Mild emphysema.   Asymmetric mildly prominent left axillary lymph node measuring 7 mm.     12/15/2022 - Bone Scan:  There is no evidence of osseous metastatic disease.     12/20/2022 - Genetic Testing  [facility]:  Negative for 81/81 genes    12/29/2022 - Chemotherapy course:  Pembrolizumab/Adriamycin/Cyclophosphamide every 2 weeks x 4, followed by weekly Taxol/Carbo x 12    02/15/2023 - Left Breast Ultrasound:  Hypoechoic solid lesion in left breast at 3:00 with biopsy marker which has decreased in size as described above.   Final assessment: ACR: BI-RADS -6:   Known biopsy-proven malignancy.   Management: Surgical excision when clinically appropriate. Likelihood of  cancer: N/A     02/23/2023 - 05/18/2023 - Chemotherapy course:  Discontinued weekly Taxol/Carbo after 6 cycles d/t neutropenia    06/06/2023 - Left Diagnostic Mammogram:  Left breast, BI-RADS 6, biopsy-proven malignant mass at 3:00. On current examination this mass measures 13 x 9 x 6 mm. At initial presentation in November 2022 this mass measured 17 x 16 x 9 mm. This imaging suggest slight to partial response to chemotherapy. Recommend appropriate clinical and surgical management.   Current ultrasound demonstrates no abnormal axillary lymph nodes.   Recommend appropriate clinical and surgical management.   Overall assessment, BI-RADS 6, biopsy-proven malignancy.     06/06/2023 - MRI Bilateral Breasts with and without contrast:  Left breast, BI-RADS 6, biopsy-proven malignancy in the left breast upper outer quadrant. MRI suggests response to therapy as the mass is no longer seen by MRI. Biopsy clip is present. Recommend appropriate clinical and surgical management.   Overall assessment BI-RADS 6, biopsy proven malignancy.     07/14/2023 - left breast lumpectomy and sentinel lymph node biopsy per :  Left breast, lumpectomy:   No malignancy identified.   Cystic area lined by histiocytes, chronic inflammation, and fibroconnective tissue consistent with organizing fat necrosis.   Benign ducts, fibrocystic change, and terminal lobular units.   Unremarkable epidermis.  Left breast additional medial margin:  No malignancy identified.   The surgical margins are free of tumor.   Left breast, additional lateral margin:   No malignancy identified.   The surgical margins are free of tumor.   Left breast, additional deep margin:   No malignancy identified.   The surgical margins are free of tumor.   Left axilla, sentinel lymph nodes:   3 benign lymph nodes.     07/21/2023 - 09/08/2023 - Chemotherapy course:  Weekly Taxol/Carbo x 6, continue Pembrolizumab every 6 weeks  Taxol dc'd due to persistent  neutropenia    08/21/2023 - Appointment with :  IMPRESSION:  Doing well s/p left partial mastectomy with left sentinel lymph node biopsy   PLAN:   I will see her back for a physical exam in 3 months     09/08/2023 - Appointment with Dr. Blackburn:  RTC with MD in treatment room 12 weeks  CBC, CMP, TSH T4 Free Cortisol today for toxicity assessment  CBC 2 weeks  Discontinue remaining weekly Taxol Carbo today  C#6/9 Pembrolizumab 400 mg every 6 weeks-next due 9/8/23  GCSF x3 days  Refer to Dr Tony/[facility] for evaluation for adjuvant radiation  Continue Zofran and Phenergan as needed  Continue Emla cream to port as needed  Recommend CT low dose chest yearly due to smoking history, after 12/15/23  Continue follow-up with Dr Sung Monk/[facility], 8/21/23  Continue follow up with Dr. Mcdaniel/[facility]-10/31/23  Continue Levothyroxine 100 mcg p.o. daily  Follow Up:  Return in about 12 weeks (around 12/1/2023) for Treatment & see  in [place] RM Keytruda.  Keytruda 6 weeks  CBC 2 weeks  GCSF Mon & Tues  CT low dose chest 12/15/23 or later    09/26/2023 - Consult with  (Covering for ):  She has verbalized understanding of our conversation and agrees to the treatment recommendations for postoperative radiation therapy to the left breast following Lumpectomy. I anticipate a dose of 4005 cGy to be given over 15 daily fractions (Monday through Friday). We will simulate treatment fields to begin the treatment planning, final dose to be determined.  We anticipate starting her radiotherapy until the week of October 2 to allow for additional chemo washout period.  Discussed post surgery and radiation risk for lymphedema, she has already been seen at the lymphedema clinic, she will continue ongoing management per primary care physician and other specialists. Thank you for allowing me to assist in her care.     10/03/2023 - 10/24/2023 - Completed radiation  course:  Received 4005 cGy in 15 fractions to the left breast via External Beam Radiation - EBRT.     11/22/2023 - Appointment with :  PLAN:   She would like her port out. She still has some Keytruda to get. They will contact me if we can get this done before the end of the year.  I will see her back in 3 months with bilateral mammograms     01/23/2024 - Appointment with :  RTC with MD in 4 months  CBC, CMP, TSH, T4, Cortisol pm today for toxicity assessment  C#9/9 Pembrolizumab 400 mg today  Continue Cymbalta 60mg daily  Continue Zofran and Phenergan as needed  Continue Emla cream to port as needed  Continue Levothyroxine 100 mcg p.o. daily  Recommend CT low dose chest yearly due to smoking history, after 12/15/23  Continue follow-up with Dr Sung Monk/[facility] for mammogram, 2/22/24  OK for port removal by Dr Monk  Continue follow up with Dr. Mcdaniel/[facility]  Continue follow up with Dr Tony/[facility]    02/09/2024 - Appointment with :  Follow up in one year    02/15/2024 - Bilateral mammogram and US right breast:  Probably benign right breast mass likely represents fat necrosis. Recommend follow up right breast ultrasound in 6 months.   BI-RADS 3:  Probably Benign.   RECOMMENDATION: Follow up breast ultrasound Right 6 months     02/28/2024 - Breast, right breast needle core biopsies at 9 o'clock position:   Refractile filamentous material consistent with suture with surrounding foreign body type response and fibrosis, negative for evidence of malignancy.     08/14/2024 - Bilateral Mammogram:  No mammographic evidence of malignancy.   BI-RADS 2:  Benign.   RECOMMENDATION: Diagnostic mammogram Bilateral 12 months     08/31/2024 - Appointment with Crow Faith PA-C:  Plan:  We will see her back in 6 months with left breast mammography     09/25/2024 - Appointment with :  PLAN:  RTC with FLAKITO Leahy in 6 months   CBC, CMP, TSH, T4,  Cortisol with MD appt  Increased Cymbalta 60mg to Cymbalta 90mg daily due to polyarthralgia   Recommend OTC Tylenol   Encouraged exercise to help with polyarthralgia   Consider referral to [facility]  Continue Levothyroxine 100 mcg p.o. daily  Repeat CT low dose chest yearly due to smoking history, after 5/13/25  Continue follow-up with Dr Sung Monk/[facility] for mammogram, 8/21/24  Continue follow up with Dr. Mcdaniel/[facility], 4/30/25  Continue follow up with Dr Tony/[facility], 2/7/25   Continue follow up with Vascular  Follow Up:  Return in about 6 months (around 3/25/2025) for CBC CMP TSH T4,Free and Cortisol, Appointment with FLAKITO Leahy.        History obtained from  PATIENT and CHART    PAST MEDICAL HISTORY  Past Medical History:   Diagnosis Date    Breast cancer     GERD (gastroesophageal reflux disease)       PAST SURGICAL HISTORY  Past Surgical History:   Procedure Laterality Date    BREAST AUGMENTATION      x3    BREAST BIOPSY Left 11/21/2022    BREAST LUMPECTOMY WITH SENTINEL NODE BIOPSY Left 07/14/2023    Dr. Monk    GALLBLADDER SURGERY      HYSTERECTOMY      PORTACATH PLACEMENT      TONSILLECTOMY      TOTAL HIP ARTHROPLASTY Right       FAMILY HISTORY  family history is not on file.    SOCIAL HISTORY  Social History     Tobacco Use    Smoking status: Every Day     Types: Cigarettes      ALLERGIES  Codeine, Ketorolac tromethamine, and Oxycodone-acetaminophen     MEDICATIONS  Current Outpatient Medications   Medication Sig Dispense Refill    atenolol (TENORMIN) 25 MG tablet Take 1 tablet by mouth Daily.      DULoxetine (CYMBALTA) 60 MG capsule Take 1 capsule by mouth Daily.      levothyroxine (SYNTHROID, LEVOTHROID) 100 MCG tablet Take 1 tablet by mouth Daily.      montelukast (SINGULAIR) 10 MG tablet Take 1 tablet by mouth Daily.      naloxone (NARCAN) 4 MG/0.1ML nasal spray Administer 1 spray into the nostril(s) as directed by provider As Needed.       "omeprazole (priLOSEC) 20 MG capsule Take 1 capsule by mouth Daily.      ondansetron (ZOFRAN) 4 MG tablet Take 1 tablet by mouth Every 6 (Six) Hours.      rOPINIRole (REQUIP) 0.5 MG tablet Take 1 tablet by mouth every night at bedtime.      vitamin D (ERGOCALCIFEROL) 1.25 MG (93342 UT) capsule capsule Take 1 capsule by mouth 1 (One) Time Per Week.       No current facility-administered medications for this visit.      The following portions of the patient's history were reviewed and updated as appropriate: allergies, current medications, past family history, past medical history, past social history, past surgical history and problem list.    REVIEW OF SYSTEMS  Review of Systems   Constitutional:  Positive for fatigue.   HENT: Negative.     Eyes:         Glasses    Respiratory: Negative.     Cardiovascular: Negative.    Gastrointestinal: Negative.    Endocrine: Negative.    Genitourinary: Negative.    Musculoskeletal:  Positive for myalgias (tenderness in the left breast).   Skin: Negative.    Allergic/Immunologic: Negative.    Neurological: Negative.    Hematological: Negative.    Psychiatric/Behavioral: Negative.       Implants       No active implants to display in this view.          PHYSICAL EXAM  VITAL SIGNS:   Vitals:    02/07/25 0947   BP: 102/66   Pulse: 64   SpO2: 98%   Weight: 61.5 kg (135 lb 8 oz)   Height: 175.3 cm (69\")   PainSc: 0-No pain      Physical Exam  Vitals reviewed.   Constitutional:       Appearance: Normal appearance.   HENT:      Head: Normocephalic.      Nose: Nose normal.   Eyes:      Pupils: Pupils are equal, round, and reactive to light.   Cardiovascular:      Rate and Rhythm: Normal rate and regular rhythm.      Pulses: Normal pulses.      Heart sounds: Normal heart sounds.   Pulmonary:      Effort: Pulmonary effort is normal. No respiratory distress.      Breath sounds: Normal breath sounds. No wheezing.   Abdominal:      General: Bowel sounds are normal.      Palpations: There is no " mass.   Musculoskeletal:         General: Normal range of motion.      Cervical back: Normal range of motion and neck supple. No tenderness.   Lymphadenopathy:      Cervical: No cervical adenopathy.   Skin:     General: Skin is warm and dry.      Capillary Refill: Capillary refill takes less than 2 seconds.   Neurological:      General: No focal deficit present.      Mental Status: She is alert and oriented to person, place, and time.      Motor: No weakness.   Psychiatric:         Mood and Affect: Mood normal.         Behavior: Behavior normal.       Performance Status: ECOG (0) Fully active, able to carry on all predisease performance without restriction    Clinical Quality Measures  - Pain Documented by Standardized Tool, FPS Polina Francis reports a pain score of 0. Given her pain assessment as noted, treatment options were discussed and the following options were decided upon as a follow-up plan to address the patient's pain: continuation of current treatment plan for pain.  Pain Medications               DULoxetine (CYMBALTA) 60 MG capsule Take 1 capsule by mouth Daily.          - Body Mass Index Screening and Follow-Up Plan  BMI is within normal parameters. No other follow-up for BMI required.    - Tobacco Use: Screening and Cessation Intervention  Social History    Tobacco Use      Smoking status: Every Day        Types: Cigarettes      Smokeless tobacco: Not on file  Smoking cessation information given in after visit summary.    - Advanced Care Planning Advance Care Planning   ACP discussion was held with the patient during this visit. Patient does not have an advance directive, information provided.    - PHQ-2 Depression Screening:  Little interest or pleasure in doing things? Not at all   Feeling down, depressed, or hopeless? Not at all   PHQ-2 Total Score 0     ASSESSMENT AND PLAN  1. History of left breast cancer    2. S/P lumpectomy, left breast    3. S/P lymph node biopsy    4. History of radiation  therapy    5. Current every day smoker      No orders of the defined types were placed in this encounter.    Recommendations:  Polina Francis is status post completion of adjuvant radiation therapy to the left breast and presents to our clinic today for follow up exam. Diagnosed with left breast triple negative infiltrating ductal carcinoma, grade 3, stage IIA (cT2 pN0 M0). She completed 4005 cGy in 15 fractions to the left breast on 10/24/2023.     We discussed expected post radiation long and short term effects, monthly self exams and NCCN surveillance recommendations. She is doing well and has no evidence of recurrent or metastatic disease today. Bilateral mammogram is due in August. She is planning to undergo revision/reconstruction per general surgery. Continue ongoing management with other physicians, will follow up with us in 1 year or sooner if needed.    Polina Francis  reports that she has been smoking cigarettes. She does not have any smokeless tobacco history on file. I have educated her on the risk of diseases from using tobacco products such as cancer, COPD, and heart disease. I spent >10 minutes counseling the patient.    Patient Instructions   1) return in 1 year    Return in about 1 year (around 2/7/2026).     Time Spent: I spent 44 minutes caring for Polina on this date of service. This time includes time spent by me in the following activities: preparing for the visit, reviewing tests, obtaining and/or reviewing a separately obtained history, performing a medically appropriate examination and/or evaluation, counseling and educating the patient/family/caregiver, ordering medications, tests, or procedures, referring and communicating with other health care professionals, documenting information in the medical record, independently interpreting results and communicating that information with the patient/family/caregiver, and care coordination.   Israel Gomez, APRN  02/07/2025

## 2025-02-06 ENCOUNTER — HOSPITAL ENCOUNTER (OUTPATIENT)
Dept: RADIATION ONCOLOGY | Facility: HOSPITAL | Age: 50
Setting detail: RADIATION/ONCOLOGY SERIES
End: 2025-02-06
Payer: COMMERCIAL

## 2025-02-06 ENCOUNTER — OFFICE VISIT (OUTPATIENT)
Dept: SURGERY | Age: 50
End: 2025-02-06
Payer: COMMERCIAL

## 2025-02-06 VITALS — HEART RATE: 64 BPM | OXYGEN SATURATION: 97 % | RESPIRATION RATE: 20 BRPM

## 2025-02-06 DIAGNOSIS — T85.44XA CAPSULAR CONTRACTURE OF LEFT BREAST IMPLANT: ICD-10-CM

## 2025-02-06 DIAGNOSIS — Z17.421 TRIPLE NEGATIVE BREAST CANCER (HCC): Primary | ICD-10-CM

## 2025-02-06 DIAGNOSIS — Z98.890 STATUS POST LEFT BREAST LUMPECTOMY: ICD-10-CM

## 2025-02-06 DIAGNOSIS — C50.919 TRIPLE NEGATIVE BREAST CANCER (HCC): Primary | ICD-10-CM

## 2025-02-06 PROCEDURE — 99213 OFFICE O/P EST LOW 20 MIN: CPT | Performed by: SURGERY

## 2025-02-07 ENCOUNTER — OFFICE VISIT (OUTPATIENT)
Age: 50
End: 2025-02-07
Payer: COMMERCIAL

## 2025-02-07 VITALS
SYSTOLIC BLOOD PRESSURE: 102 MMHG | BODY MASS INDEX: 20.07 KG/M2 | WEIGHT: 135.5 LBS | OXYGEN SATURATION: 98 % | DIASTOLIC BLOOD PRESSURE: 66 MMHG | HEIGHT: 69 IN | HEART RATE: 64 BPM

## 2025-02-07 DIAGNOSIS — Z98.890 S/P LYMPH NODE BIOPSY: ICD-10-CM

## 2025-02-07 DIAGNOSIS — Z92.3 HISTORY OF RADIATION THERAPY: ICD-10-CM

## 2025-02-07 DIAGNOSIS — F17.200 CURRENT EVERY DAY SMOKER: ICD-10-CM

## 2025-02-07 DIAGNOSIS — Z85.3 HISTORY OF LEFT BREAST CANCER: Primary | ICD-10-CM

## 2025-02-07 DIAGNOSIS — Z98.890 S/P LUMPECTOMY, LEFT BREAST: ICD-10-CM

## 2025-02-07 PROCEDURE — G0463 HOSPITAL OUTPT CLINIC VISIT: HCPCS | Performed by: RADIOLOGY

## 2025-02-10 DIAGNOSIS — E03.2 DRUG-INDUCED HYPOTHYROIDISM: ICD-10-CM

## 2025-02-10 PROBLEM — T85.44XA CAPSULAR CONTRACTURE OF LEFT BREAST IMPLANT: Status: ACTIVE | Noted: 2025-02-10

## 2025-02-10 RX ORDER — LEVOTHYROXINE SODIUM 100 UG/1
100 TABLET ORAL DAILY
Qty: 30 TABLET | Refills: 5 | Status: SHIPPED | OUTPATIENT
Start: 2025-02-10

## 2025-03-20 ENCOUNTER — TELEPHONE (OUTPATIENT)
Dept: HEMATOLOGY | Age: 50
End: 2025-03-20

## 2025-03-20 DIAGNOSIS — G89.18 JOINT PAIN FOLLOWING CHEMOTHERAPY: ICD-10-CM

## 2025-03-20 DIAGNOSIS — M25.50 JOINT PAIN FOLLOWING CHEMOTHERAPY: ICD-10-CM

## 2025-03-20 NOTE — TELEPHONE ENCOUNTER
I called patient and left detailed voicemail about their appointment on 03/25/2025. I made patient aware not to arrive any earlier than the appointment time and to come at the time of the follow up not the time of the lab appointment if it is different than the follow up appt time. I also made patient aware to eat a meal (Our labs are not fasting labs) and drink plenty of water to hydrate their veins properly before coming to these appointments because this will make their lab draw much easier. Made patient aware that we are now located at the [facility] at [address]. Located between [facility] and the [facility]. Front entrance faces [place].

## 2025-03-21 RX ORDER — DULOXETIN HYDROCHLORIDE 60 MG/1
60 CAPSULE, DELAYED RELEASE ORAL NIGHTLY
Qty: 30 CAPSULE | Refills: 0 | Status: SHIPPED | OUTPATIENT
Start: 2025-03-21

## 2025-03-21 SDOH — ECONOMIC STABILITY: FOOD INSECURITY: WITHIN THE PAST 12 MONTHS, YOU WORRIED THAT YOUR FOOD WOULD RUN OUT BEFORE YOU GOT MONEY TO BUY MORE.: NEVER TRUE

## 2025-03-21 SDOH — ECONOMIC STABILITY: TRANSPORTATION INSECURITY
IN THE PAST 12 MONTHS, HAS THE LACK OF TRANSPORTATION KEPT YOU FROM MEDICAL APPOINTMENTS OR FROM GETTING MEDICATIONS?: NO

## 2025-03-21 SDOH — ECONOMIC STABILITY: INCOME INSECURITY: IN THE LAST 12 MONTHS, WAS THERE A TIME WHEN YOU WERE NOT ABLE TO PAY THE MORTGAGE OR RENT ON TIME?: NO

## 2025-03-21 SDOH — ECONOMIC STABILITY: FOOD INSECURITY: WITHIN THE PAST 12 MONTHS, THE FOOD YOU BOUGHT JUST DIDN'T LAST AND YOU DIDN'T HAVE MONEY TO GET MORE.: NEVER TRUE

## 2025-03-21 ASSESSMENT — PATIENT HEALTH QUESTIONNAIRE - PHQ9
SUM OF ALL RESPONSES TO PHQ QUESTIONS 1-9: 4
10. IF YOU CHECKED OFF ANY PROBLEMS, HOW DIFFICULT HAVE THESE PROBLEMS MADE IT FOR YOU TO DO YOUR WORK, TAKE CARE OF THINGS AT HOME, OR GET ALONG WITH OTHER PEOPLE: NOT DIFFICULT AT ALL
1. LITTLE INTEREST OR PLEASURE IN DOING THINGS: NOT AT ALL
7. TROUBLE CONCENTRATING ON THINGS, SUCH AS READING THE NEWSPAPER OR WATCHING TELEVISION: SEVERAL DAYS
4. FEELING TIRED OR HAVING LITTLE ENERGY: SEVERAL DAYS
3. TROUBLE FALLING OR STAYING ASLEEP: SEVERAL DAYS
6. FEELING BAD ABOUT YOURSELF - OR THAT YOU ARE A FAILURE OR HAVE LET YOURSELF OR YOUR FAMILY DOWN: NOT AT ALL
1. LITTLE INTEREST OR PLEASURE IN DOING THINGS: NOT AT ALL
5. POOR APPETITE OR OVEREATING: NOT AT ALL
SUM OF ALL RESPONSES TO PHQ QUESTIONS 1-9: 4
2. FEELING DOWN, DEPRESSED OR HOPELESS: SEVERAL DAYS
8. MOVING OR SPEAKING SO SLOWLY THAT OTHER PEOPLE COULD HAVE NOTICED. OR THE OPPOSITE, BEING SO FIGETY OR RESTLESS THAT YOU HAVE BEEN MOVING AROUND A LOT MORE THAN USUAL: NOT AT ALL
7. TROUBLE CONCENTRATING ON THINGS, SUCH AS READING THE NEWSPAPER OR WATCHING TELEVISION: SEVERAL DAYS
10. IF YOU CHECKED OFF ANY PROBLEMS, HOW DIFFICULT HAVE THESE PROBLEMS MADE IT FOR YOU TO DO YOUR WORK, TAKE CARE OF THINGS AT HOME, OR GET ALONG WITH OTHER PEOPLE: NOT DIFFICULT AT ALL
8. MOVING OR SPEAKING SO SLOWLY THAT OTHER PEOPLE COULD HAVE NOTICED. OR THE OPPOSITE - BEING SO FIDGETY OR RESTLESS THAT YOU HAVE BEEN MOVING AROUND A LOT MORE THAN USUAL: NOT AT ALL
9. THOUGHTS THAT YOU WOULD BE BETTER OFF DEAD, OR OF HURTING YOURSELF: NOT AT ALL
9. THOUGHTS THAT YOU WOULD BE BETTER OFF DEAD, OR OF HURTING YOURSELF: NOT AT ALL
SUM OF ALL RESPONSES TO PHQ QUESTIONS 1-9: 4
SUM OF ALL RESPONSES TO PHQ QUESTIONS 1-9: 4
6. FEELING BAD ABOUT YOURSELF - OR THAT YOU ARE A FAILURE OR HAVE LET YOURSELF OR YOUR FAMILY DOWN: NOT AT ALL
2. FEELING DOWN, DEPRESSED OR HOPELESS: SEVERAL DAYS
SUM OF ALL RESPONSES TO PHQ QUESTIONS 1-9: 4
5. POOR APPETITE OR OVEREATING: NOT AT ALL
4. FEELING TIRED OR HAVING LITTLE ENERGY: SEVERAL DAYS
3. TROUBLE FALLING OR STAYING ASLEEP: SEVERAL DAYS

## 2025-03-24 ENCOUNTER — OFFICE VISIT (OUTPATIENT)
Dept: PRIMARY CARE CLINIC | Age: 50
End: 2025-03-24
Payer: COMMERCIAL

## 2025-03-24 ENCOUNTER — OFFICE VISIT (OUTPATIENT)
Dept: SURGERY | Age: 50
End: 2025-03-24
Payer: COMMERCIAL

## 2025-03-24 VITALS
HEART RATE: 63 BPM | OXYGEN SATURATION: 99 % | BODY MASS INDEX: 20.06 KG/M2 | DIASTOLIC BLOOD PRESSURE: 76 MMHG | WEIGHT: 135.4 LBS | SYSTOLIC BLOOD PRESSURE: 114 MMHG | HEIGHT: 69 IN

## 2025-03-24 VITALS — HEART RATE: 68 BPM | BODY MASS INDEX: 20.08 KG/M2 | HEIGHT: 69 IN | OXYGEN SATURATION: 96 % | WEIGHT: 135.6 LBS

## 2025-03-24 DIAGNOSIS — I10 ESSENTIAL HYPERTENSION: ICD-10-CM

## 2025-03-24 DIAGNOSIS — F33.1 MODERATE EPISODE OF RECURRENT MAJOR DEPRESSIVE DISORDER (HCC): ICD-10-CM

## 2025-03-24 DIAGNOSIS — Z85.3 PERSONAL HISTORY OF BREAST CANCER: Primary | ICD-10-CM

## 2025-03-24 DIAGNOSIS — F41.9 ANXIETY: Primary | ICD-10-CM

## 2025-03-24 DIAGNOSIS — E55.9 VITAMIN D DEFICIENCY: ICD-10-CM

## 2025-03-24 DIAGNOSIS — M25.50 ARTHRALGIA, UNSPECIFIED JOINT: ICD-10-CM

## 2025-03-24 DIAGNOSIS — G25.81 RESTLESS LEG SYNDROME: ICD-10-CM

## 2025-03-24 DIAGNOSIS — K21.9 GASTROESOPHAGEAL REFLUX DISEASE WITHOUT ESOPHAGITIS: ICD-10-CM

## 2025-03-24 PROCEDURE — 99214 OFFICE O/P EST MOD 30 MIN: CPT | Performed by: NURSE PRACTITIONER

## 2025-03-24 PROCEDURE — 3078F DIAST BP <80 MM HG: CPT | Performed by: NURSE PRACTITIONER

## 2025-03-24 PROCEDURE — 99213 OFFICE O/P EST LOW 20 MIN: CPT | Performed by: PHYSICIAN ASSISTANT

## 2025-03-24 PROCEDURE — 3074F SYST BP LT 130 MM HG: CPT | Performed by: NURSE PRACTITIONER

## 2025-03-24 RX ORDER — ROPINIROLE 1 MG/1
1 TABLET, FILM COATED ORAL 3 TIMES DAILY
Qty: 90 TABLET | Refills: 1 | Status: SHIPPED | OUTPATIENT
Start: 2025-03-24

## 2025-03-24 ASSESSMENT — ENCOUNTER SYMPTOMS
COUGH: 0
COLOR CHANGE: 0
SHORTNESS OF BREATH: 0
VOMITING: 0
NAUSEA: 0
VOICE CHANGE: 0
RHINORRHEA: 0
BACK PAIN: 0
PHOTOPHOBIA: 0

## 2025-03-24 NOTE — PROGRESS NOTES
03/25/2025 0.969  0.270 - 4.200 uIU/mL Final    Hemoglobin A1C 03/25/2025 5.5  4.0 - 5.6 % Final    Comment: Comment:  Diagnosis of Diabetes: > or = 6.5%  Increased risk of diabetes (Prediabetes): 5.7-6.4%  Glycemic Control: Nonpregnant Adults: <7.0%                    Pregnant: <6.0%      Cholesterol, Total 03/25/2025 178  0 - 199 mg/dL Final    Comment: <160 MG/DL=OPTIMAL    160-199 MG/DL= DESIRABLE    200-239 MG/DL=BORDERLINE-INCREASED RISK OF ATHEROSCLEROTIC  CARDIOVASCULAR DISEASE    > OR = 240 MG/DL-ASSOCIATED WITH AN INCREASED RISK OF  ATHROSCLEROTIC CARDIOVASCULAR DISEASE      Triglycerides 03/25/2025 66  0 - 149 mg/dL Final    HDL 03/25/2025 65 (H)  40 - 60 mg/dL Final    Comment: An HDL cholesterol less than 40 mg/dL is low and  constitutes a coronary heart disease risk factor.  An HDL cholesterol greater than 60 mg/dL is a  negative risk factor for coronary heart disease.      LDL Cholesterol 03/25/2025 100 (H)  <100 mg/dL Final    Comment: <100 MG/DL=OPITIMAL    100-129 MG/DL=DESIRABLE    130-159 MG/DL BORDERLINE=INCREASED RISK OF ATHEROSCLEROTIC  CARDIOVASCULAR DISEASE    > OR = 160 MG/DL=ASSOCIATED WITH AN INCREASE RISK OF  ATHEROSCLEROTIC CARDIOVASCULAR DISEASE      Vit D, 25-Hydroxy 03/25/2025 41.4  >=30 ng/mL Final    Comment: <=20 ng/mL.............Deficient  21-29 ng/mL...........Insufficient  >=30 ng/mL..........Sufficient     Orders Only on 03/25/2025   Component Date Value Ref Range Status    Vitamin B-12 03/25/2025 202 (L)  232 - 1245 pg/mL Final         2/7/2025 Patient was seen in office visit by WHITNEY Loja: No evidence of recurrent or metastatic disease. Follow up in one year.     3/24/2025 Patient was seen in office visit by Kenroy Ching PA-C: \"no suspicious masses in either breast.  There is no axillary or supraclavicular adenopathy. She has some radiation changes on the left and is developing a painful contracture in particular I do not feel anything worrisome on the right.\"

## 2025-03-24 NOTE — PROGRESS NOTES
[facility]  [address]  [address]  [place] [address]  Dept: [phone]  Dept Fax: [phone]  Loc: [phone]    Rayne Welch is a 49 y.o. female who presents today for her medical conditions/complaints as noted below.  Rayne Welch is c/o of 6 Month Follow-Up (Pt c/o bilateral leg pain/cramps.  Keeps her awake at night)        HPI:     HPI   Chief Complaint   Patient presents with    6 Month Follow-Up     Pt c/o bilateral leg pain/cramps.  Keeps her awake at night       Patient presents today for follow-up hypothyroidism, anxiety, GERD. She has follow-up with Dr. Henley tomorrow for breast cancer; she was diagnosed with triple negative breast cancer stage IIIb s/p neoadjuvant chemotherapy and radiation. She has completed all therapy and doing well. Mood is stable with Viibryd she reports.    She complains of worsening leg and feet cramps at night. She is taking ropinirole 1 mg nightly. She states cramps are severe and she is having to stretch multiple times throughout the night. She states she feels like she drinks plenty of water. Denies any new use of medications or supplements.       Past Medical History:   Diagnosis Date    Adverse effect of chemotherapy     Arthritis     Breast cancer (HCC) 11/2022    Triple negative breast cancer    Hypotension     Malignant neoplasm of overlapping sites of left breast (HCC) 11/22/2022    PVC (premature ventricular contraction)     Restless leg syndrome 07/16/2018    Thyroid disease     Uterine mass 06/17/2014    9x9 cm on CT scan      Past Surgical History:   Procedure Laterality Date    BREAST LUMPECTOMY Left 7/14/2023    LEFT LUMPECTOMY & SNB, PREOP & INTRAOP US GUIDED NEEDLE LOC, PEC BLOCK, BIOZORB, FLAPS & MARGINPROBE performed by Kj Cali MD at [facility] OR    BREAST RECONSTRUCTION  04/2019    BREAST SURGERY Bilateral 2010    silicone Memory Gel    CHOLECYSTECTOMY  06/24/2014    COLONOSCOPY N/A 03/15/2023

## 2025-03-24 NOTE — PROGRESS NOTES
single-lumen appearing silicone implants. In  the upper outer quadrant of the left breast at the known side of  malignancy, the previous mass is no longer seen. T2 bright nonanatomic  biopsy clip is evident. No suspicious MR abnormality identified in  either breast. On current study there are no abnormal axillary or  internal mammary chain lymph nodes. The visualized extent of  extramammary findings no significant abnormality is seen.    1. Left breast, BI-RADS 6, biopsy-proven malignancy in the left breast  upper outer quadrant. MRI suggests response to therapy as the mass is  no longer seen by MRI. Biopsy clip is present. Recommend appropriate  clinical and surgical management.  Overall assessment BI-RADS 6, biopsy proven malignancy.  Signed by Dr Tatiana Castelan      PATHOLOGY REVEALS:  FINAL DIAGNOSIS:     A.  Left breast, lumpectomy:   No malignancy identified.   Cystic area lined by histiocytes, chronic inflammation, and   fibroconnective tissue consistent with organizing fat necrosis.   Benign ducts, fibrocystic change, and terminal lobular units.   Unremarkable epidermis.     B.  Left breast additional medial margin no malignancy identified.   The surgical margins are free of tumor.     C.  Left breast, additional lateral margin:   No malignancy identified.   The surgical margins are free of tumor.     D.  Left breast, additional deep margin:   No malignancy identified.   The surgical margins are free of tumor.     E.  Left axilla, sentinel lymph nodes:   3 benign lymph nodes.     :  Patient Active Problem List    Diagnosis Date Noted    Triple negative breast cancer (HCC) 12/09/2022    Malignant neoplasm of overlapping sites of left breast (HCC) 11/22/2022    Capsular contracture of left breast implant 02/10/2025    Abnormal mammogram 03/18/2024    Status post left breast lumpectomy with SNB-7/14/2023 07/19/2023    Encounter for central line care 06/01/2023    Chemotherapy induced neutropenia 03/30/2023

## 2025-03-25 ENCOUNTER — OFFICE VISIT (OUTPATIENT)
Dept: HEMATOLOGY | Age: 50
End: 2025-03-25
Payer: COMMERCIAL

## 2025-03-25 ENCOUNTER — HOSPITAL ENCOUNTER (OUTPATIENT)
Dept: INFUSION THERAPY | Age: 50
Discharge: HOME | End: 2025-03-25
Payer: COMMERCIAL

## 2025-03-25 VITALS
SYSTOLIC BLOOD PRESSURE: 112 MMHG | HEIGHT: 69 IN | OXYGEN SATURATION: 99 % | DIASTOLIC BLOOD PRESSURE: 78 MMHG | TEMPERATURE: 97.6 F | WEIGHT: 135.3 LBS | BODY MASS INDEX: 20.04 KG/M2 | HEART RATE: 52 BPM

## 2025-03-25 DIAGNOSIS — G25.81 RESTLESS LEG SYNDROME: ICD-10-CM

## 2025-03-25 DIAGNOSIS — Z08 ENCOUNTER FOR FOLLOW-UP SURVEILLANCE OF BREAST CANCER: Primary | ICD-10-CM

## 2025-03-25 DIAGNOSIS — I10 ESSENTIAL HYPERTENSION: ICD-10-CM

## 2025-03-25 DIAGNOSIS — R53.83 OTHER FATIGUE: ICD-10-CM

## 2025-03-25 DIAGNOSIS — Z08 ENCOUNTER FOR FOLLOW-UP SURVEILLANCE OF BREAST CANCER: ICD-10-CM

## 2025-03-25 DIAGNOSIS — F33.1 MODERATE EPISODE OF RECURRENT MAJOR DEPRESSIVE DISORDER (HCC): ICD-10-CM

## 2025-03-25 DIAGNOSIS — M25.50 ARTHRALGIA, UNSPECIFIED JOINT: ICD-10-CM

## 2025-03-25 DIAGNOSIS — E55.9 VITAMIN D DEFICIENCY: ICD-10-CM

## 2025-03-25 DIAGNOSIS — F41.9 ANXIETY: ICD-10-CM

## 2025-03-25 DIAGNOSIS — Z85.3 ENCOUNTER FOR FOLLOW-UP SURVEILLANCE OF BREAST CANCER: ICD-10-CM

## 2025-03-25 DIAGNOSIS — D72.818 OTHER DECREASED WHITE BLOOD CELL (WBC) COUNT: ICD-10-CM

## 2025-03-25 DIAGNOSIS — D75.89 MACROCYTOSIS: ICD-10-CM

## 2025-03-25 DIAGNOSIS — Z85.3 HISTORY OF LEFT BREAST CANCER: ICD-10-CM

## 2025-03-25 DIAGNOSIS — E03.2 DRUG-INDUCED HYPOTHYROIDISM: ICD-10-CM

## 2025-03-25 DIAGNOSIS — Z85.3 ENCOUNTER FOR FOLLOW-UP SURVEILLANCE OF BREAST CANCER: Primary | ICD-10-CM

## 2025-03-25 DIAGNOSIS — K21.9 GASTROESOPHAGEAL REFLUX DISEASE WITHOUT ESOPHAGITIS: ICD-10-CM

## 2025-03-25 DIAGNOSIS — Z79.899 MEDICATION MANAGEMENT: ICD-10-CM

## 2025-03-25 DIAGNOSIS — E53.8 B12 DEFICIENCY: ICD-10-CM

## 2025-03-25 DIAGNOSIS — Z71.89 CARE PLAN DISCUSSED WITH PATIENT: ICD-10-CM

## 2025-03-25 DIAGNOSIS — Z72.0 TOBACCO USE: ICD-10-CM

## 2025-03-25 DIAGNOSIS — M25.50 POLYARTHRALGIA: ICD-10-CM

## 2025-03-25 LAB
25(OH)D3 SERPL-MCNC: 41.4 NG/ML
ALBUMIN SERPL-MCNC: 4.5 G/DL (ref 3.5–5.2)
ALP SERPL-CCNC: 85 U/L (ref 35–104)
ALT SERPL-CCNC: 12 U/L (ref 5–33)
ANION GAP SERPL CALCULATED.3IONS-SCNC: 13 MMOL/L (ref 7–19)
AST SERPL-CCNC: 26 U/L (ref 5–32)
BASOPHILS # BLD: 0.04 K/UL (ref 0–0.2)
BASOPHILS NFR BLD: 1 % (ref 0–1)
BILIRUB SERPL-MCNC: 0.3 MG/DL (ref 0–1.2)
BUN SERPL-MCNC: 14 MG/DL (ref 6–20)
CALCIUM SERPL-MCNC: 9.7 MG/DL (ref 8.6–10)
CHLORIDE SERPL-SCNC: 100 MMOL/L (ref 98–107)
CHOLEST SERPL-MCNC: 178 MG/DL (ref 0–199)
CO2 SERPL-SCNC: 26 MMOL/L (ref 22–29)
CORTIS AM PEAK SERPL-MCNC: 7.5 UG/DL (ref 4.8–19.5)
CREAT SERPL-MCNC: 0.9 MG/DL (ref 0.5–0.9)
EOSINOPHIL # BLD: 0.06 K/UL (ref 0–0.6)
EOSINOPHIL NFR BLD: 1.6 % (ref 0–5)
ERYTHROCYTE [DISTWIDTH] IN BLOOD BY AUTOMATED COUNT: 11.7 % (ref 11.5–14.5)
GLUCOSE SERPL-MCNC: 86 MG/DL (ref 70–99)
HBA1C MFR BLD: 5.5 % (ref 4–5.6)
HCT VFR BLD AUTO: 40.1 % (ref 37–47)
HCV AB SERPL QL IA: NORMAL
HDLC SERPL-MCNC: 65 MG/DL (ref 40–60)
HGB BLD-MCNC: 13.6 G/DL (ref 12–16)
HIV-1 P24 AG: NORMAL
HIV1+2 AB SERPLBLD QL IA.RAPID: NORMAL
LDLC SERPL CALC-MCNC: 100 MG/DL
LYMPHOCYTES # BLD: 1.93 K/UL (ref 1.1–4.5)
LYMPHOCYTES NFR BLD: 50.3 % (ref 20–40)
MCH RBC QN AUTO: 34.3 PG (ref 27–31)
MCHC RBC AUTO-ENTMCNC: 33.9 G/DL (ref 33–37)
MCV RBC AUTO: 101.3 FL (ref 81–99)
MONOCYTES # BLD: 0.21 K/UL (ref 0–0.9)
MONOCYTES NFR BLD: 5.5 % (ref 1–10)
NEUTROPHILS # BLD: 1.59 K/UL (ref 1.5–7.5)
NEUTS SEG NFR BLD: 41.3 % (ref 50–65)
PLATELET # BLD AUTO: 219 K/UL (ref 130–400)
PMV BLD AUTO: 8.8 FL (ref 9.4–12.3)
POTASSIUM SERPL-SCNC: 4 MMOL/L (ref 3.5–5.1)
PROT SERPL-MCNC: 7.7 G/DL (ref 6.4–8.3)
RBC # BLD AUTO: 3.96 M/UL (ref 4.2–5.4)
SODIUM SERPL-SCNC: 139 MMOL/L (ref 136–145)
T4 FREE SERPL-MCNC: 1.34 NG/DL (ref 0.93–1.7)
TRIGL SERPL-MCNC: 66 MG/DL (ref 0–149)
TSH SERPL DL<=0.005 MIU/L-ACNC: 0.96 UIU/ML (ref 0.27–4.2)
TSH SERPL DL<=0.005 MIU/L-ACNC: 0.97 UIU/ML (ref 0.27–4.2)
VIT B12 SERPL-MCNC: 202 PG/ML (ref 232–1245)
WBC # BLD AUTO: 3.84 K/UL (ref 4.8–10.8)

## 2025-03-25 PROCEDURE — 86803 HEPATITIS C AB TEST: CPT

## 2025-03-25 PROCEDURE — G2211 COMPLEX E/M VISIT ADD ON: HCPCS | Performed by: NURSE PRACTITIONER

## 2025-03-25 PROCEDURE — 83036 HEMOGLOBIN GLYCOSYLATED A1C: CPT

## 2025-03-25 PROCEDURE — 3078F DIAST BP <80 MM HG: CPT | Performed by: NURSE PRACTITIONER

## 2025-03-25 PROCEDURE — 82533 TOTAL CORTISOL: CPT

## 2025-03-25 PROCEDURE — 3074F SYST BP LT 130 MM HG: CPT | Performed by: NURSE PRACTITIONER

## 2025-03-25 PROCEDURE — 85025 COMPLETE CBC W/AUTO DIFF WBC: CPT

## 2025-03-25 PROCEDURE — 99213 OFFICE O/P EST LOW 20 MIN: CPT

## 2025-03-25 PROCEDURE — 99214 OFFICE O/P EST MOD 30 MIN: CPT | Performed by: NURSE PRACTITIONER

## 2025-03-25 PROCEDURE — 80053 COMPREHEN METABOLIC PANEL: CPT

## 2025-03-25 PROCEDURE — 82306 VITAMIN D 25 HYDROXY: CPT

## 2025-03-25 PROCEDURE — 36415 COLL VENOUS BLD VENIPUNCTURE: CPT

## 2025-03-25 PROCEDURE — 84443 ASSAY THYROID STIM HORMONE: CPT

## 2025-03-25 PROCEDURE — 87806 HIV AG W/HIV1&2 ANTB W/OPTIC: CPT

## 2025-03-25 PROCEDURE — 80061 LIPID PANEL: CPT

## 2025-03-25 PROCEDURE — 84439 ASSAY OF FREE THYROXINE: CPT

## 2025-03-26 ENCOUNTER — RESULTS FOLLOW-UP (OUTPATIENT)
Dept: PRIMARY CARE CLINIC | Age: 50
End: 2025-03-26

## 2025-03-31 ASSESSMENT — ENCOUNTER SYMPTOMS
VOMITING: 0
DIARRHEA: 0
SHORTNESS OF BREATH: 0
TROUBLE SWALLOWING: 0
SORE THROAT: 0
CONSTIPATION: 0
EYE ITCHING: 0
NAUSEA: 1
EYE DISCHARGE: 0
ABDOMINAL PAIN: 0
COUGH: 0
WHEEZING: 0

## 2025-04-30 DIAGNOSIS — M25.50 JOINT PAIN FOLLOWING CHEMOTHERAPY: ICD-10-CM

## 2025-04-30 DIAGNOSIS — G89.18 JOINT PAIN FOLLOWING CHEMOTHERAPY: ICD-10-CM

## 2025-04-30 RX ORDER — DULOXETIN HYDROCHLORIDE 60 MG/1
60 CAPSULE, DELAYED RELEASE ORAL NIGHTLY
Qty: 30 CAPSULE | Refills: 0 | Status: SHIPPED | OUTPATIENT
Start: 2025-04-30

## 2025-06-08 DIAGNOSIS — G89.18 JOINT PAIN FOLLOWING CHEMOTHERAPY: ICD-10-CM

## 2025-06-08 DIAGNOSIS — M25.50 JOINT PAIN FOLLOWING CHEMOTHERAPY: ICD-10-CM

## 2025-06-08 DIAGNOSIS — Z98.890 STATUS POST LEFT BREAST LUMPECTOMY: ICD-10-CM

## 2025-06-09 RX ORDER — DULOXETIN HYDROCHLORIDE 60 MG/1
60 CAPSULE, DELAYED RELEASE ORAL NIGHTLY
Qty: 30 CAPSULE | Refills: 0 | Status: SHIPPED | OUTPATIENT
Start: 2025-06-09

## 2025-06-10 RX ORDER — MONTELUKAST SODIUM 10 MG/1
10 TABLET ORAL NIGHTLY
Qty: 90 TABLET | Refills: 3 | Status: SHIPPED | OUTPATIENT
Start: 2025-06-10

## 2025-06-10 NOTE — TELEPHONE ENCOUNTER
Rayne Yuri called to request a refill on her medication.      Last office visit : 3/24/2025   Next office visit : 9/24/2025     Requested Prescriptions     Pending Prescriptions Disp Refills    tiZANidine (ZANAFLEX) 4 MG tablet [Pharmacy Med Name: TIZANIDINE 4MG TABLETS] 30 tablet 1     Sig: TAKE 1 TABLET BY MOUTH THREE TIMES DAILY AS NEEDED FOR LEG CRAMPS            Eliane Ambrosio MA

## 2025-07-03 DIAGNOSIS — G89.18 JOINT PAIN FOLLOWING CHEMOTHERAPY: ICD-10-CM

## 2025-07-03 DIAGNOSIS — M25.50 JOINT PAIN FOLLOWING CHEMOTHERAPY: ICD-10-CM

## 2025-07-03 RX ORDER — DULOXETIN HYDROCHLORIDE 60 MG/1
60 CAPSULE, DELAYED RELEASE ORAL NIGHTLY
Qty: 30 CAPSULE | Refills: 0 | Status: SHIPPED | OUTPATIENT
Start: 2025-07-03

## 2025-07-03 NOTE — TELEPHONE ENCOUNTER
Rayne Welch called to request a refill on her medication.      Last office visit : 3/24/2025   Next office visit : 9/24/2025     Requested Prescriptions     Pending Prescriptions Disp Refills    DULoxetine (CYMBALTA) 60 MG extended release capsule [Pharmacy Med Name: DULOXETINE DR 60MG CAPSULES] 30 capsule 0     Sig: TAKE 1 CAPSULE BY MOUTH EVERY NIGHT            April PRINCE Santana LPN

## 2025-08-18 RX ORDER — VILAZODONE HYDROCHLORIDE 20 MG/1
20 TABLET ORAL DAILY
Qty: 90 TABLET | Refills: 0 | Status: SHIPPED | OUTPATIENT
Start: 2025-08-18

## 2025-08-18 RX ORDER — ROPINIROLE 1 MG/1
1 TABLET, FILM COATED ORAL 3 TIMES DAILY
Qty: 90 TABLET | Refills: 1 | Status: SHIPPED | OUTPATIENT
Start: 2025-08-18

## (undated) DEVICE — PROBE DTECT MARGIN F/LUMPECTOMY

## (undated) DEVICE — SUTURE VCRL SZ 3-0 L27IN ABSRB UD L26MM SH 1/2 CIR J416H

## (undated) DEVICE — GLOVE SURG SZ 75 CRM LTX FREE POLYISOPRENE POLYMER BEAD ANTI

## (undated) DEVICE — STANDARD HYPODERMIC NEEDLE,POLYPROPYLENE HUB: Brand: MONOJECT

## (undated) DEVICE — CANNULA NSL AD L7FT DIV O2 CO2 W/ M LUERLOCK TRMPT CONN

## (undated) DEVICE — GOWN,PREVENTION PLUS,XL,ST,24/CS: Brand: MEDLINE

## (undated) DEVICE — SYRINGE MED 10ML TRNSLUC BRL PLUNG BLK MRK POLYPR CTRL

## (undated) DEVICE — GAUZE,SPONGE,FLUFF,6"X6.75",STRL,10/TRAY: Brand: MEDLINE

## (undated) DEVICE — C-ARM: Brand: UNBRANDED

## (undated) DEVICE — TOWEL,OR,DSP,ST,BLUE,DLX,4/PK,20PK/CS: Brand: MEDLINE

## (undated) DEVICE — SUTURE VICRYL + SZ 3-0 L27IN ABSRB UD L26MM SH 1/2 CIR VCP416H

## (undated) DEVICE — ENDO KIT: Brand: MEDLINE INDUSTRIES, INC.

## (undated) DEVICE — 3 ML SYRINGE WITH HYPODERMIC SAFETY NEEDLE: Brand: MAGELLAN

## (undated) DEVICE — BLANKET WRM W40.2XL55.9IN IORT LO BODY + MISTRAL AIR

## (undated) DEVICE — PATIENT RETURN ELECTRODE, SINGLE-USE, CONTACT QUALITY MONITORING, ADULT, WITH 9FT CORD, FOR PATIENTS WEIGING OVER 33LBS. (15KG): Brand: MEGADYNE

## (undated) DEVICE — SUTURE PERMAHAND SZ 2-0 L18IN NONABSORBABLE BLK L26MM SH C012D

## (undated) DEVICE — BITE BLOCK ENDOSCP AD 60 FR W/ ADJ STRP PLAS GRN BLOX

## (undated) DEVICE — SUTURE MNCRYL STRATAFIX PS 4-0 30CM

## (undated) DEVICE — DECANTER FLD 9IN ST BG FOR ASEP TRNSF OF FLD

## (undated) DEVICE — Device

## (undated) DEVICE — SUTURE VCRL SZ 2-0 L36IN ABSRB UD L36MM CT-1 1/2 CIR J945H

## (undated) DEVICE — MINOR CDS: Brand: MEDLINE INDUSTRIES, INC.

## (undated) DEVICE — COVER US PRB W5XL96IN LTX W/ GEL

## (undated) DEVICE — SYRINGE MED 10ML POLYPR LUERSLIP TIP FLAT TOP W/O SFTY DISP

## (undated) DEVICE — SNARE POLYP SM W13MMXL240CM SHTH DIA2.4MM OVL FLX DISP

## (undated) DEVICE — FORCEPS BX 240CM 2.4MM L NDL RAD JAW 4 M00513334

## (undated) DEVICE — PEN: MARKING STD 100/CS: Brand: MEDICAL ACTION INDUSTRIES

## (undated) DEVICE — SINGLE PORT MANIFOLD: Brand: NEPTUNE 2

## (undated) DEVICE — SOLUTION IV IRRIG POUR BRL 0.9% SODIUM CHL 2F7124

## (undated) DEVICE — NEPTUNE E-SEP SMOKE EVACUATION PENCIL, COATED, 70MM BLADE, PUSH BUTTON SWITCH: Brand: NEPTUNE E-SEP

## (undated) DEVICE — BLADE SURG NO11 C STL RETRCT DISPOSABLE

## (undated) DEVICE — ADHESIVE SKIN CLSR 0.7ML TOP DERMBND ADV

## (undated) DEVICE — GOWN,PREVENTION PLUS,2XL,ST,22/CS: Brand: MEDLINE

## (undated) DEVICE — 3M™ IOBAN™ 2 ANTIMICROBIAL INCISE DRAPE 6650EZ: Brand: IOBAN™ 2

## (undated) DEVICE — PACK,UNIVERSAL,NO GOWNS: Brand: MEDLINE

## (undated) DEVICE — Device: Brand: SPOT EX ENDOSCOPIC TATTOO

## (undated) DEVICE — SUTURE MCRYL SZ 4-0 L18IN ABSRB UD L19MM PS-2 3/8 CIR PRIM Y496G

## (undated) DEVICE — GOWN, ORBIS, XXLARGE, STERILE: Brand: MEDLINE

## (undated) DEVICE — 6 ML SYRINGE LUER-LOCK TIP: Brand: MONOJECT

## (undated) DEVICE — SET TRAY BULB SYRINGE IRRIG MINI 60ML

## (undated) DEVICE — ADHESIVE SKIN CLOSURE WND 8.661X1.5 IN 22 CM LIQUIBAND SECUR

## (undated) DEVICE — ENDO KIT,LOURDES HOSPITAL: Brand: MEDLINE INDUSTRIES, INC.

## (undated) DEVICE — LIQUIBAND RAPID ADHESIVE 36/CS 0.8ML: Brand: MEDLINE

## (undated) DEVICE — SYRINGE, LUER LOCK, 10ML: Brand: MEDLINE

## (undated) DEVICE — SPECIMEN ORIENTATION CHARMS, SIX DISTINCTLY SHAPED STERILE 10MM CHARMS: Brand: MARGINMAP

## (undated) DEVICE — SHEET,DRAPE,53X77,STERILE: Brand: MEDLINE

## (undated) DEVICE — KIT STD TEARWY INTRO PTFE 10 FR X 14 CM L PEELPRO